# Patient Record
Sex: FEMALE | Race: BLACK OR AFRICAN AMERICAN | NOT HISPANIC OR LATINO | Employment: OTHER | URBAN - METROPOLITAN AREA
[De-identification: names, ages, dates, MRNs, and addresses within clinical notes are randomized per-mention and may not be internally consistent; named-entity substitution may affect disease eponyms.]

---

## 2017-02-23 PROBLEM — E03.9 ACQUIRED HYPOTHYROIDISM: Status: ACTIVE | Noted: 2017-02-23

## 2017-11-13 PROBLEM — E03.9 ACQUIRED HYPOTHYROIDISM: Status: RESOLVED | Noted: 2017-02-23 | Resolved: 2017-11-13

## 2018-01-30 ENCOUNTER — TELEPHONE (OUTPATIENT)
Dept: ADMINISTRATIVE | Facility: HOSPITAL | Age: 73
End: 2018-01-30

## 2018-05-15 ENCOUNTER — HOSPITAL ENCOUNTER (OUTPATIENT)
Dept: RADIOLOGY | Facility: HOSPITAL | Age: 73
Discharge: HOME | End: 2018-05-15
Attending: FAMILY MEDICINE
Payer: MEDICARE

## 2018-05-15 DIAGNOSIS — N95.9 MENOPAUSAL AND PERIMENOPAUSAL DISORDER: ICD-10-CM

## 2018-05-15 DIAGNOSIS — Z00.00 HEALTHCARE MAINTENANCE: ICD-10-CM

## 2018-05-15 PROCEDURE — 77080 DXA BONE DENSITY AXIAL: CPT | Mod: TC,PO

## 2018-09-24 DIAGNOSIS — R92.8 ABNORMAL MAMMOGRAM: Primary | ICD-10-CM

## 2019-02-12 ENCOUNTER — TELEPHONE (OUTPATIENT)
Dept: ADMINISTRATIVE | Facility: HOSPITAL | Age: 74
End: 2019-02-12

## 2019-08-01 ENCOUNTER — TELEPHONE (OUTPATIENT)
Dept: ADMINISTRATIVE | Facility: HOSPITAL | Age: 74
End: 2019-08-01

## 2019-08-01 ENCOUNTER — PATIENT OUTREACH (OUTPATIENT)
Dept: ADMINISTRATIVE | Facility: HOSPITAL | Age: 74
End: 2019-08-01

## 2019-12-05 PROBLEM — Z12.11 SCREENING FOR COLON CANCER: Status: ACTIVE | Noted: 2019-12-05

## 2020-03-02 ENCOUNTER — TELEPHONE (OUTPATIENT)
Dept: ADMINISTRATIVE | Facility: HOSPITAL | Age: 75
End: 2020-03-02

## 2020-05-27 ENCOUNTER — TELEPHONE (OUTPATIENT)
Dept: ADMINISTRATIVE | Facility: HOSPITAL | Age: 75
End: 2020-05-27

## 2021-01-11 PROBLEM — Z90.710 S/P VAGINAL HYSTERECTOMY: Status: ACTIVE | Noted: 2021-01-11

## 2021-01-11 PROBLEM — N81.4 UTERINE PROLAPSE: Status: ACTIVE | Noted: 2021-01-11

## 2021-02-23 PROBLEM — N39.46 MIXED URINARY INCONTINENCE DUE TO FEMALE GENITAL PROLAPSE: Status: ACTIVE | Noted: 2021-02-23

## 2021-02-23 PROBLEM — N81.9 MIXED URINARY INCONTINENCE DUE TO FEMALE GENITAL PROLAPSE: Status: ACTIVE | Noted: 2021-02-23

## 2021-04-13 PROBLEM — G25.81 RLS (RESTLESS LEGS SYNDROME): Status: ACTIVE | Noted: 2021-04-13

## 2021-12-17 DIAGNOSIS — R92.8 ABNORMAL FINDING ON BREAST IMAGING: Primary | ICD-10-CM

## 2022-02-16 ENCOUNTER — PATIENT MESSAGE (OUTPATIENT)
Dept: ADMINISTRATIVE | Facility: HOSPITAL | Age: 77
End: 2022-02-16
Payer: MEDICARE

## 2022-04-19 ENCOUNTER — HOSPITAL ENCOUNTER (OUTPATIENT)
Dept: RADIOLOGY | Facility: HOSPITAL | Age: 77
Discharge: HOME | End: 2022-04-19
Payer: MEDICARE

## 2022-04-19 DIAGNOSIS — C50.912 INVASIVE LOBULAR CARCINOMA OF LEFT BREAST IN FEMALE: ICD-10-CM

## 2022-05-03 ENCOUNTER — HOSPITAL ENCOUNTER (OUTPATIENT)
Dept: RADIOLOGY | Facility: HOSPITAL | Age: 77
Discharge: HOME | End: 2022-05-03
Payer: MEDICARE

## 2022-05-03 PROCEDURE — 77049 MRI BREAST W/WO CONTRAST, W/CAD, BILATERAL: ICD-10-PCS | Mod: 26,,, | Performed by: RADIOLOGY

## 2022-05-03 PROCEDURE — 77049 MRI BREAST C-+ W/CAD BI: CPT | Mod: TC

## 2022-05-03 PROCEDURE — 25500020 PHARM REV CODE 255

## 2022-05-03 PROCEDURE — A9577 INJ MULTIHANCE: HCPCS

## 2022-05-03 PROCEDURE — 77049 MRI BREAST C-+ W/CAD BI: CPT | Mod: 26,,, | Performed by: RADIOLOGY

## 2022-05-03 RX ADMIN — GADOBENATE DIMEGLUMINE 20 ML: 529 INJECTION, SOLUTION INTRAVENOUS at 07:05

## 2022-05-06 PROBLEM — C50.912 LOBULAR CARCINOMA OF LEFT BREAST: Status: ACTIVE | Noted: 2022-05-06

## 2022-05-06 PROBLEM — C50.112 MALIGNANT NEOPLASM OF CENTRAL PORTION OF LEFT FEMALE BREAST: Status: ACTIVE | Noted: 2022-05-06

## 2022-05-13 ENCOUNTER — HOSPITAL ENCOUNTER (OUTPATIENT)
Dept: RADIOLOGY | Facility: HOSPITAL | Age: 77
Discharge: HOME | End: 2022-05-13
Attending: SURGERY
Payer: MEDICARE

## 2022-05-13 DIAGNOSIS — C50.912 LOBULAR CARCINOMA OF LEFT BREAST: ICD-10-CM

## 2022-05-13 DIAGNOSIS — Z17.0 MALIGNANT NEOPLASM OF CENTRAL PORTION OF LEFT BREAST IN FEMALE, ESTROGEN RECEPTOR POSITIVE: ICD-10-CM

## 2022-05-13 DIAGNOSIS — C50.112 MALIGNANT NEOPLASM OF CENTRAL PORTION OF LEFT BREAST IN FEMALE, ESTROGEN RECEPTOR POSITIVE: ICD-10-CM

## 2022-05-13 DIAGNOSIS — D48.7 NEOPLASM OF UNCERTAIN BEHAVIOR OF OTHER SPECIFIED SITES: ICD-10-CM

## 2022-05-13 PROCEDURE — 25500020 PHARM REV CODE 255: Performed by: SURGERY

## 2022-05-13 PROCEDURE — A9698 NON-RAD CONTRAST MATERIALNOC: HCPCS | Performed by: SURGERY

## 2022-05-13 PROCEDURE — 78815 PET IMAGE W/CT SKULL-THIGH: CPT | Mod: 26,PS,, | Performed by: RADIOLOGY

## 2022-05-13 PROCEDURE — 78815 NM PET CT ROUTINE: ICD-10-PCS | Mod: 26,PS,, | Performed by: RADIOLOGY

## 2022-05-13 PROCEDURE — 78815 PET IMAGE W/CT SKULL-THIGH: CPT | Mod: TC

## 2022-05-13 RX ADMIN — IOHEXOL 500 ML: 9 SOLUTION ORAL at 03:05

## 2022-05-16 LAB
POCT GLUCOSE: 44 MG/DL (ref 70–110)
POCT GLUCOSE: 97 MG/DL (ref 70–110)

## 2022-05-23 ENCOUNTER — TELEPHONE (OUTPATIENT)
Dept: HEMATOLOGY/ONCOLOGY | Facility: CLINIC | Age: 77
End: 2022-05-23
Payer: MEDICARE

## 2022-05-23 NOTE — TELEPHONE ENCOUNTER
Received message to assist pt with medical oncology appt here at main campus as pt lives closer. Called & spoke with pt, she will see Dr. Lesia Zavala this Friday afternoon. Reviewed location & confirmed appt. Provided my direct number should she need anything in the interim.

## 2022-05-27 ENCOUNTER — OFFICE VISIT (OUTPATIENT)
Dept: HEMATOLOGY/ONCOLOGY | Facility: CLINIC | Age: 77
End: 2022-05-27
Payer: MEDICARE

## 2022-05-27 VITALS
HEART RATE: 62 BPM | RESPIRATION RATE: 18 BRPM | DIASTOLIC BLOOD PRESSURE: 63 MMHG | OXYGEN SATURATION: 95 % | HEIGHT: 67 IN | SYSTOLIC BLOOD PRESSURE: 139 MMHG | WEIGHT: 219.44 LBS | TEMPERATURE: 98 F | BODY MASS INDEX: 34.44 KG/M2

## 2022-05-27 DIAGNOSIS — Z17.0 MALIGNANT NEOPLASM OF CENTRAL PORTION OF LEFT BREAST IN FEMALE, ESTROGEN RECEPTOR POSITIVE: ICD-10-CM

## 2022-05-27 DIAGNOSIS — C50.912 LOBULAR CARCINOMA OF LEFT BREAST: Primary | ICD-10-CM

## 2022-05-27 DIAGNOSIS — C50.112 MALIGNANT NEOPLASM OF CENTRAL PORTION OF LEFT BREAST IN FEMALE, ESTROGEN RECEPTOR POSITIVE: ICD-10-CM

## 2022-05-27 PROCEDURE — 99999 PR PBB SHADOW E&M-EST. PATIENT-LVL IV: ICD-10-PCS | Mod: PBBFAC,,, | Performed by: INTERNAL MEDICINE

## 2022-05-27 PROCEDURE — 99999 PR PBB SHADOW E&M-EST. PATIENT-LVL IV: CPT | Mod: PBBFAC,,, | Performed by: INTERNAL MEDICINE

## 2022-05-27 PROCEDURE — 99205 OFFICE O/P NEW HI 60 MIN: CPT | Mod: S$PBB,,, | Performed by: INTERNAL MEDICINE

## 2022-05-27 PROCEDURE — 99214 OFFICE O/P EST MOD 30 MIN: CPT | Mod: PBBFAC | Performed by: INTERNAL MEDICINE

## 2022-05-27 PROCEDURE — 99205 PR OFFICE/OUTPT VISIT, NEW, LEVL V, 60-74 MIN: ICD-10-PCS | Mod: S$PBB,,, | Performed by: INTERNAL MEDICINE

## 2022-05-27 NOTE — PROGRESS NOTES
CONSULT NOTE    Subjective:       Patient ID: Kee Ramirez is a 76 y.o. female.  MRN: [id number]  : 1945    Chief Complaint: No chief complaint on file.      History of Present Illness:   Kee Ramirez is a 76 y.o. female who is referred for newly diagnosed ILC of the left breast.     Reports yearly mammograms, normal in . In  screening mammogram showed left breast architectural distortion. Diagnostic mammogram in March showed a 21 x 9  X 2 0 mm left breast mass. US guided biopsy showed ILC, ER strongly positive, NM 15% positive, Her 2 rhina negative.   A breast MRI showed a 7.6 x 4.7 cm mass with spiculated margins.No abnormal lymph nodes were found.     No history of breast mass or biopsies. She has been seeing Dr. Ortiz and is transferring care to use due to proximity. See full oncology history below.   She is s/p port placement and is here to discuss neoadjuvant chemotherapy.     Gyn history:  Menarche 14  , Age at first live birth 25  Menopause at early 60's   OCP use for 4 years.   HRT Premarin, brief, 3 months after hysterectomy in  for uterine prolapse.     Colonoscopy done 2019, repeat due .     FH:  Breast cancer younger sister in her 60's  Another sister had gall bladder cancer was older than 50      Oncology History:  As dcoumented by  and updated     2022 Left Breast, Core Needle Biopsies:   - Invasive lobular carcinoma, well-differentiated .   - Bloom-Damon score (5/9):        - Tubular Formation: 3/3        - Nuclear Pleomorphism: 1/3        - Mitotic Activity: 1/3.   - Background of lobular carinoma in-situ (LCIS), nuclear grade 1.   - No perineural or lymphovascular invasion is identified.   Estrogen receptor: Positive (strong intensity, >95% of tumor cell nuclei).   Progesterone receptor: Positive (strong intensity, 15% of tumor cell nuclei).   HER2 IHC: Negative.   Ki-67: 10%.  2022 [facility] --  Surgery FU w/Oncotype  04.06.2022  follow up  -Breast MRI for staging, discussed surgery options; pt would like BCT, will make definitive plan after MRI  05.03.2022 Breast MRI  -Irregular 7.6 cm enhancing spiculated mass in the upper LEFT breast, consistent with the patient's known biopsy-proven invasive lobular carcinoma. The enhancement does extend to the nipple-areolar complex.  -No suspicious abnormality in the RIGHT breast.   BI-RADS Category: Overall: 6 - known biopsy-proven malignancy  05.06.2022 Follow up with [facility]  -Plan for Axillary U/S to assess LAD, PetCT  -HemOnc referral for NA therapy. Due to size of mass/location and nature of lobular cancer, was not felt that BCT was optimal unless NA therapy was done 1st   -2 week follow up to discuss further   05.13.2022 PetCT  -Mildly hypermetabolic irregular soft tissue within the left breast in keeping with known lobular carcinoma. This mass is better appreciated on MRI of the breast.  -No definite evidence of metastatic disease.  Please note that FDG PET has has suboptimal sensitivity for certain histologies such as lobular and tubular carcinomas.  05.18.2022 Initial [facility] eval    History:  Past Medical History:   Diagnosis Date    Cancer     Diabetes mellitus, type 2     Hyperlipidemia     Hypertension     Midline low back pain without sciatica 07/31/2015    Thyroid disease     Venous stasis     bilateral legs      Past Surgical History:   Procedure Laterality Date    COLONOSCOPY      COLONOSCOPY N/A 12/5/2019    Procedure: COLONOSCOPY;  Surgeon: Luis Bogran-Reyes, MD;  Location: [facility];  Service: Endoscopy;  Laterality: N/A;    INSERTION OF TUNNELED CENTRAL VENOUS CATHETER (CVC) WITH SUBCUTANEOUS PORT Right 5/24/2022    Procedure: KONMLLAUK-TFIN-O-CATH;  Surgeon: Darien Bear MD;  Location: [facility];  Service: General;  Laterality: Right;    SHOULDER ARTHROSCOPY W/ ROTATOR CUFF REPAIR Right 2001    TUBAL LIGATION      VAGINAL HYSTERECTOMY  "N/A 1/11/2021    Procedure: HYSTERECTOMY, VAGINAL ;  Surgeon: Jessie Dacosta MD;  Location: [facility];  Service: OB/GYN;  Laterality: N/A;     Family History   Problem Relation Age of Onset    Diabetes Mother     Hypertension Mother     Arthritis Mother     COPD Father     Hypertension Sister     Diabetes Sister     Cancer Sister         PANCREATIC      Social History     Tobacco Use    Smoking status: Never Smoker    Smokeless tobacco: Never Used   Substance and Sexual Activity    Alcohol use: No     Alcohol/week: 0.0 standard drinks    Drug use: No    Sexual activity: Not Currently     Partners: Male     Birth control/protection: None, See Surgical Hx        ROS:   Review of Systems   Constitutional: Negative for fever, malaise/fatigue and weight loss.   HENT: Negative for congestion, ear pain, nosebleeds and sore throat.    Eyes: Negative for blurred vision, double vision and photophobia.   Respiratory: Negative for cough, hemoptysis, sputum production, shortness of breath, wheezing and stridor.    Cardiovascular: Negative for chest pain, palpitations, orthopnea and leg swelling.   Gastrointestinal: Negative for abdominal pain, blood in stool, constipation, diarrhea, heartburn, melena, nausea and vomiting.   Genitourinary: Negative for dysuria, frequency and hematuria.   Musculoskeletal: Negative for back pain, myalgias and neck pain.   Skin: Negative for itching and rash.   Neurological: Negative for dizziness, focal weakness, seizures, weakness and headaches.   Endo/Heme/Allergies: Negative for polydipsia. Does not bruise/bleed easily.   Psychiatric/Behavioral: Negative for depression and memory loss. The patient is nervous/anxious. The patient does not have insomnia.         Objective:     Vitals:    05/27/22 1533   BP: 139/63   Pulse: 62   Resp: 18   Temp: 98 °F (36.7 °C)   TempSrc: Oral   SpO2: 95%   Weight: 99.6 kg (219 lb 7.5 oz)   Height: 5' 7" (1.702 m)   PainSc: 0-No pain       Physical " Examination:   Physical Exam  Vitals and nursing note reviewed.   Constitutional:       General: She is not in acute distress.     Appearance: She is not diaphoretic.   HENT:      Head: Normocephalic.      Mouth/Throat:      Pharynx: No oropharyngeal exudate.   Eyes:      General: No scleral icterus.     Conjunctiva/sclera: Conjunctivae normal.   Neck:      Thyroid: No thyromegaly.   Cardiovascular:      Rate and Rhythm: Normal rate and regular rhythm.      Heart sounds: Normal heart sounds. No murmur heard.  Pulmonary:      Effort: Pulmonary effort is normal. No respiratory distress.      Breath sounds: No stridor. No wheezing or rales.   Chest:      Chest wall: No tenderness.   Abdominal:      General: Bowel sounds are normal. There is no distension.      Palpations: Abdomen is soft. There is no mass.      Tenderness: There is no abdominal tenderness. There is no rebound.   Musculoskeletal:         General: No tenderness or deformity. Normal range of motion.      Cervical back: Neck supple.   Lymphadenopathy:      Cervical: No cervical adenopathy.   Skin:     General: Skin is warm and dry.      Findings: No erythema or rash.      Comments: + left breat mass at 12 0 clock, 7-8 cm, firmness felt just under the Nipple as well with some flattening of the nipple. No obvious extension to the skin   Neurological:      Mental Status: She is alert and oriented to person, place, and time.      Cranial Nerves: No cranial nerve deficit.      Coordination: Coordination normal.      Gait: Gait is intact.   Psychiatric:         Mood and Affect: Affect normal.         Cognition and Memory: Memory normal.         Judgment: Judgment normal.          Diagnostic Tests:  Significant Imaging: I have reviewed and interpreted all pertinent imaging results/findings.        Laboratory Data:  All pertinent labs have been reviewed.    Labs:   Lab Results   Component Value Date    WBC 6.98 03/04/2022    HGB 10.6 (L) 03/04/2022    HCT 33.8 (L)  03/04/2022    MCV 94 03/04/2022     03/04/2022       Assessment/Plan:   Lobular carcinoma of left breast  Malignant neoplasm of central portion of left breast in female, estrogen receptor positive  cT3N0, ER 95%, PR15%, Her 2 rhina negative, Grade 1, ki 67 10%     She is interested in breast conservation and is not a candidate for upfront lumpectomy given tumor size and extension to the nipple areolar complex.  I will refer her to breast Oncology for an opinion at this time as she will be undergoing surgery with us.    Neoadjuvant chemotherapy has been planned, she is status post Port-A-Cath placement.  We did discuss that given favorable grade, on more than receptor-positive status and lobular histology of her breast cancer, we may not see a robust response to neoadjuvant chemotherapy.  She does want to undergo chemotherapy to try, otherwise she will agree to mastectomy if she has a suboptimal response to neoadjuvant chemotherapy.    She has a good performance status, as well-controlled hypertension and diabetes that will be monitor closely throughout chemo.  I will obtain a baseline 2D echo and offer her AC-T if normal.  Otherwise, docetaxel and Cytoxan would be preferred.    I will see her back in a week to finalize our plans.     ECOG SCORE    1 - Restricted in strenuous activity-ambulatory and able to carry out work of a light nature           Discussion:   No follow-ups on file.    Plan was discussed with the patient at length, and she verbalized understanding. Kee was given an opportunity to ask questions that were answered to her satisfaction, and she was advised to call in the interval if any problems or questions arise.    Electronically signed by Mitali Carlin MD          Answers for HPI/ROS submitted by the patient on 5/25/2022  appetite change : No  unexpected weight change: No  mouth sores: No  visual disturbance: No  adenopathy: No    Route Chart for Scheduling    Med Onc Chart  Routing      Follow up with physician . See me 6/7 with labs    Follow up with KAYLYN    Labs CMP and CBC   Lab interval:     Imaging    Pharmacy appointment    Other referrals          Treatment Plan Information   OP BREAST DOSE-DENSE AC-T (DOXORUBICIN CYCLOPHOSPHAMIDE Q2W FOLLOWED BY PACLITAXEL WEEKLY)   Mitali Carlin MD   Upcoming Treatment Dates - OP BREAST DOSE-DENSE AC-T (DOXORUBICIN CYCLOPHOSPHAMIDE Q2W FOLLOWED BY PACLITAXEL WEEKLY)    5/27/2022       Chemotherapy       DOXOrubicin chemo injection 130 mg       cyclophosphamide 600 mg/m2 = 1,300 mg in sodium chloride 0.9% 250 mL chemo infusion  6/10/2022       Chemotherapy       DOXOrubicin chemo injection 130 mg       cyclophosphamide 600 mg/m2 = 1,300 mg in sodium chloride 0.9% 250 mL chemo infusion  6/24/2022       Chemotherapy       DOXOrubicin chemo injection 130 mg       cyclophosphamide 600 mg/m2 = 1,300 mg in sodium chloride 0.9% 250 mL chemo infusion  7/8/2022       Chemotherapy       DOXOrubicin chemo injection 130 mg       cyclophosphamide 600 mg/m2 = 1,300 mg in sodium chloride 0.9% 250 mL chemo infusion

## 2022-05-27 NOTE — PLAN OF CARE
START ON PATHWAY REGIMEN - Breast    ICD673        Doxorubicin (Adriamycin)       Cyclophosphamide (Cytoxan)       Pegfilgrastim-xxxx       Paclitaxel (Taxol)           Additional Orders: Assess LVEF prior to initiation of doxorubicin and as   clinically indicated during and after treatment. Doxorubicin can cause   myocardial damage, including acute left ventricular failure. Risk of   cardiomyopathy is generally proportional to cumulative   anthracycline/anthracenedione exposure. Use of concomitant cardiotoxic agents,   previous radiotherapy to the mediastinum, or presence/history of cardiovascular   disease can additionally increase cardiomyopathy risk. See PI for details.    **Always confirm dose/schedule in your pharmacy ordering system**    Patient Characteristics:  Preoperative or Nonsurgical Candidate (Clinical Staging), Neoadjuvant Therapy   followed by Surgery, Invasive Disease, Chemotherapy, HER2   Negative/Unknown/Equivocal, ER Positive  Therapeutic Status: Preoperative or Nonsurgical Candidate (Clinical Staging)  AJCC M Category: cM0  AJCC Grade: G1  Breast Surgical Plan: Neoadjuvant Therapy followed by Surgery  ER Status: Positive (+)  AJCC 8 Stage Grouping: IIA  HER2 Status: Negative (-)  AJCC T Category: cT3  AJCC N Category: cN0  NH Status: Positive (+)  Intent of Therapy:  Curative Intent, Discussed with Patient

## 2022-05-30 ENCOUNTER — TELEPHONE (OUTPATIENT)
Dept: HEMATOLOGY/ONCOLOGY | Facility: CLINIC | Age: 77
End: 2022-05-30
Payer: MEDICARE

## 2022-05-30 NOTE — NURSING
Oncology Navigation   Intake  Date of Diagnosis: 3/21/2022  Cancer Type: Breast  Internal / External Referral: Internal  Date Worked: 5/30/2022  First Appointment Available: 5/27/2022  Appointment Date: 5/27/2022  First Available Date vs. Scheduled Date (days): 0     Treatment  Current Status: Active    Surgery: Planned  Surgical Oncologist: Dr.Amy Valente  Consult Date: 6/7/2022    Medical Oncologist: Dr.Rabia Carlin  Consult Date: 5/27/2022  Chemotherapy: Planned       Procedures: Biopsy; MRI; Echo; PET scan; Port / PICC  Biopsy Schedule Date: 3/21/2022  Echo Schedule Date: 6/6/2022  MRI Schedule Date: 5/3/2022  PET Scan Schedule Date: 5/13/2022  Port / PICC Schedule Date: 5/18/2022       ER: Positive  DE: Positive  Her2: Negative           Acuity      Follow Up  No follow-ups on file.

## 2022-05-30 NOTE — TELEPHONE ENCOUNTER
Per , pt needs appt with breast surgery prior to starting neoadjuvant chemo. Scheduled with  next Tuesday. Attempted to call & review, no answer, left voicemail.

## 2022-06-01 ENCOUNTER — TELEPHONE (OUTPATIENT)
Dept: HEMATOLOGY/ONCOLOGY | Facility: CLINIC | Age: 77
End: 2022-06-01
Payer: MEDICARE

## 2022-06-05 NOTE — PROGRESS NOTES
Breast Surgery  [facility]  Department of Surgery      REFERRING PROVIDER: Mitali Carlin MD  [address] [place] [address]    Chief Complaint: Breast Cancer (NP Breast Cancer)      Subjective:      Patient ID: Kee Ramirez is a 76 y.o. female with HTN, T2DM, hypothyroidism who presents with invasive lobular carcinoma of the left breast. She initially underwent screening mammogram on 12/15/21 which showed architectural distortion in the upper outer quadrant of the left breast and a focal asymmetry in the upper central portion of the right breast.    Follow-up mammogram (3/4/22) and ultrasound (3/4/22) showed 21 mm x 9 mm x 20 mm irregularly shaped, hypoechoic mass with indistinct margins in the upper central portion of the left breast and a 4 mm simple cyst seen in the inner central region of the right breast. A ultrasound guided biopsy was performed on 3/21/22 with pathology revealing infiltrating lobular carcinoma of the breast. She underwent MRI on 5/3/22 which showed 7.6 cm (anterior-posterior) x 4.7 cm (transverse) x 3.9 cm (superior-inferior) irregular heterogeneously enhancing mass with spiculated margins in the upper left breast at anterior-middle depth that extends to the NAC. She presented to [facility] and was evaluated by the surgery clinic there. At that time, she was desiring breast conserving surgery. She was not a candidate for that at the time and was referred to oncology for neoadjuvant therapy. She underwent PET scan which did not show evidence of metastatic disease. She has had her port placed on 5/24 with plans to start chemotherapy on 6/10. She sees Dr. Carlin today. She has not had genetic testing. She presents today to establish care at [facility] as it is closer to her home.     Patient does not routinely do self breast exams.  Patient has noted a change on breast exam.  Patient denies nipple discharge. Patient denies to previous breast biopsy. Patient denies a  personal history of breast cancer.    Findings at that time were the following:   Tumor size: 2.1 cm   Tumor ndgndrndanddndend:nd nd2nd Estrogen Receptor: positive  Progesterone Receptor: positive   Her-2 rhina: negative   Lymph node status: clinically negative   Lymphatic invasion: none identified     GYN History:  Age of menarche was 14. Age of menopause was 62.  Last menstrual period was around age 62. Patient denies hormonal therapy. Patient is . Age of first live birth was 25. Patient did not breast feed.      Family History:  Sister - breast cancer diagnosed in her 60s    Past Medical History:   Diagnosis Date    Cancer     Diabetes mellitus, type 2     Hyperlipidemia     Hypertension     Midline low back pain without sciatica 2015    Thyroid disease     Venous stasis     bilateral legs     Past Surgical History:   Procedure Laterality Date    COLONOSCOPY      COLONOSCOPY N/A 2019    Procedure: COLONOSCOPY;  Surgeon: Luis Bogran-Reyes, MD;  Location: Pending sale to [facility];  Service: Endoscopy;  Laterality: N/A;    INSERTION OF TUNNELED CENTRAL VENOUS CATHETER (CVC) WITH SUBCUTANEOUS PORT Right 2022    Procedure: [id number];  Surgeon: Darien Bear MD;  Location: [facility];  Service: General;  Laterality: Right;    SHOULDER ARTHROSCOPY W/ ROTATOR CUFF REPAIR Right     TUBAL LIGATION      VAGINAL HYSTERECTOMY N/A 2021    Procedure: HYSTERECTOMY, VAGINAL ;  Surgeon: Jessie Dacosta MD;  Location: [facility];  Service: OB/GYN;  Laterality: N/A;     Current Outpatient Medications on File Prior to Visit   Medication Sig Dispense Refill    amLODIPine (NORVASC) 10 MG tablet Take 1 tablet (10 mg total) by mouth once daily. 90 tablet 3    aspirin (ECOTRIN) 81 MG EC tablet Take 81 mg by mouth once daily.      cloNIDine (CATAPRES) 0.1 MG tablet TAKE 1 TABLET BY MOUTH THREE TIMES DAILY 90 tablet 11    gabapentin (NEURONTIN) 300 MG capsule Take 1 capsule (300 mg total) by mouth 3 (three) times daily.  90 capsule 5    ibuprofen (ADVIL,MOTRIN) 800 MG tablet Take 1 tablet (800 mg total) by mouth 3 (three) times daily. 90 tablet 3    levothyroxine (SYNTHROID) 125 MCG tablet Take 1 tablet (125 mcg total) by mouth before breakfast. 90 tablet 3    losartan (COZAAR) 100 MG tablet Take 1 tablet (100 mg total) by mouth once daily. 90 tablet 3    lovastatin (MEVACOR) 20 MG tablet Take 2 tablets (40 mg total) by mouth every evening. 180 tablet 3    metFORMIN (GLUCOPHAGE) 500 MG tablet Take 1 tablet (500 mg total) by mouth 2 (two) times daily with meals. 180 tablet 3    methylcellulose (ARTIFICIAL TEARS) 1 % ophthalmic solution Place 1 drop into both eyes as needed.      multivitamin (THERAGRAN) per tablet Take 1 tablet by mouth once daily.      oxybutynin (DITROPAN) 5 MG Tab Take 1 tablet (5 mg total) by mouth 3 (three) times daily. 90 tablet 11    rOPINIRole (REQUIP) 1 MG tablet Take 1 tablet (1 mg total) by mouth every evening. 90 tablet 3    traZODone (DESYREL) 50 MG tablet Take 1 tablet (50 mg total) by mouth every evening. 30 tablet 11    mupirocin (BACTROBAN) 2 % ointment       oxyCODONE (ROXICODONE) 5 MG immediate release tablet Take 1 tablet (5 mg total) by mouth every 6 (six) hours as needed for Pain. (Patient not taking: Reported on 5/27/2022) 5 tablet 0    triamcinolone acetonide 0.1% (KENALOG) 0.1 % cream Apply topically 2 (two) times daily. To affected skin location. 1 each 0    [DISCONTINUED] conjugated estrogens (PREMARIN) vaginal cream Place 1 g vaginally twice a week. (Patient not taking: No sig reported) 30 g 3     No current facility-administered medications on file prior to visit.     Social History     Socioeconomic History    Marital status:     Years of education: college   Tobacco Use    Smoking status: Never Smoker    Smokeless tobacco: Never Used   Substance and Sexual Activity    Alcohol use: No     Alcohol/week: 0.0 standard drinks    Drug use: No    Sexual activity:  "Not Currently     Partners: Male     Birth control/protection: None, See Surgical Hx     Social Determinants of Health     Financial Resource Strain: Low Risk     Difficulty of Paying Living Expenses: Not very hard   Food Insecurity: No Food Insecurity    Worried About Running Out of Food in the Last Year: Never true    Ran Out of Food in the Last Year: Never true   Transportation Needs: No Transportation Needs    Lack of Transportation (Medical): No    Lack of Transportation (Non-Medical): No   Physical Activity: Unknown    Days of Exercise per Week: 0 days   Stress: No Stress Concern Present    Feeling of Stress : Only a little   Social Connections: Unknown    Frequency of Communication with Friends and Family: More than three times a week    Frequency of Social Gatherings with Friends and Family: Three times a week    Active Member of Clubs or Organizations: Yes    Attends Club or Organization Meetings: Patient refused    Marital Status:    Housing Stability: Low Risk     Unable to Pay for Housing in the Last Year: No    Number of Places Lived in the Last Year: 1    Unstable Housing in the Last Year: No     Family History   Problem Relation Age of Onset    Diabetes Mother     Hypertension Mother     Arthritis Mother     COPD Father     Hypertension Sister     Diabetes Sister     Cancer Sister         PANCREATIC        Review of Systems   Respiratory: Negative.    Cardiovascular: Negative.    Gastrointestinal: Negative.    Hematological: Negative.    All other systems reviewed and are negative.    Objective:   BP (!) 172/73 (BP Location: Right arm, Patient Position: Sitting, BP Method: Large (Automatic))   Pulse (!) 53   Ht 5' 7" (1.702 m)   Wt 98.9 kg (218 lb)   BMI 34.14 kg/m²     Physical Exam   Constitutional: She is oriented to person, place, and time. She appears well-developed and well-nourished.   HENT:   Head: Normocephalic and atraumatic.   Eyes: Pupils are equal, round, " and reactive to light.   Cardiovascular: Normal rate and regular rhythm.    Pulmonary/Chest: Effort normal. No respiratory distress. Right breast exhibits no inverted nipple, no mass, no nipple discharge, no skin change and no tenderness. Left breast exhibits mass. Left breast exhibits no inverted nipple, no nipple discharge, no skin change and no tenderness.       Neurological: She is alert and oriented to person, place, and time.   Skin: Skin is warm and dry.         Radiology review: Images personally reviewed by me in the clinic.   Final Pathologic Diagnosis   Date/Time Value Ref Range Status   03/21/2022 09:32 AM (A)  Corrected    Left Breast, Core Needle Biopsies:  - Invasive lobular carcinoma, well-differentiated .  - Bloom-Damon score (5/9):       - Tubular Formation: 3/3       - Nuclear Pleomorphism: 1/3       - Mitotic Activity: 1/3.  - Background of lobular carinoma in-situ (LCIS), nuclear grade 1.  - No perineural or lymphovascular invasion is identified.  Note: Prognostic studies are pending and will be reported in an addendum to  follow.       Comment:     Interp By Avni Almodovar III, M.D., Signed on 03/28/2022 at 09:17         No matching staging information was found for the patient.  Cancer Staging  Malignant neoplasm of central portion of left female breast  Staging form: Breast, AJCC 8th Edition  - Clinical: Stage IIA (cT3, cN0, cM0, G1, ER+, ND+, HER2-) - Signed by Mitali Carlin MD on 6/7/2022    Pathology Results  (Last 10 years)               03/21/22 0932  Specimen to Pathology, Radiology Breast, needle biopsy (Abnormal) Edited Result - FINAL    Narrative:  Ultrasound guided left breast mass biopsy   5 - 14 gauge core biopsy specimen were obtained and submitted   to pathology in 1 jar   Release to patient->Immediate       01/11/21 0915  Specimen to Pathology, Surgery Gynecology and Obstetrics Final result    Narrative:  Pre-op Diagnosis: Uterine prolapse [N81.4]   Procedure(s):    HYSTERECTOMY, VAGINAL   Number of specimens: 1   Name of specimens: uterus and cervix   Specimen total (fresh, frozen, permanent):->1       12/05/19 0844  Specimen to Pathology, Surgery General Surgery Final result    Narrative:  screening   Procedure(s):   COLONOSCOPY   Number of specimens: 1   Name of specimens: 1. Rectal polyp @ 0844   Specimen total (fresh, frozen, permanent):->1                  X-Ray Chest 1 View    Result Date: 5/24/2022  EXAMINATION: XR CHEST 1 VIEW CLINICAL HISTORY: post port a cath placement; COMPARISON: AP portable chest 01/11/2021. FINDINGS: Right IJ Port-A-Cath in place.  Subtle patchy airspace opacities are questioned right mid and lower lung zones peripherally and are patchy airspace opacities left base raising possibility of multifocal airspace disease/possible multifocal pneumonia.  No effusion seen. Degenerative change glenohumeral joints.     Subtle patchy airspace opacities are questioned right mid and lower lung zones peripherally and also questioned left base raising possibility of subtle multifocal airspace disease.  PA and lateral views of chest recommended to better assess. Electronically signed by: Keila Wiggins MD Date:    05/24/2022 Time:    10:59    NM PET CT Routine FDG    Result Date: 5/13/2022  EXAMINATION: NM PET CT ROUTINE CLINICAL HISTORY: malignant neoplasm breast; Malignant neoplasm of unspecified site of left female breast TECHNIQUE: 11.98 mCi of F18-FDG was administered intravenously in the right forearm.  After an approximately 60 min distribution time, PET/CT images were acquired from the skull base to thigh.  Transmission images were acquired to correct for attenuation using a whole body low-dose CT scan with oral contrast and without IV contrast with the arms positioned above the head. Glycemia at the time of injection was 97 mg/dL. COMPARISON: MRI of the breast 05/03/2022. FINDINGS: Quality of the study: Adequate. In the head and neck, there are no  hypermetabolic lesions worrisome for malignancy. There are no hypermetabolic mucosal lesions, and there are no pathologically enlarged or hypermetabolic lymph nodes.  There is likely physiologic prominent uptake within Waldeyer's ring. In the chest, there is mildly hypermetabolic irregular soft tissue within the upper aspect of the left breast measuring proximally 6.4 cm in maximum axial dimension with a maximum SUV of 1.8 on image 63.  There are no concerning pulmonary nodules or masses, and there are no pathologically enlarged or hypermetabolic lymph nodes.  There are several non pathologically enlarged and non hypermetabolic axillary lymph nodes bilaterally. In the abdomen and pelvis, there is physiologic tracer distribution within the abdominal organs (including diffuse colonic uptake in keeping with metformin use) and excretion into the genitourinary system. In the bones, there are no definite hypermetabolic lesions worrisome for malignancy.  There mild-to-moderate S-shaped scoliosis of the thoracolumbar spine with associated degenerative changes. In the extremities, there are no hypermetabolic lesions worrisome for malignancy.  Mild periarticular uptake, right greater than left, in the shoulders is in keeping with soft tissue injury.     1.  Mildly hypermetabolic irregular soft tissue within the left breast in keeping with known lobular carcinoma.  This mass is better appreciated on MRI of the breast. 2.  No definite evidence of metastatic disease.  Please note that FDG PET has has suboptimal sensitivity for certain histologies such as lobular and tubular carcinomas. 3.  Other findings as above. Electronically signed by: Zak Patel Date:    05/13/2022 Time:    16:15    SURG FL Surgery Fluoro Usage    Result Date: 5/24/2022  See OP Notes for results. IMPRESSION: See OP Notes for results. This procedure was auto-finalized by: Virtual Radiologist    US Breast Left Limited    Result Date: 5/19/2022  Result: US  Breast Left Limited  History: Patient is 76 y.o. and is seen for diagnostic imaging. Films Compared: Compared to: 05/03/2022 MRI Breast w/wo Contrast, w/CAD, Bilateral, 03/21/2022 US Breast Biopsy with Imaging 1st site Left, 03/04/2022 Mammo Digital Diagnostic Bilat with George, 03/04/2022 US Breast Bilateral Limited, 12/15/2021 Mammo Digital Screening Bilat w/ George, 11/24/2020 Mammo Digital Diagnostic Bilat w/ George, 03/25/2019 Mammo Digital Diagnostic Bilat w/ George, 09/24/2018 US Breast Left Limited, 09/24/2018 Mammo Digital Diagnostic Left with CAD, 03/13/2018 US Breast Left Limited, 03/13/2018 Mammo Digital Diagnostic Left with CAD, 02/06/2018 Mammo Digital Screening Bilat with CAD, 12/16/2016 Mammo Digital Screening Bilat with CAD, 11/18/2015 Mammo Digital Screening Bilat with CAD, 10/27/2014 Mammo Digital Screening Bilat with CAD, and 10/22/2013 Mammo Digital Screening Bilat with Tomos  Findings:  There is a 20 mm x 19 mm x 16 mm irregularly shaped, hypoechoic mass with indistinct margins with shadowing seen in the upper central region of the left breast in the middle depth. Findings are similar in character as opposed to previous suggesting a poor response to chemotherapeutic regimen in a patient with documented history of lobular CIS.     Left Mass: Left breast 20 mm x 19 mm x 16 mm mass at the upper central middle position. Assessment: 3 - Probably benign. Short Interval Follow-Up in 3 Months is recommended. BI-RADS Category: Overall: 3 - Probably Benign  Recommendation: Short Interval Follow-Up is recommended in 3 Months.     Assessment:       1. Malignant neoplasm of central portion of left breast in female, estrogen receptor positive    2. Pre-op testing    3. Lobular carcinoma of left breast        Plan:     Options for management were discussed with the patient and her family. We reviewed the existing data noting the equivalency of breast conserving surgery with radiation therapy and mastectomy. We also  reviewed the guidelines of the National Comprehensive Cancer Network for Stage 2 breast carcinoma. We discussed the need for lumpectomy margins to be negative for carcinoma, the necessity for postoperative radiation therapy after breast conservation in most cases, the possibility of a failed or false negative sentinel lymph node biopsy and the potential need for complete lymphadenectomy for a failed or positive sentinel lymph node biopsy were fully discussed. In the setting of mastectomy, delayed or immediate reconstruction options are available and were discussed.     In the setting of lumpectomy, radiation therapy would be recommended majority of the time.  The duration and treatment side effects were discussed with the patient.  This will coordinated with the radiation oncologist pending final pathology.    We also discussed the role of systemic therapy in the treatment of early stage breast cancer.  We discussed that this is based on tumor biology and marisol status and will be determined based on final pathology.  We discussed that if the cancer is hormone positive, endocrine therapy would be recommended in most cases and its use can reduce the risk of recurrence as well as improve survival. Side effects of treatment were briefly discussed. We also discussed the potential role for chemotherapy based on a number of factors such as tumor phenotype (ER+ vs. triple negative vs. Czf4tlp+) and this would be determined in coordination with the medical oncologist.    The patient has elected to proceed with oncotype testing to see if chemotherapy is necessary.  If not, I offered her surgery upfront vs endocrine therapy neoadjuvantly.  I discussed with Dr. Carlin.  The patient would really prefer to have a lumpectomy rather than mastectomy.  I explained that I think there is a low chance that we are able to convert her to a lumpectomy candidate with neoadjuvant therapy, but would be possible.  She would prefer to try and  would definitely prefer not to have chemo if it is not indicated.  The operative risks of bleeding, infection, recurrence, scarring, and anesthetic complications and the possibility of requiring further surgery were all noted.      Patient was educated on breast cancer, receptors, wire localization lumpectomy, mastectomy, sentinel lymph node mapping and biopsy, axillary lymph node dissection, reconstruction, breast prosthesis with post-mastectomy bra and radiation therapy. Patient was given patient information binder including [facility] breast cancer treatment brochure.  All her questions were answered.    Total time spent with the patient: 65 minutes.  45 minutes of face to face consultation and 20 minutes of chart review and coordination of care.

## 2022-06-06 ENCOUNTER — HOSPITAL ENCOUNTER (OUTPATIENT)
Dept: CARDIOLOGY | Facility: HOSPITAL | Age: 77
Discharge: HOME | End: 2022-06-06
Attending: INTERNAL MEDICINE
Payer: MEDICARE

## 2022-06-06 VITALS — HEART RATE: 56 BPM | BODY MASS INDEX: 35.47 KG/M2 | HEIGHT: 67 IN | WEIGHT: 226 LBS

## 2022-06-06 DIAGNOSIS — C50.912 LOBULAR CARCINOMA OF LEFT BREAST: ICD-10-CM

## 2022-06-06 DIAGNOSIS — Z17.0 MALIGNANT NEOPLASM OF CENTRAL PORTION OF LEFT BREAST IN FEMALE, ESTROGEN RECEPTOR POSITIVE: ICD-10-CM

## 2022-06-06 DIAGNOSIS — Z71.89 GOALS OF CARE, COUNSELING/DISCUSSION: ICD-10-CM

## 2022-06-06 DIAGNOSIS — C50.112 MALIGNANT NEOPLASM OF CENTRAL PORTION OF LEFT BREAST IN FEMALE, ESTROGEN RECEPTOR POSITIVE: ICD-10-CM

## 2022-06-06 PROCEDURE — 93356 MYOCRD STRAIN IMG SPCKL TRCK: CPT

## 2022-06-06 PROCEDURE — 93306 TTE W/DOPPLER COMPLETE: CPT | Mod: 26,,, | Performed by: INTERNAL MEDICINE

## 2022-06-06 PROCEDURE — 93306 ECHO (CUPID ONLY): ICD-10-PCS | Mod: 26,,, | Performed by: INTERNAL MEDICINE

## 2022-06-06 PROCEDURE — 93356 ECHO (CUPID ONLY): ICD-10-PCS | Mod: ,,, | Performed by: INTERNAL MEDICINE

## 2022-06-06 PROCEDURE — 93356 MYOCRD STRAIN IMG SPCKL TRCK: CPT | Mod: ,,, | Performed by: INTERNAL MEDICINE

## 2022-06-07 ENCOUNTER — LAB VISIT (OUTPATIENT)
Dept: LAB | Facility: HOSPITAL | Age: 77
End: 2022-06-07
Payer: MEDICARE

## 2022-06-07 ENCOUNTER — OFFICE VISIT (OUTPATIENT)
Dept: SURGERY | Facility: CLINIC | Age: 77
End: 2022-06-07
Payer: MEDICARE

## 2022-06-07 ENCOUNTER — OFFICE VISIT (OUTPATIENT)
Dept: HEMATOLOGY/ONCOLOGY | Facility: CLINIC | Age: 77
End: 2022-06-07
Payer: MEDICARE

## 2022-06-07 ENCOUNTER — DOCUMENTATION ONLY (OUTPATIENT)
Dept: HEMATOLOGY/ONCOLOGY | Facility: CLINIC | Age: 77
End: 2022-06-07

## 2022-06-07 VITALS
WEIGHT: 218 LBS | DIASTOLIC BLOOD PRESSURE: 69 MMHG | BODY MASS INDEX: 34.21 KG/M2 | SYSTOLIC BLOOD PRESSURE: 167 MMHG | HEIGHT: 67 IN | OXYGEN SATURATION: 100 % | RESPIRATION RATE: 18 BRPM | TEMPERATURE: 98 F | HEART RATE: 53 BPM

## 2022-06-07 VITALS
BODY MASS INDEX: 34.21 KG/M2 | DIASTOLIC BLOOD PRESSURE: 73 MMHG | HEIGHT: 67 IN | SYSTOLIC BLOOD PRESSURE: 172 MMHG | HEART RATE: 53 BPM | WEIGHT: 218 LBS

## 2022-06-07 DIAGNOSIS — C50.912 LOBULAR CARCINOMA OF LEFT BREAST: ICD-10-CM

## 2022-06-07 DIAGNOSIS — Z01.818 PRE-OP TESTING: ICD-10-CM

## 2022-06-07 DIAGNOSIS — C50.112 MALIGNANT NEOPLASM OF CENTRAL PORTION OF LEFT BREAST IN FEMALE, ESTROGEN RECEPTOR POSITIVE: Primary | ICD-10-CM

## 2022-06-07 DIAGNOSIS — Z17.0 MALIGNANT NEOPLASM OF CENTRAL PORTION OF LEFT BREAST IN FEMALE, ESTROGEN RECEPTOR POSITIVE: Primary | ICD-10-CM

## 2022-06-07 DIAGNOSIS — Z78.0 ASYMPTOMATIC MENOPAUSAL STATE: ICD-10-CM

## 2022-06-07 DIAGNOSIS — C50.912 LOBULAR CARCINOMA OF LEFT BREAST: Primary | ICD-10-CM

## 2022-06-07 LAB
ALBUMIN SERPL BCP-MCNC: 3.7 G/DL (ref 3.5–5.2)
ALP SERPL-CCNC: 48 U/L (ref 55–135)
ALT SERPL W/O P-5'-P-CCNC: 11 U/L (ref 10–44)
ANION GAP SERPL CALC-SCNC: 9 MMOL/L (ref 8–16)
ASCENDING AORTA: 3.78 CM
AST SERPL-CCNC: 14 U/L (ref 10–40)
AV INDEX (PROSTH): 0.59
AV MEAN GRADIENT: 11 MMHG
AV PEAK GRADIENT: 20 MMHG
AV VALVE AREA: 2.09 CM2
AV VELOCITY RATIO: 0.58
BASOPHILS # BLD AUTO: 0.04 K/UL (ref 0–0.2)
BASOPHILS NFR BLD: 0.8 % (ref 0–1.9)
BILIRUB SERPL-MCNC: 0.5 MG/DL (ref 0.1–1)
BSA FOR ECHO PROCEDURE: 2.2 M2
BUN SERPL-MCNC: 18 MG/DL (ref 8–23)
CALCIUM SERPL-MCNC: 9.9 MG/DL (ref 8.7–10.5)
CHLORIDE SERPL-SCNC: 105 MMOL/L (ref 95–110)
CO2 SERPL-SCNC: 26 MMOL/L (ref 23–29)
CREAT SERPL-MCNC: 0.8 MG/DL (ref 0.5–1.4)
CV ECHO LV RWT: 0.3 CM
DIFFERENTIAL METHOD: ABNORMAL
DOP CALC AO PEAK VEL: 2.26 M/S
DOP CALC AO VTI: 52.77 CM
DOP CALC LVOT AREA: 3.5 CM2
DOP CALC LVOT DIAMETER: 2.12 CM
DOP CALC LVOT PEAK VEL: 1.32 M/S
DOP CALC LVOT STROKE VOLUME: 110.15 CM3
DOP CALCLVOT PEAK VEL VTI: 31.22 CM
E WAVE DECELERATION TIME: 182.48 MSEC
E/A RATIO: 1.14
E/E' RATIO: 10.8 M/S
ECHO LV POSTERIOR WALL: 0.8 CM (ref 0.6–1.1)
EJECTION FRACTION: 65 %
EOSINOPHIL # BLD AUTO: 0.3 K/UL (ref 0–0.5)
EOSINOPHIL NFR BLD: 6.4 % (ref 0–8)
ERYTHROCYTE [DISTWIDTH] IN BLOOD BY AUTOMATED COUNT: 12.4 % (ref 11.5–14.5)
EST. GFR  (AFRICAN AMERICAN): >60 ML/MIN/1.73 M^2
EST. GFR  (NON AFRICAN AMERICAN): >60 ML/MIN/1.73 M^2
FRACTIONAL SHORTENING: 38 % (ref 28–44)
GLUCOSE SERPL-MCNC: 110 MG/DL (ref 70–110)
HCT VFR BLD AUTO: 32.9 % (ref 37–48.5)
HGB BLD-MCNC: 10.6 G/DL (ref 12–16)
IMM GRANULOCYTES # BLD AUTO: 0.01 K/UL (ref 0–0.04)
IMM GRANULOCYTES NFR BLD AUTO: 0.2 % (ref 0–0.5)
INTERVENTRICULAR SEPTUM: 0.9 CM (ref 0.6–1.1)
LA MAJOR: 5.55 CM
LA MINOR: 5.57 CM
LA WIDTH: 4.62 CM
LEFT ATRIUM SIZE: 4.48 CM
LEFT ATRIUM VOLUME INDEX MOD: 35.8 ML/M2
LEFT ATRIUM VOLUME INDEX: 45.9 ML/M2
LEFT ATRIUM VOLUME MOD: 76.29 CM3
LEFT ATRIUM VOLUME: 97.82 CM3
LEFT INTERNAL DIMENSION IN SYSTOLE: 3.31 CM (ref 2.1–4)
LEFT VENTRICLE DIASTOLIC VOLUME INDEX: 53.88 ML/M2
LEFT VENTRICLE DIASTOLIC VOLUME: 114.77 ML
LEFT VENTRICLE MASS INDEX: 76 G/M2
LEFT VENTRICLE SYSTOLIC VOLUME INDEX: 20.9 ML/M2
LEFT VENTRICLE SYSTOLIC VOLUME: 44.57 ML
LEFT VENTRICULAR INTERNAL DIMENSION IN DIASTOLE: 5.3 CM (ref 3.5–6)
LEFT VENTRICULAR MASS: 162.11 G
LV LATERAL E/E' RATIO: 10.8 M/S
LV SEPTAL E/E' RATIO: 10.8 M/S
LYMPHOCYTES # BLD AUTO: 1.8 K/UL (ref 1–4.8)
LYMPHOCYTES NFR BLD: 34.9 % (ref 18–48)
MCH RBC QN AUTO: 28.8 PG (ref 27–31)
MCHC RBC AUTO-ENTMCNC: 32.2 G/DL (ref 32–36)
MCV RBC AUTO: 89 FL (ref 82–98)
MONOCYTES # BLD AUTO: 0.8 K/UL (ref 0.3–1)
MONOCYTES NFR BLD: 14.7 % (ref 4–15)
MV A" WAVE DURATION": 10.85 MSEC
MV PEAK A VEL: 0.95 M/S
MV PEAK E VEL: 1.08 M/S
MV STENOSIS PRESSURE HALF TIME: 52.92 MS
MV VALVE AREA P 1/2 METHOD: 4.16 CM2
NEUTROPHILS # BLD AUTO: 2.2 K/UL (ref 1.8–7.7)
NEUTROPHILS NFR BLD: 43 % (ref 38–73)
NRBC BLD-RTO: 0 /100 WBC
PISA TR MAX VEL: 3.12 M/S
PLATELET # BLD AUTO: 219 K/UL (ref 150–450)
PMV BLD AUTO: 9.8 FL (ref 9.2–12.9)
POTASSIUM SERPL-SCNC: 4.3 MMOL/L (ref 3.5–5.1)
PROT SERPL-MCNC: 7.6 G/DL (ref 6–8.4)
PULM VEIN S/D RATIO: 0.43
PV PEAK D VEL: 0.6 M/S
PV PEAK S VEL: 0.26 M/S
QEF: 69 %
RA MAJOR: 5.76 CM
RA PRESSURE: 3 MMHG
RA WIDTH: 3.99 CM
RBC # BLD AUTO: 3.68 M/UL (ref 4–5.4)
RIGHT VENTRICULAR END-DIASTOLIC DIMENSION: 4.07 CM
RV TISSUE DOPPLER FREE WALL SYSTOLIC VELOCITY 1 (APICAL 4 CHAMBER VIEW): 13.37 CM/S
SINUS: 2.75 CM
SODIUM SERPL-SCNC: 140 MMOL/L (ref 136–145)
STJ: 2.39 CM
TDI LATERAL: 0.1 M/S
TDI SEPTAL: 0.1 M/S
TDI: 0.1 M/S
TR MAX PG: 39 MMHG
TRICUSPID ANNULAR PLANE SYSTOLIC EXCURSION: 2.64 CM
TV REST PULMONARY ARTERY PRESSURE: 42 MMHG
WBC # BLD AUTO: 5.16 K/UL (ref 3.9–12.7)

## 2022-06-07 PROCEDURE — 99999 PR PBB SHADOW E&M-EST. PATIENT-LVL IV: ICD-10-PCS | Mod: PBBFAC,,, | Performed by: INTERNAL MEDICINE

## 2022-06-07 PROCEDURE — 99999 PR PBB SHADOW E&M-EST. PATIENT-LVL IV: CPT | Mod: PBBFAC,,, | Performed by: SURGERY

## 2022-06-07 PROCEDURE — 99999 PR PBB SHADOW E&M-EST. PATIENT-LVL IV: ICD-10-PCS | Mod: PBBFAC,,, | Performed by: SURGERY

## 2022-06-07 PROCEDURE — 99214 OFFICE O/P EST MOD 30 MIN: CPT | Mod: PBBFAC,27 | Performed by: SURGERY

## 2022-06-07 PROCEDURE — 85025 COMPLETE CBC W/AUTO DIFF WBC: CPT | Performed by: INTERNAL MEDICINE

## 2022-06-07 PROCEDURE — 99215 OFFICE O/P EST HI 40 MIN: CPT | Mod: S$PBB,,, | Performed by: INTERNAL MEDICINE

## 2022-06-07 PROCEDURE — 99214 OFFICE O/P EST MOD 30 MIN: CPT | Mod: PBBFAC | Performed by: INTERNAL MEDICINE

## 2022-06-07 PROCEDURE — 99215 OFFICE O/P EST HI 40 MIN: CPT | Mod: S$PBB,,, | Performed by: SURGERY

## 2022-06-07 PROCEDURE — 80053 COMPREHEN METABOLIC PANEL: CPT | Performed by: INTERNAL MEDICINE

## 2022-06-07 PROCEDURE — 99999 PR PBB SHADOW E&M-EST. PATIENT-LVL IV: CPT | Mod: PBBFAC,,, | Performed by: INTERNAL MEDICINE

## 2022-06-07 PROCEDURE — 99215 PR OFFICE/OUTPT VISIT, EST, LEVL V, 40-54 MIN: ICD-10-PCS | Mod: S$PBB,,, | Performed by: INTERNAL MEDICINE

## 2022-06-07 PROCEDURE — 99215 PR OFFICE/OUTPT VISIT, EST, LEVL V, 40-54 MIN: ICD-10-PCS | Mod: S$PBB,,, | Performed by: SURGERY

## 2022-06-07 NOTE — PROGRESS NOTES
"  Patient ID: Kee Ramirez is a 76 y.o. female.  Chief Complaint: Breast Cancer (NP Breast Cancer)      Subjective    HPI    Oncology History   Malignant neoplasm of central portion of left female breast   5/6/2022 Initial Diagnosis    Malignant neoplasm of central portion of left female breast     5/27/2022 -  Chemotherapy    Treatment Summary   Plan Name: OP BREAST DOSE-DENSE AC-T (DOXORUBICIN CYCLOPHOSPHAMIDE Q2W FOLLOWED BY PACLITAXEL WEEKLY)  Treatment Goal: Curative  Status: Active  Start Date: 5/27/2022 (Planned)  End Date: 10/7/2022 (Planned)  Provider: Mitali Carlin MD  Chemotherapy: DOXOrubicin chemo injection 130 mg, 60 mg/m2, Intravenous, Clinic/HOD 1 time, 0 of 4 cycles  cyclophosphamide 600 mg/m2 = 1,300 mg in sodium chloride 0.9% 250 mL chemo infusion, 600 mg/m2, Intravenous, Clinic/HOD 1 time, 0 of 4 cycles  PACLitaxeL (TAXOL) 80 mg/m2 = 174 mg in sodium chloride 0.9% 250 mL chemo infusion, 80 mg/m2, Intravenous, Clinic/HOD 1 time, 0 of 12 cycles         Cancer-related family history: Cancer-related family history includes Cancer in her sister.    Review of Systems - Oncology     Objective   Weight: 98.9 kg (218 lb), Height: 5' 7" (1.702 m), BSA (Calculated - sq m): 2.16 sq meters, BMI (Calculated): 34.1, BP: (!) 167/69, Pulse: (!) 53, Resp: 18, Temp: 98.1 °F (36.7 °C), SpO2: 100 %     Physical Exam  ECOG Score:      Pain Score: 0-No pain    Distress Score: 3    Lab Results   Component Value Date    WBC 5.16 06/07/2022    HGB 10.6 (L) 06/07/2022    HCT 32.9 (L) 06/07/2022     06/07/2022    CREATININE 0.8 06/07/2022    AST 14 06/07/2022       Assessment & Plan     Cancer Staging  No matching staging information was found for the patient.  Kee was seen today for breast cancer.  Diagnoses and all orders for this visit:  1. Pre-op testing (Primary)  -     EKG 12-lead; Future  -     X-Ray Chest PA And Lateral; Future; Expected date: 06/07/2022  2. Lobular carcinoma of left breast  -     " BRCAnalysis fred/ Sonny; Future; Expected date: 06/07/2022

## 2022-06-07 NOTE — NURSING
Oncology Navigation   Intake  Date of Diagnosis: 3/21/2022  Cancer Type: Breast  Internal / External Referral: Internal  Date Worked: 6/7/2022  First Appointment Available: 5/27/2022  Appointment Date: 5/27/2022  First Available Date vs. Scheduled Date (days): 0     Treatment  Current Status: Active    Surgery: Planned  Surgical Oncologist: Dr.Amy Valente  Consult Date: 6/7/2022    Medical Oncologist: Dr.Rabia Carlin  Consult Date: 5/27/2022  Chemotherapy: Planned       Procedures: Genomic testing; Genetic test  Biopsy Schedule Date: 3/21/2022  Echo Schedule Date: 6/6/2022  Genetic Testing Date Sent: 6/7/2022  MRI Schedule Date: 5/3/2022  Genomic Testing Date Sent: 6/7/2022  PET Scan Schedule Date: 5/13/2022  Port / PICC Schedule Date: 5/18/2022    General Referrals: Integrative medicine; Support group    ER: Positive  RI: Positive  Her2: Negative           Acuity  Stage: I-II  Treatment Tolerability: Has not started treatment yet/treatment fully completed and side effects resolved  Navigation Acuity: 1     Follow Up  Follow up in about 13 days (around 6/20/2022) for Oncotype results.

## 2022-06-07 NOTE — PROGRESS NOTES
Nurse Navigator Note:     Met with patient during her consult with Dr. Valente.  Patient and I reviewed the information she discussed with Dr. Valente, including treatment options, diagnosis, and future plans for workup. Patient and I went through the new patient binder, explained some of the information and why it is provided.     Also offered patient consults with our other specialty clinics: Dr. Riggins for gynecological health during treatment, our breast physical therapy department for pre-op and post-operative assessments, Dr. Alegria for psychological support, and Caron Hebert for nutritional counseling. Explained to patient that all of these support services are completely optional. Discussed that physical therapy may call patient to offer pre-op appt, and what that appt would entail.     Patient was given a copy of her appointments, Dr. Valente's card, and my card. Encouraged her to call me if she has any questions or concerns or would like to schedule any additional appointments. Verbalized understanding of all information.

## 2022-06-07 NOTE — PROGRESS NOTES
PROGRESS NOTE    Subjective:       Patient ID: Kee Ramirez is a 76 y.o. female.  MRN: [id number]  : 1945    Chief Complaint: ILC of the left breast     History of Present Illness:   Kee Ramirez is a 76 y.o. female who presents with newly diagnosed ILC of the left breast.      Reports yearly mammograms, normal in . In  screening mammogram showed left breast architectural distortion. Diagnostic mammogram in March showed a 21 x 9  X 2 0 mm left breast mass. US guided biopsy showed ILC, ER strongly positive, CO 15% positive, Her 2 rhina negative.   A breast MRI showed a 7.6 x 4.7 cm mass with spiculated margins.No abnormal lymph nodes were found.      No history of breast mass or biopsies. She has been seeing Dr. Ortiz and is transferring care to use due to proximity. See full oncology history below.   She has been referred for neoadjuvant chemotherapy. We met today to discuss recommendations following surgical oncology evaluation.           Oncology History:  As dcoumented by  and updated      2022 Left Breast, Core Needle Biopsies:   - Invasive lobular carcinoma, well-differentiated .   - Bloom-Damon score (5/9):        - Tubular Formation: 3/3        - Nuclear Pleomorphism: 1/3        - Mitotic Activity: 1/3.   - Background of lobular carinoma in-situ (LCIS), nuclear grade 1.   - No perineural or lymphovascular invasion is identified.   Estrogen receptor: Positive (strong intensity, >95% of tumor cell nuclei).   Progesterone receptor: Positive (strong intensity, 15% of tumor cell nuclei).   HER2 IHC: Negative.   Ki-67: 10%.  2022 [facility] TB -- Surgery FU w/Oncotype  2022  follow up  -Breast MRI for staging, discussed surgery options; pt would like BCT, will make definitive plan after MRI  2022 Breast MRI  -Irregular 7.6 cm enhancing spiculated mass in the upper LEFT breast, consistent with the patient's  known biopsy-proven invasive lobular carcinoma. The enhancement does extend to the nipple-areolar complex.  -No suspicious abnormality in the RIGHT breast.   BI-RADS Category: Overall: 6 - known biopsy-proven malignancy  05.06.2022 Follow up with [facility]  -Plan for Axillary U/S to assess LAD, [facility]  [facility]. Due to size of mass/location and nature of lobular cancer, was not felt that BCT was optimal unless NA therapy was done 1st   -2 week follow up to discuss further   05.13.2022 PetCT  -Mildly hypermetabolic irregular soft tissue within the left breast in keeping with known lobular carcinoma. This mass is better appreciated on MRI of the breast.  -No definite evidence of metastatic disease.  Please note that FDG PET has has suboptimal sensitivity for certain histologies such as lobular and tubular carcinomas.  05.18.2022 Initial [facility] eval    Oncology History:  Oncology History   Malignant neoplasm of central portion of left female breast   5/6/2022 Initial Diagnosis    Malignant neoplasm of central portion of left female breast     5/27/2022 -  Chemotherapy    Treatment Summary   Plan Name: OP BREAST DOSE-DENSE AC-T (DOXORUBICIN CYCLOPHOSPHAMIDE Q2W FOLLOWED BY PACLITAXEL WEEKLY)  Treatment Goal: Curative  Status: Active  Start Date: 5/27/2022 (Planned)  End Date: 10/7/2022 (Planned)  Provider: Mitali Carlin MD  Chemotherapy: DOXOrubicin chemo injection 130 mg, 60 mg/m2, Intravenous, Clinic/HOD 1 time, 0 of 4 cycles  cyclophosphamide 600 mg/m2 = 1,300 mg in sodium chloride 0.9% 250 mL chemo infusion, 600 mg/m2, Intravenous, Clinic/HOD 1 time, 0 of 4 cycles  PACLitaxeL (TAXOL) 80 mg/m2 = 174 mg in sodium chloride 0.9% 250 mL chemo infusion, 80 mg/m2, Intravenous, Clinic/HOD 1 time, 0 of 12 cycles     6/7/2022 Cancer Staged    Staging form: Breast, AJCC 8th Edition  - Clinical: Stage IIA (cT3, cN0, cM0, G1, ER+, AL+, HER2-)         History:  Past Medical History:   Diagnosis Date    Cancer      Diabetes mellitus, type 2     Hyperlipidemia     Hypertension     Midline low back pain without sciatica 07/31/2015    Thyroid disease     Venous stasis     bilateral legs      Past Surgical History:   Procedure Laterality Date    COLONOSCOPY      COLONOSCOPY N/A 12/5/2019    Procedure: COLONOSCOPY;  Surgeon: Luis Bogran-Reyes, MD;  Location: [facility];  Service: Endoscopy;  Laterality: N/A;    INSERTION OF TUNNELED CENTRAL VENOUS CATHETER (CVC) WITH SUBCUTANEOUS PORT Right 5/24/2022    Procedure: [id number];  Surgeon: Darien Bear MD;  Location: [facility];  Service: General;  Laterality: Right;    SHOULDER ARTHROSCOPY W/ ROTATOR CUFF REPAIR Right 2001    TUBAL LIGATION      VAGINAL HYSTERECTOMY N/A 1/11/2021    Procedure: HYSTERECTOMY, VAGINAL ;  Surgeon: Jessie Dacosta MD;  Location: [facility];  Service: OB/GYN;  Laterality: N/A;     Family History   Problem Relation Age of Onset    Diabetes Mother     Hypertension Mother     Arthritis Mother     COPD Father     Hypertension Sister     Diabetes Sister     Cancer Sister         PANCREATIC     Social History     Tobacco Use    Smoking status: Never Smoker    Smokeless tobacco: Never Used   Substance and Sexual Activity    Alcohol use: No     Alcohol/week: 0.0 standard drinks    Drug use: No    Sexual activity: Not Currently     Partners: Male     Birth control/protection: None, See Surgical Hx        ROS:   Review of Systems   Constitutional: Negative for fever, malaise/fatigue and weight loss.   HENT: Negative for congestion, hearing loss, nosebleeds and sore throat.    Eyes: Negative for double vision and photophobia.   Respiratory: Negative for cough, hemoptysis, sputum production, shortness of breath and wheezing.    Cardiovascular: Negative for chest pain, palpitations, orthopnea and leg swelling.   Gastrointestinal: Negative for abdominal pain, blood in stool, constipation, diarrhea, heartburn, nausea and vomiting.  "  Genitourinary: Negative for dysuria, hematuria and urgency.   Musculoskeletal: Negative for back pain, joint pain and myalgias.   Skin: Negative for itching and rash.   Neurological: Negative for dizziness, tingling, seizures, weakness and headaches.   Endo/Heme/Allergies: Negative for polydipsia. Does not bruise/bleed easily.   Psychiatric/Behavioral: Negative for depression and memory loss. The patient is not nervous/anxious and does not have insomnia.         Objective:     Vitals:    06/07/22 1140   BP: (!) 167/69   Pulse: (!) 53   Resp: 18   Temp: 98.1 °F (36.7 °C)   TempSrc: Oral   SpO2: 100%   Weight: 98.9 kg (218 lb)   Height: 5' 7" (1.702 m)   PainSc: 0-No pain     Wt Readings from Last 10 Encounters:   06/07/22 98.9 kg (218 lb)   06/07/22 98.9 kg (218 lb)   06/06/22 102.5 kg (226 lb)   05/27/22 99.6 kg (219 lb 7.5 oz)   05/24/22 102.5 kg (226 lb)   05/18/22 102.6 kg (226 lb 4.8 oz)   05/18/22 102.5 kg (225 lb 14.4 oz)   05/18/22 102.7 kg (226 lb 8.4 oz)   05/18/22 103.1 kg (227 lb 4.7 oz)   05/06/22 101.9 kg (224 lb 10.4 oz)       Physical Examination:   Physical Exam  Vitals and nursing note reviewed.   Constitutional:       General: She is not in acute distress.     Appearance: She is not diaphoretic.   HENT:      Head: Normocephalic.      Mouth/Throat:      Pharynx: No oropharyngeal exudate.   Eyes:      General: No scleral icterus.     Conjunctiva/sclera: Conjunctivae normal.   Neck:      Thyroid: No thyromegaly.   Cardiovascular:      Rate and Rhythm: Normal rate and regular rhythm.      Heart sounds: Normal heart sounds. No murmur heard.  Pulmonary:      Effort: Pulmonary effort is normal. No respiratory distress.      Breath sounds: No stridor. No wheezing or rales.   Chest:      Chest wall: No tenderness.   Abdominal:      General: Bowel sounds are normal. There is no distension.      Palpations: Abdomen is soft. There is no mass.      Tenderness: There is no abdominal tenderness. There is no " rebound.   Musculoskeletal:         General: No tenderness or deformity. Normal range of motion.      Cervical back: Neck supple.   Lymphadenopathy:      Cervical: No cervical adenopathy.   Skin:     General: Skin is warm and dry.      Findings: No erythema or rash.      Comments: + left breat mass at 12 0 clock, 7-8 cm, firmness felt just under the Nipple as well with some flattening of the nipple.    Neurological:      Mental Status: She is alert and oriented to person, place, and time.      Cranial Nerves: No cranial nerve deficit.      Coordination: Coordination normal.      Gait: Gait is intact.   Psychiatric:         Mood and Affect: Affect normal.         Cognition and Memory: Memory normal.         Judgment: Judgment normal.          Diagnostic Tests:  Significant Imaging: I have reviewed and interpreted all pertinent imaging results/findings.    Laboratory Data:  All pertinent labs have been reviewed.  Labs:   Lab Results   Component Value Date    WBC 5.16 06/07/2022    RBC 3.68 (L) 06/07/2022    HGB 10.6 (L) 06/07/2022    HCT 32.9 (L) 06/07/2022    MCV 89 06/07/2022     06/07/2022     06/07/2022     06/07/2022    K 4.3 06/07/2022    BUN 18 06/07/2022    CREATININE 0.8 06/07/2022    AST 14 06/07/2022    ALT 11 06/07/2022    BILITOT 0.5 06/07/2022       Assessment/Plan:   Malignant neoplasm of central portion of left breast in female, estrogen receptor positive     cT3N0, ER 95%, PR15%, Her 2 rhina negative, Grade 1, ki 67 10%      She is interested in breast conservation and is not a candidate for upfront lumpectomy given tumor size and extension to the nipple areolar complex.     She met with Dr. Valente today who agrees that she may not be a candidate for breast conservation even after neoadjuvant therapies. Patient wants to try.     We discussed neoadjuvant chemotherapy vs neoadjuvant endocrine therapy.       We discussed that given favorable grade, hormone receptor-positive status and  lobular histology of her breast cancer, we may not see a robust response to neoadjuvant chemotherapy. Given T3 tumor, it is reasonable to send oncotype and only proceed with chemotherapy if she is high risk. I will hold chemo until oncotype results are available.     If not, I will start her on neoadjuvant endocrine therapy, will obtain a baseline DEXA scan.     I have discussed the plan with Dr. Valente.   I will see her back in 2 weeks to finalize our plans.    ECOG SCORE    1 - Restricted in strenuous activity-ambulatory and able to carry out work of a light nature           Discussion:   No follow-ups on file.    Plan was discussed with the patient at length, and she verbalized understanding. Kee was given an opportunity to ask questions that were answered to her satisfaction, and she was advised to call in the interval if any problems or questions arise.    Electronically signed by Mitali Carlin MD        Route Chart for Scheduling    Med Onc Chart Routing      Follow up with physician . 6/24, see me with oncotype results and dexa scan. Cancel infusion appt 6/10 and 6/11   Follow up with KAYLYN    Labs    Imaging DXA scan      Pharmacy appointment    Other referrals          Treatment Plan Information   OP BREAST DOSE-DENSE AC-T (DOXORUBICIN CYCLOPHOSPHAMIDE Q2W FOLLOWED BY PACLITAXEL WEEKLY)   Mitali Carlin MD   Upcoming Treatment Dates - OP BREAST DOSE-DENSE AC-T (DOXORUBICIN CYCLOPHOSPHAMIDE Q2W FOLLOWED BY PACLITAXEL WEEKLY)    5/27/2022       Chemotherapy       DOXOrubicin chemo injection 130 mg       cyclophosphamide 600 mg/m2 = 1,300 mg in sodium chloride 0.9% 250 mL chemo infusion  6/10/2022       Chemotherapy       DOXOrubicin chemo injection 130 mg       cyclophosphamide 600 mg/m2 = 1,300 mg in sodium chloride 0.9% 250 mL chemo infusion  6/24/2022       Chemotherapy       DOXOrubicin chemo injection 130 mg       cyclophosphamide 600 mg/m2 = 1,300 mg in sodium chloride 0.9% 250 mL chemo  infusion  7/8/2022       Chemotherapy       DOXOrubicin chemo injection 130 mg       cyclophosphamide 600 mg/m2 = 1,300 mg in sodium chloride 0.9% 250 mL chemo infusion

## 2022-06-07 NOTE — Clinical Note
Edel tomas, I know many of you have been involved in this patient's care, so sending a blanket update. She would really prefer breast conservation, so we are going to send an oncotype first.  If it is low risk, will give her neoadjuvant endocrine therapy since the tumor is grade 1, Lobular, and unlikely to respond to chemo.   If it is high risk will give her chemo instead, but I doubt she will be high risk.  Will plan for surgery in 3-6 months depending on response to therapy.  She understands she will likely need mastectomy either way, but we may be able to spare her the chemo experience.  Thanks for sending her over!  She is definitely closer to home being treated here.  Thanks! Erika

## 2022-06-09 ENCOUNTER — PATIENT MESSAGE (OUTPATIENT)
Dept: HEMATOLOGY/ONCOLOGY | Facility: CLINIC | Age: 77
End: 2022-06-09
Payer: MEDICARE

## 2022-06-13 ENCOUNTER — PATIENT MESSAGE (OUTPATIENT)
Dept: HEMATOLOGY/ONCOLOGY | Facility: CLINIC | Age: 77
End: 2022-06-13
Payer: MEDICARE

## 2022-06-13 ENCOUNTER — TELEPHONE (OUTPATIENT)
Dept: HEMATOLOGY/ONCOLOGY | Facility: CLINIC | Age: 77
End: 2022-06-13
Payer: MEDICARE

## 2022-06-13 ENCOUNTER — HOSPITAL ENCOUNTER (OUTPATIENT)
Dept: RADIOLOGY | Facility: HOSPITAL | Age: 77
Discharge: HOME | End: 2022-06-13
Attending: SURGERY
Payer: MEDICARE

## 2022-06-13 ENCOUNTER — HOSPITAL ENCOUNTER (OUTPATIENT)
Dept: RADIOLOGY | Facility: CLINIC | Age: 77
Discharge: HOME | End: 2022-06-13
Attending: INTERNAL MEDICINE
Payer: MEDICARE

## 2022-06-13 DIAGNOSIS — Z01.818 PRE-OP TESTING: ICD-10-CM

## 2022-06-13 DIAGNOSIS — Z17.0 MALIGNANT NEOPLASM OF CENTRAL PORTION OF LEFT BREAST IN FEMALE, ESTROGEN RECEPTOR POSITIVE: ICD-10-CM

## 2022-06-13 DIAGNOSIS — Z78.0 ASYMPTOMATIC MENOPAUSAL STATE: ICD-10-CM

## 2022-06-13 DIAGNOSIS — C50.112 MALIGNANT NEOPLASM OF CENTRAL PORTION OF LEFT BREAST IN FEMALE, ESTROGEN RECEPTOR POSITIVE: ICD-10-CM

## 2022-06-13 PROCEDURE — 77080 DXA BONE DENSITY AXIAL: CPT | Mod: 26,,, | Performed by: INTERNAL MEDICINE

## 2022-06-13 PROCEDURE — 71046 X-RAY EXAM CHEST 2 VIEWS: CPT | Mod: 26,,, | Performed by: RADIOLOGY

## 2022-06-13 PROCEDURE — 71046 X-RAY EXAM CHEST 2 VIEWS: CPT | Mod: TC,FY

## 2022-06-13 PROCEDURE — 71046 XR CHEST PA AND LATERAL: ICD-10-PCS | Mod: 26,,, | Performed by: RADIOLOGY

## 2022-06-13 PROCEDURE — 77080 DXA BONE DENSITY AXIAL: CPT | Mod: TC

## 2022-06-13 PROCEDURE — 77080 DEXA BONE DENSITY SPINE HIP: ICD-10-PCS | Mod: 26,,, | Performed by: INTERNAL MEDICINE

## 2022-06-13 NOTE — TELEPHONE ENCOUNTER
Called pt and left message in regards to rescheduling appt due to provider being out of clinic.     Message also sent in portal

## 2022-06-17 NOTE — PROGRESS NOTES
PROGRESS NOTE    Subjective:       Patient ID: Kee Ramirez is a 76 y.o. female.  MRN: [id number]  : 1945    Chief Complaint: ILC of the left breast     History of Present Illness:   Kee Ramirez is a 76 y.o. female who presents with newly diagnosed ILC of the left breast.  Presents for a follow up visit     Reports yearly mammograms, normal in . In  screening mammogram showed left breast architectural distortion. Diagnostic mammogram in March showed a 21 x 9  X 2 0 mm left breast mass. US guided biopsy showed ILC, ER strongly positive, TX 15% positive, Her 2 rhina negative.   A breast MRI showed a 7.6 x 4.7 cm mass with spiculated margins.No abnormal lymph nodes were found.      No history of breast mass or biopsies. She has been seeing Dr. Ortiz and is transferring care to use due to proximity. See full oncology history below.   She has been referred for neoadjuvant chemotherapy. We met today as a follow up visit to further discuss neoadjuvant therapy.  At her visit two weeks ago, we discussed neoadjuvant chemotherapy vs neoadjuvant endocrine therapy.  She is interested in breast conservation and is not a candidate for upfront lumpectomy given tumor size and extension to the nipple areolar complex.  She met with Dr. Valente who agrees that she may not be a candidate for breast conservation even after neoadjuvant therapies.  Patient wants to try.     An oncotype was sent to determine if she was high risk and would benefit from chemotherapy.  We were notified there was not enough tissue specimen from the original biopsy for the oncotype.   We discussed this today and the follow up options: including starting neoadjuvent endocrine therapy now, or doing a re-biopsy of site to resend for oncotype, or proceeding with surgery with mastectomy.     She is generally feeling well today.  No new complaints.  Blood pressure is elevated in clinic.  She  brings in multiple home readings from home (bp 130-150/60-70) at home.  Sees Dr. Azar as her PCP    Oncology History:  As dcoumented by  and updated      03.21.2022 Left Breast, Core Needle Biopsies:   - Invasive lobular carcinoma, well-differentiated .   - Bloom-Damon score (5/9):        - Tubular Formation: 3/3        - Nuclear Pleomorphism: 1/3        - Mitotic Activity: 1/3.   - Background of lobular carinoma in-situ (LCIS), nuclear grade 1.   - No perineural or lymphovascular invasion is identified.   Estrogen receptor: Positive (strong intensity, >95% of tumor cell nuclei).   Progesterone receptor: Positive (strong intensity, 15% of tumor cell nuclei).   HER2 IHC: Negative.   Ki-67: 10%.  04.04.2022 [facility] -- Surgery FU w/Oncotype  04.06.2022 [facility] follow up  -Breast MRI for staging, discussed surgery options; pt would like BCT, will make definitive plan after MRI  05.03.2022 Breast MRI  -Irregular 7.6 cm enhancing spiculated mass in the upper LEFT breast, consistent with the patient's known biopsy-proven invasive lobular carcinoma. The enhancement does extend to the nipple-areolar complex.  -No suspicious abnormality in the RIGHT breast.   BI-RADS Category: Overall: 6 - known biopsy-proven malignancy  05.06.2022 Follow up with [facility]  -Plan for Axillary U/S to assess LAD, [facility]  [facility]. Due to size of mass/location and nature of lobular cancer, was not felt that BCT was optimal unless NA therapy was done 1st   -2 week follow up to discuss further   05.13.2022 PetCT  -Mildly hypermetabolic irregular soft tissue within the left breast in keeping with known lobular carcinoma. This mass is better appreciated on MRI of the breast.  -No definite evidence of metastatic disease.  Please note that FDG PET has has suboptimal sensitivity for certain histologies such as lobular and tubular carcinomas.  05.18.2022 Initial [facility] eval    Oncology History:  Oncology History   Malignant  neoplasm of central portion of left female breast   5/6/2022 Initial Diagnosis    Malignant neoplasm of central portion of left female breast     5/27/2022 - 5/27/2022 Chemotherapy    Treatment Summary   Plan Name: OP BREAST DOSE-DENSE AC-T (DOXORUBICIN CYCLOPHOSPHAMIDE Q2W FOLLOWED BY PACLITAXEL WEEKLY)  Treatment Goal: Curative  Status: Inactive  Start Date:   End Date:   Provider: Mitali Carlin MD  Chemotherapy: DOXOrubicin chemo injection 130 mg, 60 mg/m2, Intravenous, Clinic/HOD 1 time, 0 of 4 cycles  cyclophosphamide 600 mg/m2 = 1,300 mg in sodium chloride 0.9% 250 mL chemo infusion, 600 mg/m2, Intravenous, Clinic/HOD 1 time, 0 of 4 cycles  PACLitaxeL (TAXOL) 80 mg/m2 = 174 mg in sodium chloride 0.9% 250 mL chemo infusion, 80 mg/m2, Intravenous, Clinic/HOD 1 time, 0 of 12 cycles     6/7/2022 Cancer Staged    Staging form: Breast, AJCC 8th Edition  - Clinical: Stage IIA (cT3, cN0, cM0, G1, ER+, ID+, HER2-)     6/24/2022 -  Chemotherapy    Treatment Summary   Plan Name: OP ANASTROZOLE DAILY  Treatment Goal: Curative  Status: Active  Start Date: 6/24/2022 (Planned)  End Date: 6/24/2022 (Planned)  Provider: Mitali Carlin MD  Chemotherapy: anastrozole (ARIMIDEX) 1 mg Tab, 1 mg, Oral, Daily, 0 of 1 cycle, Start date: --, End date: --         History:  Past Medical History:   Diagnosis Date    Cancer     Diabetes mellitus, type 2     Hyperlipidemia     Hypertension     Midline low back pain without sciatica 07/31/2015    Thyroid disease     Venous stasis     bilateral legs      Past Surgical History:   Procedure Laterality Date    COLONOSCOPY      COLONOSCOPY N/A 12/5/2019    Procedure: COLONOSCOPY;  Surgeon: Luis Bogran-Reyes, MD;  Location: [facility];  Service: Endoscopy;  Laterality: N/A;    INSERTION OF TUNNELED CENTRAL VENOUS CATHETER (CVC) WITH SUBCUTANEOUS PORT Right 5/24/2022    Procedure: [id number];  Surgeon: Darien Bear MD;  Location: [facility];  Service: General;  Laterality:  "Right;    SHOULDER ARTHROSCOPY W/ ROTATOR CUFF REPAIR Right 2001    TUBAL LIGATION      VAGINAL HYSTERECTOMY N/A 1/11/2021    Procedure: HYSTERECTOMY, VAGINAL ;  Surgeon: Jessie Dacosta MD;  Location: [facility];  Service: OB/GYN;  Laterality: N/A;     Family History   Problem Relation Age of Onset    Diabetes Mother     Hypertension Mother     Arthritis Mother     COPD Father     Hypertension Sister     Diabetes Sister     Cancer Sister         PANCREATIC     Social History     Tobacco Use    Smoking status: Never Smoker    Smokeless tobacco: Never Used   Substance and Sexual Activity    Alcohol use: No     Alcohol/week: 0.0 standard drinks    Drug use: No    Sexual activity: Not Currently     Partners: Male     Birth control/protection: None, See Surgical Hx        ROS:   Review of Systems   Constitutional: Negative for fever, malaise/fatigue and weight loss.   HENT: Negative for congestion, hearing loss, nosebleeds and sore throat.    Eyes: Negative for double vision and photophobia.   Respiratory: Negative for cough, hemoptysis, sputum production, shortness of breath and wheezing.    Cardiovascular: Negative for chest pain, palpitations, orthopnea and leg swelling.   Gastrointestinal: Negative for abdominal pain, blood in stool, constipation, diarrhea, heartburn, nausea and vomiting.   Genitourinary: Negative for dysuria, hematuria and urgency.   Musculoskeletal: Negative for back pain, joint pain and myalgias.   Skin: Negative for itching and rash.   Neurological: Negative for dizziness, tingling, seizures, weakness and headaches.   Endo/Heme/Allergies: Negative for polydipsia. Does not bruise/bleed easily.   Psychiatric/Behavioral: Negative for depression and memory loss. The patient is not nervous/anxious and does not have insomnia.       Objective:     Vitals:    06/24/22 1450   BP: (!) 181/75   Pulse: 67   Resp: 18   SpO2: 96%   Weight: 97.1 kg (214 lb 1.1 oz)   Height: 5' 7" (1.702 m) "   PainSc: 0-No pain     Wt Readings from Last 10 Encounters:   06/24/22 97.1 kg (214 lb 1.1 oz)   06/07/22 98.9 kg (218 lb)   06/07/22 98.9 kg (218 lb)   06/06/22 102.5 kg (226 lb)   05/27/22 99.6 kg (219 lb 7.5 oz)   05/24/22 102.5 kg (226 lb)   05/18/22 102.6 kg (226 lb 4.8 oz)   05/18/22 102.5 kg (225 lb 14.4 oz)   05/18/22 102.7 kg (226 lb 8.4 oz)   05/18/22 103.1 kg (227 lb 4.7 oz)       Physical Examination:   Physical Exam  Vitals and nursing note reviewed.   Constitutional:       General: She is not in acute distress.     Appearance: She is not diaphoretic.   HENT:      Head: Normocephalic.   Eyes:      General: No scleral icterus.     Conjunctiva/sclera: Conjunctivae normal.   Neck:      Thyroid: No thyromegaly.   Cardiovascular:      Rate and Rhythm: Normal rate and regular rhythm.      Heart sounds: Normal heart sounds. No murmur heard.  Pulmonary:      Effort: Pulmonary effort is normal. No respiratory distress.      Breath sounds: No stridor. No wheezing or rales.   Chest:      Chest wall: No tenderness.     Musculoskeletal:         General: No tenderness or deformity. Normal range of motion.      Cervical back: Neck supple.   Lymphadenopathy:      Cervical: No cervical adenopathy.   Skin:     General: Skin is warm and dry.      Findings: No erythema or rash.      Comments: + left breat mass at 12 0 clock, 7-8 cm, firmness felt just under the Nipple as well with some flattening of the nipple.    Neurological:      Mental Status: She is alert and oriented to person, place, and time.      Cranial Nerves: No cranial nerve deficit.      Coordination: Coordination normal.      Gait: Gait is intact.   Psychiatric:         Mood and Affect: Affect normal.         Cognition and Memory: Memory normal.         Judgment: Judgment normal.     Diagnostic Tests:  Significant Imaging: I have reviewed and interpreted all pertinent imaging results/findings.    Laboratory Data:  All pertinent labs have been  reviewed.  Labs:   Lab Results   Component Value Date    WBC 6.33 06/24/2022    RBC 3.97 (L) 06/24/2022    HGB 11.8 (L) 06/24/2022    HCT 36.4 (L) 06/24/2022    MCV 92 06/24/2022     06/24/2022     06/24/2022     06/24/2022    K 4.5 06/24/2022    BUN 23 06/24/2022    CREATININE 0.8 06/24/2022    AST 17 06/24/2022    ALT 17 06/24/2022    BILITOT 0.8 06/24/2022       Assessment/Plan:   Malignant neoplasm of central portion of left breast in female, estrogen receptor positive  cT3N0, ER 95%, PR15%, Her 2 rhina negative, Grade 1, ki 67 10%      She is interested in breast conservation and is not a candidate for upfront lumpectomy given tumor size and extension to the nipple areolar complex.     We discussed neoadjuvant chemotherapy vs neoadjuvant endocrine therapy.      We discussed that given favorable grade, hormone receptor-positive status and lobular histology of her breast cancer, we may not see a robust response to neoadjuvant chemotherapy.She is more likely to respond well to endocrine therapy that can be used in the neoadjuvant setting.      Given not enough tissue sample for oncotype, patient would like to proceed with neoadjuvant endocrine therapy with arimidex.  Side effects of arimidex reviewed including bone pain, fatigue, hot flashes and osteopenia/osteoporosis.       Pt instructed to start calcium and vitamin D supplements.  Recent dexa from 06/20/22 reviewed and showed normal bone density.      Will see patient every 4 weeks to monitor tumor response to therapy with imaging in 3 months.  Pt verbalized understanding and agrees with plan.  Questions were answered. Have discussed plan with Dr. Valente.     Primary Hypertension  Compliant with current medications  Home blood pressures better than in clinic today, but still elevated  Low salt diet discussed  Recommended follow up with PCP    ECOG SCORE    0 - Fully active-able to carry on all pre-disease performance without restriction        Discussion:   No follow-ups on file.    Plan was discussed with the patient at length, and she verbalized understanding. Kee was given an opportunity to ask questions that were answered to her satisfaction, and she was advised to call in the interval if any problems or questions arise.    Electronically signed by Keila Duarte NP    Route Chart for Scheduling      Med Onc Chart Routing      Follow up with physician 4 weeks. 07/22/22, 08/19/22, 09/16/22   Follow up with KAYLYN    Infusion scheduling note    Injection scheduling note    Labs    Imaging Mammogram   In 3 months   Pharmacy appointment    Other referrals          Treatment Plan Information   OP ANASTROZOLE DAILY   Mitali Carlin MD   Upcoming Treatment Dates - OP ANASTROZOLE DAILY    6/24/2022       Antiemetics       Physician communication order       Take Home Chemotherapy       anastrozole (ARIMIDEX) 1 mg Tab

## 2022-06-20 DIAGNOSIS — Z17.0 MALIGNANT NEOPLASM OF CENTRAL PORTION OF LEFT BREAST IN FEMALE, ESTROGEN RECEPTOR POSITIVE: Primary | ICD-10-CM

## 2022-06-20 DIAGNOSIS — C50.112 MALIGNANT NEOPLASM OF CENTRAL PORTION OF LEFT BREAST IN FEMALE, ESTROGEN RECEPTOR POSITIVE: Primary | ICD-10-CM

## 2022-06-24 ENCOUNTER — LAB VISIT (OUTPATIENT)
Dept: LAB | Facility: HOSPITAL | Age: 77
End: 2022-06-24
Attending: INTERNAL MEDICINE
Payer: MEDICARE

## 2022-06-24 ENCOUNTER — OFFICE VISIT (OUTPATIENT)
Dept: HEMATOLOGY/ONCOLOGY | Facility: CLINIC | Age: 77
End: 2022-06-24
Payer: MEDICARE

## 2022-06-24 VITALS
WEIGHT: 214.06 LBS | BODY MASS INDEX: 33.6 KG/M2 | DIASTOLIC BLOOD PRESSURE: 75 MMHG | HEART RATE: 67 BPM | SYSTOLIC BLOOD PRESSURE: 181 MMHG | HEIGHT: 67 IN | OXYGEN SATURATION: 96 % | RESPIRATION RATE: 18 BRPM

## 2022-06-24 DIAGNOSIS — I10 PRIMARY HYPERTENSION: ICD-10-CM

## 2022-06-24 DIAGNOSIS — C50.112 MALIGNANT NEOPLASM OF CENTRAL PORTION OF LEFT BREAST IN FEMALE, ESTROGEN RECEPTOR POSITIVE: ICD-10-CM

## 2022-06-24 DIAGNOSIS — Z17.0 MALIGNANT NEOPLASM OF CENTRAL PORTION OF LEFT BREAST IN FEMALE, ESTROGEN RECEPTOR POSITIVE: Primary | ICD-10-CM

## 2022-06-24 DIAGNOSIS — Z17.0 MALIGNANT NEOPLASM OF CENTRAL PORTION OF LEFT BREAST IN FEMALE, ESTROGEN RECEPTOR POSITIVE: ICD-10-CM

## 2022-06-24 DIAGNOSIS — C50.112 MALIGNANT NEOPLASM OF CENTRAL PORTION OF LEFT BREAST IN FEMALE, ESTROGEN RECEPTOR POSITIVE: Primary | ICD-10-CM

## 2022-06-24 LAB
ALBUMIN SERPL BCP-MCNC: 4 G/DL (ref 3.5–5.2)
ALP SERPL-CCNC: 52 U/L (ref 55–135)
ALT SERPL W/O P-5'-P-CCNC: 17 U/L (ref 10–44)
ANION GAP SERPL CALC-SCNC: 9 MMOL/L (ref 8–16)
AST SERPL-CCNC: 17 U/L (ref 10–40)
BASOPHILS # BLD AUTO: 0.03 K/UL (ref 0–0.2)
BASOPHILS NFR BLD: 0.5 % (ref 0–1.9)
BILIRUB SERPL-MCNC: 0.8 MG/DL (ref 0.1–1)
BUN SERPL-MCNC: 23 MG/DL (ref 8–23)
CALCIUM SERPL-MCNC: 10.4 MG/DL (ref 8.7–10.5)
CHLORIDE SERPL-SCNC: 105 MMOL/L (ref 95–110)
CO2 SERPL-SCNC: 26 MMOL/L (ref 23–29)
CREAT SERPL-MCNC: 0.8 MG/DL (ref 0.5–1.4)
DIFFERENTIAL METHOD: ABNORMAL
EOSINOPHIL # BLD AUTO: 0.3 K/UL (ref 0–0.5)
EOSINOPHIL NFR BLD: 5.2 % (ref 0–8)
ERYTHROCYTE [DISTWIDTH] IN BLOOD BY AUTOMATED COUNT: 12.4 % (ref 11.5–14.5)
EST. GFR  (AFRICAN AMERICAN): >60 ML/MIN/1.73 M^2
EST. GFR  (NON AFRICAN AMERICAN): >60 ML/MIN/1.73 M^2
GLUCOSE SERPL-MCNC: 110 MG/DL (ref 70–110)
HCT VFR BLD AUTO: 36.4 % (ref 37–48.5)
HGB BLD-MCNC: 11.8 G/DL (ref 12–16)
IMM GRANULOCYTES # BLD AUTO: 0.01 K/UL (ref 0–0.04)
IMM GRANULOCYTES NFR BLD AUTO: 0.2 % (ref 0–0.5)
LYMPHOCYTES # BLD AUTO: 2.1 K/UL (ref 1–4.8)
LYMPHOCYTES NFR BLD: 32.9 % (ref 18–48)
MCH RBC QN AUTO: 29.7 PG (ref 27–31)
MCHC RBC AUTO-ENTMCNC: 32.4 G/DL (ref 32–36)
MCV RBC AUTO: 92 FL (ref 82–98)
MONOCYTES # BLD AUTO: 0.8 K/UL (ref 0.3–1)
MONOCYTES NFR BLD: 11.8 % (ref 4–15)
NEUTROPHILS # BLD AUTO: 3.1 K/UL (ref 1.8–7.7)
NEUTROPHILS NFR BLD: 49.4 % (ref 38–73)
NRBC BLD-RTO: 0 /100 WBC
PLATELET # BLD AUTO: 277 K/UL (ref 150–450)
PMV BLD AUTO: 9.9 FL (ref 9.2–12.9)
POTASSIUM SERPL-SCNC: 4.5 MMOL/L (ref 3.5–5.1)
PROT SERPL-MCNC: 8.1 G/DL (ref 6–8.4)
RBC # BLD AUTO: 3.97 M/UL (ref 4–5.4)
SODIUM SERPL-SCNC: 140 MMOL/L (ref 136–145)
WBC # BLD AUTO: 6.33 K/UL (ref 3.9–12.7)

## 2022-06-24 PROCEDURE — 99999 PR PBB SHADOW E&M-EST. PATIENT-LVL III: CPT | Mod: PBBFAC,,, | Performed by: INTERNAL MEDICINE

## 2022-06-24 PROCEDURE — 99213 OFFICE O/P EST LOW 20 MIN: CPT | Mod: PBBFAC | Performed by: INTERNAL MEDICINE

## 2022-06-24 PROCEDURE — 36415 COLL VENOUS BLD VENIPUNCTURE: CPT | Performed by: INTERNAL MEDICINE

## 2022-06-24 PROCEDURE — 80053 COMPREHEN METABOLIC PANEL: CPT | Performed by: INTERNAL MEDICINE

## 2022-06-24 PROCEDURE — 99215 OFFICE O/P EST HI 40 MIN: CPT | Mod: S$PBB,,, | Performed by: INTERNAL MEDICINE

## 2022-06-24 PROCEDURE — 99215 PR OFFICE/OUTPT VISIT, EST, LEVL V, 40-54 MIN: ICD-10-PCS | Mod: S$PBB,,, | Performed by: INTERNAL MEDICINE

## 2022-06-24 PROCEDURE — 99999 PR PBB SHADOW E&M-EST. PATIENT-LVL III: ICD-10-PCS | Mod: PBBFAC,,, | Performed by: INTERNAL MEDICINE

## 2022-06-24 PROCEDURE — 85025 COMPLETE CBC W/AUTO DIFF WBC: CPT | Performed by: INTERNAL MEDICINE

## 2022-06-24 RX ORDER — ANASTROZOLE 1 MG/1
1 TABLET ORAL DAILY
Qty: 30 TABLET | Refills: 3 | Status: SHIPPED | OUTPATIENT
Start: 2022-06-24 | End: 2022-07-26 | Stop reason: SDUPTHER

## 2022-06-24 NOTE — PLAN OF CARE
DISCONTINUE ON PATHWAY REGIMEN - Breast    [id number]        Doxorubicin (Adriamycin)       Cyclophosphamide (Cytoxan)       [id number]       Paclitaxel (Taxol)           Additional Orders: Assess LVEF prior to initiation of doxorubicin and as   clinically indicated during and after treatment. Doxorubicin can cause   myocardial damage, including acute left ventricular failure. Risk of   cardiomyopathy is generally proportional to cumulative   anthracycline/anthracenedione exposure. Use of concomitant cardiotoxic agents,   previous radiotherapy to the mediastinum, or presence/history of cardiovascular   disease can additionally increase cardiomyopathy risk. See PI for details.    **Always confirm dose/schedule in your pharmacy ordering system**    REASON: Change In Patient Status  PRIOR TREATMENT: [id number]    START ON PATHWAY REGIMEN - Breast    [id number]        Anastrozole (Arimidex)     **Always confirm dose/schedule in your pharmacy ordering system**    Patient Characteristics:  Preoperative or Nonsurgical Candidate (Clinical Staging), Neoadjuvant Therapy   followed by Surgery, Invasive Disease, Hormonal Therapy, Postmenopausal  Therapeutic Status: Preoperative or Nonsurgical Candidate (Clinical Staging)  AJCC M Category: cM0  AJCC Grade: G1  Breast Surgical Plan: Neoadjuvant Therapy followed by Surgery  ER Status: Positive (+)  AJCC 8 Stage Grouping: IIA  HER2 Status: Negative (-)  AJCC T Category: cT3  AJCC N Category: cN0  AK Status: Positive (+)  Menopausal Status: Postmenopausal  Intent of Therapy:  Curative Intent, Discussed with Patient

## 2022-07-07 ENCOUNTER — OFFICE VISIT (OUTPATIENT)
Dept: HEMATOLOGY/ONCOLOGY | Facility: CLINIC | Age: 77
End: 2022-07-07
Payer: MEDICARE

## 2022-07-07 ENCOUNTER — TELEPHONE (OUTPATIENT)
Dept: SURGERY | Facility: CLINIC | Age: 77
End: 2022-07-07
Payer: MEDICARE

## 2022-07-07 VITALS
DIASTOLIC BLOOD PRESSURE: 72 MMHG | HEIGHT: 67 IN | HEART RATE: 65 BPM | WEIGHT: 216.69 LBS | BODY MASS INDEX: 34.01 KG/M2 | SYSTOLIC BLOOD PRESSURE: 154 MMHG

## 2022-07-07 DIAGNOSIS — Z17.0 MALIGNANT NEOPLASM OF CENTRAL PORTION OF LEFT BREAST IN FEMALE, ESTROGEN RECEPTOR POSITIVE: Primary | ICD-10-CM

## 2022-07-07 DIAGNOSIS — C50.112 MALIGNANT NEOPLASM OF CENTRAL PORTION OF LEFT BREAST IN FEMALE, ESTROGEN RECEPTOR POSITIVE: Primary | ICD-10-CM

## 2022-07-07 DIAGNOSIS — R53.81 PHYSICAL DECONDITIONING: ICD-10-CM

## 2022-07-07 DIAGNOSIS — R53.83 FATIGUE, UNSPECIFIED TYPE: ICD-10-CM

## 2022-07-07 PROCEDURE — 99999 PR PBB SHADOW E&M-EST. PATIENT-LVL V: ICD-10-PCS | Mod: PBBFAC,,, | Performed by: PHYSICIAN ASSISTANT

## 2022-07-07 PROCEDURE — 99999 PR PBB SHADOW E&M-EST. PATIENT-LVL V: CPT | Mod: PBBFAC,,, | Performed by: PHYSICIAN ASSISTANT

## 2022-07-07 PROCEDURE — 99215 OFFICE O/P EST HI 40 MIN: CPT | Mod: PBBFAC | Performed by: PHYSICIAN ASSISTANT

## 2022-07-07 PROCEDURE — 99204 OFFICE O/P NEW MOD 45 MIN: CPT | Mod: S$PBB,,, | Performed by: PHYSICIAN ASSISTANT

## 2022-07-07 PROCEDURE — 99204 PR OFFICE/OUTPT VISIT, NEW, LEVL IV, 45-59 MIN: ICD-10-PCS | Mod: S$PBB,,, | Performed by: PHYSICIAN ASSISTANT

## 2022-07-07 NOTE — TELEPHONE ENCOUNTER
Attempted to contact patient regarding genetic testing results.  Patient did not answer, message left with my name and direct number for patient to contact back.

## 2022-07-07 NOTE — PROGRESS NOTES
Integrative Health and Medicine Initial Visit    This 76 y.o. female seeks an integrative approach to discuss what she can do to improve her long-term survival from cancer and to address side effects related to cancer treatment. Presents today with her son, [place].    HPI   She has a history of ER+/NJ+/HER2- left breast cancer. Started on neoadjuvant endocrine therapy with Arimidex. She is tolerating this well. Denies joint pain, hot flashes, or mood changes. She has been having some fatigue on and off. Will relax for a few minutes and feels better. Initially anxious and worried about diagnosis but now has come to terms with it and excepted it. She is feeling well and confident in her treatment. She is not exercising. Son has been working on diet. Avoiding fried foods and increasing fruits.    Supplements: Multivitamin, Vitamin D3, Calcium    7 pillars Assessment    Sleep  Sleeping well. Getting 6-7 hours a night.   Will wake to urinate but able to go right back to sleep.    Resilience  How do you manage stress? 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 Paint[employer], does puzzles and flowers.    Nutrition   Eliminated fried foods. Often skips breakfast and not eating regular meals. Increasing fruits.    Exercise  None. She does feel deconditioned and interested in PT.    Past Medical History  Past Medical History:   Diagnosis Date    Cancer     Diabetes mellitus, type 2     Hyperlipidemia     Hypertension     Midline low back pain without sciatica 07/31/2015    Thyroid disease     Venous stasis     bilateral legs        Past Surgical History   Past Surgical History:   Procedure Laterality Date    COLONOSCOPY      COLONOSCOPY N/A 12/5/2019    Procedure: COLONOSCOPY;  Surgeon: Luis Bogran-Reyes, MD;  Location: [facility];  Service: Endoscopy;  Laterality: N/A;    INSERTION OF TUNNELED CENTRAL VENOUS CATHETER (CVC) WITH SUBCUTANEOUS PORT Right 5/24/2022    Procedure: [id number];  Surgeon: Darien Bear MD;   [facility];  Service: General;  Laterality: Right;    SHOULDER ARTHROSCOPY W/ ROTATOR CUFF REPAIR Right 2001    TUBAL LIGATION      VAGINAL HYSTERECTOMY N/A 1/11/2021    Procedure: HYSTERECTOMY, VAGINAL ;  Surgeon: Jessie Dacosta MD;  [facility];  Service: OB/GYN;  Laterality: N/A;        Family History   Family History   Problem Relation Age of Onset    Diabetes Mother     Hypertension Mother     Arthritis Mother     COPD Father     Hypertension Sister     Diabetes Sister     Cancer Sister         PANCREATIC        Social History  Social History     Socioeconomic History    Marital status:     Years of education: college   Tobacco Use    Smoking status: Never Smoker    Smokeless tobacco: Never Used   Substance and Sexual Activity    Alcohol use: No     Alcohol/week: 0.0 standard drinks    Drug use: No    Sexual activity: Not Currently     Partners: Male     Birth control/protection: None, See Surgical Hx     Social Determinants of Health     Financial Resource Strain: Low Risk     Difficulty of Paying Living Expenses: Not very hard   Food Insecurity: No Food Insecurity    Worried About Running Out of Food in the Last Year: Never true    Ran Out of Food in the Last Year: Never true   Transportation Needs: No Transportation Needs    Lack of Transportation (Medical): No    Lack of Transportation (Non-Medical): No   Physical Activity: Unknown    Days of Exercise per Week: 0 days   Stress: No Stress Concern Present    Feeling of Stress : Only a little   Social Connections: Unknown    Frequency of Communication with Friends and Family: More than three times a week    Frequency of Social Gatherings with Friends and Family: Three times a week    Active Member of Clubs or Organizations: Yes    Attends Club or Organization Meetings: Patient refused    Marital Status:    Housing Stability: Low Risk     Unable to Pay for Housing in the Last Year: No    Number of  Places Lived in the Last Year: 1    Unstable Housing in the Last Year: No        Allergies  Review of patient's allergies indicates:   Allergen Reactions    Naproxen sodium Itching        Current Medications:    Current Outpatient Medications:     amLODIPine (NORVASC) 10 MG tablet, Take 1 tablet (10 mg total) by mouth once daily., Disp: 90 tablet, Rfl: 3    anastrozole (ARIMIDEX) 1 mg Tab, Take 1 tablet (1 mg total) by mouth once daily., Disp: 30 tablet, Rfl: 3    aspirin (ECOTRIN) 81 MG EC tablet, Take 81 mg by mouth once daily., Disp: , Rfl:     cloNIDine (CATAPRES) 0.1 MG tablet, TAKE 1 TABLET BY MOUTH THREE TIMES DAILY, Disp: 90 tablet, Rfl: 11    gabapentin (NEURONTIN) 300 MG capsule, Take 1 capsule (300 mg total) by mouth 3 (three) times daily., Disp: 90 capsule, Rfl: 5    ibuprofen (ADVIL,MOTRIN) 800 MG tablet, Take 1 tablet (800 mg total) by mouth 3 (three) times daily., Disp: 90 tablet, Rfl: 3    levothyroxine (SYNTHROID) 125 MCG tablet, Take 1 tablet (125 mcg total) by mouth before breakfast., Disp: 90 tablet, Rfl: 3    losartan (COZAAR) 100 MG tablet, Take 1 tablet (100 mg total) by mouth once daily., Disp: 90 tablet, Rfl: 3    lovastatin (MEVACOR) 20 MG tablet, Take 2 tablets (40 mg total) by mouth every evening., Disp: 180 tablet, Rfl: 3    metFORMIN (GLUCOPHAGE) 500 MG tablet, Take 1 tablet (500 mg total) by mouth 2 (two) times daily with meals., Disp: 180 tablet, Rfl: 3    methylcellulose (ARTIFICIAL TEARS) 1 % ophthalmic solution, Place 1 drop into both eyes as needed., Disp: , Rfl:     multivitamin (THERAGRAN) per tablet, Take 1 tablet by mouth once daily., Disp: , Rfl:     rOPINIRole (REQUIP) 1 MG tablet, Take 1 tablet (1 mg total) by mouth every evening., Disp: 90 tablet, Rfl: 3    traZODone (DESYREL) 50 MG tablet, Take 1 tablet (50 mg total) by mouth every evening., Disp: 30 tablet, Rfl: 11    oxybutynin (DITROPAN) 5 MG Tab, Take 1 tablet (5 mg total) by mouth 3 (three) times  "daily., Disp: 90 tablet, Rfl: 11     Review of Systems  Constitutional: no weight loss, weight gain, fever. +fatigue  Eyes:  No vision changes, glasses/contacts  ENT/Mouth: No ulcers, sinus problems, ears ringing, headache  Cardiovascular: No inability to lie flat, chest pain, exercise intolerance, swelling, heart palpitations  Respiratory: No wheezing, coughing blood, shortness of breath, or cough  Gastrointestinal: No diarrhea, bloody stool, nausea/vomiting, constipation, gas, hemorrhoids  Genitourinary: No blood in urine, painful urination, urgency of urination, frequency of urination, incomplete emptying, incontinence, abnormal bleeding, painful periods, heavy periods, vaginal discharge, vaginal odor, painful intercourse, sexual problems, bleeding after intercourse.  Musculoskeletal: No muscle weakness  Skin/Breast: No painful breasts, nipple discharge, masses, rash, ulcers  Neurological: No passing out, seizures, numbness, headache  Endocrine: No diabetes, hypothyroid, hyperthyroid, hot flashes, hair loss, abnormal hair growth, acne  Psychiatric: No depression, crying  Hematologic: No bruises, bleeding, swollen lymph nodes, anemia.    Physical Exam   Vitals:    07/07/22 1133   BP: (!) 154/72   Pulse: 65   Weight: 98.3 kg (216 lb 11.4 oz)   Height: 5' 7" (1.702 m)   PainSc: 0-No pain     Body mass index is 33.94 kg/m².  Physical Exam - deferred    ASSESSMENT:     Malignant neoplasm of central portion of left breast in female, estrogen receptor positive  -     Ambulatory referral/consult to Integrative Oncology  -     Ambulatory referral/consult to Physical/Occupational Therapy; Future; Expected date: 07/14/2022    Physical deconditioning  -     Ambulatory referral/consult to Physical/Occupational Therapy; Future; Expected date: 07/14/2022    Fatigue, unspecified type        PLAN:  Referral to PT for deconditioning.  Discussed that regular exercise helps with fatigue.  Start with Walking with geri at home. Also " has classes at [facility] [employer].  Reviewed diet and discussed that this can effect her energy levels.  Encouraged small regular meals. Focus on lean proteins with every meal, especially breakfast.  Declines nutrition referral at this time but will consider.  Discussed benefits of acupuncture if needed in the future.  Follow up PRN.

## 2022-07-07 NOTE — TELEPHONE ENCOUNTER
2nd attempt to contact patient regarding genetic testing results.  Patient did not answer, message left with my name and direct number for patient to contact back.  Will try patient again.

## 2022-07-08 ENCOUNTER — TELEPHONE (OUTPATIENT)
Dept: SURGERY | Facility: CLINIC | Age: 77
End: 2022-07-08
Payer: MEDICARE

## 2022-07-08 ENCOUNTER — PATIENT MESSAGE (OUTPATIENT)
Dept: SURGERY | Facility: CLINIC | Age: 77
End: 2022-07-08
Payer: MEDICARE

## 2022-07-08 NOTE — TELEPHONE ENCOUNTER
Genetic Lay Navigator Note:    Called patient with the results of genetic testing. Explained that genetic testing resulted with a variant of unknown significance. Discussed that the results do not indicate any further action is needed at this time, and that the company will notify us if any additional recommendations become available. Explained that we will provide a formal copy of report via [email or web address] or mail to patient.    Offered patient a phone session with a genetic counselor through [employer], or a in person session with our [facility] genetic counselors. Also discussed that this will remain an available option for patient at any time in the future.     Patient was instructed to call with any additional questions or concerns. Verbalized understanding of all information.    Provider notified of results and that patient was given them.     Full results reports scanned in media.    Report # 1        Report # 2

## 2022-07-11 ENCOUNTER — PATIENT MESSAGE (OUTPATIENT)
Dept: ADMINISTRATIVE | Facility: HOSPITAL | Age: 77
End: 2022-07-11
Payer: MEDICARE

## 2022-07-12 ENCOUNTER — TELEPHONE (OUTPATIENT)
Dept: HEMATOLOGY/ONCOLOGY | Facility: CLINIC | Age: 77
End: 2022-07-12
Payer: MEDICARE

## 2022-07-12 NOTE — TELEPHONE ENCOUNTER
Pt returned call, reports intense abdominal cramping that is intermittent. Last bowel movement yesterday. Denies fever.

## 2022-07-12 NOTE — TELEPHONE ENCOUNTER
Spoke to patient who reports an achy pain in her stomach, does not have reflux, no blood from mouth or rectally, eating has nothing to do with symptoms, no fever, has had bowel movement. Started this am, subsided for a short time and it came back. Wants to know what she can take, she has no pain meds. Is there something you can prescribe her or is there anything else you need to know?

## 2022-07-12 NOTE — TELEPHONE ENCOUNTER
Left message for patient to start with otc simethicone d/t arimedex side effects per provider. Asked her to call back and let us know how she is doing  Pt verbalized understanding and thanked nurse

## 2022-07-12 NOTE — TELEPHONE ENCOUNTER
"Received message from pt asking for something for "stomach pain." Attempted to call pt back to get more info, no answer, had to leave voicemail.  "

## 2022-07-18 ENCOUNTER — TELEPHONE (OUTPATIENT)
Dept: HEMATOLOGY/ONCOLOGY | Facility: CLINIC | Age: 77
End: 2022-07-18
Payer: MEDICARE

## 2022-07-18 NOTE — TELEPHONE ENCOUNTER
Pt called to report nausea, vomiting & diarrhea x 1 week, having trouble even keeping water down. Tmax 99.7, reports that it is constant. Denies starting any new meds recently except the Arimidex & also denies being around anyone that's been sick. Informed pt that I would message her provider for next steps, she voiced understanding.

## 2022-07-18 NOTE — TELEPHONE ENCOUNTER
Per , instructed pt to hold Arimidex to see if symptoms resolve. She has not taken today's dose so she will hold until Friday & call me with an update then. Reminded to stay hydrated & sip fluids like Gatorade & Pedialyte, she voiced understanding.

## 2022-07-19 ENCOUNTER — HOSPITAL ENCOUNTER (INPATIENT)
Facility: HOSPITAL | Age: 77
LOS: 2 days | Discharge: HOME | DRG: 388 | End: 2022-07-22
Attending: EMERGENCY MEDICINE | Admitting: SURGERY
Payer: MEDICARE

## 2022-07-19 ENCOUNTER — TELEPHONE (OUTPATIENT)
Dept: HEMATOLOGY/ONCOLOGY | Facility: CLINIC | Age: 77
End: 2022-07-19
Payer: MEDICARE

## 2022-07-19 DIAGNOSIS — K56.609 SMALL BOWEL OBSTRUCTION: ICD-10-CM

## 2022-07-19 DIAGNOSIS — I10 PRIMARY HYPERTENSION: ICD-10-CM

## 2022-07-19 DIAGNOSIS — R10.84 GENERALIZED ABDOMINAL PAIN: Primary | ICD-10-CM

## 2022-07-19 DIAGNOSIS — E11.9 TYPE 2 DIABETES MELLITUS WITHOUT COMPLICATION, WITHOUT LONG-TERM CURRENT USE OF INSULIN: ICD-10-CM

## 2022-07-19 DIAGNOSIS — U07.1 COVID-19: ICD-10-CM

## 2022-07-19 DIAGNOSIS — E07.9 THYROID DISEASE: ICD-10-CM

## 2022-07-19 DIAGNOSIS — R11.2 NAUSEA AND VOMITING, INTRACTABILITY OF VOMITING NOT SPECIFIED, UNSPECIFIED VOMITING TYPE: ICD-10-CM

## 2022-07-19 DIAGNOSIS — K56.609 SBO (SMALL BOWEL OBSTRUCTION): ICD-10-CM

## 2022-07-19 LAB
ALBUMIN SERPL BCP-MCNC: 4.2 G/DL (ref 3.5–5.2)
ALP SERPL-CCNC: 54 U/L (ref 55–135)
ALT SERPL W/O P-5'-P-CCNC: 16 U/L (ref 10–44)
ANION GAP SERPL CALC-SCNC: 14 MMOL/L (ref 8–16)
AST SERPL-CCNC: 15 U/L (ref 10–40)
BASOPHILS # BLD AUTO: 0.03 K/UL (ref 0–0.2)
BASOPHILS NFR BLD: 0.4 % (ref 0–1.9)
BILIRUB SERPL-MCNC: 1 MG/DL (ref 0.1–1)
BUN SERPL-MCNC: 27 MG/DL (ref 8–23)
CALCIUM SERPL-MCNC: 10.3 MG/DL (ref 8.7–10.5)
CHLORIDE SERPL-SCNC: 102 MMOL/L (ref 95–110)
CO2 SERPL-SCNC: 21 MMOL/L (ref 23–29)
CREAT SERPL-MCNC: 1.4 MG/DL (ref 0.5–1.4)
DIFFERENTIAL METHOD: ABNORMAL
EOSINOPHIL # BLD AUTO: 0.1 K/UL (ref 0–0.5)
EOSINOPHIL NFR BLD: 0.6 % (ref 0–8)
ERYTHROCYTE [DISTWIDTH] IN BLOOD BY AUTOMATED COUNT: 12.5 % (ref 11.5–14.5)
EST. GFR  (AFRICAN AMERICAN): 42.7 ML/MIN/1.73 M^2
EST. GFR  (NON AFRICAN AMERICAN): 37 ML/MIN/1.73 M^2
GLUCOSE SERPL-MCNC: 110 MG/DL (ref 70–110)
HCT VFR BLD AUTO: 40.3 % (ref 37–48.5)
HGB BLD-MCNC: 13.3 G/DL (ref 12–16)
IMM GRANULOCYTES # BLD AUTO: 0.02 K/UL (ref 0–0.04)
IMM GRANULOCYTES NFR BLD AUTO: 0.2 % (ref 0–0.5)
LACTATE SERPL-SCNC: 1.5 MMOL/L (ref 0.5–2.2)
LIPASE SERPL-CCNC: 7 U/L (ref 4–60)
LYMPHOCYTES # BLD AUTO: 2.1 K/UL (ref 1–4.8)
LYMPHOCYTES NFR BLD: 25.2 % (ref 18–48)
MCH RBC QN AUTO: 29.6 PG (ref 27–31)
MCHC RBC AUTO-ENTMCNC: 33 G/DL (ref 32–36)
MCV RBC AUTO: 90 FL (ref 82–98)
MONOCYTES # BLD AUTO: 1.4 K/UL (ref 0.3–1)
MONOCYTES NFR BLD: 16.9 % (ref 4–15)
NEUTROPHILS # BLD AUTO: 4.7 K/UL (ref 1.8–7.7)
NEUTROPHILS NFR BLD: 56.7 % (ref 38–73)
NRBC BLD-RTO: 0 /100 WBC
PLATELET # BLD AUTO: 329 K/UL (ref 150–450)
PMV BLD AUTO: 10.3 FL (ref 9.2–12.9)
POTASSIUM SERPL-SCNC: 4.9 MMOL/L (ref 3.5–5.1)
PROT SERPL-MCNC: 8.5 G/DL (ref 6–8.4)
RBC # BLD AUTO: 4.49 M/UL (ref 4–5.4)
SODIUM SERPL-SCNC: 137 MMOL/L (ref 136–145)
WBC # BLD AUTO: 8.22 K/UL (ref 3.9–12.7)

## 2022-07-19 PROCEDURE — 80053 COMPREHEN METABOLIC PANEL: CPT | Performed by: EMERGENCY MEDICINE

## 2022-07-19 PROCEDURE — 99285 PR EMERGENCY DEPT VISIT,LEVEL V: ICD-10-PCS | Mod: ,,, | Performed by: EMERGENCY MEDICINE

## 2022-07-19 PROCEDURE — 96374 THER/PROPH/DIAG INJ IV PUSH: CPT

## 2022-07-19 PROCEDURE — 83605 ASSAY OF LACTIC ACID: CPT | Performed by: EMERGENCY MEDICINE

## 2022-07-19 PROCEDURE — 99285 EMERGENCY DEPT VISIT HI MDM: CPT | Mod: 25

## 2022-07-19 PROCEDURE — 83690 ASSAY OF LIPASE: CPT | Performed by: EMERGENCY MEDICINE

## 2022-07-19 PROCEDURE — 25000003 PHARM REV CODE 250: Performed by: EMERGENCY MEDICINE

## 2022-07-19 PROCEDURE — 99285 EMERGENCY DEPT VISIT HI MDM: CPT | Mod: ,,, | Performed by: EMERGENCY MEDICINE

## 2022-07-19 PROCEDURE — 86803 HEPATITIS C AB TEST: CPT | Performed by: PHYSICIAN ASSISTANT

## 2022-07-19 PROCEDURE — 83036 HEMOGLOBIN GLYCOSYLATED A1C: CPT | Performed by: EMERGENCY MEDICINE

## 2022-07-19 PROCEDURE — 96361 HYDRATE IV INFUSION ADD-ON: CPT

## 2022-07-19 PROCEDURE — 85025 COMPLETE CBC W/AUTO DIFF WBC: CPT | Performed by: EMERGENCY MEDICINE

## 2022-07-19 PROCEDURE — 25500020 PHARM REV CODE 255: Performed by: EMERGENCY MEDICINE

## 2022-07-19 PROCEDURE — 63600175 PHARM REV CODE 636 W HCPCS: Performed by: EMERGENCY MEDICINE

## 2022-07-19 RX ORDER — ONDANSETRON 2 MG/ML
8 INJECTION INTRAMUSCULAR; INTRAVENOUS
Status: COMPLETED | OUTPATIENT
Start: 2022-07-19 | End: 2022-07-19

## 2022-07-19 RX ADMIN — IOHEXOL 100 ML: 350 INJECTION, SOLUTION INTRAVENOUS at 10:07

## 2022-07-19 RX ADMIN — SODIUM CHLORIDE 1000 ML: 0.9 INJECTION, SOLUTION INTRAVENOUS at 07:07

## 2022-07-19 RX ADMIN — ONDANSETRON 8 MG: 2 INJECTION INTRAMUSCULAR; INTRAVENOUS at 07:07

## 2022-07-19 NOTE — FIRST PROVIDER EVALUATION
Medical screening exam completed.  I have conducted a focused provider triage encounter, findings are as follows:    Brief history of present illness:  abd pain, n/v, no BM x 3 days, breast cancer    There were no vitals filed for this visit.    Pertinent physical exam:  Well-appearing    Brief workup plan:  Labs, zofran    Preliminary workup initiated; this workup will be continued and followed by the physician or advanced practice provider that is assigned to the patient when roomed.

## 2022-07-19 NOTE — TELEPHONE ENCOUNTER
Pt called to report continued nausea & vomiting despite not taking the Arimidex x 2 days. States it's getting worse with intermittent abdominal pain, emesis is yellow. Can't keep anything down. Discussed with , offered to send in a rx for Zofran but pt reports that she is going to have her son bring her to the ER this evening. Will follow.

## 2022-07-20 LAB
ANION GAP SERPL CALC-SCNC: 16 MMOL/L (ref 8–16)
ANISOCYTOSIS BLD QL SMEAR: SLIGHT
BASOPHILS # BLD AUTO: 0.03 K/UL (ref 0–0.2)
BASOPHILS NFR BLD: 0.5 % (ref 0–1.9)
BUN SERPL-MCNC: 27 MG/DL (ref 8–23)
CALCIUM SERPL-MCNC: 9.8 MG/DL (ref 8.7–10.5)
CHLORIDE SERPL-SCNC: 102 MMOL/L (ref 95–110)
CO2 SERPL-SCNC: 19 MMOL/L (ref 23–29)
CREAT SERPL-MCNC: 1.1 MG/DL (ref 0.5–1.4)
DIFFERENTIAL METHOD: ABNORMAL
EOSINOPHIL # BLD AUTO: 0.1 K/UL (ref 0–0.5)
EOSINOPHIL NFR BLD: 1.2 % (ref 0–8)
ERYTHROCYTE [DISTWIDTH] IN BLOOD BY AUTOMATED COUNT: 13 % (ref 11.5–14.5)
EST. GFR  (AFRICAN AMERICAN): 57.2 ML/MIN/1.73 M^2
EST. GFR  (NON AFRICAN AMERICAN): 49.6 ML/MIN/1.73 M^2
ESTIMATED AVG GLUCOSE: 123 MG/DL (ref 68–131)
GLUCOSE SERPL-MCNC: 94 MG/DL (ref 70–110)
HBA1C MFR BLD: 5.9 % (ref 4–5.6)
HCT VFR BLD AUTO: 38.6 % (ref 37–48.5)
HCV AB SERPL QL IA: NEGATIVE
HGB BLD-MCNC: 12.4 G/DL (ref 12–16)
HYPOCHROMIA BLD QL SMEAR: ABNORMAL
IMM GRANULOCYTES # BLD AUTO: 0.02 K/UL (ref 0–0.04)
IMM GRANULOCYTES NFR BLD AUTO: 0.3 % (ref 0–0.5)
LYMPHOCYTES # BLD AUTO: 1.4 K/UL (ref 1–4.8)
LYMPHOCYTES NFR BLD: 21.6 % (ref 18–48)
MAGNESIUM SERPL-MCNC: 1.5 MG/DL (ref 1.6–2.6)
MCH RBC QN AUTO: 29.3 PG (ref 27–31)
MCHC RBC AUTO-ENTMCNC: 32.1 G/DL (ref 32–36)
MCV RBC AUTO: 91 FL (ref 82–98)
MONOCYTES # BLD AUTO: 1.2 K/UL (ref 0.3–1)
MONOCYTES NFR BLD: 17.5 % (ref 4–15)
NEUTROPHILS # BLD AUTO: 3.9 K/UL (ref 1.8–7.7)
NEUTROPHILS NFR BLD: 58.9 % (ref 38–73)
NRBC BLD-RTO: 0 /100 WBC
OVALOCYTES BLD QL SMEAR: ABNORMAL
PHOSPHATE SERPL-MCNC: 3.9 MG/DL (ref 2.7–4.5)
PLATELET # BLD AUTO: 265 K/UL (ref 150–450)
PMV BLD AUTO: 10.7 FL (ref 9.2–12.9)
POCT GLUCOSE: 100 MG/DL (ref 70–110)
POCT GLUCOSE: 113 MG/DL (ref 70–110)
POIKILOCYTOSIS BLD QL SMEAR: SLIGHT
POLYCHROMASIA BLD QL SMEAR: ABNORMAL
POTASSIUM SERPL-SCNC: 4.2 MMOL/L (ref 3.5–5.1)
RBC # BLD AUTO: 4.23 M/UL (ref 4–5.4)
SARS-COV-2 RDRP RESP QL NAA+PROBE: POSITIVE
SODIUM SERPL-SCNC: 137 MMOL/L (ref 136–145)
WBC # BLD AUTO: 6.56 K/UL (ref 3.9–12.7)

## 2022-07-20 PROCEDURE — 80048 BASIC METABOLIC PNL TOTAL CA: CPT | Performed by: STUDENT IN AN ORGANIZED HEALTH CARE EDUCATION/TRAINING PROGRAM

## 2022-07-20 PROCEDURE — U0002 COVID-19 LAB TEST NON-CDC: HCPCS | Performed by: STUDENT IN AN ORGANIZED HEALTH CARE EDUCATION/TRAINING PROGRAM

## 2022-07-20 PROCEDURE — 25000003 PHARM REV CODE 250

## 2022-07-20 PROCEDURE — 99223 PR INITIAL HOSPITAL CARE,LEVL III: ICD-10-PCS | Mod: AI,CR,GC, | Performed by: SURGERY

## 2022-07-20 PROCEDURE — 99223 1ST HOSP IP/OBS HIGH 75: CPT | Mod: AI,CR,GC, | Performed by: SURGERY

## 2022-07-20 PROCEDURE — 27000207 HC ISOLATION

## 2022-07-20 PROCEDURE — 36415 COLL VENOUS BLD VENIPUNCTURE: CPT | Performed by: STUDENT IN AN ORGANIZED HEALTH CARE EDUCATION/TRAINING PROGRAM

## 2022-07-20 PROCEDURE — 84100 ASSAY OF PHOSPHORUS: CPT | Performed by: STUDENT IN AN ORGANIZED HEALTH CARE EDUCATION/TRAINING PROGRAM

## 2022-07-20 PROCEDURE — 85025 COMPLETE CBC W/AUTO DIFF WBC: CPT | Performed by: STUDENT IN AN ORGANIZED HEALTH CARE EDUCATION/TRAINING PROGRAM

## 2022-07-20 PROCEDURE — 20600001 HC STEP DOWN PRIVATE ROOM

## 2022-07-20 PROCEDURE — 63600175 PHARM REV CODE 636 W HCPCS: Performed by: STUDENT IN AN ORGANIZED HEALTH CARE EDUCATION/TRAINING PROGRAM

## 2022-07-20 PROCEDURE — 83735 ASSAY OF MAGNESIUM: CPT | Performed by: STUDENT IN AN ORGANIZED HEALTH CARE EDUCATION/TRAINING PROGRAM

## 2022-07-20 RX ORDER — MAGNESIUM SULFATE HEPTAHYDRATE 40 MG/ML
2 INJECTION, SOLUTION INTRAVENOUS ONCE
Status: COMPLETED | OUTPATIENT
Start: 2022-07-20 | End: 2022-07-20

## 2022-07-20 RX ORDER — INSULIN ASPART 100 [IU]/ML
0-5 INJECTION, SOLUTION INTRAVENOUS; SUBCUTANEOUS EVERY 6 HOURS PRN
Status: DISCONTINUED | OUTPATIENT
Start: 2022-07-20 | End: 2022-07-23 | Stop reason: HOSPADM

## 2022-07-20 RX ORDER — LABETALOL HCL 20 MG/4 ML
10 SYRINGE (ML) INTRAVENOUS EVERY 6 HOURS PRN
Status: DISCONTINUED | OUTPATIENT
Start: 2022-07-20 | End: 2022-07-23 | Stop reason: HOSPADM

## 2022-07-20 RX ORDER — LEVOTHYROXINE SODIUM ANHYDROUS 100 UG/5ML
100 INJECTION, POWDER, LYOPHILIZED, FOR SOLUTION INTRAVENOUS
Status: DISCONTINUED | OUTPATIENT
Start: 2022-07-20 | End: 2022-07-23 | Stop reason: HOSPADM

## 2022-07-20 RX ORDER — ONDANSETRON 8 MG/1
8 TABLET, ORALLY DISINTEGRATING ORAL EVERY 8 HOURS PRN
Status: DISCONTINUED | OUTPATIENT
Start: 2022-07-20 | End: 2022-07-23 | Stop reason: HOSPADM

## 2022-07-20 RX ORDER — HYDRALAZINE HYDROCHLORIDE 20 MG/ML
10 INJECTION INTRAMUSCULAR; INTRAVENOUS EVERY 8 HOURS PRN
Status: DISCONTINUED | OUTPATIENT
Start: 2022-07-20 | End: 2022-07-23 | Stop reason: HOSPADM

## 2022-07-20 RX ORDER — GLUCAGON 1 MG
1 KIT INJECTION
Status: DISCONTINUED | OUTPATIENT
Start: 2022-07-20 | End: 2022-07-23 | Stop reason: HOSPADM

## 2022-07-20 RX ORDER — SODIUM CHLORIDE, SODIUM LACTATE, POTASSIUM CHLORIDE, CALCIUM CHLORIDE 600; 310; 30; 20 MG/100ML; MG/100ML; MG/100ML; MG/100ML
INJECTION, SOLUTION INTRAVENOUS CONTINUOUS
Status: DISCONTINUED | OUTPATIENT
Start: 2022-07-20 | End: 2022-07-23 | Stop reason: HOSPADM

## 2022-07-20 RX ORDER — ENOXAPARIN SODIUM 100 MG/ML
40 INJECTION SUBCUTANEOUS EVERY 24 HOURS
Status: DISCONTINUED | OUTPATIENT
Start: 2022-07-20 | End: 2022-07-23 | Stop reason: HOSPADM

## 2022-07-20 RX ORDER — LIDOCAINE HYDROCHLORIDE 20 MG/ML
JELLY TOPICAL
Status: DISPENSED | OUTPATIENT
Start: 2022-07-20 | End: 2022-07-20

## 2022-07-20 RX ORDER — LIDOCAINE HYDROCHLORIDE 20 MG/ML
JELLY TOPICAL ONCE
Status: DISCONTINUED | OUTPATIENT
Start: 2022-07-20 | End: 2022-07-23 | Stop reason: HOSPADM

## 2022-07-20 RX ADMIN — ENOXAPARIN SODIUM 40 MG: 100 INJECTION SUBCUTANEOUS at 04:07

## 2022-07-20 RX ADMIN — SODIUM CHLORIDE, SODIUM LACTATE, POTASSIUM CHLORIDE, AND CALCIUM CHLORIDE: .6; .31; .03; .02 INJECTION, SOLUTION INTRAVENOUS at 04:07

## 2022-07-20 RX ADMIN — HYDRALAZINE HYDROCHLORIDE 10 MG: 20 INJECTION, SOLUTION INTRAMUSCULAR; INTRAVENOUS at 07:07

## 2022-07-20 RX ADMIN — HYDRALAZINE HYDROCHLORIDE 10 MG: 20 INJECTION, SOLUTION INTRAMUSCULAR; INTRAVENOUS at 05:07

## 2022-07-20 RX ADMIN — MAGNESIUM SULFATE HEPTAHYDRATE 2 G: 40 INJECTION, SOLUTION INTRAVENOUS at 10:07

## 2022-07-20 RX ADMIN — Medication 10 MG: at 12:07

## 2022-07-20 RX ADMIN — SODIUM CHLORIDE, SODIUM LACTATE, POTASSIUM CHLORIDE, AND CALCIUM CHLORIDE: .6; .31; .03; .02 INJECTION, SOLUTION INTRAVENOUS at 03:07

## 2022-07-20 NOTE — CARE UPDATE
Ng placed at bedside, waiting for KUB.   Patient tested positive for COVID, respiratory precautions ordered

## 2022-07-20 NOTE — ED PROVIDER NOTES
Encounter Date: 7/19/2022    SCRIBE #1 NOTE: I, Paola Vidal, am scribing for, and in the presence of,  Michael Carpenter DO. I have scribed the following portions of the note - Other sections scribed: HPI ROS MDM.       History     Chief Complaint   Patient presents with    Abdominal Pain     Nausea, vomiting abd below navel     Time patient was seen by the provider: 8:35 PM      The patient is a 76 y.o. female with past medical history of HTN, thyroid disease, DM2, HLD, breast cancer, who presents to the ED with a complaint of abdominal pain onset 1 week. The patient reports of lower abdominal pain that is intermittent but is gradually worsening associated with nausea, vomiting and inability to tolerate p.o..  The pain is initially midline, but radiates diffusely.. Describes the pain as throbbing sensation. States she has been constipated and has not had a bowel movement for 3 days. She notes of associated nausea and vomiting. She is unable to tolerate PO intake. Denies diarrhea, blood in vomit, fever, chills, dysuria, hematuria. Patient is fully vaccinated for COVID-19.  She denies any chest pain, lightheadedness, dizziness, falls or trauma.  She denies any skin changes, rashes or ulcerations.  No other aggravating or alleviating factors.    The history is provided by the patient and medical records. No  was used.     Review of patient's allergies indicates:   Allergen Reactions    Naproxen sodium Itching     Past Medical History:   Diagnosis Date    Cancer     Diabetes mellitus, type 2     Hyperlipidemia     Hypertension     Midline low back pain without sciatica 07/31/2015    Thyroid disease     Venous stasis     bilateral legs     Past Surgical History:   Procedure Laterality Date    COLONOSCOPY      COLONOSCOPY N/A 12/5/2019    Procedure: COLONOSCOPY;  Surgeon: Luis Bogran-Reyes, MD;  Location: On license of [facility];  Service: Endoscopy;  Laterality: N/A;    INSERTION OF TUNNELED  CENTRAL VENOUS CATHETER (CVC) WITH SUBCUTANEOUS PORT Right 5/24/2022    Procedure: [id number];  Surgeon: Darien Bear MD;  Location: [facility];  Service: General;  Laterality: Right;    SHOULDER ARTHROSCOPY W/ ROTATOR CUFF REPAIR Right 2001    TUBAL LIGATION      VAGINAL HYSTERECTOMY N/A 1/11/2021    Procedure: HYSTERECTOMY, VAGINAL ;  Surgeon: Jessie Dacosta MD;  Location: [facility];  Service: OB/GYN;  Laterality: N/A;     Family History   Problem Relation Age of Onset    Diabetes Mother     Hypertension Mother     Arthritis Mother     COPD Father     Hypertension Sister     Diabetes Sister     Cancer Sister         PANCREATIC     Social History     Tobacco Use    Smoking status: Never Smoker    Smokeless tobacco: Never Used   Substance Use Topics    Alcohol use: No     Alcohol/week: 0.0 standard drinks    Drug use: No     Review of Systems   Constitutional: Positive for appetite change. Negative for chills and fever.   HENT: Negative for sore throat.    Respiratory: Negative for shortness of breath.    Cardiovascular: Negative for chest pain.   Gastrointestinal: Positive for abdominal pain, constipation, nausea and vomiting. Negative for diarrhea.   Genitourinary: Negative for dysuria and hematuria.   Musculoskeletal: Negative for back pain.   Skin: Negative for rash.   Neurological: Negative for weakness.   Hematological: Does not bruise/bleed easily.       Physical Exam     Initial Vitals [07/19/22 1804]   BP Pulse Resp Temp SpO2   131/80 101 16 99.2 °F (37.3 °C) 99 %      MAP       --         Physical Exam    Nursing note and vitals reviewed.      Constitutional: Well-developed. Well-nourished.  Moderate emotional distress.  HENT: OP clear and moist.  NECK: Supple. No cervical LAD.  CARDIAC: RRR. Normal S1/ S2. No murmurs, gallops or rubs. 2+ distal pulses.  PULM: Normal effort. Breath sounds clear - no wheezes, rales, rhonchi.  ABD: Soft, diffusely tender, slightly distended,  hypoactive BS. Negative Lafleur sign, no McBurney's point tenderness. No guarding, no rebound.  : No CVA tenderness.   RECTAL: deferred  MS: Full ROM. No edema.   NEURO: Alert and oriented x3., no sensory or motor deficits.  Moving all extremities purposefully.  SKIN: Warm and dry. No rash or lesions.  No cyanosis or jaundice.  PSYCH: Normal judgment. Normal affect.      ED Course   Procedures  Labs Reviewed   CBC W/ AUTO DIFFERENTIAL - Abnormal; Notable for the following components:       Result Value    Mono # 1.4 (*)     Mono % 16.9 (*)     All other components within normal limits    Narrative:     Release to patient->Immediate   COMPREHENSIVE METABOLIC PANEL - Abnormal; Notable for the following components:    CO2 21 (*)     BUN 27 (*)     Total Protein 8.5 (*)     Alkaline Phosphatase 54 (*)     eGFR if  42.7 (*)     eGFR if non  37.0 (*)     All other components within normal limits    Narrative:     Release to patient->Immediate   HEMOGLOBIN A1C - Abnormal; Notable for the following components:    Hemoglobin A1C 5.9 (*)     All other components within normal limits    Narrative:     add on per dr mary fernandez/ order #[id number] @02:36 07/20/2022    Release to patient->Immediate   LIPASE    Narrative:     Release to patient->Immediate   LACTIC ACID, PLASMA    Narrative:     Release to patient->Immediate   HEMOGLOBIN A1C   POCT GLUCOSE MONITORING CONTINUOUS          Imaging Results           CT Abdomen Pelvis With Contrast (Final result)  Result time 07/19/22 23:39:51    Final result by Geronimo Issa MD (07/19/22 23:39:51)                 Impression:      Moderate distension of fluid-filled small bowel with 2 apparent transition points within the right lower quadrant as above.  Swirled appearance to the mesentery and vessels in this area.  Findings consistent with small-bowel obstruction with closed loop obstruction difficult to exclude.  There is trace adjacent free  fluid and edema but no convincing findings of bowel ischemia.  Adhesions favored over internal hernia.    Cholelithiasis with abnormal heterogeneous appearance to the gallbladder likely related to presence of sizable stones.  Suggest follow-up gallbladder ultrasound when clinically appropriate.    Nonobstructive left nephrolith.    Partially calcified right adnexal mass measuring 2.4 cm, unchanged from PET-CT 05/13/2022.    Additional incidental findings as above.    This report was flagged in Epic as abnormal.    Electronically signed by resident: Kristopher Benedict  Date:    07/19/2022  Time:    22:41    Electronically signed by: Geronimo Issa MD  Date:    07/19/2022  Time:    23:39             Narrative:    EXAMINATION:  CT ABDOMEN PELVIS WITH CONTRAST    CLINICAL HISTORY:  Abdominal abscess/infection suspected;LLQ abdominal pain;    TECHNIQUE:  The patient was surveyed from the lung bases through the pelvis after the administration of 100 cc Omni 350 IV contrast.  Oral contrast was administered. The data was reconstructed for coronal, sagittal, and axial images.    COMPARISON:  PET-CT, 05/13/2022.    FINDINGS:  CHEST:    Lungs/Pleura: Lung bases are unremarkable.  No focal consolidation, pneumothorax, or pleural effusion is present.    Heart: The visualized portions of the heart are normal. No pericardial effusion.    Thoracic soft tissues: Unremarkable    ABDOMEN:    Liver: The liver is normal in size and attenuation.  No focal hepatic abnormality.    Gallbladder/Bile ducts: Layering hyperdensity within the gallbladder likely representing cholelithiasis.  There is otherwise abnormal heterogeneous appearance to the gallbladder without evidence of wall thickening or pericholecystic fluid.  Mild intrahepatic biliary ductal dilation.  Extrahepatic common bile duct is within normal limits.    Spleen:Unremarkable.    Stomach: Unremarkable.    Pancreas: Unremarkable.    Adrenals: Unremarkable.    Renal/Ureters: The  kidneys are normal in size and location. No hydronephrosis. Punctate nonobstructive left lower pole stone.  Punctate hypodensity within the right midpole too small to characterize.  The ureters are normal in course and caliber. The urinary bladder is unremarkable.    Reproductive: Partially calcified right adnexal mass measuring 2.4 x 2.4 cm, not significantly changed from prior PET-CT 05/13/2022.    Bowel: Multiple loops of dilated fluid-filled small bowel with apparent transition point within the right lower quadrant with slight swirled appearance to the mesentery in vessels.  Likely transition points in the right lower quadrant on series 2, image 96 and 111, making it difficult to exclude close loop obstruction.  Trace adjacent free fluid most pronounced in the midline lower abdomen/pelvis.  Trace inflammatory fat stranding adjacent to distended bowel.  No evidence of pneumatosis or portal venous gas.  Colon is unremarkable.    Peritoneum: Trace pelvic free fluid.  No pneumoperitoneum.    Lymph Nodes: No pathologic marisol enlargement in the abdomen or pelvis.    Aorta: The abdominal aorta is normal in course and caliber.  Moderate atherosclerotic calcifications.    Bones: Grade 1 anterolisthesis of L4 on L5.  Advanced degenerative changes of the spine.  No acute fractures or osseous destructive lesions.    Soft Tissues: Extraperitoneal soft tissues are unremarkable.                              X-Rays:   Independently Interpreted Readings:   Abdomen:   Abdomen and Pelvis CT with Contrast - Small Bowel: Obstruction present. Two transition points, mesenteric swirl, small-bowel obstruction     Medications   ondansetron disintegrating tablet 8 mg (has no administration in time range)   enoxaparin injection 40 mg (has no administration in time range)   levothyroxine injection 100 mcg (has no administration in time range)   dextrose 10% bolus 125 mL (has no administration in time range)   glucagon (human recombinant)  injection 1 mg (has no administration in time range)   insulin aspart U-100 pen 0-5 Units (has no administration in time range)   lactated ringers infusion ( Intravenous Stopped 7/20/22 0442)   hydrALAZINE injection 10 mg (10 mg Intravenous Given 7/20/22 0742)   LIDOcaine HCL 2% jelly (has no administration in time range)   LIDOcaine HCl 2% urojet (has no administration in time range)   labetalol 20 mg/4 mL (5 mg/mL) IV syring (10 mg Intravenous Given 7/20/22 1242)   sodium chloride 0.9% bolus 1,000 mL (0 mLs Intravenous Stopped 7/19/22 2053)   ondansetron injection 8 mg (8 mg Intravenous Given 7/19/22 1954)   iohexoL (OMNIPAQUE 350) injection 100 mL (100 mLs Intravenous Given 7/19/22 2219)   magnesium sulfate 2g in water 50mL IVPB (premix) (2 g Intravenous New Bag 7/20/22 1046)     Medical Decision Making:   History:   Old Medical Records: I decided to obtain old medical records.  Initial Assessment:   The patient is a 76 y.o. female with past medical history of HTN, thyroid disease, DM2, HLD, breast cancer, who presents to the ED with a complaint of abdominal pain onset 1 week.  Differential Diagnosis:   Obstruction, perforation, appendicitis, pancreatitis, diverticulitis  Independently Interpreted Test(s):   I have ordered and independently interpreted X-rays - see prior notes.  Clinical Tests:   Lab Tests: Ordered and Reviewed  Radiological Study: Ordered and Reviewed  Other:   I have discussed this case with another health care provider.       <> Summary of the Discussion: General Surgery    Emergent evaluation of a patient presenting with abdominal pain, nausea, vomiting and obstipation for 3 days.  She is currently afebrile vital signs are stable.  Physical exam findings remarkable for slight distension, diffuse tenderness but without peritoneal findings on my exam.  IV line placed, IV fluids administered, labs obtained which does not show lactic acidosis, severe metabolic acidosis, leukocytosis or elevated  lipase.  Doubt pancreatitis.  She does have a slight JAE likely due to dehydration and poor p.o. intake secondary to nausea and vomiting.  Given age, risk factors and concern for acute process CT scan of the abdomen pelvis was obtained.  Two transition points were seen on CT on my read concerning for closed loop obstruction.  Discussed findings with general surgery who evaluated the patient at bedside.  Plan will be for NG tube, admit to General surgery and continued monitoring.  Continue IV fluids for now.  Patient agreeable to admission plan.  Patient remained hemodynamically stable at the time of admission.  Patient was signed out to oncoming ED attending, Dr. Jeffrey with admission pending full general surgery evaluation.    Complexity: High - level 5          Scribe Attestation:   Scribe #1: I performed the above scribed service and the documentation accurately describes the services I performed. I attest to the accuracy of the note.               I, Dr. Michael Carpenter, personally performed the services described in this documentation. All medical record entries made by the scribe were at my direction and in my presence.  I have reviewed the chart and agree that the record reflects my personal performance and is accurate and complete.     Clinical Impression:   Final diagnoses:  [R10.84] Generalized abdominal pain (Primary)  [R11.2] Nausea and vomiting, intractability of vomiting not specified, unspecified vomiting type  [K56.609] Small bowel obstruction          ED Disposition Condition    Observation               Michael Carpenter DO, [facility]  Emergency Staff Physician   Dept of Emergency Medicine   [facility]  Spectralink: [phone]        Disclaimer: This note has been generated using voice-recognition software. There may be typographical errors that have been missed during proof-reading.       Michael Carpenter DO  07/20/22 0221

## 2022-07-20 NOTE — ED TRIAGE NOTES
Pt states that she has HX of Breast Ca and currently taking oral treatment. Pt states that she has had a problem with medication and was taken off medication. Pt has been having nausea and vomiting for the past several days. Pt denies blood and only vomiting bile. Pt states that she is unable to tolerate liquids or food. Pt also concerned about lower abdominal pain that she describes as throbbing that comes and goes.

## 2022-07-20 NOTE — CARE UPDATE
GENERAL SURGERY    Pt seen and examined.  Abdominal exam still very reassuring.  No output from NG yet, but appears quite distal on KUB.   Will retract the NG and monitor.  May be okay for gastrograffin later today.  Will make determination on PM rounds.    Roger Ng MD  General Surgery, PGY-5  [phone]

## 2022-07-20 NOTE — CONSULTS
[facility] - Emergency Dept  General Surgery  Consult Note    Consults  Subjective:     Chief Complaint/Reason for Admission:   Abdominal pain    History of Present Illness:   76 y.o. female with past medical history of HTN, thyroid disease, DM2, HLD, breast cancer (on Arimidex), who presents to the ED with a complaint of abdominal pain onset 1 week. The patient reports of lower abdominal pain that is intermittent. Describes the pain as throbbing sensation. States she hasnt had a bowel movement or passed gas in 1 week. She notes of associated nausea and vomiting. She is unable to tolerate PO intake.     This is the first time she has this symptoms. She was seen in the ED, a set of labs were unremarkable aside from a minor JAE (baseline 0.8, right now 1.4). A CT was was done, it showed dilated loops of bowel and two transition points within the RLQ with swirled appearance of the mesentery and vessels in this area. General surgery was consulted for evaluation.     No current facility-administered medications on file prior to encounter.     Current Outpatient Medications on File Prior to Encounter   Medication Sig    amLODIPine (NORVASC) 10 MG tablet Take 1 tablet (10 mg total) by mouth once daily.    anastrozole (ARIMIDEX) 1 mg Tab Take 1 tablet (1 mg total) by mouth once daily.    aspirin (ECOTRIN) 81 MG EC tablet Take 81 mg by mouth once daily.    cloNIDine (CATAPRES) 0.1 MG tablet TAKE 1 TABLET BY MOUTH THREE TIMES DAILY    gabapentin (NEURONTIN) 300 MG capsule Take 1 capsule (300 mg total) by mouth 3 (three) times daily.    ibuprofen (ADVIL,MOTRIN) 800 MG tablet Take 1 tablet (800 mg total) by mouth 3 (three) times daily.    levothyroxine (SYNTHROID) 125 MCG tablet Take 1 tablet (125 mcg total) by mouth before breakfast.    losartan (COZAAR) 100 MG tablet Take 1 tablet (100 mg total) by mouth once daily.    lovastatin (MEVACOR) 20 MG tablet Take 2 tablets (40 mg total) by mouth every evening.    metFORMIN  (GLUCOPHAGE) 500 MG tablet Take 1 tablet (500 mg total) by mouth 2 (two) times daily with meals.    methylcellulose (ARTIFICIAL TEARS) 1 % ophthalmic solution Place 1 drop into both eyes as needed.    multivitamin (THERAGRAN) per tablet Take 1 tablet by mouth once daily.    oxybutynin (DITROPAN) 5 MG Tab Take 1 tablet (5 mg total) by mouth 3 (three) times daily.    rOPINIRole (REQUIP) 1 MG tablet Take 1 tablet (1 mg total) by mouth every evening.    traZODone (DESYREL) 50 MG tablet Take 1 tablet (50 mg total) by mouth every evening.    [DISCONTINUED] conjugated estrogens (PREMARIN) vaginal cream Place 1 g vaginally twice a week. (Patient not taking: No sig reported)       Review of patient's allergies indicates:   Allergen Reactions    Naproxen sodium Itching       Past Medical History:   Diagnosis Date    Cancer     Diabetes mellitus, type 2     Hyperlipidemia     Hypertension     Midline low back pain without sciatica 07/31/2015    Thyroid disease     Venous stasis     bilateral legs     Past Surgical History:   Procedure Laterality Date    COLONOSCOPY      COLONOSCOPY N/A 12/5/2019    Procedure: COLONOSCOPY;  Surgeon: Luis Bogran-Reyes, MD;  Location: [facility];  Service: Endoscopy;  Laterality: N/A;    INSERTION OF TUNNELED CENTRAL VENOUS CATHETER (CVC) WITH SUBCUTANEOUS PORT Right 5/24/2022    Procedure: [id number];  Surgeon: Darien Bear MD;  Location[facility];  Service: General;  Laterality: Right;    SHOULDER ARTHROSCOPY W/ ROTATOR CUFF REPAIR Right 2001    TUBAL LIGATION      VAGINAL HYSTERECTOMY N/A 1/11/2021    Procedure: HYSTERECTOMY, VAGINAL ;  Surgeon: Jessie Dacosta MD;  Location: [facility];  Service: OB/GYN;  Laterality: N/A;     Family History     Problem Relation (Age of Onset)    Arthritis Mother    COPD Father    Cancer Sister    Diabetes Mother, Sister    Hypertension Mother, Sister        Tobacco Use    Smoking status: Never Smoker    Smokeless tobacco: Never  Used   Substance and Sexual Activity    Alcohol use: No     Alcohol/week: 0.0 standard drinks    Drug use: No    Sexual activity: Not Currently     Partners: Male     Birth control/protection: None, See Surgical Hx     Review of Systems   Constitutional: Positive for appetite change. Negative for activity change.   Respiratory: Negative for apnea and chest tightness.    Cardiovascular: Negative for chest pain and leg swelling.   Gastrointestinal: Positive for abdominal distention and abdominal pain.   All other systems reviewed and are negative.    Objective:     Vital Signs (Most Recent):  Temp: 98.5 °F (36.9 °C) (07/20/22 0021)  Pulse: 67 (07/20/22 0021)  Resp: 16 (07/20/22 0021)  BP: (!) 173/81 (07/20/22 0021)  SpO2: 99 % (07/20/22 0021) Vital Signs (24h Range):  Temp:  [98.5 °F (36.9 °C)-99.2 °F (37.3 °C)] 98.5 °F (36.9 °C)  Pulse:  [] 67  Resp:  [16] 16  SpO2:  [99 %] 99 %  BP: (131-173)/(80-81) 173/81     Weight: 97.1 kg (214 lb)  Body mass index is 33.52 kg/m².      Intake/Output Summary (Last 24 hours) at 7/20/2022 0117  Last data filed at 7/19/2022 2053  Gross per 24 hour   Intake 1000 ml   Output --   Net 1000 ml       Physical Exam  Constitutional:       Appearance: Normal appearance.   Cardiovascular:      Rate and Rhythm: Normal rate.      Pulses: Normal pulses.   Pulmonary:      Effort: Pulmonary effort is normal.   Abdominal:      General: Abdomen is flat.      Comments: Mild distention  Not tender  Not peritoneal  Soft    Musculoskeletal:         General: Normal range of motion.   Skin:     General: Skin is warm.      Capillary Refill: Capillary refill takes less than 2 seconds.   Neurological:      General: No focal deficit present.      Mental Status: She is alert and oriented to person, place, and time.         Significant Labs:  CBC:   Recent Labs   Lab 07/19/22 1948   WBC 8.22   RBC 4.49   HGB 13.3   HCT 40.3      MCV 90   MCH 29.6   MCHC 33.0     CMP:   Recent Labs   Lab  07/19/22 1948      CALCIUM 10.3   ALBUMIN 4.2   PROT 8.5*      K 4.9   CO2 21*      BUN 27*   CREATININE 1.4   ALKPHOS 54*   ALT 16   AST 15   BILITOT 1.0     Lactic Acid:   Recent Labs   Lab 07/19/22 1948   LACTATE 1.5       Significant Diagnostics:  I have reviewed all pertinent imaging results/findings within the past 24 hours.    Assessment/Plan:     76 y.o. female with past medical history of HTN, thyroid disease, DM2, HLD, breast cancer (on Arimidex), who presents to the ED with a complaint of abdominal pain onset 1 week. CT scan findings consistent with SBO.    - Two transition points seen in the CT scan concerning for a closed loop obstruction, however, the bowel within these transition points is not dilated. Swirl sign is not a full 360.   - Will admit to general surgery   - Will place ng and will do an early GG challenge   - KUB at 5am in the morning   - GG Today   - Serial abdominal exams  - Low threshold for exploratory laparotomy based on CT scan findings   - Continue IVF hydration for JAE     Thank you for your consult. I will follow-up with patient. Please contact us if you have any additional questions.    Michael Acevedo MD  General Surgery  [facility] - Emergency Dept

## 2022-07-21 PROBLEM — U07.1 COVID-19: Status: ACTIVE | Noted: 2022-07-21

## 2022-07-21 PROBLEM — K56.609 SBO (SMALL BOWEL OBSTRUCTION): Status: ACTIVE | Noted: 2022-07-21

## 2022-07-21 LAB
ALBUMIN SERPL BCP-MCNC: 3.7 G/DL (ref 3.5–5.2)
ALP SERPL-CCNC: 52 U/L (ref 55–135)
ALT SERPL W/O P-5'-P-CCNC: 17 U/L (ref 10–44)
ANION GAP SERPL CALC-SCNC: 17 MMOL/L (ref 8–16)
ANION GAP SERPL CALC-SCNC: 20 MMOL/L (ref 8–16)
AST SERPL-CCNC: 16 U/L (ref 10–40)
BASOPHILS # BLD AUTO: 0.02 K/UL (ref 0–0.2)
BASOPHILS # BLD AUTO: 0.05 K/UL (ref 0–0.2)
BASOPHILS NFR BLD: 0.4 % (ref 0–1.9)
BASOPHILS NFR BLD: 0.5 % (ref 0–1.9)
BILIRUB SERPL-MCNC: 0.7 MG/DL (ref 0.1–1)
BUN SERPL-MCNC: 14 MG/DL (ref 8–23)
BUN SERPL-MCNC: 16 MG/DL (ref 8–23)
CALCIUM SERPL-MCNC: 10 MG/DL (ref 8.7–10.5)
CALCIUM SERPL-MCNC: 9.4 MG/DL (ref 8.7–10.5)
CHLORIDE SERPL-SCNC: 102 MMOL/L (ref 95–110)
CHLORIDE SERPL-SCNC: 104 MMOL/L (ref 95–110)
CO2 SERPL-SCNC: 20 MMOL/L (ref 23–29)
CO2 SERPL-SCNC: 21 MMOL/L (ref 23–29)
CREAT SERPL-MCNC: 0.8 MG/DL (ref 0.5–1.4)
CREAT SERPL-MCNC: 0.8 MG/DL (ref 0.5–1.4)
DIFFERENTIAL METHOD: ABNORMAL
DIFFERENTIAL METHOD: ABNORMAL
EOSINOPHIL # BLD AUTO: 0.1 K/UL (ref 0–0.5)
EOSINOPHIL # BLD AUTO: 0.1 K/UL (ref 0–0.5)
EOSINOPHIL NFR BLD: 0.8 % (ref 0–8)
EOSINOPHIL NFR BLD: 1.8 % (ref 0–8)
ERYTHROCYTE [DISTWIDTH] IN BLOOD BY AUTOMATED COUNT: 12.8 % (ref 11.5–14.5)
ERYTHROCYTE [DISTWIDTH] IN BLOOD BY AUTOMATED COUNT: 13 % (ref 11.5–14.5)
EST. GFR  (AFRICAN AMERICAN): >60 ML/MIN/1.73 M^2
EST. GFR  (AFRICAN AMERICAN): >60 ML/MIN/1.73 M^2
EST. GFR  (NON AFRICAN AMERICAN): >60 ML/MIN/1.73 M^2
EST. GFR  (NON AFRICAN AMERICAN): >60 ML/MIN/1.73 M^2
GLUCOSE SERPL-MCNC: 151 MG/DL (ref 70–110)
GLUCOSE SERPL-MCNC: 92 MG/DL (ref 70–110)
HCT VFR BLD AUTO: 34.3 % (ref 37–48.5)
HCT VFR BLD AUTO: 40 % (ref 37–48.5)
HGB BLD-MCNC: 11.2 G/DL (ref 12–16)
HGB BLD-MCNC: 12.7 G/DL (ref 12–16)
IMM GRANULOCYTES # BLD AUTO: 0.02 K/UL (ref 0–0.04)
IMM GRANULOCYTES # BLD AUTO: 0.06 K/UL (ref 0–0.04)
IMM GRANULOCYTES NFR BLD AUTO: 0.4 % (ref 0–0.5)
IMM GRANULOCYTES NFR BLD AUTO: 0.6 % (ref 0–0.5)
LACTATE SERPL-SCNC: 2 MMOL/L (ref 0.5–2.2)
LYMPHOCYTES # BLD AUTO: 1.5 K/UL (ref 1–4.8)
LYMPHOCYTES # BLD AUTO: 1.5 K/UL (ref 1–4.8)
LYMPHOCYTES NFR BLD: 15.8 % (ref 18–48)
LYMPHOCYTES NFR BLD: 26.1 % (ref 18–48)
MAGNESIUM SERPL-MCNC: 1.5 MG/DL (ref 1.6–2.6)
MCH RBC QN AUTO: 28.8 PG (ref 27–31)
MCH RBC QN AUTO: 29.4 PG (ref 27–31)
MCHC RBC AUTO-ENTMCNC: 31.8 G/DL (ref 32–36)
MCHC RBC AUTO-ENTMCNC: 32.7 G/DL (ref 32–36)
MCV RBC AUTO: 88 FL (ref 82–98)
MCV RBC AUTO: 93 FL (ref 82–98)
MONOCYTES # BLD AUTO: 0.9 K/UL (ref 0.3–1)
MONOCYTES # BLD AUTO: 0.9 K/UL (ref 0.3–1)
MONOCYTES NFR BLD: 10 % (ref 4–15)
MONOCYTES NFR BLD: 16.1 % (ref 4–15)
NEUTROPHILS # BLD AUTO: 3.1 K/UL (ref 1.8–7.7)
NEUTROPHILS # BLD AUTO: 6.7 K/UL (ref 1.8–7.7)
NEUTROPHILS NFR BLD: 55.2 % (ref 38–73)
NEUTROPHILS NFR BLD: 72.3 % (ref 38–73)
NRBC BLD-RTO: 0 /100 WBC
NRBC BLD-RTO: 0 /100 WBC
PHOSPHATE SERPL-MCNC: 3.6 MG/DL (ref 2.7–4.5)
PLATELET # BLD AUTO: 278 K/UL (ref 150–450)
PLATELET # BLD AUTO: 287 K/UL (ref 150–450)
PMV BLD AUTO: 10.4 FL (ref 9.2–12.9)
PMV BLD AUTO: 11.1 FL (ref 9.2–12.9)
POCT GLUCOSE: 113 MG/DL (ref 70–110)
POCT GLUCOSE: 93 MG/DL (ref 70–110)
POCT GLUCOSE: 97 MG/DL (ref 70–110)
POTASSIUM SERPL-SCNC: 3.5 MMOL/L (ref 3.5–5.1)
POTASSIUM SERPL-SCNC: 3.6 MMOL/L (ref 3.5–5.1)
PROT SERPL-MCNC: 7.6 G/DL (ref 6–8.4)
RBC # BLD AUTO: 3.89 M/UL (ref 4–5.4)
RBC # BLD AUTO: 4.32 M/UL (ref 4–5.4)
SODIUM SERPL-SCNC: 141 MMOL/L (ref 136–145)
SODIUM SERPL-SCNC: 143 MMOL/L (ref 136–145)
WBC # BLD AUTO: 5.66 K/UL (ref 3.9–12.7)
WBC # BLD AUTO: 9.28 K/UL (ref 3.9–12.7)

## 2022-07-21 PROCEDURE — 85025 COMPLETE CBC W/AUTO DIFF WBC: CPT | Mod: 91

## 2022-07-21 PROCEDURE — 80048 BASIC METABOLIC PNL TOTAL CA: CPT | Performed by: STUDENT IN AN ORGANIZED HEALTH CARE EDUCATION/TRAINING PROGRAM

## 2022-07-21 PROCEDURE — 99233 SBSQ HOSP IP/OBS HIGH 50: CPT | Mod: GC,,, | Performed by: STUDENT IN AN ORGANIZED HEALTH CARE EDUCATION/TRAINING PROGRAM

## 2022-07-21 PROCEDURE — 63600175 PHARM REV CODE 636 W HCPCS

## 2022-07-21 PROCEDURE — 80053 COMPREHEN METABOLIC PANEL: CPT

## 2022-07-21 PROCEDURE — 83735 ASSAY OF MAGNESIUM: CPT | Performed by: STUDENT IN AN ORGANIZED HEALTH CARE EDUCATION/TRAINING PROGRAM

## 2022-07-21 PROCEDURE — 20600001 HC STEP DOWN PRIVATE ROOM

## 2022-07-21 PROCEDURE — 84100 ASSAY OF PHOSPHORUS: CPT | Performed by: STUDENT IN AN ORGANIZED HEALTH CARE EDUCATION/TRAINING PROGRAM

## 2022-07-21 PROCEDURE — 25000003 PHARM REV CODE 250

## 2022-07-21 PROCEDURE — 25000003 PHARM REV CODE 250: Performed by: STUDENT IN AN ORGANIZED HEALTH CARE EDUCATION/TRAINING PROGRAM

## 2022-07-21 PROCEDURE — 36415 COLL VENOUS BLD VENIPUNCTURE: CPT | Performed by: STUDENT IN AN ORGANIZED HEALTH CARE EDUCATION/TRAINING PROGRAM

## 2022-07-21 PROCEDURE — 85025 COMPLETE CBC W/AUTO DIFF WBC: CPT | Performed by: STUDENT IN AN ORGANIZED HEALTH CARE EDUCATION/TRAINING PROGRAM

## 2022-07-21 PROCEDURE — 36415 COLL VENOUS BLD VENIPUNCTURE: CPT

## 2022-07-21 PROCEDURE — 27000207 HC ISOLATION

## 2022-07-21 PROCEDURE — 25500020 PHARM REV CODE 255: Performed by: STUDENT IN AN ORGANIZED HEALTH CARE EDUCATION/TRAINING PROGRAM

## 2022-07-21 PROCEDURE — 83605 ASSAY OF LACTIC ACID: CPT

## 2022-07-21 PROCEDURE — 63600175 PHARM REV CODE 636 W HCPCS: Performed by: STUDENT IN AN ORGANIZED HEALTH CARE EDUCATION/TRAINING PROGRAM

## 2022-07-21 PROCEDURE — 99233 PR SUBSEQUENT HOSPITAL CARE,LEVL III: ICD-10-PCS | Mod: GC,,, | Performed by: STUDENT IN AN ORGANIZED HEALTH CARE EDUCATION/TRAINING PROGRAM

## 2022-07-21 RX ORDER — ACETAMINOPHEN 10 MG/ML
1000 INJECTION, SOLUTION INTRAVENOUS ONCE
Status: COMPLETED | OUTPATIENT
Start: 2022-07-21 | End: 2022-07-21

## 2022-07-21 RX ORDER — ACETAMINOPHEN 325 MG/1
650 TABLET ORAL EVERY 6 HOURS PRN
Status: DISCONTINUED | OUTPATIENT
Start: 2022-07-21 | End: 2022-07-23 | Stop reason: HOSPADM

## 2022-07-21 RX ORDER — MAGNESIUM SULFATE HEPTAHYDRATE 40 MG/ML
2 INJECTION, SOLUTION INTRAVENOUS ONCE
Status: COMPLETED | OUTPATIENT
Start: 2022-07-21 | End: 2022-07-21

## 2022-07-21 RX ORDER — LABETALOL HCL 20 MG/4 ML
10 SYRINGE (ML) INTRAVENOUS ONCE
Status: COMPLETED | OUTPATIENT
Start: 2022-07-21 | End: 2022-07-21

## 2022-07-21 RX ORDER — AMLODIPINE BESYLATE 10 MG/1
10 TABLET ORAL DAILY
Status: DISCONTINUED | OUTPATIENT
Start: 2022-07-21 | End: 2022-07-22

## 2022-07-21 RX ORDER — HYDRALAZINE HYDROCHLORIDE 20 MG/ML
10 INJECTION INTRAMUSCULAR; INTRAVENOUS ONCE AS NEEDED
Status: DISCONTINUED | OUTPATIENT
Start: 2022-07-21 | End: 2022-07-23 | Stop reason: HOSPADM

## 2022-07-21 RX ORDER — MORPHINE SULFATE 2 MG/ML
2 INJECTION, SOLUTION INTRAMUSCULAR; INTRAVENOUS ONCE
Status: COMPLETED | OUTPATIENT
Start: 2022-07-21 | End: 2022-07-21

## 2022-07-21 RX ORDER — LABETALOL HCL 20 MG/4 ML
20 SYRINGE (ML) INTRAVENOUS ONCE
Status: DISCONTINUED | OUTPATIENT
Start: 2022-07-21 | End: 2022-07-21

## 2022-07-21 RX ADMIN — Medication 10 MG: at 07:07

## 2022-07-21 RX ADMIN — MORPHINE SULFATE 2 MG: 2 INJECTION, SOLUTION INTRAMUSCULAR; INTRAVENOUS at 07:07

## 2022-07-21 RX ADMIN — HYDRALAZINE HYDROCHLORIDE 10 MG: 20 INJECTION, SOLUTION INTRAMUSCULAR; INTRAVENOUS at 08:07

## 2022-07-21 RX ADMIN — MAGNESIUM SULFATE HEPTAHYDRATE 2 G: 40 INJECTION, SOLUTION INTRAVENOUS at 08:07

## 2022-07-21 RX ADMIN — DIATRIZOATE MEGLUMINE AND DIATRIZOATE SODIUM 50 ML: 660; 100 LIQUID ORAL; RECTAL at 08:07

## 2022-07-21 RX ADMIN — ONDANSETRON 8 MG: 8 TABLET, ORALLY DISINTEGRATING ORAL at 05:07

## 2022-07-21 RX ADMIN — ACETAMINOPHEN 1000 MG: 10 INJECTION INTRAVENOUS at 12:07

## 2022-07-21 RX ADMIN — ENOXAPARIN SODIUM 40 MG: 100 INJECTION SUBCUTANEOUS at 04:07

## 2022-07-21 RX ADMIN — SODIUM CHLORIDE, SODIUM LACTATE, POTASSIUM CHLORIDE, AND CALCIUM CHLORIDE: .6; .31; .03; .02 INJECTION, SOLUTION INTRAVENOUS at 09:07

## 2022-07-21 RX ADMIN — Medication 10 MG: at 06:07

## 2022-07-21 RX ADMIN — ACETAMINOPHEN 650 MG: 325 TABLET ORAL at 06:07

## 2022-07-21 RX ADMIN — HYDRALAZINE HYDROCHLORIDE 10 MG: 20 INJECTION, SOLUTION INTRAMUSCULAR; INTRAVENOUS at 05:07

## 2022-07-21 RX ADMIN — Medication 10 MG: at 12:07

## 2022-07-21 RX ADMIN — LEVOTHYROXINE SODIUM 100 MCG: 20 INJECTION, SOLUTION INTRAVENOUS at 09:07

## 2022-07-21 RX ADMIN — SODIUM CHLORIDE, SODIUM LACTATE, POTASSIUM CHLORIDE, AND CALCIUM CHLORIDE: .6; .31; .03; .02 INJECTION, SOLUTION INTRAVENOUS at 12:07

## 2022-07-21 RX ADMIN — Medication 10 MG: at 04:07

## 2022-07-21 RX ADMIN — HYDRALAZINE HYDROCHLORIDE 10 MG: 20 INJECTION, SOLUTION INTRAMUSCULAR; INTRAVENOUS at 02:07

## 2022-07-21 NOTE — NURSING
Called and spoke to Dr. Aranda re: pt temp of 100.4. Pt is NPO and has nothing ordered. Dr. Aranda came to see pt and put in order to give one time dose of Acetaminophen 1000 mg IVPB. Orders will be implemented. Will continue to monitor pt.

## 2022-07-21 NOTE — PROGRESS NOTES
[facility] - [facility]  General Surgery  Progress Note    Subjective:     History of Present Illness:  No notes on file    Post-Op Info:  * No surgery found *         Interval History: NAEON. Temp to 100.4, resolved with tylenol. HDS. 500cc out of NGT overnight. Denies n/v. Passing flatus, no BM. Pain is controlled with current regimen. Adequate UOP. No new symptoms or concerns this morning. All questions answered.       Medications:  Continuous Infusions:   lactated ringers 125 mL/hr at 07/21/22 0005     Scheduled Meds:   diatrizoate meglumineand-diatrizoate sodium  50 mL Per NG tube Once    enoxaparin  40 mg Subcutaneous Daily    levothyroxine  100 mcg Intravenous Before breakfast    LIDOcaine HCl 2%   Topical (Top) Once     PRN Meds:dextrose 10%, glucagon (human recombinant), hydrALAZINE, insulin aspart U-100, labetalol, ondansetron     Review of patient's allergies indicates:   Allergen Reactions    Naproxen sodium Itching     Objective:     Vital Signs (Most Recent):  Temp: 98.4 °F (36.9 °C) (07/21/22 0755)  Pulse: 77 (07/21/22 0755)  Resp: 16 (07/21/22 0755)  BP: (!) 169/77 (07/21/22 0755)  SpO2: 98 % (07/21/22 0755)   Vital Signs (24h Range):  Temp:  [97.8 °F (36.6 °C)-100.4 °F (38 °C)] 98.4 °F (36.9 °C)  Pulse:  [75-98] 77  Resp:  [16-20] 16  SpO2:  [96 %-98 %] 98 %  BP: (139-193)/(54-84) 169/77     Weight: 97.1 kg (214 lb)  Body mass index is 33.52 kg/m².    Intake/Output - Last 3 Shifts         07/19 0700 07/20 0659 07/20 0700 07/21 0659 07/21 0700 07/22 0659    P.O.  0     I.V. (mL/kg) 82 (0.8) 1299.3 (13.4)     IV Piggyback 1000      Total Intake(mL/kg) 1082 (11.1) 1299.3 (13.4)     Urine (mL/kg/hr)  1000 (0.4)     Drains  850     Stool  0     Total Output  1850     Net +1082 -550.7            Stool Occurrence  0 x             Physical Exam  Vitals and nursing note reviewed.   Constitutional:       General: She is not in acute distress.     Appearance: She is not diaphoretic.      Comments: Room  air  NGT to LIWS   HENT:      Head: Normocephalic and atraumatic.      Mouth/Throat:      Mouth: Mucous membranes are moist.      Pharynx: Oropharynx is clear.   Eyes:      Extraocular Movements: Extraocular movements intact.      Conjunctiva/sclera: Conjunctivae normal.   Cardiovascular:      Rate and Rhythm: Normal rate.   Pulmonary:      Effort: Pulmonary effort is normal. No respiratory distress.   Abdominal:      Palpations: Abdomen is soft.      Tenderness: There is no guarding or rebound.      Comments: Mildly distended. No TTP throughout. No peritonitic signs    Musculoskeletal:         General: No deformity.      Cervical back: Normal range of motion.   Skin:     General: Skin is warm and dry.   Neurological:      Mental Status: She is alert and oriented to person, place, and time.       Significant Labs:  I have reviewed all pertinent lab results within the past 24 hours.  CBC:   Recent Labs   Lab 07/21/22  0308   WBC 5.66   RBC 3.89*   HGB 11.2*   HCT 34.3*      MCV 88   MCH 28.8   MCHC 32.7     CMP:   Recent Labs   Lab 07/19/22  1948 07/20/22  0342 07/21/22  0308      < > 92   CALCIUM 10.3   < > 9.4   ALBUMIN 4.2  --   --    PROT 8.5*  --   --       < > 141   K 4.9   < > 3.6   CO2 21*   < > 20*      < > 104   BUN 27*   < > 16   CREATININE 1.4   < > 0.8   ALKPHOS 54*  --   --    ALT 16  --   --    AST 15  --   --    BILITOT 1.0  --   --     < > = values in this interval not displayed.       Significant Diagnostics:  I have reviewed all pertinent imaging results/findings within the past 24 hours.    Assessment/Plan:     * SBO (small bowel obstruction)  Patient is a 76 y ear old female with past medical history of HTN, thyroid disease, DM2, HLD, breast cancer (on Arimidex), who presents to the ED with a complaint of abdominal pain onset 1 week. CT scan findings consistent with SBO. Clinically stable     - Two transition points seen in the CT scan concerning for a closed loop  obstruction, however, the bowel within these transition points is not dilated. Swirl sign is not a full 360 and physical exam remains reassuring  - NPO  - NGT to LIWS  - Gastrograffin challenge today with KUBs at 4 and 8 hours  - Serial abdominal exams  - Low threshold for exploratory laparotomy based on CT scan findings   - Continue IVF hydration while NPO. JAE resolved  - PRN pain and nausea medications  - Daily labs  - Replete electrolytes PRN  - DVT prophylaxis (SCDs and lovenox)  - OOB, ambulate    Dispo: not yet medically stable for dc         COVID-19  - Positive on admit  - PRN tylenol  - Currently satting well on room air  - Continue to monitor with q4 vitals and adjust regimen PRN    HTN (hypertension)  - Currently not controlled  - Holding home meds in setting of acute disease process and NPO  - PRN hydralazine and labetalol on board  - Continue to monitor with q4 vitals and adjust regimen PRN    hypothyroidism  - Home synthroid converted to IV levothyroxine while NPO    DM type 2 (diabetes mellitus, type 2)  - Mostly controlled  - SSI   - POCT glucose checks  - Hypoglycemia protocol  - Holding home meds in setting of acute disease process        Case discussed with Dr. Oliveros.    Win Haley PACheloC  General Surgery  [facility]

## 2022-07-21 NOTE — PLAN OF CARE
Pt aox4 on RA, patient verbalizes understanding of POC.    Problem: Adult Inpatient Plan of Care  Goal: Plan of Care Review  Outcome: Ongoing, Progressing  Goal: Patient-Specific Goal (Individualized)  Outcome: Ongoing, Progressing  Goal: Absence of Hospital-Acquired Illness or Injury  Outcome: Ongoing, Progressing  Goal: Optimal Comfort and Wellbeing  Outcome: Ongoing, Progressing  Goal: Readiness for Transition of Care  Outcome: Ongoing, Progressing     Problem: Diabetes Comorbidity  Goal: Blood Glucose Level Within Targeted Range  Outcome: Ongoing, Progressing     Problem: Infection  Goal: Absence of Infection Signs and Symptoms  Outcome: Ongoing, Progressing

## 2022-07-21 NOTE — PLAN OF CARE
POC reviewed with patient who verbalized understanding. AAOX4. Remains free of falls and injury.     - VSS on RA, except elevated BP, controlled with PRN medications, MD aware  - R NG tube to LIWS  - mIVF  - Blood glucose being monitored every 6 hours    NPO, denies nausea. Telemetry being monitored running NSR. Patient denies chest pain & SOB. No acute events. No distress noted. Bed in lowest position, call light within reach, frequent rounds made for safety.     WCTM     Problem: Diabetes Comorbidity  Goal: Blood Glucose Level Within Targeted Range  Outcome: Ongoing, Progressing     Problem: Infection  Goal: Absence of Infection Signs and Symptoms  Outcome: Ongoing, Progressing

## 2022-07-21 NOTE — PLAN OF CARE
[facility]  Initial Discharge Assessment       Primary Care Provider: Ravin Viera MD    Admission Diagnosis: Small bowel obstruction [K56.609]  Generalized abdominal pain [R10.84]  Nausea and vomiting, intractability of vomiting not specified, unspecified vomiting type [R11.2]    Admission Date: 7/19/2022  Expected Discharge Date: 7/21/2022    Discharge Barriers Identified: None    Payor: MEDICARE / Plan: MEDICARE PART A & B / Product Type: Government /     Extended Emergency Contact Information  Primary Emergency Contact: Sejal Al  Address: [address] [place] [address]  Home Phone: [phone]  Mobile Phone: [phone]  Relation: Sister    Discharge Plan A: Home  Discharge Plan B: Home Health      [facility] [address] [place], [place] [address] [facility]  [address] [facility] [address]  Phone: [phone] Fax: [phone]    [facility] [address] [place], [place] - [address]  [address]  [place] [address]  Phone: [phone] Fax: [phone]      Initial Assessment (most recent)     Adult Discharge Assessment - 07/21/22 1256        Discharge Assessment    Assessment Type Discharge Planning Assessment     Confirmed/corrected address, phone number and insurance Yes     Confirmed Demographics Correct on Facesheet     Source of Information family     Communicated CHRISTINA with patient/caregiver Yes     Do you expect to return to your current living situation? Yes     Do you have help at home or someone to help you manage your care at home? Yes     Prior to hospitilization cognitive status: Alert/Oriented     Current cognitive status: Alert/Oriented     Walking or Climbing Stairs Difficulty none     Dressing/Bathing Difficulty none     Equipment Currently Used at Home none     Readmission within 30 days? No     Do you currently have service(s) that help you manage your care at home? No     Do you take prescription  medications? Yes     Do you have prescription coverage? Yes     Do you have any problems affording any of your prescribed medications? No     Is the patient taking medications as prescribed? yes     Who is going to help you get home at discharge? Family     Are you on dialysis? No     Do you take coumadin? No     Discharge Plan A Home     Discharge Plan B Home Health     DME Needed Upon Discharge  none     Discharge Plan discussed with: Patient     Discharge Barriers Identified None                  Marcela Barroso RN, CM   Ext: [phone]

## 2022-07-21 NOTE — ASSESSMENT & PLAN NOTE
- Positive on admit  - PRN tylenol  - Currently satting well on room air  - Continue to monitor with q4 vitals and adjust regimen PRN

## 2022-07-21 NOTE — PLAN OF CARE
Pt Aox4. BP ran high this shift, needing both PRN medications. Pt temp went up to 100.4, and pt rec'd one time dose of acetaminophen 1000 mg IVPB. Upon recheck, pt temp had returned to normal. Pt to bathroom ambulatory with assist. Educated on POC, resting comfortably, bed low and locked, call light within reach, will continue to monitor.       Problem: Adult Inpatient Plan of Care  Goal: Plan of Care Review  Outcome: Ongoing, Progressing     Problem: Adult Inpatient Plan of Care  Goal: Patient-Specific Goal (Individualized)  Outcome: Ongoing, Progressing     Problem: Adult Inpatient Plan of Care  Goal: Absence of Hospital-Acquired Illness or Injury  Outcome: Ongoing, Progressing     Problem: Adult Inpatient Plan of Care  Goal: Optimal Comfort and Wellbeing  Outcome: Ongoing, Progressing

## 2022-07-21 NOTE — SUBJECTIVE & OBJECTIVE
Interval History: NAEON. Temp to 100.4, resolved with tylenol. HDS. 500cc out of NGT overnight. Denies n/v. Passing flatus, no BM. Pain is controlled with current regimen. Adequate UOP. No new symptoms or concerns this morning. All questions answered.       Medications:  Continuous Infusions:   lactated ringers 125 mL/hr at 07/21/22 0005     Scheduled Meds:   diatrizoate meglumineand-diatrizoate sodium  50 mL Per NG tube Once    enoxaparin  40 mg Subcutaneous Daily    levothyroxine  100 mcg Intravenous Before breakfast    LIDOcaine HCl 2%   Topical (Top) Once     PRN Meds:dextrose 10%, glucagon (human recombinant), hydrALAZINE, insulin aspart U-100, labetalol, ondansetron     Review of patient's allergies indicates:   Allergen Reactions    Naproxen sodium Itching     Objective:     Vital Signs (Most Recent):  Temp: 98.4 °F (36.9 °C) (07/21/22 0755)  Pulse: 77 (07/21/22 0755)  Resp: 16 (07/21/22 0755)  BP: (!) 169/77 (07/21/22 0755)  SpO2: 98 % (07/21/22 0755)   Vital Signs (24h Range):  Temp:  [97.8 °F (36.6 °C)-100.4 °F (38 °C)] 98.4 °F (36.9 °C)  Pulse:  [75-98] 77  Resp:  [16-20] 16  SpO2:  [96 %-98 %] 98 %  BP: (139-193)/(54-84) 169/77     Weight: 97.1 kg (214 lb)  Body mass index is 33.52 kg/m².    Intake/Output - Last 3 Shifts         07/19 0700 07/20 0659 07/20 0700 07/21 0659 07/21 0700 07/22 0659    P.O.  0     I.V. (mL/kg) 82 (0.8) 1299.3 (13.4)     IV Piggyback 1000      Total Intake(mL/kg) 1082 (11.1) 1299.3 (13.4)     Urine (mL/kg/hr)  1000 (0.4)     Drains  850     Stool  0     Total Output  1850     Net +1082 -550.7            Stool Occurrence  0 x             Physical Exam  Vitals and nursing note reviewed.   Constitutional:       General: She is not in acute distress.     Appearance: She is not diaphoretic.      Comments: Room air  NGT to VARSHA   HENT:      Head: Normocephalic and atraumatic.      Mouth/Throat:      Mouth: Mucous membranes are moist.      Pharynx: Oropharynx is clear.   Eyes:       Extraocular Movements: Extraocular movements intact.      Conjunctiva/sclera: Conjunctivae normal.   Cardiovascular:      Rate and Rhythm: Normal rate.   Pulmonary:      Effort: Pulmonary effort is normal. No respiratory distress.   Abdominal:      Palpations: Abdomen is soft.      Tenderness: There is no guarding or rebound.      Comments: Mildly distended. No TTP throughout. No peritonitic signs    Musculoskeletal:         General: No deformity.      Cervical back: Normal range of motion.   Skin:     General: Skin is warm and dry.   Neurological:      Mental Status: She is alert and oriented to person, place, and time.       Significant Labs:  I have reviewed all pertinent lab results within the past 24 hours.  CBC:   Recent Labs   Lab 07/21/22  0308   WBC 5.66   RBC 3.89*   HGB 11.2*   HCT 34.3*      MCV 88   MCH 28.8   MCHC 32.7     CMP:   Recent Labs   Lab 07/19/22  1948 07/20/22  0342 07/21/22  0308      < > 92   CALCIUM 10.3   < > 9.4   ALBUMIN 4.2  --   --    PROT 8.5*  --   --       < > 141   K 4.9   < > 3.6   CO2 21*   < > 20*      < > 104   BUN 27*   < > 16   CREATININE 1.4   < > 0.8   ALKPHOS 54*  --   --    ALT 16  --   --    AST 15  --   --    BILITOT 1.0  --   --     < > = values in this interval not displayed.       Significant Diagnostics:  I have reviewed all pertinent imaging results/findings within the past 24 hours.

## 2022-07-21 NOTE — ASSESSMENT & PLAN NOTE
Patient is a 76 y ear old female with past medical history of HTN, thyroid disease, DM2, HLD, breast cancer (on Arimidex), who presents to the ED with a complaint of abdominal pain onset 1 week. CT scan findings consistent with SBO. Clinically stable     - Two transition points seen in the CT scan concerning for a closed loop obstruction, however, the bowel within these transition points is not dilated. Swirl sign is not a full 360 and physical exam remains reassuring  - NPO  - NGT to LIWS  - Gastrograffin challenge today with KUBs at 4 and 8 hours  - Serial abdominal exams  - Low threshold for exploratory laparotomy based on CT scan findings   - Continue IVF hydration while NPO. JAE resolved  - PRN pain and nausea medications  - Daily labs  - Replete electrolytes PRN  - DVT prophylaxis (SCDs and lovenox)  - OOB, ambulate    Dispo: not yet medically stable for dc

## 2022-07-21 NOTE — ASSESSMENT & PLAN NOTE
- Mostly controlled  - SSI   - POCT glucose checks  - Hypoglycemia protocol  - Holding home meds in setting of acute disease process

## 2022-07-21 NOTE — ASSESSMENT & PLAN NOTE
- Currently not controlled  - Holding home meds in setting of acute disease process and NPO  - PRN hydralazine and labetalol on board  - Continue to monitor with q4 vitals and adjust regimen PRN

## 2022-07-22 VITALS
HEIGHT: 67 IN | RESPIRATION RATE: 18 BRPM | SYSTOLIC BLOOD PRESSURE: 168 MMHG | DIASTOLIC BLOOD PRESSURE: 76 MMHG | TEMPERATURE: 98 F | BODY MASS INDEX: 33.59 KG/M2 | WEIGHT: 214 LBS | OXYGEN SATURATION: 98 % | HEART RATE: 76 BPM

## 2022-07-22 LAB
ANION GAP SERPL CALC-SCNC: 15 MMOL/L (ref 8–16)
BASOPHILS # BLD AUTO: 0.02 K/UL (ref 0–0.2)
BASOPHILS NFR BLD: 0.3 % (ref 0–1.9)
BUN SERPL-MCNC: 12 MG/DL (ref 8–23)
CALCIUM SERPL-MCNC: 9.4 MG/DL (ref 8.7–10.5)
CHLORIDE SERPL-SCNC: 105 MMOL/L (ref 95–110)
CO2 SERPL-SCNC: 24 MMOL/L (ref 23–29)
CREAT SERPL-MCNC: 0.7 MG/DL (ref 0.5–1.4)
DIFFERENTIAL METHOD: ABNORMAL
EOSINOPHIL # BLD AUTO: 0 K/UL (ref 0–0.5)
EOSINOPHIL NFR BLD: 0.5 % (ref 0–8)
ERYTHROCYTE [DISTWIDTH] IN BLOOD BY AUTOMATED COUNT: 12.9 % (ref 11.5–14.5)
EST. GFR  (AFRICAN AMERICAN): >60 ML/MIN/1.73 M^2
EST. GFR  (NON AFRICAN AMERICAN): >60 ML/MIN/1.73 M^2
GLUCOSE SERPL-MCNC: 84 MG/DL (ref 70–110)
HCT VFR BLD AUTO: 34.9 % (ref 37–48.5)
HGB BLD-MCNC: 11.2 G/DL (ref 12–16)
IMM GRANULOCYTES # BLD AUTO: 0.03 K/UL (ref 0–0.04)
IMM GRANULOCYTES NFR BLD AUTO: 0.4 % (ref 0–0.5)
LYMPHOCYTES # BLD AUTO: 1.6 K/UL (ref 1–4.8)
LYMPHOCYTES NFR BLD: 20.7 % (ref 18–48)
MAGNESIUM SERPL-MCNC: 1.6 MG/DL (ref 1.6–2.6)
MCH RBC QN AUTO: 29.5 PG (ref 27–31)
MCHC RBC AUTO-ENTMCNC: 32.1 G/DL (ref 32–36)
MCV RBC AUTO: 92 FL (ref 82–98)
MONOCYTES # BLD AUTO: 1 K/UL (ref 0.3–1)
MONOCYTES NFR BLD: 12.4 % (ref 4–15)
NEUTROPHILS # BLD AUTO: 5.2 K/UL (ref 1.8–7.7)
NEUTROPHILS NFR BLD: 65.7 % (ref 38–73)
NRBC BLD-RTO: 0 /100 WBC
PHOSPHATE SERPL-MCNC: 3.4 MG/DL (ref 2.7–4.5)
PLATELET # BLD AUTO: 318 K/UL (ref 150–450)
PMV BLD AUTO: 10.8 FL (ref 9.2–12.9)
POCT GLUCOSE: 96 MG/DL (ref 70–110)
POTASSIUM SERPL-SCNC: 3.2 MMOL/L (ref 3.5–5.1)
RBC # BLD AUTO: 3.8 M/UL (ref 4–5.4)
SODIUM SERPL-SCNC: 144 MMOL/L (ref 136–145)
WBC # BLD AUTO: 7.92 K/UL (ref 3.9–12.7)

## 2022-07-22 PROCEDURE — 80048 BASIC METABOLIC PNL TOTAL CA: CPT | Performed by: STUDENT IN AN ORGANIZED HEALTH CARE EDUCATION/TRAINING PROGRAM

## 2022-07-22 PROCEDURE — 97535 SELF CARE MNGMENT TRAINING: CPT

## 2022-07-22 PROCEDURE — 83735 ASSAY OF MAGNESIUM: CPT | Performed by: STUDENT IN AN ORGANIZED HEALTH CARE EDUCATION/TRAINING PROGRAM

## 2022-07-22 PROCEDURE — 97165 OT EVAL LOW COMPLEX 30 MIN: CPT

## 2022-07-22 PROCEDURE — 36415 COLL VENOUS BLD VENIPUNCTURE: CPT | Performed by: STUDENT IN AN ORGANIZED HEALTH CARE EDUCATION/TRAINING PROGRAM

## 2022-07-22 PROCEDURE — 27000207 HC ISOLATION

## 2022-07-22 PROCEDURE — 63600175 PHARM REV CODE 636 W HCPCS: Performed by: STUDENT IN AN ORGANIZED HEALTH CARE EDUCATION/TRAINING PROGRAM

## 2022-07-22 PROCEDURE — 97161 PT EVAL LOW COMPLEX 20 MIN: CPT

## 2022-07-22 PROCEDURE — 20600001 HC STEP DOWN PRIVATE ROOM

## 2022-07-22 PROCEDURE — 84100 ASSAY OF PHOSPHORUS: CPT | Performed by: STUDENT IN AN ORGANIZED HEALTH CARE EDUCATION/TRAINING PROGRAM

## 2022-07-22 PROCEDURE — 25000003 PHARM REV CODE 250: Performed by: SURGERY

## 2022-07-22 PROCEDURE — 97530 THERAPEUTIC ACTIVITIES: CPT

## 2022-07-22 PROCEDURE — 85025 COMPLETE CBC W/AUTO DIFF WBC: CPT | Performed by: STUDENT IN AN ORGANIZED HEALTH CARE EDUCATION/TRAINING PROGRAM

## 2022-07-22 RX ORDER — POTASSIUM CHLORIDE 7.45 MG/ML
10 INJECTION INTRAVENOUS
Status: DISPENSED | OUTPATIENT
Start: 2022-07-22 | End: 2022-07-22

## 2022-07-22 RX ORDER — AMLODIPINE BESYLATE 10 MG/1
10 TABLET ORAL DAILY
Status: DISCONTINUED | OUTPATIENT
Start: 2022-07-22 | End: 2022-07-23 | Stop reason: HOSPADM

## 2022-07-22 RX ADMIN — POTASSIUM CHLORIDE 10 MEQ: 7.46 INJECTION, SOLUTION INTRAVENOUS at 11:07

## 2022-07-22 RX ADMIN — HYDRALAZINE HYDROCHLORIDE 10 MG: 20 INJECTION, SOLUTION INTRAMUSCULAR; INTRAVENOUS at 12:07

## 2022-07-22 RX ADMIN — POTASSIUM CHLORIDE 10 MEQ: 7.46 INJECTION, SOLUTION INTRAVENOUS at 01:07

## 2022-07-22 RX ADMIN — POTASSIUM CHLORIDE 10 MEQ: 7.46 INJECTION, SOLUTION INTRAVENOUS at 02:07

## 2022-07-22 RX ADMIN — AMLODIPINE BESYLATE 10 MG: 10 TABLET ORAL at 05:07

## 2022-07-22 RX ADMIN — SODIUM CHLORIDE, SODIUM LACTATE, POTASSIUM CHLORIDE, AND CALCIUM CHLORIDE: .6; .31; .03; .02 INJECTION, SOLUTION INTRAVENOUS at 10:07

## 2022-07-22 RX ADMIN — POTASSIUM CHLORIDE 10 MEQ: 7.46 INJECTION, SOLUTION INTRAVENOUS at 12:07

## 2022-07-22 RX ADMIN — POTASSIUM CHLORIDE 10 MEQ: 7.46 INJECTION, SOLUTION INTRAVENOUS at 03:07

## 2022-07-22 NOTE — PT/OT/SLP EVAL
"Physical Therapy Co-Evaluation and Discharge Note    Patient Name:  Kee Ramirez   MRN:  [id number]  Admitting Diagnosis:  SBO (small bowel obstruction)   Recent Surgery: * No surgery found *    Admit Date: 7/19/2022  Length of Stay: 2 days    Recommendations:     Discharge Recommendations:  home   Discharge Equipment Recommendations: none   Barriers to discharge: None    Assessment:     Kee Ramirez is a 76 y.o. female admitted with a medical diagnosis of SBO (small bowel obstruction). At this time, patient is functioning at their prior level of function and does not require further acute PT services. Pt educated to continue to ambulate 3x/day throughout stay in the hospital.       GOALS: No goals identified at [facility].    Plan:     During this hospitalization, patient does not require further acute PT services.  Please re-consult if situation changes.      Subjective     RN notified prior to session. Patient agreeable to PT evaluation.    Chief Complaint: "I feel better today than yesterday"  Patient/Family Comments/goals: To return to PLOF  Pain/Comfort:  Pain Rating 1: 0/10  Pain Rating Post-Intervention 1: 0/10    Social History:  Residence: lives with their family 1-story house with 0 VIVI. Pt's bathroom has a tub-shower.  Support available: family  Equipment Used: none  Equipment Owned (not using): none  Prior level of function: independent  Hand Dominance: right.   Work: Retired.   Drive: yes.     Patient reports they will have assistance from family upon discharge.    Objective:     Additional staff present:  OT and Supervising PT; OT for co-evaluation due to patient's medical complexities requiring two skilled therapists in order to appropriately assess patient's functional deficits as well as ensure patient safety, accommodate for limited activity tolerance, and provide appropriate, skilled assistance to maximize functional potential during evaluation.    Patient found HOB elevated with NG tube, peripheral IV, " "telemetry upon PT entry to room.    General Precautions: Standard, fall, airborne, contact, droplet   Orthopedic Precautions:N/A   Braces: N/A   Body mass index is 33.52 kg/m².  Oxygen Device: Room Air  Vitals: BP (!) 170/77 (BP Location: Left arm, Patient Position: Sitting)   Pulse 84   Temp 98.7 °F (37.1 °C) (Oral)   Resp 17   Ht 5' 7" (1.702 m)   Wt 97.1 kg (214 lb)   SpO2 99%   Breastfeeding No   BMI 33.52 kg/m²     Exam:  · Cognition:   · Alert and Cooperative  · AxOx4  · Command following: Follows multistep verbal commands  · Fluency: clear/fluent  · Skin Integrity: Visible skin intact  · Edema: None noted   · Sensation: Intact  · Hearing: Intact  · Vision:  Intact  · Postural Assessment: slouched posture  · Range of Motion:  · RUE: WNL  · LUE: WNL  · RLE: WNL  · LLE: WNL  · Strength Exam:  · Bilateral Upper Extremity Strength: grossly 5/5  · Bilateral Lower Extremity Strength: grossly 5/5    Outcome Measures:  AM-PAC 6 CLICK MOBILITY  Turning over in bed (including adjusting bedclothes, sheets and blankets)?: 4  Sitting down on and standing up from a chair with arms (e.g., wheelchair, bedside commode, etc.): 4  Moving from lying on back to sitting on the side of the bed?: 4  Moving to and from a bed to a chair (including a wheelchair)?: 4  Need to walk in hospital room?: 4  Climbing 3-5 steps with a railing?: 4  Basic Mobility Total Score: 24     Functional Mobility:  Bed Mobility:   · Supine to Sit: supervision; from R side of bed  · Scooting anteriorly to EOB to have both feet planted on floor: supervision    Sitting Balance at Edge of Bed:   Static Sitting Balance: Good : able to maintain balance against moderate resistance   Dynamic Sitting Balance: Good : able to sit unsupported and weight shift across midline moderately   Assistance Level Required: Supervision   Time: 3 minutes   Postural deviations noted: slouched posture   Comments: Pt demonstrated good activity tolerance; no LOB and " VSS    Transfers:   · Sit <> Stand Transfer: supervision with no assistive device   · Stand <> Sit Transfer: supervision with no assistive device   · [id number] from EOB    Standing Balance:   Static Standing Balance: Good : able to maintain standing balance against moderate resistance   Dynamic Standing Balance: Good : able to stand independently unsupported and cross midline moderately   Assistance Level Required: Supervision   Patient used: no assistive device    Time: 2 minutes   Postural deviations noted: slouched posture   Comments: Pt required one UE resting on sink for support while standing to perform self-care; no LOB and VSS    Gait:   · Patient ambulated: 50 feet   · Patient required: supervision  · Patient used:  no assistive device   · Gait Pattern observed: reciprocal gait  · Gait Deviation(s): decreased juvenal  · Impairments due to: decreased endurance  · all lines remained intact throughout ambulation trial  · Comments: Pt ambulated 3 laps in room with 180 degree turns and vertical/horizontal head turns and no LOB; VSS    Education:   Time provided for education, counseling and discussion of health disposition in regards to patient's current status   All questions answered within PT scope of practice and to patient's satisfaction   PT role in POC to address current functional deficits   Pt educated on proper body mechanics, safety techniques, and energy conservation with PT facilitation and cueing throughout session   Call nursing/pct to transfer to chair/use bathroom. Pt stated understanding.    Patient left up in chair with all lines intact, call button in reach and RN notified.    History:     Past Medical History:   Diagnosis Date    Cancer     Diabetes mellitus, type 2     Hyperlipidemia     Hypertension     Midline low back pain without sciatica 07/31/2015    Thyroid disease     Venous stasis     bilateral legs       Past Surgical History:   Procedure Laterality Date     COLONOSCOPY      COLONOSCOPY N/A 12/5/2019    Procedure: COLONOSCOPY;  Surgeon: Luis Bogran-Reyes, MD;  [facility]: [facility];  Service: Endoscopy;  Laterality: N/A;    INSERTION OF TUNNELED CENTRAL VENOUS CATHETER (CVC) WITH SUBCUTANEOUS PORT Right 5/24/2022    Procedure: [id number];  Surgeon: Darien Bear MD;  Location: [facility];  Service: General;  Laterality: Right;    SHOULDER ARTHROSCOPY W/ ROTATOR CUFF REPAIR Right 2001    TUBAL LIGATION      VAGINAL HYSTERECTOMY N/A 1/11/2021    Procedure: HYSTERECTOMY, VAGINAL ;  Surgeon: Jessie Dacosta MD;  Location: [facility];  Service: OB/GYN;  Laterality: N/A;       Family History   Problem Relation Age of Onset    Diabetes Mother     Hypertension Mother     Arthritis Mother     COPD Father     Hypertension Sister     Diabetes Sister     Cancer Sister         PANCREATIC       Social History     Socioeconomic History    Marital status:     Years of education: college   Tobacco Use    Smoking status: Never Smoker    Smokeless tobacco: Never Used   Substance and Sexual Activity    Alcohol use: No     Alcohol/week: 0.0 standard drinks    Drug use: No    Sexual activity: Not Currently     Partners: Male     Birth control/protection: None, See Surgical Hx     Social Determinants of Health     Financial Resource Strain: Low Risk     Difficulty of Paying Living Expenses: Not hard at all   Food Insecurity: No Food Insecurity    Worried About Running Out of Food in the Last Year: Never true    Ran Out of Food in the Last Year: Never true   Transportation Needs: No Transportation Needs    Lack of Transportation (Medical): No    Lack of Transportation (Non-Medical): No   Physical Activity: Inactive    Days of Exercise per Week: 0 days    Minutes of Exercise per Session: 0 min   Stress: No Stress Concern Present    Feeling of Stress : Only a little   Social Connections: Unknown    Frequency of Communication with Friends and Family: More  than three times a week    Frequency of Social Gatherings with Friends and Family: Patient refused    Active Member of Clubs or Organizations: Yes    Attends Club or Organization Meetings: Patient refused    Marital Status:    Housing Stability: Low Risk     Unable to Pay for Housing in the Last Year: No    Number of Places Lived in the Last Year: 1    Unstable Housing in the Last Year: No       Time Tracking:     PT Received On: 07/22/22  PT Start Time: 0920     PT Stop Time: 0936  PT Total Time (min): 16 min     Billable Minutes: Evaluation 8 minutes and Therapeutic Activity 8 minutes    Diann Warren, SPT  7/22/2022

## 2022-07-22 NOTE — PLAN OF CARE
OT evaluation completed; pt is functioning at prior level and does not require skilled acute OT services at this time.  Problem: Occupational Therapy  Goal: Occupational Therapy Goal  Outcome: Met

## 2022-07-22 NOTE — SUBJECTIVE & OBJECTIVE
Interval History:   No acute events overnight.   GGC without contrast seen in the colon but patient having bowel movements.   KUB this AM with contrast in rectum.  Denies nausea/vomiting. Had single episode of emesis last night but none since.     Medications:  Continuous Infusions:   lactated ringers 125 mL/hr at 07/21/22 0911     Scheduled Meds:   amLODIPine  10 mg Oral Daily    enoxaparin  40 mg Subcutaneous Daily    levothyroxine  100 mcg Intravenous Before breakfast    LIDOcaine HCl 2%   Topical (Top) Once     PRN Meds:acetaminophen, dextrose 10%, glucagon (human recombinant), hydrALAZINE, hydrALAZINE, insulin aspart U-100, labetalol, ondansetron     Review of patient's allergies indicates:   Allergen Reactions    Naproxen sodium Itching     Objective:     Vital Signs (Most Recent):  Temp: 98.5 °F (36.9 °C) (07/22/22 0454)  Pulse: 99 (07/22/22 0454)  Resp: 16 (07/22/22 0454)  BP: (!) 143/73 (07/22/22 0454)  SpO2: 95 % (07/22/22 0454)   Vital Signs (24h Range):  Temp:  [98.2 °F (36.8 °C)-98.5 °F (36.9 °C)] 98.5 °F (36.9 °C)  Pulse:  [] 99  Resp:  [16-30] 16  SpO2:  [95 %-100 %] 95 %  BP: (143-204)/(55-91) 143/73     Weight: 97.1 kg (214 lb)  Body mass index is 33.52 kg/m².    Intake/Output - Last 3 Shifts         07/20 0700 07/21 0659 07/21 0700 07/22 0659 07/22 0700 07/23 0659    P.O. 0      I.V. (mL/kg) 1299.3 (13.4)      IV Piggyback       Total Intake(mL/kg) 1299.3 (13.4)      Urine (mL/kg/hr) 1000 (0.4) 100 (0)     Emesis/NG output  0     Drains 850 100     Stool 0 0     Total Output 1850 200     Net -550.7 -200            Urine Occurrence  3 x     Stool Occurrence 0 x 4 x     Emesis Occurrence  2 x             Physical Exam  Vitals and nursing note reviewed.   Constitutional:       General: She is not in acute distress.     Appearance: She is not diaphoretic.      Comments: Room air  NGT to VARSHA   HENT:      Head: Normocephalic and atraumatic.      Mouth/Throat:      Mouth: Mucous membranes are  moist.      Pharynx: Oropharynx is clear.   Eyes:      Extraocular Movements: Extraocular movements intact.      Conjunctiva/sclera: Conjunctivae normal.   Cardiovascular:      Rate and Rhythm: Normal rate.   Pulmonary:      Effort: Pulmonary effort is normal. No respiratory distress.   Abdominal:      Palpations: Abdomen is soft.      Tenderness: There is no guarding or rebound.      Comments: Mildly distended. No TTP throughout. No peritonitic signs    Musculoskeletal:         General: No deformity.      Cervical back: Normal range of motion.   Skin:     General: Skin is warm and dry.   Neurological:      Mental Status: She is alert and oriented to person, place, and time.       Significant Labs:  I have reviewed all pertinent lab results within the past 24 hours.  CBC:   Recent Labs   Lab 07/22/22  0403   WBC 7.92   RBC 3.80*   HGB 11.2*   HCT 34.9*      MCV 92   MCH 29.5   MCHC 32.1       CMP:   Recent Labs   Lab 07/21/22  1921 07/22/22  0403   * 84   CALCIUM 10.0 9.4   ALBUMIN 3.7  --    PROT 7.6  --     144   K 3.5 3.2*   CO2 21* 24    105   BUN 14 12   CREATININE 0.8 0.7   ALKPHOS 52*  --    ALT 17  --    AST 16  --    BILITOT 0.7  --          Significant Diagnostics:  I have reviewed all pertinent imaging results/findings within the past 24 hours.

## 2022-07-22 NOTE — PT/OT/SLP EVAL
"Occupational Therapy   Co-Evaluation and Discharge Note    Name: Kee Ramirez  MRN: [id number]  Admitting Diagnosis:  SBO (small bowel obstruction)   Recent Surgery: * No surgery found *      Recommendations:     Discharge Recommendations: home  Discharge Equipment Recommendations:  none  Barriers to discharge:  None    Assessment:     Kee Ramirez is a 76 y.o. female with a medical diagnosis of SBO (small bowel obstruction). Pt agreeable to participate in evaluation. Pt demonstrating decreased endurance; encouraged patient to continue walking within room to increase activity tolerance. Pt reported no concerns with her ability to perform ADLs and functional mobility at home. At this time, patient is functioning at their prior level of function and does not require further acute OT services. Patient is appropriate to return home with assist from family when medically stable for d/c.     Plan:     During this hospitalization, patient does not require further acute OT services.  Please re-consult if situation changes.    · Plan of Care Reviewed with: patient    Subjective     Chief Complaint: None stated   Patient/Family Comments/goals: "I had a rough night last night but I am feeling so much better"    Occupational Profile:  Living Environment: Pt lives with her 2 adult sons in a [facility] with no VIVI. Pt has a tub/shower combo.   Previous level of function: Independent with ADLs and functional mobility   Roles and Routines: Pt was driving bu not working PTA  Equipment Used at home:  none  Assistance upon Discharge: Sons; pt's sons work but are able to provide assist if needed     Pain/Comfort:  · Pain Rating 1: 0/10    Patients cultural, spiritual, Christianity conflicts given the current situation: no    Objective:     Communicated with: RN prior to session.  Patient found HOB elevated with NG tube, peripheral IV, telemetry upon OT entry to room.    Additional staff present: PT present for co-tx as appropriate for patient 2* " medical complexities, decreased activity tolerance for 2 sessions, and level of skilled assist needed for task completion.    General Precautions: Standard, fall, droplet, contact, airborne   Orthopedic Precautions:N/A   Braces: N/A  Respiratory Status: Room air     Occupational Performance:    Bed Mobility:    · Patient completed Supine to Sit with supervision    Functional Mobility/Transfers:  · Patient completed Sit > Stand Transfer from EOB with supervision with no assistive device   · Patient completed Toilet Transfer using Step Transfer Technique with supervision with no assistive device   · Functional Mobility: Pt ambulated ~50 feet within room with supervision using no AD    Activities of Daily Living:  · Grooming: supervision to perform oral care standing at sink  · Lower Body Dressing: supervision to don/doff B sock sitting EOB    Cognitive/Visual Perceptual:  Cognitive/Psychosocial Skills:     -       Oriented to: Person, Place, Time and Situation   -       Follows Commands/attention:Follows multistep  commands  -       Communication: clear/fluent  -       Memory: No Deficits noted  -       Safety awareness/insight to disability: intact   -       Mood/Affect/Coping skills/emotional control: Appropriate to situation, Cooperative and Pleasant  Visual/Perceptual:      -Intact     Physical Exam:  Balance:    -       Supervision sitting and standing balance   Sensation:    -       Intact  Dominant hand:    -       Right  Upper Extremity Range of Motion:     -       Right Upper Extremity: WFL  -       Left Upper Extremity: WFL  Upper Extremity Strength:    -       Right Upper Extremity: WFL  -       Left Upper Extremity: WFL   Strength:    -       Right Upper Extremity: WFL  -       Left Upper Extremity: WFL  Fine Motor Coordination:    -       Intact    AMPAC 6 Click ADL:  AMPAC Total Score: 24    Treatment & Education:  - Discussed OT POC and answered all questions within OT scope of practice   -  Instructed patient to use call light to have nursing staff assist with needs/transfers  - Therapist provided facilitation and instruction of proper body mechanics and fall prevention strategies during tasks listed above  - Education on energy conservation and task modification to maximize safety and independence  - Instructed patient to sit in bedside chair and walk within room daily to increase OOB activity tolerance  - Encouraged patient to alert nursing staff is any concerns with ADLs arise for re-consultation   - Positioned patient comfortably in bedside chair with bedside table set up for safety and convenience   Education:    Patient left up in chair with all lines intact and call button in reach    GOALS:   Multidisciplinary Problems     Occupational Therapy Goals     Not on file          Multidisciplinary Problems (Resolved)        Problem: Occupational Therapy    Goal Priority Disciplines Outcome Interventions   Occupational Therapy Goal   (Resolved)     OT, PT/OT Met                    History:     Past Medical History:   Diagnosis Date    Cancer     Diabetes mellitus, type 2     Hyperlipidemia     Hypertension     Midline low back pain without sciatica 07/31/2015    Thyroid disease     Venous stasis     bilateral legs       Past Surgical History:   Procedure Laterality Date    COLONOSCOPY      COLONOSCOPY N/A 12/5/2019    Procedure: COLONOSCOPY;  Surgeon: Luis Bogran-Reyes, MD;  Location: [facility];  Service: Endoscopy;  Laterality: N/A;    INSERTION OF TUNNELED CENTRAL VENOUS CATHETER (CVC) WITH SUBCUTANEOUS PORT Right 5/24/2022    Procedure: [id number];  Surgeon: Darien Bear MD;  Location: [facility];  Service: General;  Laterality: Right;    SHOULDER ARTHROSCOPY W/ ROTATOR CUFF REPAIR Right 2001    TUBAL LIGATION      VAGINAL HYSTERECTOMY N/A 1/11/2021    Procedure: HYSTERECTOMY, VAGINAL ;  Surgeon: Jessie Dacosta MD;  Location: [facility];  Service: OB/GYN;  Laterality: N/A;        Time Tracking:     OT Date of Treatment: 07/22/22  OT Start Time: 0920  OT Stop Time: 0936  OT Total Time (min): 16 min    Billable Minutes:Evaluation 8  Self Care/Home Management 8    7/22/2022

## 2022-07-22 NOTE — ASSESSMENT & PLAN NOTE
Patient is a 76 y ear old female with past medical history of HTN, thyroid disease, DM2, HLD, breast cancer (on Arimidex), who presents to the ED with a complaint of abdominal pain onset 1 week. CT scan findings consistent with SBO. Clinically stable     - Two transition points seen in the CT scan concerning for a closed loop obstruction, however, the bowel within these transition points is not dilated. Swirl sign is not a full 360 and physical exam remains reassuring  - d/c NGT   - CLD  - Serial abdominal exams  - Low threshold for exploratory laparotomy based on CT scan findings   - Continue IVF hydration until taking enough PO. JAE resolved  - PRN pain and nausea medications  - Daily labs  - Replete electrolytes PRN  - DVT prophylaxis (SCDs and lovenox)  - OOB, ambulate    Dispo: not yet medically stable for dc

## 2022-07-22 NOTE — NURSING
Patient had nausea and vomited. Patient also endorsed acute onset of abd pain 10/10. Notified MD of patient elevated BP, n/v and abd pain. See new orders. MD en route to bedside.

## 2022-07-22 NOTE — PROGRESS NOTES
Abdiel Gillis - [facility]  General Surgery  Progress Note    Subjective:     History of Present Illness:  No notes on file    Post-Op Info:  * No surgery found *         Interval History:   No acute events overnight.   GGC without contrast seen in the colon but patient having bowel movements.   KUB this AM with contrast in rectum.  Denies nausea/vomiting. Had single episode of emesis last night but none since.     Medications:  Continuous Infusions:   lactated ringers 125 mL/hr at 07/21/22 0911     Scheduled Meds:   amLODIPine  10 mg Oral Daily    enoxaparin  40 mg Subcutaneous Daily    levothyroxine  100 mcg Intravenous Before breakfast    LIDOcaine HCl 2%   Topical (Top) Once     PRN Meds:acetaminophen, dextrose 10%, glucagon (human recombinant), hydrALAZINE, hydrALAZINE, insulin aspart U-100, labetalol, ondansetron     Review of patient's allergies indicates:   Allergen Reactions    Naproxen sodium Itching     Objective:     Vital Signs (Most Recent):  Temp: 98.5 °F (36.9 °C) (07/22/22 0454)  Pulse: 99 (07/22/22 0454)  Resp: 16 (07/22/22 0454)  BP: (!) 143/73 (07/22/22 0454)  SpO2: 95 % (07/22/22 0454)   Vital Signs (24h Range):  Temp:  [98.2 °F (36.8 °C)-98.5 °F (36.9 °C)] 98.5 °F (36.9 °C)  Pulse:  [] 99  Resp:  [16-30] 16  SpO2:  [95 %-100 %] 95 %  BP: (143-204)/(55-91) 143/73     Weight: 97.1 kg (214 lb)  Body mass index is 33.52 kg/m².    Intake/Output - Last 3 Shifts         07/20 0700 07/21 0659 07/21 0700 07/22 0659 07/22 0700 07/23 0659    P.O. 0      I.V. (mL/kg) 1299.3 (13.4)      IV Piggyback       Total Intake(mL/kg) 1299.3 (13.4)      Urine (mL/kg/hr) 1000 (0.4) 100 (0)     Emesis/NG output  0     Drains 850 100     Stool 0 0     Total Output 1850 200     Net -550.7 -200            Urine Occurrence  3 x     Stool Occurrence 0 x 4 x     Emesis Occurrence  2 x             Physical Exam  Vitals and nursing note reviewed.   Constitutional:       General: She is not in acute distress.      Appearance: She is not diaphoretic.      Comments: Room air  NGT to VARSHA   HENT:      Head: Normocephalic and atraumatic.      Mouth/Throat:      Mouth: Mucous membranes are moist.      Pharynx: Oropharynx is clear.   Eyes:      Extraocular Movements: Extraocular movements intact.      Conjunctiva/sclera: Conjunctivae normal.   Cardiovascular:      Rate and Rhythm: Normal rate.   Pulmonary:      Effort: Pulmonary effort is normal. No respiratory distress.   Abdominal:      Palpations: Abdomen is soft.      Tenderness: There is no guarding or rebound.      Comments: Mildly distended. No TTP throughout. No peritonitic signs    Musculoskeletal:         General: No deformity.      Cervical back: Normal range of motion.   Skin:     General: Skin is warm and dry.   Neurological:      Mental Status: She is alert and oriented to person, place, and time.       Significant Labs:  I have reviewed all pertinent lab results within the past 24 hours.  CBC:   Recent Labs   Lab 07/22/22  0403   WBC 7.92   RBC 3.80*   HGB 11.2*   HCT 34.9*      MCV 92   MCH 29.5   MCHC 32.1       CMP:   Recent Labs   Lab 07/21/22  1921 07/22/22  0403   * 84   CALCIUM 10.0 9.4   ALBUMIN 3.7  --    PROT 7.6  --     144   K 3.5 3.2*   CO2 21* 24    105   BUN 14 12   CREATININE 0.8 0.7   ALKPHOS 52*  --    ALT 17  --    AST 16  --    BILITOT 0.7  --          Significant Diagnostics:  I have reviewed all pertinent imaging results/findings within the past 24 hours.    Assessment/Plan:     * SBO (small bowel obstruction)  Patient is a 76 y ear old female with past medical history of HTN, thyroid disease, DM2, HLD, breast cancer (on Arimidex), who presents to the ED with a complaint of abdominal pain onset 1 week. CT scan findings consistent with SBO. Clinically stable     - Two transition points seen in the CT scan concerning for a closed loop obstruction, however, the bowel within these transition points is not dilated. Swirl  sign is not a full 360 and physical exam remains reassuring  - d/c NGT   - CLD  - Serial abdominal exams  - Low threshold for exploratory laparotomy based on CT scan findings   - Continue IVF hydration until taking enough PO. JAE resolved  - PRN pain and nausea medications  - Daily labs  - Replete electrolytes PRN  - DVT prophylaxis (SCDs and lovenox)  - OOB, ambulate    Dispo: not yet medically stable for dc         COVID-19  - Positive on admit  - PRN tylenol  - Currently satting well on room air  - Continue to monitor with q4 vitals and adjust regimen PRN    HTN (hypertension)  - Currently not controlled  - Holding home meds in setting of acute disease process and NPO  - PRN hydralazine and labetalol on board  - Continue to monitor with q4 vitals and adjust regimen PRN    hypothyroidism  - Home synthroid converted to IV levothyroxine while NPO    DM type 2 (diabetes mellitus, type 2)  - Mostly controlled  - SSI   - POCT glucose checks  - Hypoglycemia protocol  - Holding home meds in setting of acute disease process          Precious Lei MD  General Surgery  [facility]

## 2022-07-22 NOTE — PLAN OF CARE
Problem: Physical Therapy  Goal: Physical Therapy Goal  Description: Discharge Recommendation: Home, no PT needs.    Evaluation completed today. Pt demonstrated a high level of functional mobility and does not require acute PT services.     Diann Warren, RASHAWN  7/22/2022      Outcome: Met

## 2022-07-22 NOTE — DISCHARGE SUMMARY
Abdiel madeline [facility]  DISCHARGE SUMMARY  General Surgery      Admit Date:  7/19/2022    Discharge Date and Time:  7/22/2022  5:13 PM    Attending Physician:  Guero Oliveros MD     Discharge Provider:  Precious Lei MD     Reason for Admission:  SBO (small bowel obstruction)     Procedures Performed:  * No surgery found *    Hospital Course:  Please see the preoperative H&P and other available documentation for full details related to history prior to this admission.  Briefly, Kee Ramirez is a 76 y.o. female who was admitted following scheduled elective surgery for SBO (small bowel obstruction)    Patient had an NGT placed for decompression on admit. They underwent GGC on 7/21/22 and had BM later that evening. Contrast was seen in the colon on morning KUB the next day. The patient's labs and vital signs remained stable and appropriate throughout their hospital stay. They tolerated advancement of their diet, pain was well controlled, bowel function had returned, patient was ambulatory and voiding appropriately. By   7/22/22 the patient was deemed okay for discharge and instructed to follow up in clinic in 2 weeks.        Consults:  None.    Significant Diagnostic Studies:   Recent Labs   Lab 07/21/22  0308 07/21/22 1921 07/22/22  0403   WBC 5.66 9.28 7.92   HGB 11.2* 12.7 11.2*   HCT 34.3* 40.0 34.9*    278 318     Recent Labs   Lab 07/19/22  1948 07/20/22  0342 07/21/22  0308 07/21/22 1921 07/22/22  0403    137 141 143 144   K 4.9 4.2 3.6 3.5 3.2*    102 104 102 105   CO2 21* 19* 20* 21* 24   BUN 27* 27* 16 14 12   CREATININE 1.4 1.1 0.8 0.8 0.7    94 92 151* 84   CALCIUM 10.3 9.8 9.4 10.0 9.4   MG  --  1.5* 1.5*  --  1.6   PHOS  --  3.9 3.6  --  3.4   AST 15  --   --  16  --    ALT 16  --   --  17  --    ALKPHOS 54*  --   --  52*  --    BILITOT 1.0  --   --  0.7  --    PROT 8.5*  --   --  7.6  --    ALBUMIN 4.2  --   --  3.7  --    LIPASE 7  --   --   --   --      Recent Labs   Lab  07/19/22 1948 07/21/22 1921   LACTATE 1.5 2.0   No results for input(s): PH, PCO2, PO2, HCO3 in the last 72 hours.      Final Diagnoses:   Principal Problem:  SBO (small bowel obstruction)   Secondary Diagnoses:    Active Hospital Problems    Diagnosis  POA    *SBO (small bowel obstruction) [K56.609]  Yes    COVID-19 [U07.1]  Yes    DM type 2 (diabetes mellitus, type 2) [E11.9]  Yes    hypothyroidism [E07.9]  Yes    HTN (hypertension) [I10]  Yes      Resolved Hospital Problems   No resolved problems to display.       Discharged Condition:  Good    Disposition:  Home or Self Care    Follow Up/Patient Instructions:     Medications:  Reconciled Home Medications:    Current Discharge Medication List      CONTINUE these medications which have NOT CHANGED    Details   amLODIPine (NORVASC) 10 MG tablet Take 1 tablet (10 mg total) by mouth once daily.  Qty: 90 tablet, Refills: 3    Comments: .  Associated Diagnoses: Primary hypertension      anastrozole (ARIMIDEX) 1 mg Tab Take 1 tablet (1 mg total) by mouth once daily.  Qty: 30 tablet, Refills: 3    Associated Diagnoses: Malignant neoplasm of central portion of left breast in female, estrogen receptor positive      aspirin (ECOTRIN) 81 MG EC tablet Take 81 mg by mouth once daily.      cloNIDine (CATAPRES) 0.1 MG tablet TAKE 1 TABLET BY MOUTH THREE TIMES DAILY  Qty: 90 tablet, Refills: 11    Associated Diagnoses: Primary hypertension      gabapentin (NEURONTIN) 300 MG capsule Take 1 capsule (300 mg total) by mouth 3 (three) times daily.  Qty: 90 capsule, Refills: 5    Associated Diagnoses: Chronic bilateral low back pain with left-sided sciatica      ibuprofen (ADVIL,MOTRIN) 800 MG tablet Take 1 tablet (800 mg total) by mouth 3 (three) times daily.  Qty: 90 tablet, Refills: 3    Associated Diagnoses: Chronic bilateral low back pain with left-sided sciatica      levothyroxine (SYNTHROID) 125 MCG tablet Take 1 tablet (125 mcg total) by mouth before breakfast.  Qty: 90  tablet, Refills: 3    Associated Diagnoses: Thyroid disease      losartan (COZAAR) 100 MG tablet Take 1 tablet (100 mg total) by mouth once daily.  Qty: 90 tablet, Refills: 3    Comments: .  Associated Diagnoses: Primary hypertension      lovastatin (MEVACOR) 20 MG tablet Take 2 tablets (40 mg total) by mouth every evening.  Qty: 180 tablet, Refills: 3    Associated Diagnoses: Mixed hyperlipidemia      metFORMIN (GLUCOPHAGE) 500 MG tablet Take 1 tablet (500 mg total) by mouth 2 (two) times daily with meals.  Qty: 180 tablet, Refills: 3    Associated Diagnoses: Type 2 diabetes mellitus without complication, without long-term current use of insulin      methylcellulose (ARTIFICIAL TEARS) 1 % ophthalmic solution Place 1 drop into both eyes as needed.      multivitamin (THERAGRAN) per tablet Take 1 tablet by mouth once daily.      oxybutynin (DITROPAN) 5 MG Tab Take 1 tablet (5 mg total) by mouth 3 (three) times daily.  Qty: 90 tablet, Refills: 11    Associated Diagnoses: Urinary frequency      rOPINIRole (REQUIP) 1 MG tablet Take 1 tablet (1 mg total) by mouth every evening.  Qty: 90 tablet, Refills: 3    Associated Diagnoses: RLS (restless legs syndrome)      traZODone (DESYREL) 50 MG tablet Take 1 tablet (50 mg total) by mouth every evening.  Qty: 30 tablet, Refills: 11    Associated Diagnoses: Insomnia, unspecified type         STOP taking these medications       conjugated estrogens (PREMARIN) vaginal cream Comments:   Reason for Stopping:             Discharge Procedure Orders   Diet Adult Regular     COVID-19 Surveillance Program     Order Specific Question Answer Comments   Does patient have a smartphone? Yes    Does patient have the [facility] vidya on their smartphone? No    While in surveillance program, will patient be using home oxygen? No      Notify your health care provider if you experience any of the following:  increased confusion or weakness     Notify your health care provider if you experience any of  the following:  persistent dizziness, light-headedness, or visual disturbances     Notify your health care provider if you experience any of the following:  severe persistent headache     Notify your health care provider if you experience any of the following:  difficulty breathing or increased cough     Notify your health care provider if you experience any of the following:  severe uncontrolled pain     Notify your health care provider if you experience any of the following:  persistent nausea and vomiting or diarrhea     Notify your health care provider if you experience any of the following:  temperature >100.4     No dressing needed     Activity as tolerated      Follow-up Information     Guero Oliveros MD Follow up.    Specialty: General Surgery  Why: As needed  Contact information:  [address] [facility] [place] [address]  [phone]                         Precious Lei MD

## 2022-07-22 NOTE — PLAN OF CARE
Problem: Adult Inpatient Plan of Care  Goal: Plan of Care Review  Outcome: Ongoing, Progressing     Problem: Adult Inpatient Plan of Care  Goal: Patient-Specific Goal (Individualized)  Outcome: Ongoing, Progressing     Problem: Diabetes Comorbidity  Goal: Blood Glucose Level Within Targeted Range  Outcome: Ongoing, Progressing     Problem: Infection  Goal: Absence of Infection Signs and Symptoms  Outcome: Ongoing, Progressing       Patient AAO x 3 , calm and cooperative with staff.  Patient denies pain throughout shift.  Patient NG tube discontinued and patient tolerated clear liquid diet and diet advanced to Regular diet. K+ level 3.2 and administered K riders.  Ambulates well with BRP.  PT dc;d after patient participated well with ambulating and endurance. Family at the bedside, discharge date pending.

## 2022-07-23 ENCOUNTER — PATIENT MESSAGE (OUTPATIENT)
Dept: ADMINISTRATIVE | Facility: CLINIC | Age: 77
End: 2022-07-23
Payer: MEDICARE

## 2022-07-23 ENCOUNTER — NURSE TRIAGE (OUTPATIENT)
Dept: ADMINISTRATIVE | Facility: CLINIC | Age: 77
End: 2022-07-23
Payer: MEDICARE

## 2022-07-23 LAB — POCT GLUCOSE: 109 MG/DL (ref 70–110)

## 2022-07-23 NOTE — NURSING
Patient educated on discharge instructions and follow up care. Patient informed of upcoming appointments and verbalized an understanding.  AAO x4, denies pain. Skin WDI.  Patient educated to call nurse hotline with any questions or concerns Pharmacy delivered meds at bedside. PIV removed  and catheter intact.   Patient provided instructions not to take clonidine due to low BP.  Personal belongings packed and taking by patient.  Patient called son and awaiting ride to  Transport home.

## 2022-07-23 NOTE — TELEPHONE ENCOUNTER
2nd enrollment call attempted with no contact made. VM left, and previous [employer] message sent. Tasks will remain place. Will enroll into [facility] program

## 2022-07-25 ENCOUNTER — TELEPHONE (OUTPATIENT)
Dept: HEMATOLOGY/ONCOLOGY | Facility: CLINIC | Age: 77
End: 2022-07-25
Payer: MEDICARE

## 2022-07-25 NOTE — PHYSICIAN QUERY
PT Name: Kee Ramirez  MR #: [id number]     DOCUMENTATION CLARIFICATION     CDS: Brandy Capley, RN  Email: [email or web address]    This form is a permanent document in the medical record.     Query Date: July 25, 2022    By submitting this query, we are merely seeking further clarification of documentation to reflect the severity of illness of your patient. Please utilize your independent clinical judgment when addressing the question(s) below.    The medical record reflects the following:     Indicators   Supporting Clinical Findings Location in Medical Record   X Bowel obstruction, intestinal obstruction, LBO or SBO documented 76 y.o. female with past medical history of HTN, thyroid disease, DM2, HLD, breast cancer (on Arimidex), who presents to the ED with a complaint of abdominal pain onset 1 week. CT scan findings consistent with SBO   General surgery consult 7/20   X Radiology findings Bowel:   Multiple loops of dilated fluid-filled small bowel with apparent transition point within the right lower quadrant with slight swirled appearance to the mesentery in vessels.  Likely transition points in the right lower quadrant on series 2, image 96 and 111, making it difficult to exclude close loop obstruction.  Trace adjacent free fluid most pronounced in the midline lower abdomen/pelvis.  Trace inflammatory fat stranding adjacent to distended bowel.  No evidence of pneumatosis or portal venous gas.  Colon is unremarkable.    Impression:  Moderate distension of fluid-filled small bowel with 2 apparent transition points within the right lower quadrant as above.  Swirled appearance to the mesentery and vessels in this area.  Findings consistent with small-bowel obstruction with closed loop obstruction difficult to exclude.  There is trace adjacent free fluid and edema but no convincing findings of bowel ischemia.  Adhesions favored over internal hernia.   CT 7/19   X Treatment/Medication - Will place ng and will do  an early GG challenge   - KUB at 5am in the morning   - GG Today   - Serial abdominal exams   General surgery consult 7/20    Procedure/Surgery     X Other Past Surgical History:   TUBAL LIGATION       VAGINAL HYSTERECTOMY N/A 1/11/2021    They underwent GGC on 7/21/22 and had BM later that evening. Contrast was seen in the colon on morning KUB the next day.   General surgery consult 7/20        Discharge summary 7/22     Provider, please further specify the bowel obstruction diagnosis:    [ x  ] Partial or incomplete intestinal obstruction, due to adhesions   [   ] Partial or incomplete intestinal obstruction, due to other (please specify): ____________   [   ] Partial or incomplete intestinal obstruction, unknown or unspecified etiology   [   ] Other intestinal condition (please specify): _____________________   [   ] Clinically Undetermined       Please document in your progress notes daily for the duration of treatment until resolved, and include in your discharge summary.

## 2022-07-26 ENCOUNTER — OFFICE VISIT (OUTPATIENT)
Dept: HEMATOLOGY/ONCOLOGY | Facility: CLINIC | Age: 77
End: 2022-07-26
Payer: MEDICARE

## 2022-07-26 VITALS
WEIGHT: 210.31 LBS | BODY MASS INDEX: 33.01 KG/M2 | TEMPERATURE: 98 F | HEART RATE: 69 BPM | HEIGHT: 67 IN | DIASTOLIC BLOOD PRESSURE: 76 MMHG | RESPIRATION RATE: 18 BRPM | OXYGEN SATURATION: 95 % | SYSTOLIC BLOOD PRESSURE: 168 MMHG

## 2022-07-26 DIAGNOSIS — K56.609 SBO (SMALL BOWEL OBSTRUCTION): ICD-10-CM

## 2022-07-26 DIAGNOSIS — C50.112 MALIGNANT NEOPLASM OF CENTRAL PORTION OF LEFT BREAST IN FEMALE, ESTROGEN RECEPTOR POSITIVE: Primary | ICD-10-CM

## 2022-07-26 DIAGNOSIS — I10 PRIMARY HYPERTENSION: ICD-10-CM

## 2022-07-26 DIAGNOSIS — Z17.0 MALIGNANT NEOPLASM OF CENTRAL PORTION OF LEFT BREAST IN FEMALE, ESTROGEN RECEPTOR POSITIVE: Primary | ICD-10-CM

## 2022-07-26 DIAGNOSIS — Z79.811 USE OF ANASTROZOLE: ICD-10-CM

## 2022-07-26 PROCEDURE — 99214 PR OFFICE/OUTPT VISIT, EST, LEVL IV, 30-39 MIN: ICD-10-PCS | Mod: S$PBB,,, | Performed by: INTERNAL MEDICINE

## 2022-07-26 PROCEDURE — 99999 PR PBB SHADOW E&M-EST. PATIENT-LVL IV: ICD-10-PCS | Mod: PBBFAC,,, | Performed by: INTERNAL MEDICINE

## 2022-07-26 PROCEDURE — 99214 OFFICE O/P EST MOD 30 MIN: CPT | Mod: PBBFAC | Performed by: INTERNAL MEDICINE

## 2022-07-26 PROCEDURE — 99214 OFFICE O/P EST MOD 30 MIN: CPT | Mod: S$PBB,,, | Performed by: INTERNAL MEDICINE

## 2022-07-26 PROCEDURE — 99999 PR PBB SHADOW E&M-EST. PATIENT-LVL IV: CPT | Mod: PBBFAC,,, | Performed by: INTERNAL MEDICINE

## 2022-07-26 RX ORDER — ANASTROZOLE 1 MG/1
1 TABLET ORAL DAILY
Qty: 90 TABLET | Refills: 3 | Status: SHIPPED | OUTPATIENT
Start: 2022-07-26 | End: 2023-03-22 | Stop reason: SDUPTHER

## 2022-07-26 NOTE — PROGRESS NOTES
PROGRESS NOTE    Subjective:       Patient ID: Kee Ramirez is a 76 y.o. female.  MRN: [id number]  : 1945    Chief Complaint: ILC of the left breast     History of Present Illness:   Kee Ramirez is a 76 y.o. female who presents with  ILC of the left breast.  Presents for a follow up visit     Reports yearly mammograms, normal in . In  screening mammogram showed left breast architectural distortion. Diagnostic mammogram in March showed a 21 x 9  X 2 0 mm left breast mass. US guided biopsy showed ILC, ER strongly positive, MS 15% positive, Her 2 rhina negative.   A breast MRI showed a 7.6 x 4.7 cm mass with spiculated margins.No abnormal lymph nodes were found.      No history of breast mass or biopsies. She has been seeing Dr. Ortiz and is transferring care to use due to proximity. See full oncology history below.   She has been referred for neoadjuvant chemotherapy. We met today as a follow up visit to further discuss neoadjuvant therapy.  At her visit two weeks ago, we discussed neoadjuvant chemotherapy vs neoadjuvant endocrine therapy.  She is interested in breast conservation and is not a candidate for upfront lumpectomy given tumor size and extension to the nipple areolar complex.  She met with Dr. Valente who agrees that she may not be a candidate for breast conservation even after neoadjuvant therapies.  Patient wants to try.     An oncotype was sent to determine if she was high risk and would benefit from chemotherapy.  We were notified there was not enough tissue specimen from the original biopsy for the oncotype.  After extensive discussion, we elected to start neoadjuvant anastrozole.     Interim history:    She started anastrozole about 4 weeks ago and tolerated it well. A couple of weeks after she started the medication, she developed nausea, vomiting, abdominal pain and was admitted to the hospital from -, found to have  SBO. Has NGT for decompression and improved with conservative measures.   She is feeling much better now, has some gas but denies nausea or vomiting. She had diarrhea after discharge but it is now improving.     Oncology History:  As dcoumented by  and updated      03.21.2022 Left Breast, Core Needle Biopsies:   - Invasive lobular carcinoma, well-differentiated .   - Bloom-Damon score (5/9):        - Tubular Formation: 3/3        - Nuclear Pleomorphism: 1/3        - Mitotic Activity: 1/3.   - Background of lobular carinoma in-situ (LCIS), nuclear grade 1.   - No perineural or lymphovascular invasion is identified.   Estrogen receptor: Positive (strong intensity, >95% of tumor cell nuclei).   Progesterone receptor: Positive (strong intensity, 15% of tumor cell nuclei).   HER2 IHC: Negative.   Ki-67: 10%.  04.04.2022 [facility] [place] [facility] -- Surgery FU w/Oncotype  04.06.2022 [facility] follow up  -Breast MRI for staging, discussed surgery options; pt would like BCT, will make definitive plan after MRI  05.03.2022 Breast MRI  -Irregular 7.6 cm enhancing spiculated mass in the upper LEFT breast, consistent with the patient's known biopsy-proven invasive lobular carcinoma. The enhancement does extend to the nipple-areolar complex.  -No suspicious abnormality in the RIGHT breast.   BI-RADS Category: Overall: 6 - known biopsy-proven malignancy  05.06.2022 Follow up with [facility]  -Plan for Axillary U/S to assess LAD, PetCT  -[facility] therapy. Due to size of mass/location and nature of lobular cancer, was not felt that BCT was optimal unless NA therapy was done 1st   -2 week follow up to discuss further   05.13.2022 PetCT  -Mildly hypermetabolic irregular soft tissue within the left breast in keeping with known lobular carcinoma. This mass is better appreciated on MRI of the breast.  -No definite evidence of metastatic disease.  Please note that FDG PET has has suboptimal sensitivity for certain histologies such as lobular  and tubular carcinomas.  05.18.2022 Initial [facility] eval    Oncology History:  Oncology History   Malignant neoplasm of central portion of left female breast   5/6/2022 Initial Diagnosis    Malignant neoplasm of central portion of left female breast     5/27/2022 - 5/27/2022 Chemotherapy    Treatment Summary   Plan Name: OP BREAST DOSE-DENSE AC-T (DOXORUBICIN CYCLOPHOSPHAMIDE Q2W FOLLOWED BY PACLITAXEL WEEKLY)  Treatment Goal: Curative  Status: Inactive  Start Date:   End Date:   Provider: Mitali Carlin MD  Chemotherapy: DOXOrubicin chemo injection 130 mg, 60 mg/m2, Intravenous, Clinic/HOD 1 time, 0 of 4 cycles  cyclophosphamide 600 mg/m2 = 1,300 mg in sodium chloride 0.9% 250 mL chemo infusion, 600 mg/m2, Intravenous, Clinic/HOD 1 time, 0 of 4 cycles  PACLitaxeL (TAXOL) 80 mg/m2 = 174 mg in sodium chloride 0.9% 250 mL chemo infusion, 80 mg/m2, Intravenous, Clinic/HOD 1 time, 0 of 12 cycles     6/7/2022 Cancer Staged    Staging form: Breast, AJCC 8th Edition  - Clinical: Stage IIA (cT3, cN0, cM0, G1, ER+, AK+, HER2-)     6/24/2022 -  Chemotherapy    Treatment Summary   Plan Name: OP ANASTROZOLE DAILY  Treatment Goal: Curative  Status: Active  Start Date: 6/24/2022 (Planned)  End Date: 6/24/2022 (Planned)  Provider: Mitali Carlin MD  Chemotherapy: anastrozole (ARIMIDEX) 1 mg Tab, 1 mg, Oral, Daily, 0 of 1 cycle, Start date: --, End date: --         History:  Past Medical History:   Diagnosis Date    Cancer     Diabetes mellitus, type 2     Hyperlipidemia     Hypertension     Midline low back pain without sciatica 07/31/2015    Thyroid disease     Venous stasis     bilateral legs      Past Surgical History:   Procedure Laterality Date    COLONOSCOPY      COLONOSCOPY N/A 12/5/2019    Procedure: COLONOSCOPY;  Surgeon: Luis Bogran-Reyes, MD;  Location: [facility];  Service: Endoscopy;  Laterality: N/A;    INSERTION OF TUNNELED CENTRAL VENOUS CATHETER (CVC) WITH SUBCUTANEOUS PORT Right 5/24/2022     Procedure: [id number];  Surgeon: Darien Bear MD;  Location: [facility];  Service: General;  Laterality: Right;    SHOULDER ARTHROSCOPY W/ ROTATOR CUFF REPAIR Right 2001    TUBAL LIGATION      VAGINAL HYSTERECTOMY N/A 1/11/2021    Procedure: HYSTERECTOMY, VAGINAL ;  Surgeon: Jesise Dacosta MD;  Location: [facility];  Service: OB/GYN;  Laterality: N/A;     Family History   Problem Relation Age of Onset    Diabetes Mother     Hypertension Mother     Arthritis Mother     COPD Father     Hypertension Sister     Diabetes Sister     Cancer Sister         PANCREATIC     Social History     Tobacco Use    Smoking status: Never Smoker    Smokeless tobacco: Never Used   Substance and Sexual Activity    Alcohol use: No     Alcohol/week: 0.0 standard drinks    Drug use: No    Sexual activity: Not Currently     Partners: Male     Birth control/protection: None, See Surgical Hx        ROS:   Review of Systems   Constitutional: Negative for fever, malaise/fatigue and weight loss.   HENT: Negative for congestion, hearing loss, nosebleeds and sore throat.    Eyes: Negative for double vision and photophobia.   Respiratory: Negative for cough, hemoptysis, sputum production, shortness of breath and wheezing.    Cardiovascular: Negative for chest pain, palpitations, orthopnea and leg swelling.   Gastrointestinal: Negative for abdominal pain, blood in stool, constipation, + diarrhea heartburn, nausea and vomiting.   Genitourinary: Negative for dysuria, hematuria and urgency.   Musculoskeletal: Negative for back pain, joint pain and myalgias.   Skin: Negative for itching and rash.   Neurological: Negative for dizziness, tingling, seizures, weakness and headaches.   Endo/Heme/Allergies: Negative for polydipsia. Does not bruise/bleed easily.   Psychiatric/Behavioral: Negative for depression and memory loss. The patient is not nervous/anxious and does not have insomnia.       Objective:     Vitals:    07/26/22 0956  "  BP: (!) 168/76   Pulse: 69   Resp: 18   Temp: 98.4 °F (36.9 °C)   TempSrc: Oral   SpO2: 95%   Weight: 95.4 kg (210 lb 5.1 oz)   Height: 5' 7" (1.702 m)   PainSc: 0-No pain     Wt Readings from Last 10 Encounters:   07/26/22 95.4 kg (210 lb 5.1 oz)   07/20/22 97.1 kg (214 lb)   07/07/22 98.3 kg (216 lb 11.4 oz)   06/24/22 97.1 kg (214 lb 1.1 oz)   06/07/22 98.9 kg (218 lb)   06/07/22 98.9 kg (218 lb)   06/06/22 102.5 kg (226 lb)   05/27/22 99.6 kg (219 lb 7.5 oz)   05/24/22 102.5 kg (226 lb)   05/18/22 102.6 kg (226 lb 4.8 oz)       Physical Examination:   Physical Exam  Vitals and nursing note reviewed.   Constitutional:       General: She is not in acute distress.     Appearance: She is not diaphoretic.   HENT:      Head: Normocephalic.   Eyes:      General: No scleral icterus.     Conjunctiva/sclera: Conjunctivae normal.   Neck:      Thyroid: No thyromegaly.   Cardiovascular:      Rate and Rhythm: Normal rate and regular rhythm.      Heart sounds: Normal heart sounds. No murmur heard.  Pulmonary:      Effort: Pulmonary effort is normal. No respiratory distress.      Breath sounds: No stridor. No wheezing or rales.   Chest:      Chest wall: No tenderness.     Musculoskeletal:         General: No tenderness or deformity. Normal range of motion.      Cervical back: Neck supple.   Lymphadenopathy:      Cervical: No cervical adenopathy.   Skin:     General: Skin is warm and dry.      Findings: No erythema or rash.      Comments: + left breat mass at 12 0 clock, 7-8 cm, firmness felt just under the Nipple as well with some flattening of the nipple.  Feels softer and less distinct.   Neurological:      Mental Status: She is alert and oriented to person, place, and time.      Cranial Nerves: No cranial nerve deficit.      Coordination: Coordination normal.      Gait: Gait is intact.   Psychiatric:         Mood and Affect: Affect normal.         Cognition and Memory: Memory normal.         Judgment: Judgment normal. "     Diagnostic Tests:  Significant Imaging: I have reviewed and interpreted all pertinent imaging results/findings.    Laboratory Data:  All pertinent labs have been reviewed.  Labs:   Lab Results   Component Value Date    WBC 7.92 07/22/2022    RBC 3.80 (L) 07/22/2022    HGB 11.2 (L) 07/22/2022    HCT 34.9 (L) 07/22/2022    MCV 92 07/22/2022     07/22/2022    GLU 84 07/22/2022     07/22/2022    K 3.2 (L) 07/22/2022    BUN 12 07/22/2022    CREATININE 0.7 07/22/2022    AST 16 07/21/2022    ALT 17 07/21/2022    BILITOT 0.7 07/21/2022       Assessment/Plan:   Malignant neoplasm of central portion of left breast in female, estrogen receptor positive  cT3N0, ER 95%, PR15%, Her 2 rhina negative, Grade 1, ki 67 10%      She is interested in breast conservation and is not a candidate for upfront lumpectomy given tumor size and extension to the nipple areolar complex.     We discussed neoadjuvant chemotherapy vs neoadjuvant endocrine therapy.      We discussed that given favorable grade, hormone receptor-positive status and lobular histology of her breast cancer, we may not see a robust response to neoadjuvant chemotherapy.She is more likely to respond well to endocrine therapy that can be used in the neoadjuvant setting.      Given not enough tissue sample for oncotype, patient would like to proceed with neoadjuvant endocrine therapy with arimidex. She is tolerating it well. Continue, see every 4 weeks in clinic and repeat imaging after 3 months of treatment.     DEXA normal in June, continue calcium and vitamin D.      Primary Hypertension  Compliant with current medications  Recommended follow up with PCP    Small Bowel Obstruction   Recent hospital admission noted. Continue to monitor closely.     ECOG SCORE    1 - Restricted in strenuous activity-ambulatory and able to carry out work of a light nature       Discussion:   No follow-ups on file.    Plan was discussed with the patient at length, and she  verbalized understanding. Kee was given an opportunity to ask questions that were answered to her satisfaction, and she was advised to call in the interval if any problems or questions arise.    Electronically signed by Mitali Carlin MD    Route Chart for Scheduling    Med Onc Chart Routing      Follow up with physician . 4 weeks, appt are made    Follow up with KAYLYN    Infusion scheduling note    Injection scheduling note    Labs    Imaging    Pharmacy appointment    Other referrals          Treatment Plan Information   OP ANASTROZOLE DAILY   Mitali Carlin MD   Upcoming Treatment Dates - OP ANASTROZOLE DAILY    6/24/2022       Antiemetics       Physician communication order       Take Home Chemotherapy       anastrozole (ARIMIDEX) 1 mg Tab

## 2022-08-17 ENCOUNTER — OFFICE VISIT (OUTPATIENT)
Dept: HEMATOLOGY/ONCOLOGY | Facility: CLINIC | Age: 77
End: 2022-08-17
Payer: MEDICARE

## 2022-08-17 VITALS
SYSTOLIC BLOOD PRESSURE: 154 MMHG | HEIGHT: 67 IN | HEART RATE: 65 BPM | DIASTOLIC BLOOD PRESSURE: 70 MMHG | WEIGHT: 215.81 LBS | OXYGEN SATURATION: 97 % | TEMPERATURE: 98 F | RESPIRATION RATE: 18 BRPM | BODY MASS INDEX: 33.87 KG/M2

## 2022-08-17 DIAGNOSIS — C50.112 MALIGNANT NEOPLASM OF CENTRAL PORTION OF LEFT BREAST IN FEMALE, ESTROGEN RECEPTOR POSITIVE: ICD-10-CM

## 2022-08-17 DIAGNOSIS — C50.912 LOBULAR CARCINOMA OF LEFT BREAST: Primary | ICD-10-CM

## 2022-08-17 DIAGNOSIS — Z79.811 USE OF ANASTROZOLE: ICD-10-CM

## 2022-08-17 DIAGNOSIS — Z17.0 MALIGNANT NEOPLASM OF CENTRAL PORTION OF LEFT BREAST IN FEMALE, ESTROGEN RECEPTOR POSITIVE: ICD-10-CM

## 2022-08-17 PROCEDURE — 99214 OFFICE O/P EST MOD 30 MIN: CPT | Mod: PBBFAC | Performed by: INTERNAL MEDICINE

## 2022-08-17 PROCEDURE — 99999 PR PBB SHADOW E&M-EST. PATIENT-LVL IV: CPT | Mod: PBBFAC,,, | Performed by: INTERNAL MEDICINE

## 2022-08-17 PROCEDURE — 99214 PR OFFICE/OUTPT VISIT, EST, LEVL IV, 30-39 MIN: ICD-10-PCS | Mod: S$PBB,,, | Performed by: INTERNAL MEDICINE

## 2022-08-17 PROCEDURE — 99999 PR PBB SHADOW E&M-EST. PATIENT-LVL IV: ICD-10-PCS | Mod: PBBFAC,,, | Performed by: INTERNAL MEDICINE

## 2022-08-17 PROCEDURE — 99214 OFFICE O/P EST MOD 30 MIN: CPT | Mod: S$PBB,,, | Performed by: INTERNAL MEDICINE

## 2022-08-17 NOTE — PROGRESS NOTES
PROGRESS NOTE    Subjective:       Patient ID: Kee Ramirez is a 77 y.o. female.  MRN: [id number]  : 1945    Chief Complaint: ILC of the left breast     History of Present Illness:   Kee Ramirez is a 77 y.o. female who presents with  ILC of the left breast.  Presents for a follow up visit     Reports yearly mammograms , normal in . In  screening mammogram showed left breast architectural distortion. Diagnostic mammogram in March showed a 21 x 9  X 2 0 mm left breast mass. US guided biopsy showed ILC, ER strongly positive, UT 15% positive, Her 2 rhina negative.   A breast MRI showed a 7.6 x 4.7 cm mass with spiculated margins.No abnormal lymph nodes were found.      No history of breast mass or biopsies. She has been seeing Dr. Ortiz and is transferring care to use due to proximity. See full oncology history below.   She has been referred for neoadjuvant chemotherapy. We met today as a follow up visit to further discuss neoadjuvant therapy.  At her visit two weeks ago, we discussed neoadjuvant chemotherapy vs neoadjuvant endocrine therapy.  She is interested in breast conservation and is not a candidate for upfront lumpectomy given tumor size and extension to the nipple areolar complex.  She met with Dr. Valente who agrees that she may not be a candidate for breast conservation even after neoadjuvant therapies.  Patient wants to try.     An oncotype was sent to determine if she was high risk and would benefit from chemotherapy.  We were notified there was not enough tissue specimen from the original biopsy for the oncotype.  After extensive discussion, we elected to start neoadjuvant anastrozole.     2022: Started anastrozole      She was admitted to the hospital from -, found to have SBO. Has NGT for decompression and improved with conservative measures.     Interim history:    She has remained on anastrozole since her last visit.  She denies any hot flashes or joint pains.   Noted to be hypertensive today, did take her blood pressure mediatation.     Oncology History:  As dcoumented by  and updated      03.21.2022 Left Breast, Core Needle Biopsies:   - Invasive lobular carcinoma, well-differentiated .   - Bloom-Damon score (5/9):        - Tubular Formation: 3/3        - Nuclear Pleomorphism: 1/3        - Mitotic Activity: 1/3.   - Background of lobular carinoma in-situ (LCIS), nuclear grade 1.   - No perineural or lymphovascular invasion is identified.   Estrogen receptor: Positive (strong intensity, >95% of tumor cell nuclei).   Progesterone receptor: Positive (strong intensity, 15% of tumor cell nuclei).   HER2 IHC: Negative.   Ki-67: 10%.  04.04.2022 [facility] -- Surgery FU w/Oncotype  04.06.2022 [facility] follow up  -Breast MRI for staging, discussed surgery options; pt would like BCT, will make definitive plan after MRI  05.03.2022 Breast MRI  -Irregular 7.6 cm enhancing spiculated mass in the upper LEFT breast, consistent with the patient's known biopsy-proven invasive lobular carcinoma. The enhancement does extend to the nipple-areolar complex.  -No suspicious abnormality in the RIGHT breast.   BI-RADS Category: Overall: 6 - known biopsy-proven malignancy  05.06.2022 Follow up with [facility]  -Plan for Axillary U/S to assess LAD, PetCT  -[facility] referral for NA therapy. Due to size of mass/location and nature of lobular cancer, was not felt that BCT was optimal unless NA therapy was done 1st   -2 week follow up to discuss further   05.13.2022 PetCT  -Mildly hypermetabolic irregular soft tissue within the left breast in keeping with known lobular carcinoma. This mass is better appreciated on MRI of the breast.  -No definite evidence of metastatic disease.  Please note that FDG PET has has suboptimal sensitivity for certain histologies such as lobular and tubular carcinomas.  05.18.2022 Initial [facility] eval    Oncology History:  Oncology  History   Malignant neoplasm of central portion of left female breast   5/6/2022 Initial Diagnosis    Malignant neoplasm of central portion of left female breast     5/27/2022 - 5/27/2022 Chemotherapy    Treatment Summary   Plan Name: OP BREAST DOSE-DENSE AC-T (DOXORUBICIN CYCLOPHOSPHAMIDE Q2W FOLLOWED BY PACLITAXEL WEEKLY)  Treatment Goal: Curative  Status: Inactive  Start Date:   End Date:   Provider: Mitali Carlin MD  Chemotherapy: DOXOrubicin chemo injection 130 mg, 60 mg/m2, Intravenous, Clinic/HOD 1 time, 0 of 4 cycles  cyclophosphamide 600 mg/m2 = 1,300 mg in sodium chloride 0.9% 250 mL chemo infusion, 600 mg/m2, Intravenous, Clinic/HOD 1 time, 0 of 4 cycles  PACLitaxeL (TAXOL) 80 mg/m2 = 174 mg in sodium chloride 0.9% 250 mL chemo infusion, 80 mg/m2, Intravenous, Clinic/HOD 1 time, 0 of 12 cycles     6/7/2022 Cancer Staged    Staging form: Breast, AJCC 8th Edition  - Clinical: Stage IIA (cT3, cN0, cM0, G1, ER+, PA+, HER2-)     6/24/2022 -  Chemotherapy    Treatment Summary   Plan Name: OP ANASTROZOLE DAILY  Treatment Goal: Curative  Status: Active  Start Date: 6/24/2022 (Planned)  End Date: 6/24/2022 (Planned)  Provider: Mitali Carlin MD  Chemotherapy: anastrozole (ARIMIDEX) 1 mg Tab, 1 mg, Oral, Daily, 0 of 1 cycle, Start date: --, End date: --         History:  Past Medical History:   Diagnosis Date    Cancer     Diabetes mellitus, type 2     Hyperlipidemia     Hypertension     Midline low back pain without sciatica 07/31/2015    Thyroid disease     Venous stasis     bilateral legs      Past Surgical History:   Procedure Laterality Date    COLONOSCOPY      COLONOSCOPY N/A 12/5/2019    Procedure: COLONOSCOPY;  Surgeon: Luis Bogran-Reyes, MD;  Location: [facility];  Service: Endoscopy;  Laterality: N/A;    INSERTION OF TUNNELED CENTRAL VENOUS CATHETER (CVC) WITH SUBCUTANEOUS PORT Right 5/24/2022    Procedure: [id number]-K-CATH;  Surgeon: Darien Bear MD;  Location: [facility];  Service:  General;  Laterality: Right;    SHOULDER ARTHROSCOPY W/ ROTATOR CUFF REPAIR Right 2001    TUBAL LIGATION      VAGINAL HYSTERECTOMY N/A 1/11/2021    Procedure: HYSTERECTOMY, VAGINAL ;  Surgeon: Jessie Dacosta MD;  Location: [facility];  Service: OB/GYN;  Laterality: N/A;     Family History   Problem Relation Age of Onset    Diabetes Mother     Hypertension Mother     Arthritis Mother     COPD Father     Hypertension Sister     Diabetes Sister     Cancer Sister         PANCREATIC     Social History     Tobacco Use    Smoking status: Never Smoker    Smokeless tobacco: Never Used   Substance and Sexual Activity    Alcohol use: No     Alcohol/week: 0.0 standard drinks    Drug use: No    Sexual activity: Not Currently     Partners: Male     Birth control/protection: None, See Surgical Hx        ROS:   Review of Systems   Constitutional: Negative for fever, malaise/fatigue and weight loss.   HENT: Negative for congestion, hearing loss, nosebleeds and sore throat.    Eyes: Negative for double vision and photophobia.   Respiratory: Negative for cough, hemoptysis, sputum production, shortness of breath and wheezing.    Cardiovascular: Negative for chest pain, palpitations, orthopnea and leg swelling.   Gastrointestinal: Negative for abdominal pain, blood in stool, constipation, + diarrhea heartburn, nausea and vomiting.   Genitourinary: Negative for dysuria, hematuria and urgency.   Musculoskeletal: Negative for back pain, joint pain and myalgias.   Skin: Negative for itching and rash.   Neurological: Negative for dizziness, tingling, seizures, weakness and headaches.   Endo/Heme/Allergies: Negative for polydipsia. Does not bruise/bleed easily.   Psychiatric/Behavioral: Negative for depression and memory loss. The patient is not nervous/anxious and does not have insomnia.       Objective:     Vitals:    08/17/22 1015   BP: (!) 154/70   Pulse: 65   Resp: 18   Temp: 97.6 °F (36.4 °C)   TempSrc: Oral   SpO2: 97%  "  Weight: 97.9 kg (215 lb 13.3 oz)   Height: 5' 7" (1.702 m)   PainSc: 0-No pain     Wt Readings from Last 10 Encounters:   08/17/22 97.9 kg (215 lb 13.3 oz)   07/26/22 95.4 kg (210 lb 5.1 oz)   07/20/22 97.1 kg (214 lb)   07/07/22 98.3 kg (216 lb 11.4 oz)   06/24/22 97.1 kg (214 lb 1.1 oz)   06/07/22 98.9 kg (218 lb)   06/07/22 98.9 kg (218 lb)   06/06/22 102.5 kg (226 lb)   05/27/22 99.6 kg (219 lb 7.5 oz)   05/24/22 102.5 kg (226 lb)       Physical Examination:   Physical Exam  Vitals and nursing note reviewed.   Constitutional:       General: She is not in acute distress.     Appearance: She is not diaphoretic.   HENT:      Head: Normocephalic.   Eyes:      General: No scleral icterus.     Conjunctiva/sclera: Conjunctivae normal.   Neck:      Thyroid: No thyromegaly.   Cardiovascular:      Rate and Rhythm: Normal rate and regular rhythm.      Heart sounds: Normal heart sounds. No murmur heard.  Pulmonary:      Effort: Pulmonary effort is normal. No respiratory distress.      Breath sounds: No stridor. No wheezing or rales.   Chest:      Chest wall: No tenderness.     Musculoskeletal:         General: No tenderness or deformity. Normal range of motion.      Cervical back: Neck supple.   Lymphadenopathy:      Cervical: No cervical adenopathy.   Skin:     General: Skin is warm and dry.      Findings: No erythema or rash.      Comments: + left breat mass at 12 0 clock,smaller, nipple everted now,  Feels softer, smaller and less distinct.   Neurological:      Mental Status: She is alert and oriented to person, place, and time.      Cranial Nerves: No cranial nerve deficit.      Coordination: Coordination normal.      Gait: Gait is intact.   Psychiatric:         Mood and Affect: Affect normal.         Cognition and Memory: Memory normal.         Judgment: Judgment normal.     Diagnostic Tests:  Significant Imaging: I have reviewed and interpreted all pertinent imaging results/findings.    Laboratory Data:  All " pertinent labs have been reviewed.  Labs:   Lab Results   Component Value Date    WBC 7.92 07/22/2022    RBC 3.80 (L) 07/22/2022    HGB 11.2 (L) 07/22/2022    HCT 34.9 (L) 07/22/2022    MCV 92 07/22/2022     07/22/2022    GLU 84 07/22/2022     07/22/2022    K 3.2 (L) 07/22/2022    BUN 12 07/22/2022    CREATININE 0.7 07/22/2022    AST 16 07/21/2022    ALT 17 07/21/2022    BILITOT 0.7 07/21/2022       Assessment/Plan:   Malignant neoplasm of central portion of left breast in female, estrogen receptor positive  cT3N0, ER 95%, PR15%, Her 2 rhina negative, Grade 1, ki 67 10%      She is interested in breast conservation and is not a candidate for upfront lumpectomy given tumor size and extension to the nipple areolar complex.     We have previously discussed neoadjuvant chemotherapy vs neoadjuvant endocrine therapy.      We discussed that given favorable grade, hormone receptor-positive status and lobular histology of her breast cancer, we may not see a robust response to neoadjuvant chemotherapy.She is more likely to respond well to endocrine therapy that can be used in the neoadjuvant setting.      Given not enough tissue sample for oncotype, patient would like to proceed with neoadjuvant endocrine therapy with arimidex. She is tolerating it well. Continue, see every 4 weeks in clinic and repeat imaging after 3 months of treatment.     DEXA normal in June, continue calcium and vitamin D.      Primary Hypertension  Compliant with current medications  Recommended follow up with PCP    Small Bowel Obstruction   Recent hospital admission noted. Continue to monitor closely.     ECOG SCORE         Discussion:   No follow-ups on file.    Plan was discussed with the patient at length, and she verbalized understanding. Kee was given an opportunity to ask questions that were answered to her satisfaction, and she was advised to call in the interval if any problems or questions arise.Electronically signed by Mitali ROBERTS  MD Tesfaye    Route Chart for Scheduling    Med Onc Chart Routing      Follow up with physician 1 month. Appts made    Follow up with KAYLYN    Infusion scheduling note    Injection scheduling note    Labs    Imaging    Pharmacy appointment    Other referrals          Treatment Plan Information   OP ANASTROZOLE DAILY   Mitali Carlin MD   Upcoming Treatment Dates - OP ANASTROZOLE DAILY    6/24/2022       Antiemetics       Physician communication order       Take Home Chemotherapy       anastrozole (ARIMIDEX) 1 mg Tab

## 2022-09-12 ENCOUNTER — LAB VISIT (OUTPATIENT)
Dept: LAB | Facility: HOSPITAL | Age: 77
End: 2022-09-12
Payer: MEDICARE

## 2022-09-12 DIAGNOSIS — E11.9 TYPE 2 DIABETES MELLITUS WITHOUT COMPLICATION, WITHOUT LONG-TERM CURRENT USE OF INSULIN: ICD-10-CM

## 2022-09-12 DIAGNOSIS — E07.9 THYROID DISEASE: ICD-10-CM

## 2022-09-12 LAB
ALBUMIN SERPL BCP-MCNC: 3.8 G/DL (ref 3.5–5.2)
ALP SERPL-CCNC: 47 U/L (ref 55–135)
ALT SERPL W/O P-5'-P-CCNC: 9 U/L (ref 10–44)
ANION GAP SERPL CALC-SCNC: 8 MMOL/L (ref 8–16)
AST SERPL-CCNC: 14 U/L (ref 10–40)
BILIRUB SERPL-MCNC: 0.5 MG/DL (ref 0.1–1)
BUN SERPL-MCNC: 24 MG/DL (ref 8–23)
CALCIUM SERPL-MCNC: 9.8 MG/DL (ref 8.7–10.5)
CHLORIDE SERPL-SCNC: 107 MMOL/L (ref 95–110)
CO2 SERPL-SCNC: 26 MMOL/L (ref 23–29)
CREAT SERPL-MCNC: 0.7 MG/DL (ref 0.5–1.4)
EST. GFR  (NO RACE VARIABLE): >60 ML/MIN/1.73 M^2
ESTIMATED AVG GLUCOSE: 117 MG/DL (ref 68–131)
GLUCOSE SERPL-MCNC: 108 MG/DL (ref 70–110)
HBA1C MFR BLD: 5.7 % (ref 4–5.6)
POTASSIUM SERPL-SCNC: 4.5 MMOL/L (ref 3.5–5.1)
PROT SERPL-MCNC: 7.6 G/DL (ref 6–8.4)
SODIUM SERPL-SCNC: 141 MMOL/L (ref 136–145)
T4 FREE SERPL-MCNC: 1.73 NG/DL (ref 0.71–1.51)
TSH SERPL DL<=0.005 MIU/L-ACNC: 0.1 UIU/ML (ref 0.4–4)

## 2022-09-12 PROCEDURE — 83036 HEMOGLOBIN GLYCOSYLATED A1C: CPT | Performed by: FAMILY MEDICINE

## 2022-09-12 PROCEDURE — 80053 COMPREHEN METABOLIC PANEL: CPT | Performed by: FAMILY MEDICINE

## 2022-09-12 PROCEDURE — 36415 COLL VENOUS BLD VENIPUNCTURE: CPT | Performed by: FAMILY MEDICINE

## 2022-09-12 PROCEDURE — 84439 ASSAY OF FREE THYROXINE: CPT | Performed by: FAMILY MEDICINE

## 2022-09-12 PROCEDURE — 84443 ASSAY THYROID STIM HORMONE: CPT | Performed by: FAMILY MEDICINE

## 2022-09-16 ENCOUNTER — OFFICE VISIT (OUTPATIENT)
Dept: HEMATOLOGY/ONCOLOGY | Facility: CLINIC | Age: 77
End: 2022-09-16
Payer: MEDICARE

## 2022-09-16 ENCOUNTER — HOSPITAL ENCOUNTER (OUTPATIENT)
Dept: RADIOLOGY | Facility: HOSPITAL | Age: 77
Discharge: HOME | End: 2022-09-16
Attending: NURSE PRACTITIONER
Payer: MEDICARE

## 2022-09-16 ENCOUNTER — OFFICE VISIT (OUTPATIENT)
Dept: SURGERY | Facility: CLINIC | Age: 77
End: 2022-09-16
Payer: MEDICARE

## 2022-09-16 VITALS
TEMPERATURE: 98 F | BODY MASS INDEX: 33.34 KG/M2 | DIASTOLIC BLOOD PRESSURE: 59 MMHG | HEIGHT: 67 IN | WEIGHT: 212.44 LBS | HEART RATE: 89 BPM | RESPIRATION RATE: 18 BRPM | OXYGEN SATURATION: 98 % | SYSTOLIC BLOOD PRESSURE: 177 MMHG

## 2022-09-16 VITALS
WEIGHT: 212 LBS | SYSTOLIC BLOOD PRESSURE: 177 MMHG | HEART RATE: 89 BPM | DIASTOLIC BLOOD PRESSURE: 59 MMHG | BODY MASS INDEX: 33.27 KG/M2 | HEIGHT: 67 IN

## 2022-09-16 DIAGNOSIS — Z17.0 MALIGNANT NEOPLASM OF CENTRAL PORTION OF LEFT BREAST IN FEMALE, ESTROGEN RECEPTOR POSITIVE: Primary | ICD-10-CM

## 2022-09-16 DIAGNOSIS — C50.912 LOBULAR CARCINOMA OF LEFT BREAST: ICD-10-CM

## 2022-09-16 DIAGNOSIS — Z17.0 MALIGNANT NEOPLASM OF CENTRAL PORTION OF LEFT BREAST IN FEMALE, ESTROGEN RECEPTOR POSITIVE: ICD-10-CM

## 2022-09-16 DIAGNOSIS — C50.112 MALIGNANT NEOPLASM OF CENTRAL PORTION OF LEFT BREAST IN FEMALE, ESTROGEN RECEPTOR POSITIVE: Primary | ICD-10-CM

## 2022-09-16 DIAGNOSIS — C50.112 MALIGNANT NEOPLASM OF CENTRAL PORTION OF LEFT BREAST IN FEMALE, ESTROGEN RECEPTOR POSITIVE: ICD-10-CM

## 2022-09-16 DIAGNOSIS — Z09 FOLLOW UP: ICD-10-CM

## 2022-09-16 DIAGNOSIS — Z79.811 USE OF ANASTROZOLE: ICD-10-CM

## 2022-09-16 PROCEDURE — 99214 PR OFFICE/OUTPT VISIT, EST, LEVL IV, 30-39 MIN: ICD-10-PCS | Mod: S$PBB,,, | Performed by: INTERNAL MEDICINE

## 2022-09-16 PROCEDURE — 77065 DX MAMMO INCL CAD UNI: CPT | Mod: TC,LT

## 2022-09-16 PROCEDURE — 99214 OFFICE O/P EST MOD 30 MIN: CPT | Mod: PBBFAC | Performed by: INTERNAL MEDICINE

## 2022-09-16 PROCEDURE — 99999 PR PBB SHADOW E&M-EST. PATIENT-LVL III: CPT | Mod: PBBFAC,,, | Performed by: NURSE PRACTITIONER

## 2022-09-16 PROCEDURE — 77065 MAMMO DIGITAL DIAGNOSTIC LEFT WITH TOMO: ICD-10-PCS | Mod: 26,LT,, | Performed by: RADIOLOGY

## 2022-09-16 PROCEDURE — 76642 ULTRASOUND BREAST LIMITED: CPT | Mod: 26,LT,, | Performed by: RADIOLOGY

## 2022-09-16 PROCEDURE — 77061 MAMMO DIGITAL DIAGNOSTIC LEFT WITH TOMO: ICD-10-PCS | Mod: 26,LT,, | Performed by: RADIOLOGY

## 2022-09-16 PROCEDURE — 99214 OFFICE O/P EST MOD 30 MIN: CPT | Mod: S$PBB,,, | Performed by: NURSE PRACTITIONER

## 2022-09-16 PROCEDURE — 77061 BREAST TOMOSYNTHESIS UNI: CPT | Mod: 26,LT,, | Performed by: RADIOLOGY

## 2022-09-16 PROCEDURE — 77065 DX MAMMO INCL CAD UNI: CPT | Mod: 26,LT,, | Performed by: RADIOLOGY

## 2022-09-16 PROCEDURE — 99214 PR OFFICE/OUTPT VISIT, EST, LEVL IV, 30-39 MIN: ICD-10-PCS | Mod: S$PBB,,, | Performed by: NURSE PRACTITIONER

## 2022-09-16 PROCEDURE — 99999 PR PBB SHADOW E&M-EST. PATIENT-LVL III: ICD-10-PCS | Mod: PBBFAC,,, | Performed by: NURSE PRACTITIONER

## 2022-09-16 PROCEDURE — 99999 PR PBB SHADOW E&M-EST. PATIENT-LVL IV: ICD-10-PCS | Mod: PBBFAC,,, | Performed by: INTERNAL MEDICINE

## 2022-09-16 PROCEDURE — 99213 OFFICE O/P EST LOW 20 MIN: CPT | Mod: PBBFAC,27 | Performed by: NURSE PRACTITIONER

## 2022-09-16 PROCEDURE — 76642 ULTRASOUND BREAST LIMITED: CPT | Mod: TC,LT

## 2022-09-16 PROCEDURE — 99999 PR PBB SHADOW E&M-EST. PATIENT-LVL IV: CPT | Mod: PBBFAC,,, | Performed by: INTERNAL MEDICINE

## 2022-09-16 PROCEDURE — 76642 US BREAST LEFT LIMITED: ICD-10-PCS | Mod: 26,LT,, | Performed by: RADIOLOGY

## 2022-09-16 PROCEDURE — 99214 OFFICE O/P EST MOD 30 MIN: CPT | Mod: S$PBB,,, | Performed by: INTERNAL MEDICINE

## 2022-09-16 NOTE — Clinical Note
Need to set up Ms. Ramirez with [facility] near the end of December. She was started on Neoadjuvant anastrozole on 6/24/2022 so plan for MRI and see Dr. Valente for 6 month follow up for surgery plan  Thanks Jairo

## 2022-09-16 NOTE — PROGRESS NOTES
PROGRESS NOTE    Subjective:       Patient ID: Kee Ramirez is a 77 y.o. female.  MRN: [id number]  : 1945    Chief Complaint: ILC of the left breast     History of Present Illness:   Kee Ramirez is a 77 y.o. female who presents with  ILC of the left breast.  Presents for a follow up visit     Reports yearly mammograms , normal in . In  screening mammogram showed left breast architectural distortion. Diagnostic mammogram in March showed a 21 x 9  X 2 0 mm left breast mass. US guided biopsy showed ILC, ER strongly positive, MD 15% positive, Her 2 rhina negative.   A breast MRI showed a 7.6 x 4.7 cm mass with spiculated margins.No abnormal lymph nodes were found.      No history of breast mass or biopsies. She has been seeing Dr. Ortiz and is transferring care to use due to proximity. See full oncology history below.   She has been referred for neoadjuvant chemotherapy. We met today as a follow up visit to further discuss neoadjuvant therapy.  At her visit two weeks ago, we discussed neoadjuvant chemotherapy vs neoadjuvant endocrine therapy.  She is interested in breast conservation and is not a candidate for upfront lumpectomy given tumor size and extension to the nipple areolar complex.  She met with Dr. Valente who agrees that she may not be a candidate for breast conservation even after neoadjuvant therapies.  Patient wants to try.     An oncotype was sent to determine if she was high risk and would benefit from chemotherapy.  We were notified there was not enough tissue specimen from the original biopsy for the oncotype.  After extensive discussion, we elected to start neoadjuvant anastrozole.     2022: Started anastrozole      She was admitted to the hospital from -, found to have SBO. Has NGT for decompression and improved with conservative measures.     Interim history:    She has remained on anastrozole since her last visit  and has had follow up imaging earlier today.    She denies any hot flashes or joint pains.   Has persistent hypertension.     Oncology History:  As dcoumented by  and updated      03.21.2022 Left Breast, Core Needle Biopsies:   - Invasive lobular carcinoma, well-differentiated .   - Bloom-Damon score (5/9):        - Tubular Formation: 3/3        - Nuclear Pleomorphism: 1/3        - Mitotic Activity: 1/3.   - Background of lobular carinoma in-situ (LCIS), nuclear grade 1.   - No perineural or lymphovascular invasion is identified.   Estrogen receptor: Positive (strong intensity, >95% of tumor cell nuclei).   Progesterone receptor: Positive (strong intensity, 15% of tumor cell nuclei).   HER2 IHC: Negative.   Ki-67: 10%.  04.04.2022 [facility] -- Surgery FU w/Oncotype  04.06.2022  follow up  -Breast MRI for staging, discussed surgery options; pt would like BCT, will make definitive plan after MRI  05.03.2022 Breast MRI  -Irregular 7.6 cm enhancing spiculated mass in the upper LEFT breast, consistent with the patient's known biopsy-proven invasive lobular carcinoma. The enhancement does extend to the nipple-areolar complex.  -No suspicious abnormality in the RIGHT breast.   BI-RADS Category: Overall: 6 - known biopsy-proven malignancy  05.06.2022 Follow up with [facility]  -Plan for Axillary U/S to assess LAD, PetCT  -[facility] referral for NA therapy. Due to size of mass/location and nature of lobular cancer, was not felt that BCT was optimal unless NA therapy was done 1st   -2 week follow up to discuss further   05.13.2022 PetCT  -Mildly hypermetabolic irregular soft tissue within the left breast in keeping with known lobular carcinoma. This mass is better appreciated on MRI of the breast.  -No definite evidence of metastatic disease.  Please note that FDG PET has has suboptimal sensitivity for certain histologies such as lobular and tubular carcinomas.  05.18.2022 Initial [facility] eval    Oncology History:  Oncology  History   Malignant neoplasm of central portion of left female breast   5/6/2022 Initial Diagnosis    Malignant neoplasm of central portion of left female breast     5/27/2022 - 5/27/2022 Chemotherapy    Treatment Summary   Plan Name: OP BREAST DOSE-DENSE AC-T (DOXORUBICIN CYCLOPHOSPHAMIDE Q2W FOLLOWED BY PACLITAXEL WEEKLY)  Treatment Goal: Curative  Status: Inactive  Start Date:   End Date:   Provider: Mitali Carlin MD  Chemotherapy: DOXOrubicin chemo injection 130 mg, 60 mg/m2, Intravenous, Clinic/HOD 1 time, 0 of 4 cycles  cyclophosphamide 600 mg/m2 = 1,300 mg in sodium chloride 0.9% 250 mL chemo infusion, 600 mg/m2, Intravenous, Clinic/HOD 1 time, 0 of 4 cycles  PACLitaxeL (TAXOL) 80 mg/m2 = 174 mg in sodium chloride 0.9% 250 mL chemo infusion, 80 mg/m2, Intravenous, Clinic/HOD 1 time, 0 of 12 cycles       6/7/2022 Cancer Staged    Staging form: Breast, AJCC 8th Edition  - Clinical: Stage IIA (cT3, cN0, cM0, G1, ER+, DE+, HER2-)       6/24/2022 -  Chemotherapy    Treatment Summary   Plan Name: OP ANASTROZOLE DAILY  Treatment Goal: Curative  Status: Active  Start Date: 6/24/2022 (Planned)  End Date: 6/24/2022 (Planned)  Provider: Mitali Carlin MD  Chemotherapy: anastrozole (ARIMIDEX) 1 mg Tab, 1 mg, Oral, Daily, 0 of 1 cycle, Start date: --, End date: --           History:  Past Medical History:   Diagnosis Date    Breast cancer 05/01/2022    left    Cancer     Diabetes mellitus, type 2     Hyperlipidemia     Hypertension     Lobular carcinoma in situ 05/01/2022    left    Midline low back pain without sciatica 07/31/2015    Thyroid disease     Venous stasis     bilateral legs      Past Surgical History:   Procedure Laterality Date    BREAST BIOPSY Left 03/21/2022    Core bx, + ILC    COLONOSCOPY      COLONOSCOPY N/A 12/05/2019    Procedure: COLONOSCOPY;  Surgeon: Luis Bogran-Reyes, MD;  Location: [facility];  Service: Endoscopy;  Laterality: N/A;    HYSTERECTOMY  12/01/2021    INSERTION OF TUNNELED CENTRAL  VENOUS CATHETER (CVC) WITH SUBCUTANEOUS PORT Right 05/24/2022    Procedure: [id number];  Surgeon: Darien Bear MD;  Location: [facility];  Service: General;  Laterality: Right;    SHOULDER ARTHROSCOPY W/ ROTATOR CUFF REPAIR Right 2001    TUBAL LIGATION      VAGINAL HYSTERECTOMY N/A 01/11/2021    Procedure: HYSTERECTOMY, VAGINAL ;  Surgeon: Jessie Dacosta MD;  Location: [facility];  Service: OB/GYN;  Laterality: N/A;     Family History   Problem Relation Age of Onset    Diabetes Mother     Hypertension Mother     Arthritis Mother     COPD Father     Hypertension Sister     Diabetes Sister     Cancer Sister         PANCREATIC    Breast cancer Sister      Social History     Tobacco Use    Smoking status: Never    Smokeless tobacco: Never   Substance and Sexual Activity    Alcohol use: No     Alcohol/week: 0.0 standard drinks    Drug use: No    Sexual activity: Not Currently     Partners: Male     Birth control/protection: None, See Surgical Hx        ROS:   Review of Systems   Constitutional: Negative for fever, malaise/fatigue and weight loss.   HENT: Negative for congestion, hearing loss, nosebleeds and sore throat.    Eyes: Negative for double vision and photophobia.   Respiratory: Negative for cough, hemoptysis, sputum production, shortness of breath and wheezing.    Cardiovascular: Negative for chest pain, palpitations, orthopnea and leg swelling.   Gastrointestinal: Negative for abdominal pain, blood in stool, constipation, + diarrhea heartburn, nausea and vomiting.   Genitourinary: Negative for dysuria, hematuria and urgency.   Musculoskeletal: Negative for back pain, joint pain and myalgias.   Skin: Negative for itching and rash.   Neurological: Negative for dizziness, tingling, seizures, weakness and headaches.   Endo/Heme/Allergies: Negative for polydipsia. Does not bruise/bleed easily.   Psychiatric/Behavioral: Negative for depression and memory loss. The patient is not nervous/anxious and does not  "have insomnia.       Objective:     Vitals:    09/16/22 0929   BP: (!) 177/59   Pulse: 89   Resp: 18   Temp: 97.8 °F (36.6 °C)   TempSrc: Oral   SpO2: 98%   Weight: 96.3 kg (212 lb 6.6 oz)   Height: 5' 7" (1.702 m)   PainSc: 0-No pain     Wt Readings from Last 10 Encounters:   09/16/22 96.2 kg (212 lb)   09/16/22 96.3 kg (212 lb 6.6 oz)   08/17/22 97.9 kg (215 lb 13.3 oz)   07/26/22 95.4 kg (210 lb 5.1 oz)   07/20/22 97.1 kg (214 lb)   07/07/22 98.3 kg (216 lb 11.4 oz)   06/24/22 97.1 kg (214 lb 1.1 oz)   06/07/22 98.9 kg (218 lb)   06/07/22 98.9 kg (218 lb)   06/06/22 102.5 kg (226 lb)       Physical Examination:   Physical Exam  Vitals and nursing note reviewed.   Constitutional:       General: She is not in acute distress.     Appearance: She is not diaphoretic.   HENT:      Head: Normocephalic.   Eyes:      General: No scleral icterus.     Conjunctiva/sclera: Conjunctivae normal.   Neck:      Thyroid: No thyromegaly.   Cardiovascular:      Rate and Rhythm: Normal rate and regular rhythm.      Heart sounds: Normal heart sounds. No murmur heard.  Pulmonary:      Effort: Pulmonary effort is normal. No respiratory distress.      Breath sounds: No stridor. No wheezing or rales.   Chest:      Chest wall: No tenderness.     Musculoskeletal:         General: No tenderness or deformity. Normal range of motion.      Cervical back: Neck supple.   Lymphadenopathy:      Cervical: No cervical adenopathy.   Skin:     General: Skin is warm and dry.      Findings: No erythema or rash.      Comments: + left breat mass at 12 0 clock,smaller, nipple everted now,  Feels softer, smaller and less distinct.   Neurological:      Mental Status: She is alert and oriented to person, place, and time.      Cranial Nerves: No cranial nerve deficit.      Coordination: Coordination normal.      Gait: Gait is intact.   Psychiatric:         Mood and Affect: Affect normal.         Cognition and Memory: Memory normal.         Judgment: Judgment " normal.     Diagnostic Tests:  Significant Imaging: I have reviewed and interpreted all pertinent imaging results/findings.    Laboratory Data:  All pertinent labs have been reviewed.  Labs:   Lab Results   Component Value Date    WBC 7.92 07/22/2022    RBC 3.80 (L) 07/22/2022    HGB 11.2 (L) 07/22/2022    HCT 34.9 (L) 07/22/2022    MCV 92 07/22/2022     07/22/2022     09/12/2022     09/12/2022    K 4.5 09/12/2022    BUN 24 (H) 09/12/2022    CREATININE 0.7 09/12/2022    AST 14 09/12/2022    ALT 9 (L) 09/12/2022    BILITOT 0.5 09/12/2022       Assessment/Plan:   Malignant neoplasm of central portion of left breast in female, estrogen receptor positive  cT3N0, ER 95%, PR15%, Her 2 rhina negative, Grade 1, ki 67 10%      She is interested in breast conservation and is not a candidate for upfront lumpectomy given tumor size and extension to the nipple areolar complex.     We have previously discussed neoadjuvant chemotherapy vs neoadjuvant endocrine therapy. See previous notes for discussion. She is currently on neoadjuvant anastrozole and tolerating well.     Clinically responding, will follow official mammogram results today, discussed with surgical oncology team.     I will see her back in 6 weeks for CBE. She will see surgical oncology again in 3 months with plans for surgery at the time.     DEXA normal in June, continue calcium and vitamin D.      Primary Hypertension  Compliant with current medications  Recommended follow up with PCP    Small Bowel Obstruction   Recent hospital admission noted. Continue to monitor closely.     ECOG SCORE           Discussion:   No follow-ups on file.    Plan was discussed with the patient at length, and she verbalized understanding. Kee was given an opportunity to ask questions that were answered to her satisfaction, and she was advised to call in the interval if any problems or questions arise.Electronically signed by Mitali Carlin MD      Answers submitted  by the patient for this visit:  Review of Systems Questionnaire (Submitted on 9/9/2022)  appetite change : No  unexpected weight change: No  mouth sores: No  visual disturbance: No  cough: No  shortness of breath: No  chest pain: No  abdominal pain: No  diarrhea: No  frequency: No  back pain: No  rash: No  headaches: No  adenopathy: No  nervous/ anxious: No    Route Chart for Scheduling    Med Onc Chart Routing      Follow up with physician 6 weeks.   Follow up with KAYLYN    Infusion scheduling note    Injection scheduling note    Labs    Imaging    Pharmacy appointment    Other referrals          Treatment Plan Information   OP ANASTROZOLE DAILY   Mitali Carlin MD   Upcoming Treatment Dates - OP ANASTROZOLE DAILY    6/24/2022       Antiemetics       Physician communication order       Take Home Chemotherapy       anastrozole (ARIMIDEX) 1 mg Tab

## 2022-09-16 NOTE — PROGRESS NOTES
Breast Surgery  [facility]  Department of Surgery      REFERRING PROVIDER: No referring provider defined for this encounter.    Chief Complaint: Follow-up (3 mo f/u)      Subjective:      Patient ID: Kee Ramirez is a 77 y.o. female with HTN, T2DM, hypothyroidism who presents with invasive lobular carcinoma of the left breast. She initially underwent screening mammogram on 12/15/21 which showed architectural distortion in the upper outer quadrant of the left breast and a focal asymmetry in the upper central portion of the right breast.    Follow-up mammogram (3/4/22) and ultrasound (3/4/22) showed 21 mm x 9 mm x 20 mm irregularly shaped, hypoechoic mass with indistinct margins in the upper central portion of the left breast and a 4 mm simple cyst seen in the inner central region of the right breast. A ultrasound guided biopsy was performed on 3/21/22 with pathology revealing infiltrating lobular carcinoma of the breast. She underwent MRI on 5/3/22 which showed 7.6 cm (anterior-posterior) x 4.7 cm (transverse) x 3.9 cm (superior-inferior) irregular heterogeneously enhancing mass with spiculated margins in the upper left breast at anterior-middle depth that extends to the NAC. She presented to [facility] and was evaluated by the surgery clinic there. At that time, she was desiring breast conserving surgery. She was not a candidate for that at the time and was referred to oncology for neoadjuvant therapy. She underwent PET scan which did not show evidence of metastatic disease. She has had her port placed on 5/24/2022 with plans to start chemotherapy on 6/10/2022. An Oncotype was sent determine if she was high risk and would benefit from chemotherapy. There was not enough tissue specimen from the original biopsy for the oncotype.  After extensive discussion, Dr. Carlin started neoadjuvant anastrozole on 6/24/2022.     Patient does not routinely do self breast exams.  Patient has noted a change on breast exam.   Patient denies nipple discharge. Patient denies to previous breast biopsy. Patient denies a personal history of breast cancer.    Findings at that time were the following:   Tumor size: 2.1 cm   Tumor ndgndrndanddndend:nd nd2nd Estrogen Receptor: positive  Progesterone Receptor: positive   Her-2 rhina: negative   Lymph node status: clinically negative   Lymphatic invasion: none identified     GYN History:  Age of menarche was 14. Age of menopause was 62.  Last menstrual period was around age 62. Patient denies hormonal therapy. Patient is . Age of first live birth was 25. Patient did not breast feed.      Family History:  Sister - breast cancer diagnosed in her 60s    Past Medical History:   Diagnosis Date    Breast cancer 2022    left    Cancer     Diabetes mellitus, type 2     Hyperlipidemia     Hypertension     Lobular carcinoma in situ 2022    left    Midline low back pain without sciatica 2015    Thyroid disease     Venous stasis     bilateral legs     Past Surgical History:   Procedure Laterality Date    BREAST BIOPSY Left 2022    Core bx, + ILC    COLONOSCOPY      COLONOSCOPY N/A 2019    Procedure: COLONOSCOPY;  Surgeon: Luis Bogran-Reyes, MD;  Location: [facility];  Service: Endoscopy;  Laterality: N/A;    HYSTERECTOMY  2021    INSERTION OF TUNNELED CENTRAL VENOUS CATHETER (CVC) WITH SUBCUTANEOUS PORT Right 2022    Procedure: [id number];  Surgeon: Darien Bear MD;  Location: [facility];  Service: General;  Laterality: Right;    SHOULDER ARTHROSCOPY W/ ROTATOR CUFF REPAIR Right     TUBAL LIGATION      VAGINAL HYSTERECTOMY N/A 2021    Procedure: HYSTERECTOMY, VAGINAL ;  Surgeon: Jessie Dacosta MD;  Location: [facility];  Service: OB/GYN;  Laterality: N/A;     Current Outpatient Medications on File Prior to Visit   Medication Sig Dispense Refill    amLODIPine (NORVASC) 10 MG tablet Take 1 tablet (10 mg total) by mouth once daily. 90 tablet 3    anastrozole (ARIMIDEX)  1 mg Tab Take 1 tablet (1 mg total) by mouth once daily. 90 tablet 3    aspirin (ECOTRIN) 81 MG EC tablet Take 81 mg by mouth once daily.      cloNIDine (CATAPRES) 0.1 MG tablet TAKE 1 TABLET BY MOUTH THREE TIMES DAILY 90 tablet 11    gabapentin (NEURONTIN) 300 MG capsule Take 1 capsule (300 mg total) by mouth 3 (three) times daily. 90 capsule 5    ibuprofen (ADVIL,MOTRIN) 800 MG tablet Take 1 tablet (800 mg total) by mouth 3 (three) times daily. 90 tablet 3    levothyroxine (SYNTHROID) 125 MCG tablet Take 1 tablet (125 mcg total) by mouth before breakfast. 90 tablet 3    losartan (COZAAR) 100 MG tablet Take 1 tablet (100 mg total) by mouth once daily. 90 tablet 3    lovastatin (MEVACOR) 20 MG tablet Take 2 tablets (40 mg total) by mouth every evening. 180 tablet 3    metFORMIN (GLUCOPHAGE) 500 MG tablet Take 1 tablet (500 mg total) by mouth 2 (two) times daily with meals. 180 tablet 3    methylcellulose (ARTIFICIAL TEARS) 1 % ophthalmic solution Place 1 drop into both eyes as needed.      multivitamin (THERAGRAN) per tablet Take 1 tablet by mouth once daily.      rOPINIRole (REQUIP) 1 MG tablet Take 1 tablet (1 mg total) by mouth every evening. 90 tablet 3    traZODone (DESYREL) 50 MG tablet Take 1 tablet (50 mg total) by mouth every evening. 30 tablet 11    oxybutynin (DITROPAN) 5 MG Tab Take 1 tablet (5 mg total) by mouth 3 (three) times daily. 90 tablet 11    [DISCONTINUED] conjugated estrogens (PREMARIN) vaginal cream Place 1 g vaginally twice a week. (Patient not taking: No sig reported) 30 g 3     No current facility-administered medications on file prior to visit.     Social History     Socioeconomic History    Marital status:     Years of education: college   Tobacco Use    Smoking status: Never    Smokeless tobacco: Never   Substance and Sexual Activity    Alcohol use: No     Alcohol/week: 0.0 standard drinks    Drug use: No    Sexual activity: Not Currently     Partners: Male     Birth  "control/protection: None, See Surgical Hx     Social Determinants of Health     Financial Resource Strain: Low Risk     Difficulty of Paying Living Expenses: Not very hard   Food Insecurity: No Food Insecurity    Worried About Running Out of Food in the Last Year: Never true    Ran Out of Food in the Last Year: Never true   Transportation Needs: No Transportation Needs    Lack of Transportation (Medical): No    Lack of Transportation (Non-Medical): No   Physical Activity: Inactive    Days of Exercise per Week: 0 days    Minutes of Exercise per Session: 0 min   Stress: No Stress Concern Present    Feeling of Stress : Not at all   Social Connections: Unknown    Frequency of Communication with Friends and Family: More than three times a week    Frequency of Social Gatherings with Friends and Family: Once a week    Active Member of Clubs or Organizations: Yes    Attends Club or Organization Meetings: 1 to 4 times per year    Marital Status:    Housing Stability: Low Risk     Unable to Pay for Housing in the Last Year: No    Number of Places Lived in the Last Year: 1    Unstable Housing in the Last Year: No     Family History   Problem Relation Age of Onset    Diabetes Mother     Hypertension Mother     Arthritis Mother     COPD Father     Hypertension Sister     Diabetes Sister     Cancer Sister         PANCREATIC    Breast cancer Sister         Review of Systems   Respiratory: Negative.     Cardiovascular: Negative.    Gastrointestinal: Negative.    Hematological: Negative.    All other systems reviewed and are negative.  Objective:   BP (!) 177/59 (BP Location: Right arm, Patient Position: Sitting, BP Method: Medium (Automatic))   Pulse 89   Ht 5' 7" (1.702 m)   Wt 96.2 kg (212 lb)   BMI 33.20 kg/m²     Physical Exam   Constitutional: She is oriented to person, place, and time. She appears well-developed.   HENT:   Head: Normocephalic and atraumatic.   Eyes: Pupils are equal, round, and reactive to light. "   Cardiovascular:  Normal rate and regular rhythm.            Pulmonary/Chest: Effort normal. No respiratory distress. Right breast exhibits no inverted nipple, no mass, no nipple discharge, no skin change and no tenderness. Left breast exhibits mass. Left breast exhibits no inverted nipple, no nipple discharge, no skin change and no tenderness.       Neurological: She is alert and oriented to person, place, and time.   Skin: Skin is warm and dry.     Slight tethering to the areola skin no nipple inversion  Lymphadenopathy.         Radiology review: Images personally reviewed by me in the clinic.   Final Pathologic Diagnosis   Date/Time Value Ref Range Status   03/21/2022 09:32 AM (A)  Corrected    Left Breast, Core Needle Biopsies:  - Invasive lobular carcinoma, well-differentiated .  - Bloom-Damon score (5/9):       - Tubular Formation: 3/3       - Nuclear Pleomorphism: 1/3       - Mitotic Activity: 1/3.  - Background of lobular carinoma in-situ (LCIS), nuclear grade 1.  - No perineural or lymphovascular invasion is identified.  Note: Prognostic studies are pending and will be reported in an addendum to  follow.       Comment:     Interp By Avni Almodovar III, M.D., Signed on 06/20/2022 at 08:38         No matching staging information was found for the patient.   Cancer Staging   Malignant neoplasm of central portion of left female breast  Staging form: Breast, AJCC 8th Edition  - Clinical: Stage IIA (cT3, cN0, cM0, G1, ER+, MN+, HER2-) - Signed by Mitali Carlin MD on 6/7/2022    Pathology Results  (Last 10 years)                 03/21/22 0932  Specimen to Pathology, Radiology Breast, needle biopsy (Abnormal) Edited Result - FINAL    Narrative:  Ultrasound guided left breast mass biopsy   5 - 14 gauge core biopsy specimen were obtained and submitted   to pathology in 1 jar   Release to patient->Immediate       01/11/21 0915  Specimen to Pathology, Surgery Gynecology and Obstetrics Final result    Narrative:   Pre-op Diagnosis: Uterine prolapse [N81.4]   Procedure(s):   HYSTERECTOMY, VAGINAL   Number of specimens: 1   Name of specimens: uterus and cervix   Specimen total (fresh, frozen, permanent):->1       12/05/19 0844  Specimen to Pathology, Surgery General Surgery Final result    Narrative:  screening   Procedure(s):   COLONOSCOPY   Number of specimens: 1   Name of specimens: 1. Rectal polyp @ 0844   Specimen total (fresh, frozen, permanent):->1                    X-Ray Chest 1 View    Result Date: 5/24/2022  EXAMINATION: XR CHEST 1 VIEW CLINICAL HISTORY: post port a cath placement; COMPARISON: AP portable chest 01/11/2021. FINDINGS: Right IJ Port-A-Cath in place.  Subtle patchy airspace opacities are questioned right mid and lower lung zones peripherally and are patchy airspace opacities left base raising possibility of multifocal airspace disease/possible multifocal pneumonia.  No effusion seen. Degenerative change glenohumeral joints.     Subtle patchy airspace opacities are questioned right mid and lower lung zones peripherally and also questioned left base raising possibility of subtle multifocal airspace disease.  PA and lateral views of chest recommended to better assess. Electronically signed by: Keila Wiggins MD Date:    05/24/2022 Time:    10:59    NM PET CT Routine FDG    Result Date: 5/13/2022  EXAMINATION: NM PET CT ROUTINE CLINICAL HISTORY: malignant neoplasm breast; Malignant neoplasm of unspecified site of left female breast TECHNIQUE: 11.98 mCi of F18-FDG was administered intravenously in the right forearm.  After an approximately 60 min distribution time, PET/CT images were acquired from the skull base to thigh.  Transmission images were acquired to correct for attenuation using a whole body low-dose CT scan with oral contrast and without IV contrast with the arms positioned above the head. Glycemia at the time of injection was 97 mg/dL. COMPARISON: MRI of the breast 05/03/2022. FINDINGS: Quality  of the study: Adequate. In the head and neck, there are no hypermetabolic lesions worrisome for malignancy. There are no hypermetabolic mucosal lesions, and there are no pathologically enlarged or hypermetabolic lymph nodes.  There is likely physiologic prominent uptake within Waldeyer's ring. In the chest, there is mildly hypermetabolic irregular soft tissue within the upper aspect of the left breast measuring proximally 6.4 cm in maximum axial dimension with a maximum SUV of 1.8 on image 63.  There are no concerning pulmonary nodules or masses, and there are no pathologically enlarged or hypermetabolic lymph nodes.  There are several non pathologically enlarged and non hypermetabolic axillary lymph nodes bilaterally. In the abdomen and pelvis, there is physiologic tracer distribution within the abdominal organs (including diffuse colonic uptake in keeping with metformin use) and excretion into the genitourinary system. In the bones, there are no definite hypermetabolic lesions worrisome for malignancy.  There mild-to-moderate S-shaped scoliosis of the thoracolumbar spine with associated degenerative changes. In the extremities, there are no hypermetabolic lesions worrisome for malignancy.  Mild periarticular uptake, right greater than left, in the shoulders is in keeping with soft tissue injury.     1.  Mildly hypermetabolic irregular soft tissue within the left breast in keeping with known lobular carcinoma.  This mass is better appreciated on MRI of the breast. 2.  No definite evidence of metastatic disease.  Please note that FDG PET has has suboptimal sensitivity for certain histologies such as lobular and tubular carcinomas. 3.  Other findings as above. Electronically signed by: Zak Patel Date:    05/13/2022 Time:    16:15    SURG FL Surgery Fluoro Usage    Result Date: 5/24/2022  See OP Notes for results. IMPRESSION: See OP Notes for results. This procedure was auto-finalized by: Virtual Radiologist    US  Breast Left Limited    Result Date: 5/19/2022  Result: US Breast Left Limited  History: Patient is 76 y.o. and is seen for diagnostic imaging. Films Compared: Compared to: 05/03/2022 MRI Breast w/wo Contrast, w/CAD, Bilateral, 03/21/2022 US Breast Biopsy with Imaging 1st site Left, 03/04/2022 Mammo Digital Diagnostic Bilat with George, 03/04/2022 US Breast Bilateral Limited, 12/15/2021 Mammo Digital Screening Bilat w/ George, 11/24/2020 Mammo Digital Diagnostic Bilat w/ George, 03/25/2019 Mammo Digital Diagnostic Bilat w/ George, 09/24/2018 US Breast Left Limited, 09/24/2018 Mammo Digital Diagnostic Left with CAD, 03/13/2018 US Breast Left Limited, 03/13/2018 Mammo Digital Diagnostic Left with CAD, 02/06/2018 Mammo Digital Screening Bilat with CAD, 12/16/2016 Mammo Digital Screening Bilat with CAD, 11/18/2015 Mammo Digital Screening Bilat with CAD, 10/27/2014 Mammo Digital Screening Bilat with CAD, and 10/22/2013 Mammo Digital Screening Bilat with Tomos  Findings:  There is a 20 mm x 19 mm x 16 mm irregularly shaped, hypoechoic mass with indistinct margins with shadowing seen in the upper central region of the left breast in the middle depth. Findings are similar in character as opposed to previous suggesting a poor response to chemotherapeutic regimen in a patient with documented history of lobular CIS.     Left Mass: Left breast 20 mm x 19 mm x 16 mm mass at the upper central middle position. Assessment: 3 - Probably benign. Short Interval Follow-Up in 3 Months is recommended. BI-RADS Category: Overall: 3 - Probably Benign  Recommendation: Short Interval Follow-Up is recommended in 3 Months.     Assessment:       1. Malignant neoplasm of central portion of left breast in female, estrogen receptor positive    2. Follow up          Plan:     Options for management were discussed with the patient and her family. We reviewed the existing data noting the equivalency of breast conserving surgery with radiation therapy and  mastectomy. We also reviewed the guidelines of the National Comprehensive Cancer Network for Stage 2 breast carcinoma. We discussed the need for lumpectomy margins to be negative for carcinoma, the necessity for postoperative radiation therapy after breast conservation in most cases, the possibility of a failed or false negative sentinel lymph node biopsy and the potential need for complete lymphadenectomy for a failed or positive sentinel lymph node biopsy were fully discussed. In the setting of mastectomy, delayed or immediate reconstruction options are available and were discussed.     In the setting of lumpectomy, radiation therapy would be recommended majority of the time.  The duration and treatment side effects were discussed with the patient.  This will coordinated with the radiation oncologist pending final pathology.    We also discussed the role of systemic therapy in the treatment of early stage breast cancer.  We discussed that this is based on tumor biology and marislo status and will be determined based on final pathology.  We discussed that if the cancer is hormone positive, endocrine therapy would be recommended in most cases and its use can reduce the risk of recurrence as well as improve survival. Side effects of treatment were briefly discussed. We also discussed the potential role for chemotherapy based on a number of factors such as tumor phenotype (ER+ vs. triple negative vs. [id number]+) and this would be determined in coordination with the medical oncologist.    The patient doing well on neoadjuvant endocrine therapy. Diagnostic mammogram today shows partial imaging response. The patient would really prefer to have a lumpectomy rather than mastectomy. Will have MRI done in 6 months to assess treatment response and discuss surgery. She is very motivated to undergo BCT.     Will meet with Dr. Valente near the end of December after MRI to assess treatment response and follow up with surgical  plan    The patient is in agreement with the plan. Questions were encouraged and answered to patient's satisfaction. Kee will call our office with any questions or concerns.

## 2022-10-12 DIAGNOSIS — E11.9 TYPE 2 DIABETES MELLITUS WITHOUT COMPLICATION: ICD-10-CM

## 2022-10-17 ENCOUNTER — PATIENT MESSAGE (OUTPATIENT)
Dept: HEMATOLOGY/ONCOLOGY | Facility: CLINIC | Age: 77
End: 2022-10-17
Payer: MEDICARE

## 2022-10-17 DIAGNOSIS — Z17.0 MALIGNANT NEOPLASM OF CENTRAL PORTION OF LEFT BREAST IN FEMALE, ESTROGEN RECEPTOR POSITIVE: Primary | ICD-10-CM

## 2022-10-17 DIAGNOSIS — C50.112 MALIGNANT NEOPLASM OF CENTRAL PORTION OF LEFT BREAST IN FEMALE, ESTROGEN RECEPTOR POSITIVE: Primary | ICD-10-CM

## 2022-10-19 DIAGNOSIS — F41.9 ANXIETY: Primary | ICD-10-CM

## 2022-10-19 RX ORDER — DIAZEPAM 2 MG/1
2 TABLET ORAL
Qty: 2 TABLET | Refills: 0 | Status: SHIPPED | OUTPATIENT
Start: 2022-10-19 | End: 2022-12-22 | Stop reason: CLARIF

## 2022-10-20 ENCOUNTER — TELEPHONE (OUTPATIENT)
Dept: HEMATOLOGY/ONCOLOGY | Facility: CLINIC | Age: 77
End: 2022-10-20
Payer: MEDICARE

## 2022-10-20 NOTE — TELEPHONE ENCOUNTER
Left voicemail for patient and sister to call for appt change per Dr Carlin, can patient come at 130 instead of 4

## 2022-10-20 NOTE — TELEPHONE ENCOUNTER
"----- Message from Lashay Estrada sent at 10/20/2022  2:38 PM CDT -----  Regarding: Consult/Advisory:      Name Of Caller:     Ms. Sweet      Contact Preference?:  [phone]      What is the nature of the call?:   Returning call to [facility]. Pt wants to let her know that they will not be available tomorrow morning. Pt wants to reschedule the appt for a Wednesday or Thursday after 10 am.      "Thank you for all that you do for our patients"       "

## 2022-11-09 ENCOUNTER — INFUSION (OUTPATIENT)
Dept: INFUSION THERAPY | Facility: HOSPITAL | Age: 77
End: 2022-11-09
Payer: MEDICARE

## 2022-11-09 ENCOUNTER — OFFICE VISIT (OUTPATIENT)
Dept: HEMATOLOGY/ONCOLOGY | Facility: CLINIC | Age: 77
End: 2022-11-09
Payer: MEDICARE

## 2022-11-09 VITALS
OXYGEN SATURATION: 96 % | SYSTOLIC BLOOD PRESSURE: 167 MMHG | TEMPERATURE: 98 F | HEART RATE: 71 BPM | RESPIRATION RATE: 18 BRPM | DIASTOLIC BLOOD PRESSURE: 76 MMHG | HEIGHT: 67 IN | BODY MASS INDEX: 33.32 KG/M2 | WEIGHT: 212.31 LBS

## 2022-11-09 DIAGNOSIS — C50.912 LOBULAR CARCINOMA OF LEFT BREAST: ICD-10-CM

## 2022-11-09 DIAGNOSIS — Z17.0 MALIGNANT NEOPLASM OF CENTRAL PORTION OF LEFT BREAST IN FEMALE, ESTROGEN RECEPTOR POSITIVE: Primary | ICD-10-CM

## 2022-11-09 DIAGNOSIS — C50.112 MALIGNANT NEOPLASM OF CENTRAL PORTION OF LEFT BREAST IN FEMALE, ESTROGEN RECEPTOR POSITIVE: Primary | ICD-10-CM

## 2022-11-09 DIAGNOSIS — C50.112 MALIGNANT NEOPLASM OF CENTRAL PORTION OF LEFT FEMALE BREAST: Primary | ICD-10-CM

## 2022-11-09 DIAGNOSIS — Z79.811 USE OF ANASTROZOLE: ICD-10-CM

## 2022-11-09 PROCEDURE — 96523 IRRIG DRUG DELIVERY DEVICE: CPT

## 2022-11-09 PROCEDURE — 99214 PR OFFICE/OUTPT VISIT, EST, LEVL IV, 30-39 MIN: ICD-10-PCS | Mod: S$PBB,,, | Performed by: INTERNAL MEDICINE

## 2022-11-09 PROCEDURE — 99999 PR PBB SHADOW E&M-EST. PATIENT-LVL IV: CPT | Mod: PBBFAC,,, | Performed by: INTERNAL MEDICINE

## 2022-11-09 PROCEDURE — 99214 OFFICE O/P EST MOD 30 MIN: CPT | Mod: S$PBB,,, | Performed by: INTERNAL MEDICINE

## 2022-11-09 PROCEDURE — 99214 OFFICE O/P EST MOD 30 MIN: CPT | Mod: PBBFAC | Performed by: INTERNAL MEDICINE

## 2022-11-09 PROCEDURE — 63600175 PHARM REV CODE 636 W HCPCS: Performed by: INTERNAL MEDICINE

## 2022-11-09 PROCEDURE — 99999 PR PBB SHADOW E&M-EST. PATIENT-LVL IV: ICD-10-PCS | Mod: PBBFAC,,, | Performed by: INTERNAL MEDICINE

## 2022-11-09 PROCEDURE — 25000003 PHARM REV CODE 250: Performed by: INTERNAL MEDICINE

## 2022-11-09 PROCEDURE — A4216 STERILE WATER/SALINE, 10 ML: HCPCS | Performed by: INTERNAL MEDICINE

## 2022-11-09 RX ORDER — LIDOCAINE AND PRILOCAINE 25; 25 MG/G; MG/G
CREAM TOPICAL
Qty: 30 G | Refills: 0 | Status: SHIPPED | OUTPATIENT
Start: 2022-11-09 | End: 2022-12-28

## 2022-11-09 RX ORDER — HEPARIN 100 UNIT/ML
500 SYRINGE INTRAVENOUS
OUTPATIENT
Start: 2022-11-09

## 2022-11-09 RX ORDER — SODIUM CHLORIDE 0.9 % (FLUSH) 0.9 %
10 SYRINGE (ML) INJECTION
Status: DISCONTINUED | OUTPATIENT
Start: 2022-11-09 | End: 2022-11-09 | Stop reason: HOSPADM

## 2022-11-09 RX ORDER — HEPARIN 100 UNIT/ML
500 SYRINGE INTRAVENOUS
Status: DISCONTINUED | OUTPATIENT
Start: 2022-11-09 | End: 2022-11-09 | Stop reason: HOSPADM

## 2022-11-09 RX ORDER — SODIUM CHLORIDE 0.9 % (FLUSH) 0.9 %
10 SYRINGE (ML) INJECTION
OUTPATIENT
Start: 2022-11-09

## 2022-11-09 RX ADMIN — SODIUM CHLORIDE, PRESERVATIVE FREE 10 ML: 5 INJECTION INTRAVENOUS at 09:11

## 2022-11-09 RX ADMIN — HEPARIN 500 UNITS: 100 SYRINGE at 09:11

## 2022-11-09 NOTE — PROGRESS NOTES
PROGRESS NOTE    Subjective:       Patient ID: Kee Ramirez is a 77 y.o. female.  MRN: [id number]  : 1945    Chief Complaint: ILC of the left breast     History of Present Illness:   Kee Ramirez is a 77 y.o. female who presents with  ILC of the left breast.  Presents for a follow up visit     Reports yearly mammograms , normal in . In  screening mammogram showed left breast architectural distortion. Diagnostic mammogram in March showed a 21 x 9  X 2 0 mm left breast mass. US guided biopsy showed ILC, ER strongly positive, GA 15% positive, Her 2 rhina negative.   A breast MRI showed a 7.6 x 4.7 cm mass with spiculated margins.No abnormal lymph nodes were found.      No history of breast mass or biopsies. She has been seeing Dr. Ortiz and is transferring care to use due to proximity. See full oncology history below.   She has been referred for neoadjuvant chemotherapy. We met today as a follow up visit to further discuss neoadjuvant therapy.  At her visit two weeks ago, we discussed neoadjuvant chemotherapy vs neoadjuvant endocrine therapy.  She is interested in breast conservation and is not a candidate for upfront lumpectomy given tumor size and extension to the nipple areolar complex.  She met with Dr. Valente who agrees that she may not be a candidate for breast conservation even after neoadjuvant therapies.  Patient wants to try.     An oncotype was sent to determine if she was high risk and would benefit from chemotherapy.  We were notified there was not enough tissue specimen from the original biopsy for the oncotype.  After extensive discussion, we elected to start neoadjuvant anastrozole.     2022: Started anastrozole      She was admitted to the hospital from -, found to have SBO. Has NGT for decompression and improved with conservative measures.     Interim history:    She has remained on anastrozole since her last visit  with clinically responsive disease.    She denies any hot flashes or joint pains.   Reports sinus congestion and cough for 2 days, is afebrile. Denies sick contacts.   Has persistent hypertension.     Oncology History:  As dcoumented by  and updated      03.21.2022 Left Breast, Core Needle Biopsies:   - Invasive lobular carcinoma, well-differentiated .   - Bloom-Damon score (5/9):        - Tubular Formation: 3/3        - Nuclear Pleomorphism: 1/3        - Mitotic Activity: 1/3.   - Background of lobular carinoma in-situ (LCIS), nuclear grade 1.   - No perineural or lymphovascular invasion is identified.   Estrogen receptor: Positive (strong intensity, >95% of tumor cell nuclei).   Progesterone receptor: Positive (strong intensity, 15% of tumor cell nuclei).   HER2 IHC: Negative.   Ki-67: 10%.  04.04.2022 [facility] [facility] -- Surgery FU w/Oncotype  04.06.2022 [facility] follow up  -Breast MRI for staging, discussed surgery options; pt would like BCT, will make definitive plan after MRI  05.03.2022 Breast MRI  -Irregular 7.6 cm enhancing spiculated mass in the upper LEFT breast, consistent with the patient's known biopsy-proven invasive lobular carcinoma. The enhancement does extend to the nipple-areolar complex.  -No suspicious abnormality in the RIGHT breast.   BI-RADS Category: Overall: 6 - known biopsy-proven malignancy  05.06.2022 Follow up with [facility]  -Plan for Axillary U/S to assess LAD, PetCT  -HemOnc referral for NA therapy. Due to size of mass/location and nature of lobular cancer, was not felt that BCT was optimal unless NA therapy was done 1st   -2 week follow up to discuss further   05.13.2022 PetCT  -Mildly hypermetabolic irregular soft tissue within the left breast in keeping with known lobular carcinoma. This mass is better appreciated on MRI of the breast.  -No definite evidence of metastatic disease.  Please note that FDG PET has has suboptimal sensitivity for certain histologies such as lobular and tubular  carcinomas.  05.18.2022 Initial [facility] eval    Oncology History:  Oncology History   Malignant neoplasm of central portion of left female breast   5/6/2022 Initial Diagnosis    Malignant neoplasm of central portion of left female breast     5/27/2022 - 5/27/2022 Chemotherapy    Treatment Summary   Plan Name: OP BREAST DOSE-DENSE AC-T (DOXORUBICIN CYCLOPHOSPHAMIDE Q2W FOLLOWED BY PACLITAXEL WEEKLY)  Treatment Goal: Curative  Status: Inactive  Start Date:   End Date:   Provider: Mitali Carlin MD  Chemotherapy: DOXOrubicin chemo injection 130 mg, 60 mg/m2, Intravenous, Clinic/HOD 1 time, 0 of 4 cycles  cyclophosphamide 600 mg/m2 = 1,300 mg in sodium chloride 0.9% 250 mL chemo infusion, 600 mg/m2, Intravenous, Clinic/HOD 1 time, 0 of 4 cycles  PACLitaxeL (TAXOL) 80 mg/m2 = 174 mg in sodium chloride 0.9% 250 mL chemo infusion, 80 mg/m2, Intravenous, Clinic/HOD 1 time, 0 of 12 cycles       6/7/2022 Cancer Staged    Staging form: Breast, AJCC 8th Edition  - Clinical: Stage IIA (cT3, cN0, cM0, G1, ER+, IN+, HER2-)       6/24/2022 -  Chemotherapy    Treatment Summary   Plan Name: OP ANASTROZOLE DAILY  Treatment Goal: Curative  Status: Active  Start Date: 6/24/2022 (Planned)  End Date: 6/24/2022 (Planned)  Provider: Mitali Carlin MD  Chemotherapy: anastrozole (ARIMIDEX) 1 mg Tab, 1 mg, Oral, Daily, 0 of 1 cycle, Start date: --, End date: --           History:  Past Medical History:   Diagnosis Date    Breast cancer 05/01/2022    left    Cancer     Diabetes mellitus, type 2     Hyperlipidemia     Hypertension     Lobular carcinoma in situ 05/01/2022    left    Midline low back pain without sciatica 07/31/2015    Thyroid disease     Venous stasis     bilateral legs      Past Surgical History:   Procedure Laterality Date    BREAST BIOPSY Left 03/21/2022    Core bx, + ILC    COLONOSCOPY      COLONOSCOPY N/A 12/05/2019    Procedure: COLONOSCOPY;  Surgeon: Luis Bogran-Reyes, MD;  Location: [facility];  Service: Endoscopy;   Laterality: N/A;    HYSTERECTOMY  12/01/2021    INSERTION OF TUNNELED CENTRAL VENOUS CATHETER (CVC) WITH SUBCUTANEOUS PORT Right 05/24/2022    Procedure: [id number];  Surgeon: Darien Bear MD;  Location: [facility];  Service: General;  Laterality: Right;    SHOULDER ARTHROSCOPY W/ ROTATOR CUFF REPAIR Right 2001    TUBAL LIGATION      VAGINAL HYSTERECTOMY N/A 01/11/2021    Procedure: HYSTERECTOMY, VAGINAL ;  Surgeon: Jessie Dacosta MD;  Location: [facility];  Service: OB/GYN;  Laterality: N/A;     Family History   Problem Relation Age of Onset    Diabetes Mother     Hypertension Mother     Arthritis Mother     COPD Father     Hypertension Sister     Diabetes Sister     Cancer Sister         PANCREATIC    Breast cancer Sister      Social History     Tobacco Use    Smoking status: Never    Smokeless tobacco: Never   Substance and Sexual Activity    Alcohol use: No     Alcohol/week: 0.0 standard drinks    Drug use: No    Sexual activity: Not Currently     Partners: Male     Birth control/protection: None, See Surgical Hx        ROS:   Review of Systems   Constitutional: Negative for fever, malaise/fatigue and weight loss.   HENT: Negative for congestion, hearing loss, nosebleeds and sore throat.    Eyes: Negative for double vision and photophobia.   Respiratory: + cough x 2 days, no hemoptysis, sputum production, shortness of breath and wheezing.    Cardiovascular: Negative for chest pain, palpitations, orthopnea and leg swelling.   Gastrointestinal: Negative for abdominal pain, blood in stool, constipation, + diarrhea heartburn, nausea and vomiting.   Genitourinary: Negative for dysuria, hematuria and urgency.   Musculoskeletal: Negative for back pain, joint pain and myalgias.   Skin: Negative for itching and rash.   Neurological: Negative for dizziness, tingling, seizures, weakness and headaches.   Endo/Heme/Allergies: Negative for polydipsia. Does not bruise/bleed easily.   Psychiatric/Behavioral: Negative  "for depression and memory loss. The patient is not nervous/anxious and does not have insomnia.       Objective:     Vitals:    11/09/22 0857   BP: (!) 167/76   Pulse: 71   Resp: 18   Temp: 98.1 °F (36.7 °C)   TempSrc: Oral   SpO2: 96%   Weight: 96.3 kg (212 lb 4.9 oz)   Height: 5' 7" (1.702 m)   PainSc: 0-No pain     Wt Readings from Last 10 Encounters:   11/09/22 96.3 kg (212 lb 4.9 oz)   09/16/22 96.2 kg (212 lb)   09/16/22 96.3 kg (212 lb 6.6 oz)   08/17/22 97.9 kg (215 lb 13.3 oz)   07/26/22 95.4 kg (210 lb 5.1 oz)   07/20/22 97.1 kg (214 lb)   07/07/22 98.3 kg (216 lb 11.4 oz)   06/24/22 97.1 kg (214 lb 1.1 oz)   06/07/22 98.9 kg (218 lb)   06/07/22 98.9 kg (218 lb)       Physical Examination:   Physical Exam  Vitals and nursing note reviewed.   Constitutional:       General: She is not in acute distress.     Appearance: She is not diaphoretic.   HENT:      Head: Normocephalic.   Eyes:      General: No scleral icterus.     Conjunctiva/sclera: Conjunctivae normal.   Neck:      Thyroid: No thyromegaly.   Cardiovascular:      Rate and Rhythm: Normal rate and regular rhythm.      Heart sounds: Normal heart sounds. No murmur heard.  Pulmonary:      Effort: Pulmonary effort is normal. No respiratory distress.      Breath sounds: No stridor. No wheezing or rales.   Chest:      Chest wall: No tenderness.     Musculoskeletal:         General: No tenderness or deformity. Normal range of motion.      Cervical back: Neck supple.   Lymphadenopathy:      Cervical: No cervical adenopathy.   Skin:     General: Skin is warm and dry.      Findings: No erythema or rash.      Comments: + left breat mass at 12 0 clock,smaller, nipple everted now,  Feels softer, smaller and less distinct.   Neurological:      Mental Status: She is alert and oriented to person, place, and time.      Cranial Nerves: No cranial nerve deficit.      Coordination: Coordination normal.      Gait: Gait is intact.   Psychiatric:         Mood and Affect: " Affect normal.         Cognition and Memory: Memory normal.         Judgment: Judgment normal.     Diagnostic Tests:  Significant Imaging: I have reviewed and interpreted all pertinent imaging results/findings.    Laboratory Data:  All pertinent labs have been reviewed.  Labs:   Lab Results   Component Value Date    WBC 7.92 07/22/2022    RBC 3.80 (L) 07/22/2022    HGB 11.2 (L) 07/22/2022    HCT 34.9 (L) 07/22/2022    MCV 92 07/22/2022     07/22/2022     09/12/2022     09/12/2022    K 4.5 09/12/2022    BUN 24 (H) 09/12/2022    CREATININE 0.7 09/12/2022    AST 14 09/12/2022    ALT 9 (L) 09/12/2022    BILITOT 0.5 09/12/2022       Assessment/Plan:   Malignant neoplasm of central portion of left breast in female, estrogen receptor positive  cT3N0, ER 95%, PR15%, Her 2 rhina negative, Grade 1, ki 67 10%      She is interested in breast conservation and is not a candidate for upfront lumpectomy given tumor size and extension to the nipple areolar complex.     We have previously discussed neoadjuvant chemotherapy vs neoadjuvant endocrine therapy. See previous notes for discussion. She is currently on neoadjuvant anastrozole and tolerating well.     Clinically responding, plan for a follow up MRI and surgical planning next month. I will see her after her surgery follow up to finalize plan. Meanwhile continue anastrozole.     DEXA normal in June, continue calcium and vitamin D.      Primary Hypertension  Noted to be hypertensive today, recommend checking blood pressure at home.   Compliant with current medications  Has PCP follow up this month    ECOG SCORE    1 - Restricted in strenuous activity-ambulatory and able to carry out work of a light nature       Discussion:   No follow-ups on file.    Plan was discussed with the patient at length, and she verbalized understanding. Kee was given an opportunity to ask questions that were answered to her satisfaction, and she was advised to call in the interval  if any problems or questions arise.Electronically signed by Mitali Carlin MD      Answers submitted by the patient for this visit:  Review of Systems Questionnaire (Submitted on 9/9/2022)  appetite change : No  unexpected weight change: No  mouth sores: No  visual disturbance: No  cough: No  shortness of breath: No  chest pain: No  abdominal pain: No  diarrhea: No  frequency: No  back pain: No  rash: No  headaches: No  adenopathy: No  nervous/ anxious: No    Route Chart for Scheduling    Med Onc Chart Routing  Urgent    Follow up with physician . 12/14   Follow up with KAYLYN    Infusion scheduling note needs to have port flush asap   Injection scheduling note    Labs    Imaging    Pharmacy appointment    Other referrals        Treatment Plan Information   OP ANASTROZOLE DAILY   Mitali Carlin MD   Upcoming Treatment Dates - OP ANASTROZOLE DAILY    6/24/2022       Antiemetics       Physician communication order       Take Home Chemotherapy       anastrozole (ARIMIDEX) 1 mg Tab

## 2022-12-01 ENCOUNTER — HOSPITAL ENCOUNTER (OUTPATIENT)
Dept: RADIOLOGY | Facility: HOSPITAL | Age: 77
Discharge: HOME | End: 2022-12-01
Attending: NURSE PRACTITIONER
Payer: MEDICARE

## 2022-12-01 DIAGNOSIS — Z17.0 MALIGNANT NEOPLASM OF CENTRAL PORTION OF LEFT BREAST IN FEMALE, ESTROGEN RECEPTOR POSITIVE: ICD-10-CM

## 2022-12-01 DIAGNOSIS — C50.112 MALIGNANT NEOPLASM OF CENTRAL PORTION OF LEFT BREAST IN FEMALE, ESTROGEN RECEPTOR POSITIVE: ICD-10-CM

## 2022-12-01 PROCEDURE — 77049 MRI BREAST W/WO CONTRAST, W/CAD, BILATERAL: ICD-10-PCS | Mod: 26,,, | Performed by: RADIOLOGY

## 2022-12-01 PROCEDURE — A9577 INJ MULTIHANCE: HCPCS | Performed by: NURSE PRACTITIONER

## 2022-12-01 PROCEDURE — 77049 MRI BREAST C-+ W/CAD BI: CPT | Mod: 26,,, | Performed by: RADIOLOGY

## 2022-12-01 PROCEDURE — 77049 MRI BREAST C-+ W/CAD BI: CPT | Mod: TC

## 2022-12-01 PROCEDURE — 25500020 PHARM REV CODE 255: Performed by: NURSE PRACTITIONER

## 2022-12-01 RX ADMIN — GADOBENATE DIMEGLUMINE 20 ML: 529 INJECTION, SOLUTION INTRAVENOUS at 11:12

## 2022-12-06 ENCOUNTER — PATIENT MESSAGE (OUTPATIENT)
Dept: ADMINISTRATIVE | Facility: HOSPITAL | Age: 77
End: 2022-12-06
Payer: MEDICARE

## 2022-12-06 ENCOUNTER — PATIENT OUTREACH (OUTPATIENT)
Dept: ADMINISTRATIVE | Facility: HOSPITAL | Age: 77
End: 2022-12-06
Payer: MEDICARE

## 2022-12-06 DIAGNOSIS — E78.2 MIXED HYPERLIPIDEMIA: Primary | ICD-10-CM

## 2022-12-08 ENCOUNTER — OFFICE VISIT (OUTPATIENT)
Dept: SURGERY | Facility: CLINIC | Age: 77
End: 2022-12-08
Payer: MEDICARE

## 2022-12-08 VITALS
BODY MASS INDEX: 32.8 KG/M2 | SYSTOLIC BLOOD PRESSURE: 161 MMHG | HEART RATE: 60 BPM | DIASTOLIC BLOOD PRESSURE: 76 MMHG | HEIGHT: 67 IN | WEIGHT: 209 LBS

## 2022-12-08 DIAGNOSIS — Z01.818 PREOP TESTING: ICD-10-CM

## 2022-12-08 DIAGNOSIS — Z17.0 MALIGNANT NEOPLASM OF CENTRAL PORTION OF LEFT BREAST IN FEMALE, ESTROGEN RECEPTOR POSITIVE: Primary | ICD-10-CM

## 2022-12-08 DIAGNOSIS — C50.112 MALIGNANT NEOPLASM OF CENTRAL PORTION OF LEFT BREAST IN FEMALE, ESTROGEN RECEPTOR POSITIVE: Primary | ICD-10-CM

## 2022-12-08 PROCEDURE — 99999 PR PBB SHADOW E&M-EST. PATIENT-LVL V: ICD-10-PCS | Mod: PBBFAC,,, | Performed by: SURGERY

## 2022-12-08 PROCEDURE — 99214 OFFICE O/P EST MOD 30 MIN: CPT | Mod: S$PBB,,, | Performed by: SURGERY

## 2022-12-08 PROCEDURE — 99215 OFFICE O/P EST HI 40 MIN: CPT | Mod: PBBFAC | Performed by: SURGERY

## 2022-12-08 PROCEDURE — 99214 PR OFFICE/OUTPT VISIT, EST, LEVL IV, 30-39 MIN: ICD-10-PCS | Mod: S$PBB,,, | Performed by: SURGERY

## 2022-12-08 PROCEDURE — 99999 PR PBB SHADOW E&M-EST. PATIENT-LVL V: CPT | Mod: PBBFAC,,, | Performed by: SURGERY

## 2022-12-08 NOTE — PROGRESS NOTES
Breast Surgery  [facility]  Department of Surgery      REFERRING PROVIDER: No referring provider defined for this encounter.    Chief Complaint: Follow-up (AI 6 mon, discuss sx)        Subjective:      Patient ID: Kee Ramirez is a 77 y.o. female with HTN, T2DM, hypothyroidism who presents with ER+ IL+ Her2- invasive lobular carcinoma of the left breast.  She underwent MRI on 5/3/22 which showed 7.6 cm (anterior-posterior) x 4.7 cm (transverse) x 3.9 cm (superior-inferior) irregular heterogeneously enhancing mass with spiculated margins in the upper left breast at anterior-middle depth that extends to the NAC. She has been adamant she would prefer breast conserving surgery if possible.  An Oncotype was sent determine if she was high risk and would benefit from chemotherapy. There was not enough tissue specimen from the original biopsy for the oncotype.  After extensive discussion, Dr. Carlin started neoadjuvant anastrozole on 2022.     Interval History:   Patient underwent MRI Breast 22 which demonstrated:     Previously identified left breast mass (biopsy proven invasive lobular carcinoma) demonstrates decreased degree of enhancement compared to previous MRI.  There is persistent abnormal enhancement in the upper central left breast at the anterior-middle depth. This abnormal enhancement measures 8.1 cm (AP) x 2.5 cm (TV) x 2.7 cm (CC) (previously 7.6 x 4.7 x 3.9 cm). There is continued extension to the nipple areolar complex. This abnormal enhancement is 2.3 cm from the skin and 5.2 cm from the chest wall. No axillary adenopathy. There is no evidence of suspicious masses, abnormal enhancement, or other abnormal findings in the right breast.    GYN History:  Age of menarche was 14. Age of menopause was 62.  Last menstrual period was around age 62. Patient denies hormonal therapy. Patient is . Age of first live birth was 25. Patient did not breast feed.      Family History:  Sister - breast  cancer diagnosed in her 60s    Past Medical History:   Diagnosis Date    Bowel obstruction     Breast cancer 05/01/2022    left    Cancer     COVID-19 07/2022    Diabetes mellitus, type 2     Hyperlipidemia     Hypertension     Lobular carcinoma in situ 05/01/2022    left    Midline low back pain without sciatica 07/31/2015    Thyroid disease     Venous stasis     bilateral legs     Past Surgical History:   Procedure Laterality Date    BREAST BIOPSY Left 03/21/2022    Core bx, + ILC    COLONOSCOPY      COLONOSCOPY N/A 12/05/2019    Procedure: COLONOSCOPY;  Surgeon: Luis Bogran-Reyes, MD;  Location: [facility];  Service: Endoscopy;  Laterality: N/A;    HYSTERECTOMY  12/01/2021    INSERTION OF TUNNELED CENTRAL VENOUS CATHETER (CVC) WITH SUBCUTANEOUS PORT Right 05/24/2022    Procedure: INYWYNTHS-NCVF-A-CATH;  Surgeon: Darien Bear MD;  Location: [facility];  Service: General;  Laterality: Right;    SHOULDER ARTHROSCOPY W/ ROTATOR CUFF REPAIR Right 2001    TUBAL LIGATION      VAGINAL HYSTERECTOMY N/A 01/11/2021    Procedure: HYSTERECTOMY, VAGINAL ;  Surgeon: Jessie Dacosta MD;  Location: [facility];  Service: OB/GYN;  Laterality: N/A;     Current Outpatient Medications on File Prior to Visit   Medication Sig Dispense Refill    amLODIPine (NORVASC) 10 MG tablet Take 1 tablet (10 mg total) by mouth once daily. 90 tablet 3    anastrozole (ARIMIDEX) 1 mg Tab Take 1 tablet (1 mg total) by mouth once daily. 90 tablet 3    aspirin (ECOTRIN) 81 MG EC tablet Take 81 mg by mouth once daily.      cloNIDine (CATAPRES) 0.1 MG tablet TAKE 1 TABLET BY MOUTH THREE TIMES DAILY 90 tablet 11    gabapentin (NEURONTIN) 300 MG capsule Take 1 capsule (300 mg total) by mouth 3 (three) times daily. (Patient taking differently: Take 300 mg by mouth as needed.) 90 capsule 5    ibuprofen (ADVIL,MOTRIN) 800 MG tablet Take 1 tablet (800 mg total) by mouth 3 (three) times daily. 90 tablet 3    levothyroxine (SYNTHROID) 112 MCG tablet Take 1 tablet (112  mcg total) by mouth before breakfast. 90 tablet 3    losartan (COZAAR) 100 MG tablet Take 1 tablet (100 mg total) by mouth once daily. 90 tablet 3    lovastatin (MEVACOR) 20 MG tablet Take 2 tablets (40 mg total) by mouth every evening. 180 tablet 3    metFORMIN (GLUCOPHAGE) 500 MG tablet Take 1 tablet (500 mg total) by mouth daily with breakfast. 90 tablet 3    methylcellulose (ARTIFICIAL TEARS) 1 % ophthalmic solution Place 1 drop into both eyes as needed.      multivitamin (THERAGRAN) per tablet Take 1 tablet by mouth once daily.      rOPINIRole (REQUIP) 1 MG tablet Take 1 tablet (1 mg total) by mouth every evening. 90 tablet 3    triamcinolone acetonide 0.5% (KENALOG) 0.5 % Crea Apply topically 2 (two) times daily. 15 g 5    LIDOcaine-prilocaine (EMLA) cream Apply topically as needed. (Patient not taking: Reported on 11/28/2022) 30 g 0    [DISCONTINUED] conjugated estrogens (PREMARIN) vaginal cream Place 1 g vaginally twice a week. (Patient not taking: No sig reported) 30 g 3     No current facility-administered medications on file prior to visit.     Social History     Socioeconomic History    Marital status:     Years of education: college   Tobacco Use    Smoking status: Never    Smokeless tobacco: Never   Substance and Sexual Activity    Alcohol use: No     Alcohol/week: 0.0 standard drinks    Drug use: No    Sexual activity: Not Currently     Partners: Male     Birth control/protection: None, See Surgical Hx     Social Determinants of Health     Financial Resource Strain: Low Risk     Difficulty of Paying Living Expenses: Not hard at all   Food Insecurity: No Food Insecurity    Worried About Running Out of Food in the Last Year: Never true    Ran Out of Food in the Last Year: Never true   Transportation Needs: No Transportation Needs    Lack of Transportation (Medical): No    Lack of Transportation (Non-Medical): No   Physical Activity: Unknown    Days of Exercise per Week: 0 days    Minutes of  "Exercise per Session: Patient refused   Stress: No Stress Concern Present    Feeling of Stress : Not at all   Social Connections: Unknown    Frequency of Communication with Friends and Family: More than three times a week    Frequency of Social Gatherings with Friends and Family: Three times a week    Active Member of Clubs or Organizations: Yes    Attends Club or Organization Meetings: More than 4 times per year    Marital Status:    Housing Stability: Low Risk     Unable to Pay for Housing in the Last Year: No    Number of Places Lived in the Last Year: 1    Unstable Housing in the Last Year: No     Family History   Problem Relation Age of Onset    Diabetes Mother     Hypertension Mother     Arthritis Mother     COPD Father     Hypertension Sister     Diabetes Sister     Cancer Sister         PANCREATIC    Breast cancer Sister         Review of Systems   Respiratory: Negative.     Cardiovascular: Negative.    Gastrointestinal: Negative.    Hematological: Negative.    All other systems reviewed and are negative.  Objective:   BP (!) 161/76 (BP Location: Left arm, Patient Position: Sitting, BP Method: Large (Automatic))   Pulse 60   Ht 5' 7" (1.702 m)   Wt 94.8 kg (209 lb)   BMI 32.73 kg/m²     Physical Exam   Constitutional: She is oriented to person, place, and time. She appears well-developed.   HENT:   Head: Normocephalic and atraumatic.   Eyes: Pupils are equal, round, and reactive to light.   Cardiovascular:  Normal rate and regular rhythm.            Pulmonary/Chest: Effort normal. No respiratory distress. Right breast exhibits no inverted nipple, no mass, no nipple discharge, no skin change and no tenderness. Left breast exhibits mass. Left breast exhibits no inverted nipple, no nipple discharge, no skin change and no tenderness.       Neurological: She is alert and oriented to person, place, and time.   Skin: Skin is warm and dry.     Slight tethering to the areola skin no nipple " inversion  Lymphadenopathy.         Radiology review: Images personally reviewed by me in the clinic.   Final Pathologic Diagnosis   Date/Time Value Ref Range Status   03/21/2022 09:32 AM (A)  Corrected    Left Breast, Core Needle Biopsies:  - Invasive lobular carcinoma, well-differentiated .  - Bloom-Damon score (5/9):       - Tubular Formation: 3/3       - Nuclear Pleomorphism: 1/3       - Mitotic Activity: 1/3.  - Background of lobular carinoma in-situ (LCIS), nuclear grade 1.  - No perineural or lymphovascular invasion is identified.  Note: Prognostic studies are pending and will be reported in an addendum to  follow.       Comment:     Interp By Avni Almodovar III, M.D., Signed on 06/20/2022 at 08:38         No matching staging information was found for the patient.   Cancer Staging   Malignant neoplasm of central portion of left female breast  Staging form: Breast, AJCC 8th Edition  - Clinical: Stage IIA (cT3, cN0, cM0, G1, ER+, NJ+, HER2-) - Signed by Mitali Carlin MD on 6/7/2022    Pathology Results  (Last 10 years)                 03/21/22 0932  Specimen to Pathology, Radiology Breast, needle biopsy (Abnormal) Edited Result - FINAL    Narrative:  Ultrasound guided left breast mass biopsy   5 - 14 gauge core biopsy specimen were obtained and submitted   to pathology in 1 jar   Release to patient->Immediate       01/11/21 0915  Specimen to Pathology, Surgery Gynecology and Obstetrics Final result    Narrative:  Pre-op Diagnosis: Uterine prolapse [N81.4]   Procedure(s):   HYSTERECTOMY, VAGINAL   Number of specimens: 1   Name of specimens: uterus and cervix   Specimen total (fresh, frozen, permanent):->1       12/05/19 0844  Specimen to Pathology, Surgery General Surgery Final result    Narrative:  screening   Procedure(s):   COLONOSCOPY   Number of specimens: 1   Name of specimens: 1. Rectal polyp @ 0844   Specimen total (fresh, frozen, permanent):->1                  MRI Breast 12/2/22  Result:   MRI  Breast w/wo Contrast, w/CAD, Bilateral     History:  Patient is 77 y.o. and is seen for malignant neoplasm of central portion of left breast in female, estrogen receptor positive.       Films Compared:  Prior images (if available) were compared.     Technique:  A routine breast MRI was performed with a dedicated breast coil. Pre-contrast STIR were acquired. Then, pre and post contrast T1 weighted fat saturated images were acquired and subtracted with MIP reconstruction. 20 ml of intravenous gadolinium contrast was administered. The study was reviewed with Matchbox software.     Findings:  The breasts have scattered fibroglandular tissue. The background parenchymal enhancement is minimal.      Left  Previously identified left breast mass (biopsy proven invasive lobular carcinoma) demonstrates decreased degree of enhancement compared to previous MRI.  There is persistent abnormal enhancement in the upper central left breast at the anterior-middle depth. This abnormal enhancement measures 8.1 cm (AP) x 2.5 cm (TV) x 2.7 cm (CC) (previously 7.6 x 4.7 x 3.9 cm). There is continued extension to the nipple areolar complex. This abnormal enhancement is 2.3 cm from the skin and 5.2 cm from the chest wall.     No axillary adenopathy.      Right  There is no evidence of suspicious masses, abnormal enhancement, or other abnormal findings in the right breast.     Impression:  Left  Interval decreased enhancement of left breast malignancy (biopsy proven ILC).  Residual abnormal enhancement still spans 8.1 cm AP x 2.5 cm transverse and extends to the left nipple areolar complex.     Right  There is no MR evidence of malignancy.     BI-RADS Category:   Overall: 6 - Known Biopsy-Proven Malignancy     Recommendation:  The patient is established with breast surgery and oncology. Further management per clinical team.    X-Ray Chest 1 View    Result Date: 5/24/2022  EXAMINATION: XR CHEST 1 VIEW CLINICAL HISTORY: post port a cath  placement; COMPARISON: AP portable chest 01/11/2021. FINDINGS: Right IJ Port-A-Cath in place.  Subtle patchy airspace opacities are questioned right mid and lower lung zones peripherally and are patchy airspace opacities left base raising possibility of multifocal airspace disease/possible multifocal pneumonia.  No effusion seen. Degenerative change glenohumeral joints.     Subtle patchy airspace opacities are questioned right mid and lower lung zones peripherally and also questioned left base raising possibility of subtle multifocal airspace disease.  PA and lateral views of chest recommended to better assess. Electronically signed by: Keila Wiggins MD Date:    05/24/2022 Time:    10:59    NM PET CT Routine FDG    Result Date: 5/13/2022  EXAMINATION: NM PET CT ROUTINE CLINICAL HISTORY: malignant neoplasm breast; Malignant neoplasm of unspecified site of left female breast TECHNIQUE: 11.98 mCi of F18-FDG was administered intravenously in the right forearm.  After an approximately 60 min distribution time, PET/CT images were acquired from the skull base to thigh.  Transmission images were acquired to correct for attenuation using a whole body low-dose CT scan with oral contrast and without IV contrast with the arms positioned above the head. Glycemia at the time of injection was 97 mg/dL. COMPARISON: MRI of the breast 05/03/2022. FINDINGS: Quality of the study: Adequate. In the head and neck, there are no hypermetabolic lesions worrisome for malignancy. There are no hypermetabolic mucosal lesions, and there are no pathologically enlarged or hypermetabolic lymph nodes.  There is likely physiologic prominent uptake within Waldeyer's ring. In the chest, there is mildly hypermetabolic irregular soft tissue within the upper aspect of the left breast measuring proximally 6.4 cm in maximum axial dimension with a maximum SUV of 1.8 on image 63.  There are no concerning pulmonary nodules or masses, and there are no  pathologically enlarged or hypermetabolic lymph nodes.  There are several non pathologically enlarged and non hypermetabolic axillary lymph nodes bilaterally. In the abdomen and pelvis, there is physiologic tracer distribution within the abdominal organs (including diffuse colonic uptake in keeping with metformin use) and excretion into the genitourinary system. In the bones, there are no definite hypermetabolic lesions worrisome for malignancy.  There mild-to-moderate S-shaped scoliosis of the thoracolumbar spine with associated degenerative changes. In the extremities, there are no hypermetabolic lesions worrisome for malignancy.  Mild periarticular uptake, right greater than left, in the shoulders is in keeping with soft tissue injury.     1.  Mildly hypermetabolic irregular soft tissue within the left breast in keeping with known lobular carcinoma.  This mass is better appreciated on MRI of the breast. 2.  No definite evidence of metastatic disease.  Please note that FDG PET has has suboptimal sensitivity for certain histologies such as lobular and tubular carcinomas. 3.  Other findings as above. Electronically signed by: Zak Patel Date:    05/13/2022 Time:    16:15    SURG [place] Surgery Fluoro Usage    Result Date: 5/24/2022  See OP Notes for results. IMPRESSION: See OP Notes for results. This procedure was auto-finalized by: Virtual Radiologist    US Breast Left Limited    Result Date: 5/19/2022  Result: US Breast Left Limited  History: Patient is 76 y.o. and is seen for diagnostic imaging. Films Compared: Compared to: 05/03/2022 MRI Breast w/wo Contrast, w/CAD, Bilateral, 03/21/2022 US Breast Biopsy with Imaging 1st site Left, 03/04/2022 Mammo Digital Diagnostic Bilat with George, 03/04/2022 US Breast Bilateral Limited, 12/15/2021 Mammo Digital Screening Bilat w/ George, 11/24/2020 Mammo Digital Diagnostic Bilat w/ George, 03/25/2019 Mammo Digital Diagnostic Bilat w/ George, 09/24/2018 US Breast Left Limited,  09/24/2018 Mammo Digital Diagnostic Left with CAD, 03/13/2018 US Breast Left Limited, 03/13/2018 Mammo Digital Diagnostic Left with CAD, 02/06/2018 Mammo Digital Screening Bilat with CAD, 12/16/2016 Mammo Digital Screening Bilat with CAD, 11/18/2015 Mammo Digital Screening Bilat with CAD, 10/27/2014 Mammo Digital Screening Bilat with CAD, and 10/22/2013 Mammo Digital Screening Bilat with Tomos  Findings:  There is a 20 mm x 19 mm x 16 mm irregularly shaped, hypoechoic mass with indistinct margins with shadowing seen in the upper central region of the left breast in the middle depth. Findings are similar in character as opposed to previous suggesting a poor response to chemotherapeutic regimen in a patient with documented history of lobular CIS.     Left Mass: Left breast 20 mm x 19 mm x 16 mm mass at the upper central middle position. Assessment: 3 - Probably benign. Short Interval Follow-Up in 3 Months is recommended. BI-RADS Category: Overall: 3 - Probably Benign  Recommendation: Short Interval Follow-Up is recommended in 3 Months.     Assessment:       1. Malignant neoplasm of central portion of left breast in female, estrogen receptor positive    2. Preop testing            Plan:     Options for management were discussed with the patient and her family. We reviewed the existing data noting the equivalency of breast conserving surgery with radiation therapy and mastectomy. We also reviewed the guidelines of the National Comprehensive Cancer Network for Stage 2 breast carcinoma. We discussed the need for lumpectomy margins to be negative for carcinoma, the necessity for postoperative radiation therapy after breast conservation in most cases, the possibility of a failed or false negative sentinel lymph node biopsy and the potential need for complete lymphadenectomy for a failed or positive sentinel lymph node biopsy were fully discussed. In the setting of mastectomy, delayed or immediate reconstruction options are  available and were discussed.     In the setting of lumpectomy, radiation therapy would be recommended majority of the time.  The duration and treatment side effects were discussed with the patient.  This will coordinated with the radiation oncologist pending final pathology.    We also discussed the role of systemic therapy in the treatment of early stage breast cancer.  We discussed that this is based on tumor biology and marisol status and will be determined based on final pathology.  We discussed that if the cancer is hormone positive, endocrine therapy would be recommended in most cases and its use can reduce the risk of recurrence as well as improve survival. Side effects of treatment were briefly discussed. We also discussed the potential role for chemotherapy based on a number of factors such as tumor phenotype (ER+ vs. triple negative vs. Ztx6oxy+) and this would be determined in coordination with the medical oncologist.    The patient doing well on neoadjuvant endocrine therapy. Diagnostic mammogram today shows partial imaging response. The patient would really prefer to have a lumpectomy but there has not been enough response to assure her that I would be able to clear her margins reasonably without concern for leaving residual disease behind.  Ultimately after discussion, she is opting for L mastectomy, LSNB    The patient is in agreement with the plan. Questions were encouraged and answered to patient's satisfaction. Kee will call our office with any questions or concerns.         45 minutes were spent on this encounter, 30 of which was face to face counseling and 15 minutes were spent on chart review and coordination of care.

## 2022-12-08 NOTE — H&P (VIEW-ONLY)
Breast Surgery  [facility]  Department of Surgery      REFERRING PROVIDER: No referring provider defined for this encounter.    Chief Complaint: Follow-up (AI 6 mon, discuss sx)        Subjective:      Patient ID: Kee Ramirez is a 77 y.o. female with HTN, T2DM, hypothyroidism who presents with ER+ NE+ Her2- invasive lobular carcinoma of the left breast.  She underwent MRI on 5/3/22 which showed 7.6 cm (anterior-posterior) x 4.7 cm (transverse) x 3.9 cm (superior-inferior) irregular heterogeneously enhancing mass with spiculated margins in the upper left breast at anterior-middle depth that extends to the NAC. She has been adamant she would prefer breast conserving surgery if possible.  An Oncotype was sent determine if she was high risk and would benefit from chemotherapy. There was not enough tissue specimen from the original biopsy for the oncotype.  After extensive discussion, Dr. Carlin started neoadjuvant anastrozole on 2022.     Interval History:   Patient underwent MRI Breast 22 which demonstrated:     Previously identified left breast mass (biopsy proven invasive lobular carcinoma) demonstrates decreased degree of enhancement compared to previous MRI.  There is persistent abnormal enhancement in the upper central left breast at the anterior-middle depth. This abnormal enhancement measures 8.1 cm (AP) x 2.5 cm (TV) x 2.7 cm (CC) (previously 7.6 x 4.7 x 3.9 cm). There is continued extension to the nipple areolar complex. This abnormal enhancement is 2.3 cm from the skin and 5.2 cm from the chest wall. No axillary adenopathy. There is no evidence of suspicious masses, abnormal enhancement, or other abnormal findings in the right breast.    GYN History:  Age of menarche was 14. Age of menopause was 62.  Last menstrual period was around age 62. Patient denies hormonal therapy. Patient is . Age of first live birth was 25. Patient did not breast feed.      Family History:  Sister - breast  cancer diagnosed in her 60s    Past Medical History:   Diagnosis Date    Bowel obstruction     Breast cancer 05/01/2022    left    Cancer     COVID-19 07/2022    Diabetes mellitus, type 2     Hyperlipidemia     Hypertension     Lobular carcinoma in situ 05/01/2022    left    Midline low back pain without sciatica 07/31/2015    Thyroid disease     Venous stasis     bilateral legs     Past Surgical History:   Procedure Laterality Date    BREAST BIOPSY Left 03/21/2022    Core bx, + ILC    COLONOSCOPY      COLONOSCOPY N/A 12/05/2019    Procedure: COLONOSCOPY;  Surgeon: Luis Bogran-Reyes, MD;  Location: [facility];  Service: Endoscopy;  Laterality: N/A;    HYSTERECTOMY  12/01/2021    INSERTION OF TUNNELED CENTRAL VENOUS CATHETER (CVC) WITH SUBCUTANEOUS PORT Right 05/24/2022    Procedure: [id number]-CATH;  Surgeon: Darien Bear MD;  Location: [facility];  Service: General;  Laterality: Right;    SHOULDER ARTHROSCOPY W/ ROTATOR CUFF REPAIR Right 2001    TUBAL LIGATION      VAGINAL HYSTERECTOMY N/A 01/11/2021    Procedure: HYSTERECTOMY, VAGINAL ;  Surgeon: Jessie Dacosta MD;  Location: [facility];  Service: OB/GYN;  Laterality: N/A;     Current Outpatient Medications on File Prior to Visit   Medication Sig Dispense Refill    amLODIPine (NORVASC) 10 MG tablet Take 1 tablet (10 mg total) by mouth once daily. 90 tablet 3    anastrozole (ARIMIDEX) 1 mg Tab Take 1 tablet (1 mg total) by mouth once daily. 90 tablet 3    aspirin (ECOTRIN) 81 MG EC tablet Take 81 mg by mouth once daily.      cloNIDine (CATAPRES) 0.1 MG tablet TAKE 1 TABLET BY MOUTH THREE TIMES DAILY 90 tablet 11    gabapentin (NEURONTIN) 300 MG capsule Take 1 capsule (300 mg total) by mouth 3 (three) times daily. (Patient taking differently: Take 300 mg by mouth as needed.) 90 capsule 5    ibuprofen (ADVIL,MOTRIN) 800 MG tablet Take 1 tablet (800 mg total) by mouth 3 (three) times daily. 90 tablet 3    levothyroxine (SYNTHROID) 112 MCG tablet Take 1 tablet (112  mcg total) by mouth before breakfast. 90 tablet 3    losartan (COZAAR) 100 MG tablet Take 1 tablet (100 mg total) by mouth once daily. 90 tablet 3    lovastatin (MEVACOR) 20 MG tablet Take 2 tablets (40 mg total) by mouth every evening. 180 tablet 3    metFORMIN (GLUCOPHAGE) 500 MG tablet Take 1 tablet (500 mg total) by mouth daily with breakfast. 90 tablet 3    methylcellulose (ARTIFICIAL TEARS) 1 % ophthalmic solution Place 1 drop into both eyes as needed.      multivitamin (THERAGRAN) per tablet Take 1 tablet by mouth once daily.      rOPINIRole (REQUIP) 1 MG tablet Take 1 tablet (1 mg total) by mouth every evening. 90 tablet 3    triamcinolone acetonide 0.5% (KENALOG) 0.5 % Crea Apply topically 2 (two) times daily. 15 g 5    LIDOcaine-prilocaine (EMLA) cream Apply topically as needed. (Patient not taking: Reported on 11/28/2022) 30 g 0    [DISCONTINUED] conjugated estrogens (PREMARIN) vaginal cream Place 1 g vaginally twice a week. (Patient not taking: No sig reported) 30 g 3     No current facility-administered medications on file prior to visit.     Social History     Socioeconomic History    Marital status:     Years of education: college   Tobacco Use    Smoking status: Never    Smokeless tobacco: Never   Substance and Sexual Activity    Alcohol use: No     Alcohol/week: 0.0 standard drinks    Drug use: No    Sexual activity: Not Currently     Partners: Male     Birth control/protection: None, See Surgical Hx     Social Determinants of Health     Financial Resource Strain: Low Risk     Difficulty of Paying Living Expenses: Not hard at all   Food Insecurity: No Food Insecurity    Worried About Running Out of Food in the Last Year: Never true    Ran Out of Food in the Last Year: Never true   Transportation Needs: No Transportation Needs    Lack of Transportation (Medical): No    Lack of Transportation (Non-Medical): No   Physical Activity: Unknown    Days of Exercise per Week: 0 days    Minutes of  "Exercise per Session: Patient refused   Stress: No Stress Concern Present    Feeling of Stress : Not at all   Social Connections: Unknown    Frequency of Communication with Friends and Family: More than three times a week    Frequency of Social Gatherings with Friends and Family: Three times a week    Active Member of Clubs or Organizations: Yes    Attends Club or Organization Meetings: More than 4 times per year    Marital Status:    Housing Stability: Low Risk     Unable to Pay for Housing in the Last Year: No    Number of Places Lived in the Last Year: 1    Unstable Housing in the Last Year: No     Family History   Problem Relation Age of Onset    Diabetes Mother     Hypertension Mother     Arthritis Mother     COPD Father     Hypertension Sister     Diabetes Sister     Cancer Sister         PANCREATIC    Breast cancer Sister         Review of Systems   Respiratory: Negative.     Cardiovascular: Negative.    Gastrointestinal: Negative.    Hematological: Negative.    All other systems reviewed and are negative.  Objective:   BP (!) 161/76 (BP Location: Left arm, Patient Position: Sitting, BP Method: Large (Automatic))   Pulse 60   Ht 5' 7" (1.702 m)   Wt 94.8 kg (209 lb)   BMI 32.73 kg/m²     Physical Exam   Constitutional: She is oriented to person, place, and time. She appears well-developed.   HENT:   Head: Normocephalic and atraumatic.   Eyes: Pupils are equal, round, and reactive to light.   Cardiovascular:  Normal rate and regular rhythm.            Pulmonary/Chest: Effort normal. No respiratory distress. Right breast exhibits no inverted nipple, no mass, no nipple discharge, no skin change and no tenderness. Left breast exhibits mass. Left breast exhibits no inverted nipple, no nipple discharge, no skin change and no tenderness.       Neurological: She is alert and oriented to person, place, and time.   Skin: Skin is warm and dry.     Slight tethering to the areola skin no nipple " inversion  Lymphadenopathy.         Radiology review: Images personally reviewed by me in the clinic.   Final Pathologic Diagnosis   Date/Time Value Ref Range Status   03/21/2022 09:32 AM (A)  Corrected    Left Breast, Core Needle Biopsies:  - Invasive lobular carcinoma, well-differentiated .  - Bloom-Damon score (5/9):       - Tubular Formation: 3/3       - Nuclear Pleomorphism: 1/3       - Mitotic Activity: 1/3.  - Background of lobular carinoma in-situ (LCIS), nuclear grade 1.  - No perineural or lymphovascular invasion is identified.  Note: Prognostic studies are pending and will be reported in an addendum to  follow.       Comment:     Interp By Avni Almodovar III, M.D., Signed on 06/20/2022 at 08:38         No matching staging information was found for the patient.   Cancer Staging   Malignant neoplasm of central portion of left female breast  Staging form: Breast, AJCC 8th Edition  - Clinical: Stage IIA (cT3, cN0, cM0, G1, ER+, KY+, HER2-) - Signed by Mitali Carlin MD on 6/7/2022    Pathology Results  (Last 10 years)                 03/21/22 0932  Specimen to Pathology, Radiology Breast, needle biopsy (Abnormal) Edited Result - FINAL    Narrative:  Ultrasound guided left breast mass biopsy   5 - 14 gauge core biopsy specimen were obtained and submitted   to pathology in 1 jar   Release to patient->Immediate       01/11/21 0915  Specimen to Pathology, Surgery Gynecology and Obstetrics Final result    Narrative:  Pre-op Diagnosis: Uterine prolapse [N81.4]   Procedure(s):   HYSTERECTOMY, VAGINAL   Number of specimens: 1   Name of specimens: uterus and cervix   Specimen total (fresh, frozen, permanent):->1       12/05/19 0844  Specimen to Pathology, Surgery General Surgery Final result    Narrative:  screening   Procedure(s):   COLONOSCOPY   Number of specimens: 1   Name of specimens: 1. Rectal polyp @ 0844   Specimen total (fresh, frozen, permanent):->1                  MRI Breast 12/2/22  Result:   MRI  Breast w/wo Contrast, w/CAD, Bilateral     History:  Patient is 77 y.o. and is seen for malignant neoplasm of central portion of left breast in female, estrogen receptor positive.       Films Compared:  Prior images (if available) were compared.     Technique:  A routine breast MRI was performed with a dedicated breast coil. Pre-contrast STIR were acquired. Then, pre and post contrast T1 weighted fat saturated images were acquired and subtracted with MIP reconstruction. 20 ml of intravenous gadolinium contrast was administered. The study was reviewed with [employer] software.     Findings:  The breasts have scattered fibroglandular tissue. The background parenchymal enhancement is minimal.      Left  Previously identified left breast mass (biopsy proven invasive lobular carcinoma) demonstrates decreased degree of enhancement compared to previous MRI.  There is persistent abnormal enhancement in the upper central left breast at the anterior-middle depth. This abnormal enhancement measures 8.1 cm (AP) x 2.5 cm (TV) x 2.7 cm (CC) (previously 7.6 x 4.7 x 3.9 cm). There is continued extension to the nipple areolar complex. This abnormal enhancement is 2.3 cm from the skin and 5.2 cm from the chest wall.     No axillary adenopathy.      Right  There is no evidence of suspicious masses, abnormal enhancement, or other abnormal findings in the right breast.     Impression:  Left  Interval decreased enhancement of left breast malignancy (biopsy proven ILC).  Residual abnormal enhancement still spans 8.1 cm AP x 2.5 cm transverse and extends to the left nipple areolar complex.     Right  There is no MR evidence of malignancy.     BI-RADS Category:   Overall: 6 - Known Biopsy-Proven Malignancy     Recommendation:  The patient is established with breast surgery and oncology. Further management per clinical team.    X-Ray Chest 1 View    Result Date: 5/24/2022  EXAMINATION: XR CHEST 1 VIEW CLINICAL HISTORY: post port a cath  placement; COMPARISON: AP portable chest 01/11/2021. FINDINGS: Right IJ Port-A-Cath in place.  Subtle patchy airspace opacities are questioned right mid and lower lung zones peripherally and are patchy airspace opacities left base raising possibility of multifocal airspace disease/possible multifocal pneumonia.  No effusion seen. Degenerative change glenohumeral joints.     Subtle patchy airspace opacities are questioned right mid and lower lung zones peripherally and also questioned left base raising possibility of subtle multifocal airspace disease.  PA and lateral views of chest recommended to better assess. Electronically signed by: Keila Wiggins MD Date:    05/24/2022 Time:    10:59    NM PET CT Routine FDG    Result Date: 5/13/2022  EXAMINATION: NM PET CT ROUTINE CLINICAL HISTORY: malignant neoplasm breast; Malignant neoplasm of unspecified site of left female breast TECHNIQUE: 11.98 mCi of F18-FDG was administered intravenously in the right forearm.  After an approximately 60 min distribution time, PET/CT images were acquired from the skull base to thigh.  Transmission images were acquired to correct for attenuation using a whole body low-dose CT scan with oral contrast and without IV contrast with the arms positioned above the head. Glycemia at the time of injection was 97 mg/dL. COMPARISON: MRI of the breast 05/03/2022. FINDINGS: Quality of the study: Adequate. In the head and neck, there are no hypermetabolic lesions worrisome for malignancy. There are no hypermetabolic mucosal lesions, and there are no pathologically enlarged or hypermetabolic lymph nodes.  There is likely physiologic prominent uptake within Waldeyer's ring. In the chest, there is mildly hypermetabolic irregular soft tissue within the upper aspect of the left breast measuring proximally 6.4 cm in maximum axial dimension with a maximum SUV of 1.8 on image 63.  There are no concerning pulmonary nodules or masses, and there are no  pathologically enlarged or hypermetabolic lymph nodes.  There are several non pathologically enlarged and non hypermetabolic axillary lymph nodes bilaterally. In the abdomen and pelvis, there is physiologic tracer distribution within the abdominal organs (including diffuse colonic uptake in keeping with metformin use) and excretion into the genitourinary system. In the bones, there are no definite hypermetabolic lesions worrisome for malignancy.  There mild-to-moderate S-shaped scoliosis of the thoracolumbar spine with associated degenerative changes. In the extremities, there are no hypermetabolic lesions worrisome for malignancy.  Mild periarticular uptake, right greater than left, in the shoulders is in keeping with soft tissue injury.     1.  Mildly hypermetabolic irregular soft tissue within the left breast in keeping with known lobular carcinoma.  This mass is better appreciated on MRI of the breast. 2.  No definite evidence of metastatic disease.  Please note that FDG PET has has suboptimal sensitivity for certain histologies such as lobular and tubular carcinomas. 3.  Other findings as above. Electronically signed by: Zak Patel Date:    05/13/2022 Time:    16:15    SURG [place] Surgery Fluoro Usage    Result Date: 5/24/2022  See OP Notes for results. IMPRESSION: See OP Notes for results. This procedure was auto-finalized by: Virtual Radiologist    US Breast Left Limited    Result Date: 5/19/2022  Result: US Breast Left Limited  History: Patient is 76 y.o. and is seen for diagnostic imaging. Films Compared: Compared to: 05/03/2022 MRI Breast w/wo Contrast, w/CAD, Bilateral, 03/21/2022 US Breast Biopsy with Imaging 1st site Left, 03/04/2022 Mammo Digital Diagnostic Bilat with George, 03/04/2022 US Breast Bilateral Limited, 12/15/2021 Mammo Digital Screening Bilat w/ George, 11/24/2020 Mammo Digital Diagnostic Bilat w/ George, 03/25/2019 Mammo Digital Diagnostic Bilat w/ George, 09/24/2018 US Breast Left Limited,  09/24/2018 Mammo Digital Diagnostic Left with CAD, 03/13/2018 US Breast Left Limited, 03/13/2018 Mammo Digital Diagnostic Left with CAD, 02/06/2018 Mammo Digital Screening Bilat with CAD, 12/16/2016 Mammo Digital Screening Bilat with CAD, 11/18/2015 Mammo Digital Screening Bilat with CAD, 10/27/2014 Mammo Digital Screening Bilat with CAD, and 10/22/2013 Mammo Digital Screening Bilat with Tomos  Findings:  There is a 20 mm x 19 mm x 16 mm irregularly shaped, hypoechoic mass with indistinct margins with shadowing seen in the upper central region of the left breast in the middle depth. Findings are similar in character as opposed to previous suggesting a poor response to chemotherapeutic regimen in a patient with documented history of lobular CIS.     Left Mass: Left breast 20 mm x 19 mm x 16 mm mass at the upper central middle position. Assessment: 3 - Probably benign. Short Interval Follow-Up in 3 Months is recommended. BI-RADS Category: Overall: 3 - Probably Benign  Recommendation: Short Interval Follow-Up is recommended in 3 Months.     Assessment:       1. Malignant neoplasm of central portion of left breast in female, estrogen receptor positive    2. Preop testing            Plan:     Options for management were discussed with the patient and her family. We reviewed the existing data noting the equivalency of breast conserving surgery with radiation therapy and mastectomy. We also reviewed the guidelines of the National Comprehensive Cancer Network for Stage 2 breast carcinoma. We discussed the need for lumpectomy margins to be negative for carcinoma, the necessity for postoperative radiation therapy after breast conservation in most cases, the possibility of a failed or false negative sentinel lymph node biopsy and the potential need for complete lymphadenectomy for a failed or positive sentinel lymph node biopsy were fully discussed. In the setting of mastectomy, delayed or immediate reconstruction options are  available and were discussed.     In the setting of lumpectomy, radiation therapy would be recommended majority of the time.  The duration and treatment side effects were discussed with the patient.  This will coordinated with the radiation oncologist pending final pathology.    We also discussed the role of systemic therapy in the treatment of early stage breast cancer.  We discussed that this is based on tumor biology and marisol status and will be determined based on final pathology.  We discussed that if the cancer is hormone positive, endocrine therapy would be recommended in most cases and its use can reduce the risk of recurrence as well as improve survival. Side effects of treatment were briefly discussed. We also discussed the potential role for chemotherapy based on a number of factors such as tumor phenotype (ER+ vs. triple negative vs. Wol0lce+) and this would be determined in coordination with the medical oncologist.    The patient doing well on neoadjuvant endocrine therapy. Diagnostic mammogram today shows partial imaging response. The patient would really prefer to have a lumpectomy but there has not been enough response to assure her that I would be able to clear her margins reasonably without concern for leaving residual disease behind.  Ultimately after discussion, she is opting for L mastectomy, LSNB    The patient is in agreement with the plan. Questions were encouraged and answered to patient's satisfaction. Kee will call our office with any questions or concerns.         45 minutes were spent on this encounter, 30 of which was face to face counseling and 15 minutes were spent on chart review and coordination of care.

## 2022-12-13 RX ORDER — SODIUM CHLORIDE 9 MG/ML
INJECTION, SOLUTION INTRAVENOUS CONTINUOUS
Status: CANCELLED | OUTPATIENT
Start: 2022-12-13

## 2022-12-14 ENCOUNTER — OFFICE VISIT (OUTPATIENT)
Dept: HEMATOLOGY/ONCOLOGY | Facility: CLINIC | Age: 77
End: 2022-12-14
Payer: MEDICARE

## 2022-12-14 VITALS
HEART RATE: 91 BPM | TEMPERATURE: 100 F | RESPIRATION RATE: 18 BRPM | WEIGHT: 207 LBS | OXYGEN SATURATION: 96 % | BODY MASS INDEX: 32.49 KG/M2 | SYSTOLIC BLOOD PRESSURE: 159 MMHG | DIASTOLIC BLOOD PRESSURE: 71 MMHG | HEIGHT: 67 IN

## 2022-12-14 DIAGNOSIS — C50.112 MALIGNANT NEOPLASM OF CENTRAL PORTION OF LEFT BREAST IN FEMALE, ESTROGEN RECEPTOR POSITIVE: Primary | ICD-10-CM

## 2022-12-14 DIAGNOSIS — Z17.0 MALIGNANT NEOPLASM OF CENTRAL PORTION OF LEFT BREAST IN FEMALE, ESTROGEN RECEPTOR POSITIVE: Primary | ICD-10-CM

## 2022-12-14 DIAGNOSIS — Z79.811 USE OF ANASTROZOLE: ICD-10-CM

## 2022-12-14 DIAGNOSIS — I10 PRIMARY HYPERTENSION: ICD-10-CM

## 2022-12-14 PROCEDURE — 99999 PR PBB SHADOW E&M-EST. PATIENT-LVL IV: ICD-10-PCS | Mod: PBBFAC,,, | Performed by: INTERNAL MEDICINE

## 2022-12-14 PROCEDURE — 99214 PR OFFICE/OUTPT VISIT, EST, LEVL IV, 30-39 MIN: ICD-10-PCS | Mod: S$PBB,,, | Performed by: INTERNAL MEDICINE

## 2022-12-14 PROCEDURE — 99999 PR PBB SHADOW E&M-EST. PATIENT-LVL IV: CPT | Mod: PBBFAC,,, | Performed by: INTERNAL MEDICINE

## 2022-12-14 PROCEDURE — 99214 OFFICE O/P EST MOD 30 MIN: CPT | Mod: S$PBB,,, | Performed by: INTERNAL MEDICINE

## 2022-12-14 PROCEDURE — 99214 OFFICE O/P EST MOD 30 MIN: CPT | Mod: PBBFAC | Performed by: INTERNAL MEDICINE

## 2022-12-14 NOTE — PROGRESS NOTES
PROGRESS NOTE    Subjective:       Patient ID: Kee Ramirez is a 77 y.o. female.  MRN: [id number]  : 1945    Chief Complaint: ILC of the left breast     History of Present Illness:   Kee Ramirez is a 77 y.o. female who presents with  ILC of the left breast.       Reports yearly mammograms , normal in . In  screening mammogram showed left breast architectural distortion. Diagnostic mammogram in March showed a 21 x 9  X 2 0 mm left breast mass. US guided biopsy showed ILC, ER strongly positive, AR 15% positive, Her 2 rhina negative.   A breast MRI showed a 7.6 x 4.7 cm mass with spiculated margins.No abnormal lymph nodes were found.      No history of breast mass or biopsies. She has been seeing Dr. Ortiz and is transferring care to use due to proximity. See full oncology history below.   She has been referred for neoadjuvant chemotherapy. We met today as a follow up visit to further discuss neoadjuvant therapy.  At her visit two weeks ago, we discussed neoadjuvant chemotherapy vs neoadjuvant endocrine therapy.  She is interested in breast conservation and is not a candidate for upfront lumpectomy given tumor size and extension to the nipple areolar complex.  She met with Dr. Valente who agrees that she may not be a candidate for breast conservation even after neoadjuvant therapies.  Patient wants to try.     An oncotype was sent to determine if she was high risk and would benefit from chemotherapy.  We were notified there was not enough tissue specimen from the original biopsy for the oncotype.  After extensive discussion, we elected to start neoadjuvant anastrozole.     2022: Started anastrozole      She was admitted to the hospital from -, found to have SBO. Has NGT for decompression and improved with conservative measures.     Interim history:    She has remained on anastrozole since her last visit with clinically responsive  disease.    She denies any hot flashes or joint pains.   Reports sinus congestion and cough for 2 days, is taking OTC Robitussin.     She had a follow up MRI and has seen Dr. Valente.     Oncology History:  As dcoumented by  and updated      03.21.2022 Left Breast, Core Needle Biopsies:   - Invasive lobular carcinoma, well-differentiated .   - Bloom-Damon score (5/9):        - Tubular Formation: 3/3        - Nuclear Pleomorphism: 1/3        - Mitotic Activity: 1/3.   - Background of lobular carinoma in-situ (LCIS), nuclear grade 1.   - No perineural or lymphovascular invasion is identified.   Estrogen receptor: Positive (strong intensity, >95% of tumor cell nuclei).   Progesterone receptor: Positive (strong intensity, 15% of tumor cell nuclei).   HER2 IHC: Negative.   Ki-67: 10%.  04.04.2022 [facility] -- Surgery FU w/Oncotype  04.06.2022 GS follow up  -Breast MRI for staging, discussed surgery options; pt would like BCT, will make definitive plan after MRI  05.03.2022 Breast MRI  -Irregular 7.6 cm enhancing spiculated mass in the upper LEFT breast, consistent with the patient's known biopsy-proven invasive lobular carcinoma. The enhancement does extend to the nipple-areolar complex.  -No suspicious abnormality in the RIGHT breast.   BI-RADS Category: Overall: 6 - known biopsy-proven malignancy  05.06.2022 Follow up with GS  -Plan for Axillary U/S to assess LAD, PetCT  [facility]. Due to size of mass/location and nature of lobular cancer, was not felt that BCT was optimal unless NA therapy was done 1st   -2 week follow up to discuss further   05.13.2022 PetCT  -Mildly hypermetabolic irregular soft tissue within the left breast in keeping with known lobular carcinoma. This mass is better appreciated on MRI of the breast.  -No definite evidence of metastatic disease.  Please note that FDG PET has has suboptimal sensitivity for certain histologies such as lobular and tubular  carcinomas.  05.18.2022 Initial [facility] eval    12/1/22  MRI     Impression:  Left  Interval decreased enhancement of left breast malignancy (biopsy proven ILC).  Residual abnormal enhancement still spans 8.1 cm AP x 2.5 cm transverse and extends to the left nipple areolar complex.     Right  There is no MR evidence of malignancy.     BI-RADS Category:   Overall: 6 - Known Biopsy-Proven Malignancy    Oncology History:  Oncology History   Malignant neoplasm of central portion of left female breast   5/6/2022 Initial Diagnosis    Malignant neoplasm of central portion of left female breast     5/27/2022 - 5/27/2022 Chemotherapy    Treatment Summary   Plan Name: OP BREAST DOSE-DENSE AC-T (DOXORUBICIN CYCLOPHOSPHAMIDE Q2W FOLLOWED BY PACLITAXEL WEEKLY)  Treatment Goal: Curative  Status: Inactive  Start Date:   End Date:   Provider: Mitali Carlin MD  Chemotherapy: DOXOrubicin chemo injection 130 mg, 60 mg/m2, Intravenous, Clinic/HOD 1 time, 0 of 4 cycles  cyclophosphamide 600 mg/m2 = 1,300 mg in sodium chloride 0.9% 250 mL chemo infusion, 600 mg/m2, Intravenous, Clinic/HOD 1 time, 0 of 4 cycles  PACLitaxeL (TAXOL) 80 mg/m2 = 174 mg in sodium chloride 0.9% 250 mL chemo infusion, 80 mg/m2, Intravenous, Clinic/HOD 1 time, 0 of 12 cycles       6/7/2022 Cancer Staged    Staging form: Breast, AJCC 8th Edition  - Clinical: Stage IIA (cT3, cN0, cM0, G1, ER+, WI+, HER2-)       6/24/2022 -  Chemotherapy    Treatment Summary   Plan Name: OP ANASTROZOLE DAILY  Treatment Goal: Curative  Status: Active  Start Date: 6/24/2022 (Planned)  End Date: 6/24/2022 (Planned)  Provider: Mitali Carlin MD  Chemotherapy: anastrozole (ARIMIDEX) 1 mg Tab, 1 mg, Oral, Daily, 0 of 1 cycle, Start date: --, End date: --           History:  Past Medical History:   Diagnosis Date    Breast cancer 05/01/2022    left    Cancer     Diabetes mellitus, type 2     Hyperlipidemia     Hypertension     Lobular carcinoma in situ 05/01/2022    left    Midline low  back pain without sciatica 07/31/2015    Thyroid disease     Venous stasis     bilateral legs      Past Surgical History:   Procedure Laterality Date    BREAST BIOPSY Left 03/21/2022    Core bx, + ILC    COLONOSCOPY      COLONOSCOPY N/A 12/05/2019    Procedure: COLONOSCOPY;  Surgeon: Luis Bogran-Reyes, MD;  Location: [facility];  Service: Endoscopy;  Laterality: N/A;    HYSTERECTOMY  12/01/2021    INSERTION OF TUNNELED CENTRAL VENOUS CATHETER (CVC) WITH SUBCUTANEOUS PORT Right 05/24/2022    Procedure: [id number]-CATH;  Surgeon: Darien Bear MD;  Location: [facility];  Service: General;  Laterality: Right;    SHOULDER ARTHROSCOPY W/ ROTATOR CUFF REPAIR Right 2001    TUBAL LIGATION      VAGINAL HYSTERECTOMY N/A 01/11/2021    Procedure: HYSTERECTOMY, VAGINAL ;  Surgeon: Jessie Dacosta MD;  Location: [facility];  Service: OB/GYN;  Laterality: N/A;     Family History   Problem Relation Age of Onset    Diabetes Mother     Hypertension Mother     Arthritis Mother     COPD Father     Hypertension Sister     Diabetes Sister     Cancer Sister         PANCREATIC    Breast cancer Sister      Social History     Tobacco Use    Smoking status: Never    Smokeless tobacco: Never   Substance and Sexual Activity    Alcohol use: No     Alcohol/week: 0.0 standard drinks    Drug use: No    Sexual activity: Not Currently     Partners: Male     Birth control/protection: None, See Surgical Hx        ROS:   Review of Systems   Constitutional: Negative for fever, malaise/fatigue and weight loss.   HENT: + congestion, hearing loss, nosebleeds and sore throat.    Eyes: Negative for double vision and photophobia.   Respiratory: + cough x a few days, no hemoptysis, sputum production, shortness of breath and wheezing.    Cardiovascular: Negative for chest pain, palpitations, orthopnea and leg swelling.   Gastrointestinal: Negative for abdominal pain, blood in stool, constipation, + diarrhea heartburn, nausea and vomiting.   Genitourinary:  "Negative for dysuria, hematuria and urgency.   Musculoskeletal: Negative for back pain, joint pain and myalgias.   Skin: Negative for itching and rash.   Neurological: Negative for dizziness, tingling, seizures, weakness and headaches.   Endo/Heme/Allergies: Negative for polydipsia. Does not bruise/bleed easily.   Psychiatric/Behavioral: Negative for depression and memory loss. The patient is not nervous/anxious and does not have insomnia.       Objective:     Vitals:    12/14/22 0852   BP: (!) 159/71   Pulse: 91   Resp: 18   Temp: 99.8 °F (37.7 °C)   TempSrc: Oral   SpO2: 96%   Weight: 93.9 kg (207 lb 0.2 oz)   Height: 5' 7" (1.702 m)   PainSc:   7       Wt Readings from Last 10 Encounters:   12/14/22 93.9 kg (207 lb 0.2 oz)   12/08/22 94.8 kg (209 lb)   11/28/22 95.9 kg (211 lb 6.4 oz)   11/09/22 96.3 kg (212 lb 4.9 oz)   09/16/22 96.2 kg (212 lb)   09/16/22 96.3 kg (212 lb 6.6 oz)   08/17/22 97.9 kg (215 lb 13.3 oz)   07/26/22 95.4 kg (210 lb 5.1 oz)   07/20/22 97.1 kg (214 lb)   07/07/22 98.3 kg (216 lb 11.4 oz)       Physical Examination:   Physical Exam  Vitals and nursing note reviewed.   Constitutional:       General: She is not in acute distress.     Appearance: She is not diaphoretic.   HENT:      Head: Normocephalic.   Eyes:      General: No scleral icterus.     Conjunctiva/sclera: Conjunctivae normal.   Neck:      Thyroid: No thyromegaly.   Cardiovascular:      Rate and Rhythm: Normal rate and regular rhythm.      Heart sounds: Normal heart sounds. No murmur heard.  Pulmonary:      Effort: Pulmonary effort is normal. No respiratory distress.      Breath sounds: No stridor. No wheezing or rales.   Chest:      Chest wall: No tenderness.     Musculoskeletal:         General: No tenderness or deformity. Normal range of motion.      Cervical back: Neck supple.   Lymphadenopathy:      Cervical: No cervical adenopathy.   Skin:     General: Skin is warm and dry.      Findings: No erythema or rash.      Comments: " + left breat mass at 12 0 clock,smaller, nipple everted now,  Feels softer, smaller and less distinct.   Neurological:      Mental Status: She is alert and oriented to person, place, and time.      Cranial Nerves: No cranial nerve deficit.      Coordination: Coordination normal.      Gait: Gait is intact.   Psychiatric:         Mood and Affect: Affect normal.         Cognition and Memory: Memory normal.         Judgment: Judgment normal.     Diagnostic Tests:  Significant Imaging: I have reviewed and interpreted all pertinent imaging results/findings.    Laboratory Data:  All pertinent labs have been reviewed.  Labs:   Lab Results   Component Value Date    WBC 7.92 07/22/2022    RBC 3.80 (L) 07/22/2022    HGB 11.2 (L) 07/22/2022    HCT 34.9 (L) 07/22/2022    MCV 92 07/22/2022     07/22/2022     09/12/2022     09/12/2022    K 4.5 09/12/2022    BUN 24 (H) 09/12/2022    CREATININE 0.7 09/12/2022    AST 14 09/12/2022    ALT 9 (L) 09/12/2022    BILITOT 0.5 09/12/2022       Assessment/Plan:   Malignant neoplasm of central portion of left breast in female, estrogen receptor positive  cT3N0, ER 95%, PR15%, Her 2 rhina negative, Grade 1, ki 67 10%      She is interested in breast conservation and is not a candidate for upfront lumpectomy given tumor size and extension to the nipple areolar complex.     We have previously discussed neoadjuvant chemotherapy vs neoadjuvant endocrine therapy. See previous notes for discussion. She is currently on neoadjuvant anastrozole and tolerating well.     Follow up MRI showed some response but persistent large area of enhancement with extension to the nipple areolar complex. Mastectomy is being recommended and she is agreeable.     Hold anastrozole 1 week prior to surgery and send oncotype of tissue sample at the time of surgery. I will see her 3-4 weeks post op with results and make further recommendations. Ok to hold anastrozole till my follow up visit.     DEXA normal  in June, continue calcium and vitamin D.      Primary Hypertension   Compliant with current medications  Continue PMD follow up.     ECOG SCORE           Discussion:   No follow-ups on file.    Plan was discussed with the patient at length, and she verbalized understanding. Kee was given an opportunity to ask questions that were answered to her satisfaction, and she was advised to call in the interval if any problems or questions arise.Electronically signed by Mitali Carlin MD          Route Chart for Scheduling    Med Onc Chart Routing      Follow up with physician . 1/25/23   Follow up with KAYLYN    Infusion scheduling note    Injection scheduling note    Labs    Imaging    Pharmacy appointment    Other referrals        Treatment Plan Information   OP ANASTROZOLE DAILY   Mitali Carlin MD   Upcoming Treatment Dates - OP ANASTROZOLE DAILY    6/24/2022       Antiemetics       Physician communication order       Take Home Chemotherapy       anastrozole (ARIMIDEX) 1 mg Tab    Therapy Plan Information  Flushes  heparin, porcine (PF) 100 unit/mL injection flush 500 Units  500 Units, Intravenous, Every visit  sodium chloride 0.9% flush 10 mL  10 mL, Intravenous, Every visit

## 2022-12-22 ENCOUNTER — ANESTHESIA EVENT (OUTPATIENT)
Dept: SURGERY | Facility: OTHER | Age: 77
End: 2022-12-22
Payer: MEDICARE

## 2022-12-22 ENCOUNTER — HOSPITAL ENCOUNTER (OUTPATIENT)
Dept: PREADMISSION TESTING | Facility: OTHER | Age: 77
Discharge: HOME | End: 2022-12-22
Attending: SURGERY
Payer: MEDICARE

## 2022-12-22 VITALS
WEIGHT: 206 LBS | HEART RATE: 57 BPM | DIASTOLIC BLOOD PRESSURE: 71 MMHG | BODY MASS INDEX: 32.33 KG/M2 | TEMPERATURE: 98 F | OXYGEN SATURATION: 99 % | SYSTOLIC BLOOD PRESSURE: 150 MMHG | HEIGHT: 67 IN

## 2022-12-22 DIAGNOSIS — Z01.818 PRE-OP TESTING: ICD-10-CM

## 2022-12-22 DIAGNOSIS — Z01.818 PREOP TESTING: ICD-10-CM

## 2022-12-22 LAB
ALBUMIN SERPL BCP-MCNC: 3.9 G/DL (ref 3.5–5.2)
ALP SERPL-CCNC: 55 U/L (ref 55–135)
ALT SERPL W/O P-5'-P-CCNC: 16 U/L (ref 10–44)
ANION GAP SERPL CALC-SCNC: 8 MMOL/L (ref 8–16)
AST SERPL-CCNC: 13 U/L (ref 10–40)
BASOPHILS # BLD AUTO: 0.03 K/UL (ref 0–0.2)
BASOPHILS NFR BLD: 0.5 % (ref 0–1.9)
BILIRUB SERPL-MCNC: 0.7 MG/DL (ref 0.1–1)
BUN SERPL-MCNC: 23 MG/DL (ref 8–23)
CALCIUM SERPL-MCNC: 9.8 MG/DL (ref 8.7–10.5)
CHLORIDE SERPL-SCNC: 106 MMOL/L (ref 95–110)
CO2 SERPL-SCNC: 26 MMOL/L (ref 23–29)
CREAT SERPL-MCNC: 0.8 MG/DL (ref 0.5–1.4)
DIFFERENTIAL METHOD: ABNORMAL
EOSINOPHIL # BLD AUTO: 0.3 K/UL (ref 0–0.5)
EOSINOPHIL NFR BLD: 5.7 % (ref 0–8)
ERYTHROCYTE [DISTWIDTH] IN BLOOD BY AUTOMATED COUNT: 13.2 % (ref 11.5–14.5)
EST. GFR  (NO RACE VARIABLE): >60 ML/MIN/1.73 M^2
GLUCOSE SERPL-MCNC: 120 MG/DL (ref 70–110)
HCT VFR BLD AUTO: 37.2 % (ref 37–48.5)
HGB BLD-MCNC: 11.9 G/DL (ref 12–16)
IMM GRANULOCYTES # BLD AUTO: 0.04 K/UL (ref 0–0.04)
IMM GRANULOCYTES NFR BLD AUTO: 0.7 % (ref 0–0.5)
LYMPHOCYTES # BLD AUTO: 1.7 K/UL (ref 1–4.8)
LYMPHOCYTES NFR BLD: 30.2 % (ref 18–48)
MCH RBC QN AUTO: 29 PG (ref 27–31)
MCHC RBC AUTO-ENTMCNC: 32 G/DL (ref 32–36)
MCV RBC AUTO: 91 FL (ref 82–98)
MONOCYTES # BLD AUTO: 0.7 K/UL (ref 0.3–1)
MONOCYTES NFR BLD: 12.5 % (ref 4–15)
NEUTROPHILS # BLD AUTO: 2.8 K/UL (ref 1.8–7.7)
NEUTROPHILS NFR BLD: 50.4 % (ref 38–73)
NRBC BLD-RTO: 0 /100 WBC
PLATELET # BLD AUTO: 337 K/UL (ref 150–450)
PMV BLD AUTO: 10 FL (ref 9.2–12.9)
POTASSIUM SERPL-SCNC: 4.7 MMOL/L (ref 3.5–5.1)
PROT SERPL-MCNC: 8 G/DL (ref 6–8.4)
RBC # BLD AUTO: 4.1 M/UL (ref 4–5.4)
SODIUM SERPL-SCNC: 140 MMOL/L (ref 136–145)
WBC # BLD AUTO: 5.62 K/UL (ref 3.9–12.7)

## 2022-12-22 PROCEDURE — 93005 ELECTROCARDIOGRAM TRACING: CPT

## 2022-12-22 PROCEDURE — 93010 EKG 12-LEAD: ICD-10-PCS | Mod: ,,, | Performed by: INTERNAL MEDICINE

## 2022-12-22 PROCEDURE — 80053 COMPREHEN METABOLIC PANEL: CPT | Performed by: SURGERY

## 2022-12-22 PROCEDURE — 36415 COLL VENOUS BLD VENIPUNCTURE: CPT | Performed by: SURGERY

## 2022-12-22 PROCEDURE — 93010 ELECTROCARDIOGRAM REPORT: CPT | Mod: ,,, | Performed by: INTERNAL MEDICINE

## 2022-12-22 PROCEDURE — 85025 COMPLETE CBC W/AUTO DIFF WBC: CPT | Performed by: SURGERY

## 2022-12-22 RX ORDER — SODIUM CHLORIDE, SODIUM LACTATE, POTASSIUM CHLORIDE, CALCIUM CHLORIDE 600; 310; 30; 20 MG/100ML; MG/100ML; MG/100ML; MG/100ML
INJECTION, SOLUTION INTRAVENOUS CONTINUOUS
Status: CANCELLED | OUTPATIENT
Start: 2022-12-22

## 2022-12-22 RX ORDER — LIDOCAINE HYDROCHLORIDE 10 MG/ML
0.5 INJECTION, SOLUTION EPIDURAL; INFILTRATION; INTRACAUDAL; PERINEURAL ONCE
Status: CANCELLED | OUTPATIENT
Start: 2022-12-22 | End: 2022-12-22

## 2022-12-22 NOTE — ANESTHESIA PREPROCEDURE EVALUATION
12/22/2022  Kee Ramirez is a 77 y.o., female.      Pre-op Assessment    I have reviewed the Patient Summary Reports.     I have reviewed the Nursing Notes. I have reviewed the NPO Status.   I have reviewed the Medications.     Review of Systems  Anesthesia Hx:  No previous Anesthesia  History of prior surgery of interest to airway management or planning: Denies Family Hx of Anesthesia complications.   Denies Personal Hx of Anesthesia complications.   Social:  Non-Smoker    Hematology/Oncology:  Hematology Normal      Current/Recent Cancer. Breast left   EENT/Dental:EENT/Dental Normal   Cardiovascular:   Hypertension, well controlled hyperlipidemia    Pulmonary:  Pulmonary Normal    Renal/:  Renal/ Normal     Hepatic/GI:  Hepatic/GI Normal    Musculoskeletal:   Arthritis     Neurological:  Neurology Normal    Endocrine:   Diabetes, type 2  Obesity / BMI > 30  Dermatological:  Skin Normal    Psych:  Psychiatric Normal           Physical Exam  General: Cooperative, Alert and Oriented    Airway:  Mallampati: II   Mouth Opening: Normal  Neck ROM: Normal ROM    Dental:  Dentures        Anesthesia Plan  Type of Anesthesia, risks & benefits discussed:    Anesthesia Type: Gen ETT  Intra-op Monitoring Plan: Standard ASA Monitors  Post Op Pain Control Plan: multimodal analgesia  Induction:  IV  Airway Plan: Video  Informed Consent: Informed consent signed with the Patient and all parties understand the risks and agree with anesthesia plan.  All questions answered.   ASA Score: 3  Anesthesia Plan Notes: Labs and EKG today  Devices on rt side    Ready For Surgery From Anesthesia Perspective.     .

## 2022-12-22 NOTE — DISCHARGE INSTRUCTIONS
Information to Prepare you for your Surgery    PRE-ADMIT TESTING -  [phone]    [address] [facility]          Your surgery has been scheduled at [facility]. We are pleased to have the opportunity to serve you. For Further Information please call [phone].    On the day of surgery please report to the Information Desk on the 1st floor.    CONTACT YOUR PHYSICIAN'S OFFICE THE DAY PRIOR TO YOUR SURGERY TO OBTAIN YOUR ARRIVAL TIME.     The evening before surgery do not eat anything after 9 p.m. ( this includes hard candy, chewing gum and mints).  You may only have GATORADE, POWERADE AND WATER  from 9 p.m. until you leave your home.   DO NOT DRINK ANY LIQUIDS ON THE WAY TO THE HOSPITAL.      Why does your anesthesiologist allow you to drink Gatorade/Powerade before surgery?  Gatorade/Powerade helps to increase your comfort before surgery and to decrease your nausea after surgery. The carbohydrates in Gatorade/Powerade help reduce your body's stress response to surgery.  If you are a diabetic-drink only water prior to surgery.      Current Visitor policy(12/27/2021) - Patients may have 2 visitors pre and post procedure. Only 2 visitors will be allowed in the Surgical building with the patient.     SPECIAL MEDICATION INSTRUCTIONS: TAKE medications checked off by the Anesthesiologist on your Medication List.    Angiogram Patients: Take medications as instructed by your physician, including aspirin.     Surgery Patients:    If you take ASPIRIN - Your PHYSICIAN/SURGEON will need to inform you IF/OR when you need to stop taking aspirin prior to your surgery.     The week prior to surgery do not ot take any medications containing IBUPROFEN or NSAIDS ( Advil, Motrin, Goodys, BC, Aleve, Naproxen etc) If you are not sure if you should take a medicine please call your surgeon's office.  Ok to take Tylenol    Do Not Wear any make-up (especially eye make-up) to  surgery. Please remove any false eyelashes or eyelash extensions. If you arrive the day of surgery with makeup/eyelashes on you will be required to remove prior to surgery. (There is a risk of corneal abrasions if eye makeup/eyelash extensions are not removed)      Leave all valuables at home.   Do Not wear any jewelry or watches, including any metal in body piercings. Jewelry must be removed prior to coming to the hospital.  There is a possibility that rings that are unable to be removed may be cut off if they are on the surgical extremity.    Please remove all hair extensions, wigs, clips and any other metal accessories/ ornaments from your hair.  These items may pose a flammable/fire risk in Surgery and must be removed.    Do not shave your surgical area at least 5 days prior to your surgery. The surgical prep will be performed at the hospital according to Infection Control regulations.    Contact Lens must be removed before surgery. Either do not wear the contact lens or bring a case and solution for storage.  Please bring a container for eyeglasses or dentures as required.  Bring any paperwork your physician has provided, such as consent forms,  history and physicals, doctor's orders, etc.   Bring comfortable clothes that are loose fitting to wear upon discharge. Take into consideration the type of surgery being performed.  Maintain your diet as advised per your physician the day prior to surgery.      Adequate rest the night before surgery is advised.   Park in the Parking lot behind the hospital or in the [facility] across the street from the parking lot. Parking is complimentary.  If you will be discharged the same day as your procedure, please arrange for a responsible adult to drive you home or to accompany you if traveling by taxi.   YOU WILL NOT BE PERMITTED TO DRIVE OR TO LEAVE THE HOSPITAL ALONE AFTER SURGERY.   If you are being discharged the same day, it is strongly recommended that you  arrange for someone to remain with you for the first 24 hrs following your surgery.    The Surgeon will speak to your family/visitor after your surgery regarding the outcome of your surgery and post op care.  The Surgeon may speak to you after your surgery, but there is a possibility you may not remember the details.  Please check with your family members regarding the conversation with the Surgeon.    We strongly recommend whoever is bringing you home be present for discharge instructions.  This will ensure a thorough understanding for your post op home care.    ALL CHILDREN MUST ALWAYS BE ACCOMPANIED BY AN ADULT.    Visitors-Refer to current Visitor policy handouts.    Thank you for your cooperation.  The Staff of [facility].            Bathing Instructions with Hibiclens    Shower the evening before and morning of your procedure with Chlorhexidine (Hibiclens)  do not use Chlorhexidine on your face or genitals. Do not get in your eyes.  Wash your face with water and your regular face wash/soap  Use your regular shampoo  Apply Chlorhexidine (Hibiclens) directly on your skin or on a wet washcloth and wash gently. When showering: Move away from the shower stream when applying Chlorhexidine (Hibiclens) to avoid rinsing off too soon.  Rinse thoroughly with warm water  Do not dilute Chlorhexidine (Hibiclens)   Dry off as usual, do not use any deodorant, powder, body lotions, perfume, after shave or cologne.

## 2023-01-03 ENCOUNTER — TELEPHONE (OUTPATIENT)
Dept: SURGERY | Facility: CLINIC | Age: 78
End: 2023-01-03
Payer: MEDICARE

## 2023-01-03 NOTE — TELEPHONE ENCOUNTER
Confirmed arrival time for surgery at the [facility] with Dr.Amy Valente on 01/04/23. Arrival time is for 5:00 am surgery is scheduled for 7:00 am . Nothing to eat after 9 pm this evening 1/3/23. Clear Liquids Gatorade up until the time patient leaves for the hospital. Please leave all jewelry home . Patient may have 2 people with her if needed. Patient voiced understanding to this call.

## 2023-01-04 ENCOUNTER — ANESTHESIA (OUTPATIENT)
Dept: SURGERY | Facility: OTHER | Age: 78
End: 2023-01-04
Payer: MEDICARE

## 2023-01-04 ENCOUNTER — HOSPITAL ENCOUNTER (OUTPATIENT)
Dept: RADIOLOGY | Facility: OTHER | Age: 78
Discharge: HOME | End: 2023-01-04
Attending: SURGERY | Admitting: SURGERY
Payer: MEDICARE

## 2023-01-04 ENCOUNTER — HOSPITAL ENCOUNTER (OUTPATIENT)
Dept: RADIOLOGY | Facility: OTHER | Age: 78
Discharge: HOME | End: 2023-01-04
Attending: SURGERY
Payer: MEDICARE

## 2023-01-04 ENCOUNTER — HOSPITAL ENCOUNTER (OUTPATIENT)
Facility: OTHER | Age: 78
Discharge: HOME | End: 2023-01-04
Attending: SURGERY | Admitting: SURGERY
Payer: MEDICARE

## 2023-01-04 VITALS
DIASTOLIC BLOOD PRESSURE: 68 MMHG | RESPIRATION RATE: 16 BRPM | WEIGHT: 206 LBS | SYSTOLIC BLOOD PRESSURE: 149 MMHG | HEART RATE: 86 BPM | OXYGEN SATURATION: 96 % | HEIGHT: 67 IN | BODY MASS INDEX: 32.33 KG/M2 | TEMPERATURE: 98 F

## 2023-01-04 DIAGNOSIS — C50.112 MALIGNANT NEOPLASM OF CENTRAL PORTION OF LEFT BREAST IN FEMALE, ESTROGEN RECEPTOR POSITIVE: ICD-10-CM

## 2023-01-04 DIAGNOSIS — C50.912 LOBULAR CARCINOMA OF LEFT BREAST: Primary | ICD-10-CM

## 2023-01-04 DIAGNOSIS — Z17.0 MALIGNANT NEOPLASM OF CENTRAL PORTION OF LEFT BREAST IN FEMALE, ESTROGEN RECEPTOR POSITIVE: ICD-10-CM

## 2023-01-04 DIAGNOSIS — K56.609 SBO (SMALL BOWEL OBSTRUCTION): ICD-10-CM

## 2023-01-04 DIAGNOSIS — Z01.818 PREOP TESTING: ICD-10-CM

## 2023-01-04 LAB
POCT GLUCOSE: 114 MG/DL (ref 70–110)
POCT GLUCOSE: 161 MG/DL (ref 70–110)

## 2023-01-04 PROCEDURE — 88300 SURGICAL PATH GROSS: CPT | Mod: 26,,, | Performed by: PATHOLOGY

## 2023-01-04 PROCEDURE — 88331 PATH CONSLTJ SURG 1 BLK 1SPC: CPT | Performed by: PATHOLOGY

## 2023-01-04 PROCEDURE — A9520 TC99 TILMANOCEPT DIAG 0.5MCI: HCPCS

## 2023-01-04 PROCEDURE — 88341 IMHCHEM/IMCYTCHM EA ADD ANTB: CPT | Mod: 26,,, | Performed by: PATHOLOGY

## 2023-01-04 PROCEDURE — 71000039 HC RECOVERY, EACH ADD'L HOUR: Performed by: SURGERY

## 2023-01-04 PROCEDURE — 71000015 HC POSTOP RECOV 1ST HR: Performed by: SURGERY

## 2023-01-04 PROCEDURE — 38900 IO MAP OF SENT LYMPH NODE: CPT | Mod: LT,,, | Performed by: SURGERY

## 2023-01-04 PROCEDURE — 88300 SURGICAL PATH GROSS: CPT | Performed by: PATHOLOGY

## 2023-01-04 PROCEDURE — 88342 CHG IMMUNOCYTOCHEMISTRY: ICD-10-PCS | Mod: 26,,, | Performed by: PATHOLOGY

## 2023-01-04 PROCEDURE — 37000008 HC ANESTHESIA 1ST 15 MINUTES: Performed by: SURGERY

## 2023-01-04 PROCEDURE — 88307 PR  SURG PATH,LEVEL V: ICD-10-PCS | Mod: 26,,, | Performed by: PATHOLOGY

## 2023-01-04 PROCEDURE — 88341 IMHCHEM/IMCYTCHM EA ADD ANTB: CPT | Mod: 59 | Performed by: PATHOLOGY

## 2023-01-04 PROCEDURE — 63600175 PHARM REV CODE 636 W HCPCS: Performed by: ANESTHESIOLOGY

## 2023-01-04 PROCEDURE — 38792 PR IDENTIFY SENTINEL 2DE: ICD-10-PCS | Mod: 59,LT,, | Performed by: SURGERY

## 2023-01-04 PROCEDURE — 88307 TISSUE EXAM BY PATHOLOGIST: CPT | Mod: 59 | Performed by: PATHOLOGY

## 2023-01-04 PROCEDURE — 88307 TISSUE EXAM BY PATHOLOGIST: CPT | Mod: 26,,, | Performed by: PATHOLOGY

## 2023-01-04 PROCEDURE — 88342 IMHCHEM/IMCYTCHM 1ST ANTB: CPT | Mod: 26,,, | Performed by: PATHOLOGY

## 2023-01-04 PROCEDURE — 88331 PR  PATH CONSULT IN SURG,W FRZ SEC: ICD-10-PCS | Mod: 26,,, | Performed by: PATHOLOGY

## 2023-01-04 PROCEDURE — 88331 PATH CONSLTJ SURG 1 BLK 1SPC: CPT | Mod: 26,,, | Performed by: PATHOLOGY

## 2023-01-04 PROCEDURE — 25000003 PHARM REV CODE 250: Performed by: ANESTHESIOLOGY

## 2023-01-04 PROCEDURE — 36000707: Performed by: SURGERY

## 2023-01-04 PROCEDURE — 71000016 HC POSTOP RECOV ADDL HR: Performed by: SURGERY

## 2023-01-04 PROCEDURE — C1729 CATH, DRAINAGE: HCPCS | Performed by: SURGERY

## 2023-01-04 PROCEDURE — 88342 IMHCHEM/IMCYTCHM 1ST ANTB: CPT | Mod: 59 | Performed by: PATHOLOGY

## 2023-01-04 PROCEDURE — 37000009 HC ANESTHESIA EA ADD 15 MINS: Performed by: SURGERY

## 2023-01-04 PROCEDURE — 25000003 PHARM REV CODE 250: Performed by: SURGERY

## 2023-01-04 PROCEDURE — 38500 PR BIOPSY/EXCISION, LYMPH NODE(S): ICD-10-PCS | Mod: 51,LT,, | Performed by: SURGERY

## 2023-01-04 PROCEDURE — 63600175 PHARM REV CODE 636 W HCPCS: Performed by: NURSE ANESTHETIST, CERTIFIED REGISTERED

## 2023-01-04 PROCEDURE — 36590 PR REMOVAL TUNNELED CV CATH W SUBQ PORT OR PUMP: ICD-10-PCS | Mod: 51,,, | Performed by: SURGERY

## 2023-01-04 PROCEDURE — 63600175 PHARM REV CODE 636 W HCPCS: Mod: JW,JG | Performed by: SURGERY

## 2023-01-04 PROCEDURE — 36000706: Performed by: SURGERY

## 2023-01-04 PROCEDURE — 36590 REMOVAL TUNNELED CV CATH: CPT | Mod: 51,,, | Performed by: SURGERY

## 2023-01-04 PROCEDURE — 88341 PR IHC OR ICC EACH ADD'L SINGLE ANTIBODY  STAINPR: ICD-10-PCS | Mod: 26,,, | Performed by: PATHOLOGY

## 2023-01-04 PROCEDURE — 71000033 HC RECOVERY, INTIAL HOUR: Performed by: SURGERY

## 2023-01-04 PROCEDURE — 38500 BIOPSY/REMOVAL LYMPH NODES: CPT | Mod: 51,LT,, | Performed by: SURGERY

## 2023-01-04 PROCEDURE — 19303 MAST SIMPLE COMPLETE: CPT | Mod: LT,,, | Performed by: SURGERY

## 2023-01-04 PROCEDURE — 63600175 PHARM REV CODE 636 W HCPCS: Performed by: SURGERY

## 2023-01-04 PROCEDURE — C9290 INJ, BUPIVACAINE LIPOSOME: HCPCS | Performed by: SURGERY

## 2023-01-04 PROCEDURE — 25000003 PHARM REV CODE 250: Performed by: NURSE ANESTHETIST, CERTIFIED REGISTERED

## 2023-01-04 PROCEDURE — 38900 PR INTRAOPERATIVE SENTINEL LYMPH NODE ID W DYE INJECTION: ICD-10-PCS | Mod: LT,,, | Performed by: SURGERY

## 2023-01-04 PROCEDURE — 76098 X-RAY EXAM SURGICAL SPECIMEN: CPT | Mod: TC

## 2023-01-04 PROCEDURE — 88300 PR  SURG PATH,GROSS,LEVEL I: ICD-10-PCS | Mod: 26,,, | Performed by: PATHOLOGY

## 2023-01-04 PROCEDURE — 82962 GLUCOSE BLOOD TEST: CPT | Performed by: SURGERY

## 2023-01-04 PROCEDURE — 19303 PR MASTECTOMY, SIMPLE, COMPLETE: ICD-10-PCS | Mod: LT,,, | Performed by: SURGERY

## 2023-01-04 PROCEDURE — 38792 RA TRACER ID OF SENTINL NODE: CPT | Mod: 59,LT,, | Performed by: SURGERY

## 2023-01-04 PROCEDURE — 27201423 OPTIME MED/SURG SUP & DEVICES STERILE SUPPLY: Performed by: SURGERY

## 2023-01-04 RX ORDER — PROPOFOL 10 MG/ML
VIAL (ML) INTRAVENOUS
Status: DISCONTINUED | OUTPATIENT
Start: 2023-01-04 | End: 2023-01-04

## 2023-01-04 RX ORDER — LIDOCAINE HYDROCHLORIDE 10 MG/ML
0.5 INJECTION, SOLUTION EPIDURAL; INFILTRATION; INTRACAUDAL; PERINEURAL ONCE
Status: DISCONTINUED | OUTPATIENT
Start: 2023-01-04 | End: 2023-01-04 | Stop reason: HOSPADM

## 2023-01-04 RX ORDER — MEPERIDINE HYDROCHLORIDE 25 MG/ML
12.5 INJECTION INTRAMUSCULAR; INTRAVENOUS; SUBCUTANEOUS ONCE AS NEEDED
Status: DISCONTINUED | OUTPATIENT
Start: 2023-01-04 | End: 2023-01-04 | Stop reason: HOSPADM

## 2023-01-04 RX ORDER — OXYCODONE HYDROCHLORIDE 5 MG/1
5 TABLET ORAL
Status: DISCONTINUED | OUTPATIENT
Start: 2023-01-04 | End: 2023-01-04 | Stop reason: HOSPADM

## 2023-01-04 RX ORDER — DEXAMETHASONE SODIUM PHOSPHATE 4 MG/ML
INJECTION, SOLUTION INTRA-ARTICULAR; INTRALESIONAL; INTRAMUSCULAR; INTRAVENOUS; SOFT TISSUE
Status: DISCONTINUED | OUTPATIENT
Start: 2023-01-04 | End: 2023-01-04

## 2023-01-04 RX ORDER — ONDANSETRON 2 MG/ML
4 INJECTION INTRAMUSCULAR; INTRAVENOUS ONCE
Status: COMPLETED | OUTPATIENT
Start: 2023-01-04 | End: 2023-01-04

## 2023-01-04 RX ORDER — LIDOCAINE HCL/PF 100 MG/5ML
SYRINGE (ML) INTRAVENOUS
Status: DISCONTINUED | OUTPATIENT
Start: 2023-01-04 | End: 2023-01-04

## 2023-01-04 RX ORDER — ROCURONIUM BROMIDE 10 MG/ML
INJECTION, SOLUTION INTRAVENOUS
Status: DISCONTINUED | OUTPATIENT
Start: 2023-01-04 | End: 2023-01-04

## 2023-01-04 RX ORDER — FENTANYL CITRATE 50 UG/ML
INJECTION, SOLUTION INTRAMUSCULAR; INTRAVENOUS
Status: DISCONTINUED | OUTPATIENT
Start: 2023-01-04 | End: 2023-01-04

## 2023-01-04 RX ORDER — SODIUM CHLORIDE, SODIUM LACTATE, POTASSIUM CHLORIDE, CALCIUM CHLORIDE 600; 310; 30; 20 MG/100ML; MG/100ML; MG/100ML; MG/100ML
INJECTION, SOLUTION INTRAVENOUS CONTINUOUS
Status: DISCONTINUED | OUTPATIENT
Start: 2023-01-04 | End: 2023-01-04 | Stop reason: HOSPADM

## 2023-01-04 RX ORDER — SODIUM CHLORIDE 0.9 % (FLUSH) 0.9 %
3 SYRINGE (ML) INJECTION
Status: DISCONTINUED | OUTPATIENT
Start: 2023-01-04 | End: 2023-01-04 | Stop reason: HOSPADM

## 2023-01-04 RX ORDER — SODIUM CHLORIDE 9 MG/ML
INJECTION, SOLUTION INTRAVENOUS CONTINUOUS
Status: DISCONTINUED | OUTPATIENT
Start: 2023-01-04 | End: 2023-01-04 | Stop reason: HOSPADM

## 2023-01-04 RX ORDER — CEFAZOLIN SODIUM 1 G/3ML
2 INJECTION, POWDER, FOR SOLUTION INTRAMUSCULAR; INTRAVENOUS
Status: COMPLETED | OUTPATIENT
Start: 2023-01-04 | End: 2023-01-04

## 2023-01-04 RX ORDER — ONDANSETRON 2 MG/ML
INJECTION INTRAMUSCULAR; INTRAVENOUS
Status: DISCONTINUED
Start: 2023-01-04 | End: 2023-01-04 | Stop reason: HOSPADM

## 2023-01-04 RX ORDER — PROCHLORPERAZINE EDISYLATE 5 MG/ML
5 INJECTION INTRAMUSCULAR; INTRAVENOUS EVERY 30 MIN PRN
Status: DISCONTINUED | OUTPATIENT
Start: 2023-01-04 | End: 2023-01-04 | Stop reason: HOSPADM

## 2023-01-04 RX ORDER — BUPIVACAINE HYDROCHLORIDE 2.5 MG/ML
INJECTION, SOLUTION EPIDURAL; INFILTRATION; INTRACAUDAL
Status: DISCONTINUED | OUTPATIENT
Start: 2023-01-04 | End: 2023-01-04 | Stop reason: HOSPADM

## 2023-01-04 RX ORDER — EPHEDRINE SULFATE 50 MG/ML
INJECTION, SOLUTION INTRAVENOUS
Status: DISCONTINUED | OUTPATIENT
Start: 2023-01-04 | End: 2023-01-04

## 2023-01-04 RX ORDER — HYDROCODONE BITARTRATE AND ACETAMINOPHEN 5; 325 MG/1; MG/1
1 TABLET ORAL EVERY 6 HOURS PRN
Qty: 20 TABLET | Refills: 0 | Status: SHIPPED | OUTPATIENT
Start: 2023-01-04 | End: 2023-03-13

## 2023-01-04 RX ORDER — HYDROMORPHONE HYDROCHLORIDE 2 MG/ML
0.4 INJECTION, SOLUTION INTRAMUSCULAR; INTRAVENOUS; SUBCUTANEOUS EVERY 5 MIN PRN
Status: DISCONTINUED | OUTPATIENT
Start: 2023-01-04 | End: 2023-01-04 | Stop reason: HOSPADM

## 2023-01-04 RX ORDER — SODIUM CHLORIDE, SODIUM LACTATE, POTASSIUM CHLORIDE, CALCIUM CHLORIDE 600; 310; 30; 20 MG/100ML; MG/100ML; MG/100ML; MG/100ML
INJECTION, SOLUTION INTRAVENOUS CONTINUOUS PRN
Status: DISCONTINUED | OUTPATIENT
Start: 2023-01-04 | End: 2023-01-04

## 2023-01-04 RX ORDER — ISOSULFAN BLUE 50 MG/5ML
INJECTION, SOLUTION SUBCUTANEOUS
Status: DISCONTINUED | OUTPATIENT
Start: 2023-01-04 | End: 2023-01-04 | Stop reason: HOSPADM

## 2023-01-04 RX ORDER — ONDANSETRON HYDROCHLORIDE 2 MG/ML
INJECTION, SOLUTION INTRAMUSCULAR; INTRAVENOUS
Status: DISCONTINUED | OUTPATIENT
Start: 2023-01-04 | End: 2023-01-04

## 2023-01-04 RX ADMIN — HYDROMORPHONE HYDROCHLORIDE 0.4 MG: 2 INJECTION INTRAMUSCULAR; INTRAVENOUS; SUBCUTANEOUS at 10:01

## 2023-01-04 RX ADMIN — DEXAMETHASONE SODIUM PHOSPHATE 4 MG: 4 INJECTION, SOLUTION INTRAMUSCULAR; INTRAVENOUS at 07:01

## 2023-01-04 RX ADMIN — ROCURONIUM BROMIDE 25 MG: 10 INJECTION, SOLUTION INTRAVENOUS at 07:01

## 2023-01-04 RX ADMIN — FENTANYL CITRATE 50 MCG: 50 INJECTION, SOLUTION INTRAMUSCULAR; INTRAVENOUS at 07:01

## 2023-01-04 RX ADMIN — EPHEDRINE SULFATE 5 MG: 50 INJECTION INTRAVENOUS at 09:01

## 2023-01-04 RX ADMIN — EPHEDRINE SULFATE 10 MG: 50 INJECTION INTRAVENOUS at 07:01

## 2023-01-04 RX ADMIN — PROPOFOL 30 MG: 10 INJECTION, EMULSION INTRAVENOUS at 07:01

## 2023-01-04 RX ADMIN — PROPOFOL 30 MG: 10 INJECTION, EMULSION INTRAVENOUS at 09:01

## 2023-01-04 RX ADMIN — EPHEDRINE SULFATE 10 MG: 50 INJECTION INTRAVENOUS at 10:01

## 2023-01-04 RX ADMIN — LIDOCAINE HYDROCHLORIDE 50 MG: 20 INJECTION, SOLUTION INTRAVENOUS at 07:01

## 2023-01-04 RX ADMIN — CARBOXYMETHYLCELLULOSE SODIUM 2 DROP: 2.5 SOLUTION/ DROPS OPHTHALMIC at 07:01

## 2023-01-04 RX ADMIN — SODIUM CHLORIDE, SODIUM LACTATE, POTASSIUM CHLORIDE, AND CALCIUM CHLORIDE: 600; 310; 30; 20 INJECTION, SOLUTION INTRAVENOUS at 09:01

## 2023-01-04 RX ADMIN — ONDANSETRON 4 MG: 2 INJECTION INTRAMUSCULAR; INTRAVENOUS at 08:01

## 2023-01-04 RX ADMIN — SODIUM CHLORIDE, SODIUM LACTATE, POTASSIUM CHLORIDE, AND CALCIUM CHLORIDE: 600; 310; 30; 20 INJECTION, SOLUTION INTRAVENOUS at 06:01

## 2023-01-04 RX ADMIN — OXYCODONE HYDROCHLORIDE 5 MG: 5 TABLET ORAL at 10:01

## 2023-01-04 RX ADMIN — PROPOFOL 150 MG: 10 INJECTION, EMULSION INTRAVENOUS at 07:01

## 2023-01-04 RX ADMIN — CEFAZOLIN 2 G: 330 INJECTION, POWDER, FOR SOLUTION INTRAMUSCULAR; INTRAVENOUS at 07:01

## 2023-01-04 RX ADMIN — FENTANYL CITRATE 50 MCG: 50 INJECTION, SOLUTION INTRAMUSCULAR; INTRAVENOUS at 08:01

## 2023-01-04 RX ADMIN — PROPOFOL 20 MG: 10 INJECTION, EMULSION INTRAVENOUS at 07:01

## 2023-01-04 RX ADMIN — GLYCOPYRROLATE 0.2 MG: 0.2 INJECTION, SOLUTION INTRAMUSCULAR; INTRAVITREAL at 07:01

## 2023-01-04 RX ADMIN — ONDANSETRON 4 MG: 2 INJECTION INTRAMUSCULAR; INTRAVENOUS at 11:01

## 2023-01-04 RX ADMIN — SUGAMMADEX 200 MG: 100 INJECTION, SOLUTION INTRAVENOUS at 09:01

## 2023-01-04 NOTE — PATIENT INSTRUCTIONS
POSTOPERATIVE INSTRUCTIONS FOLLOWING BIOPSY OR LUMPECTOMY    The following are post-operative instructions that will help you to recover from your surgery.  Please read over these instructions carefully and contact us if we can answer any of your questions or concerns.    Dressing/breast binder (surgi-bra)  A surgical bra may be placed around your chest after your surgery.  If you are given the bra, please wear it as close to 24 hours a day as possible until your post-operative clinic appointment.  If the elastic around the bra irritates your skin, you may wear a soft t-shirt underneath the bra.  You may go without wearing the bra long enough to shower, to launder and dry the bra.  If the bra is extremely uncomfortable, you may wear a supportive sports bra instead after 2 days.  You may shower the day after surgery.  Do not take a tub bath and do not soak the surgical site.    Activity   You should be able to return to your regular activities 2 days after your surgery.  However, do not engage in strenuous activities in which you use your upper body such as:  golf, tennis, aerobics, washing windows, raking the yard, mopping, vacuuming, heavy lifting (e.g children) until you are seen for your follow-up appointment in clinic.    Medication for pain  You may find that over the counter pain medications may be sufficient for your pain.  You will be given a prescription for pain medication for more severe pain.  You should not drive or operate machinery while taking these.  Please take narcotics with food.  Narcotics can cause, or worse, constipation.  You will need to increase your fluid intake, eat high fiber foods (such as fruits and bran) and make sure that you are up and walking. You may need to take an over the counter stool softener for constipation.    Please report the following:  Temperature greater than 101 degrees  Discharge or bad odor from the wound  Excessive bleeding, such as bloody dressing or extreme  bruising  Redness at incision and/or drain sites  Swelling or buildup of fluid around incision    Additional information  I will see you approximately 2 weeks following your surgery.  If this follow-up appointment has not been made, please call the office.    If you have any questions or problems, please call my office or my nurse.    MD Rupal Dunham, RN  [phone]    After hours and on weekends, you may call the main [facility] line at [phone] and ask to have the general surgery resident paged or have me paged.

## 2023-01-04 NOTE — ANESTHESIA PROCEDURE NOTES
Intubation    Date/Time: 1/4/2023 7:14 AM  Performed by: Shanon Ross CRNA  Authorized by: Jacklyn Harrison MD     Intubation:     Induction:  Intravenous    Intubated:  Postinduction    Mask Ventilation:  Easy mask    Attempts:  1    Attempted By:  CRNA    Method of Intubation:  Video laryngoscopy    Blade:  Domínguez 3    Laryngeal View Grade: Grade I - full view of cords      Difficult Airway Encountered?: No      Complications:  None    Airway Device:  Oral endotracheal tube    Airway Device Size:  7.0    Style/Cuff Inflation:  Cuffed    Inflation Amount (mL):  3    Tube secured:  21    Secured at:  The lips    Placement Verified By:  Capnometry    Complicating Factors:  Short neck and obesity    Findings Post-Intubation:  BS equal bilateral

## 2023-01-04 NOTE — OR NURSING
Pt's V/S stable on room air while in PACU. PRN pain meds and antiemetics given with good relief noted. Instructed on IS use and deep breathing encouraged. Incision and dressing to left breast C/D/I. Transferred to ACU via stretcher, safety precautions in place.

## 2023-01-04 NOTE — ANESTHESIA POSTPROCEDURE EVALUATION
Anesthesia Post Evaluation    Patient: Kee Ramirez    Procedure(s) Performed: Procedure(s) (LRB):  MASTECTOMY-Left (Left)  BIOPSY, LYMPH NODE, SENTINEL-Left (Left)  INJECTION, FOR SENTINEL NODE IDENTIFICATION-Left (Left)  LYMPHADENECTOMY, AXILLARY-Left (Left)  REMOVAL PORT (Right)    Final Anesthesia Type: general      Patient location during evaluation: PACU  Patient participation: Yes- Able to Participate  Level of consciousness: awake  Post-procedure vital signs: reviewed and stable  Pain management: adequate  Airway patency: patent    PONV status at discharge: No PONV  Anesthetic complications: no      Cardiovascular status: blood pressure returned to baseline and stable  Respiratory status: unassisted, spontaneous ventilation and room air  Hydration status: euvolemic  Follow-up not needed.          Vitals Value Taken Time   /63 01/04/23 1120   Temp 36.3 °C (97.4 °F) 01/04/23 1018   Pulse 82 01/04/23 1132   Resp 16 01/04/23 1118   SpO2 94 % 01/04/23 1132   Vitals shown include unvalidated device data.      No case tracking events are documented in the log.      Pain/Melissa Score: Pain Rating Prior to Med Admin: 8 (1/4/2023 10:52 AM)  Melissa Score: 9 (1/4/2023 11:18 AM)

## 2023-01-04 NOTE — TRANSFER OF CARE
"Anesthesia Transfer of Care Note    Patient: Kee Ramirez    Procedure(s) Performed: Procedure(s) (LRB):  MASTECTOMY-Left (Left)  BIOPSY, LYMPH NODE, SENTINEL-Left (Left)  INJECTION, FOR SENTINEL NODE IDENTIFICATION-Left (Left)  LYMPHADENECTOMY, AXILLARY-Left (Left)  REMOVAL PORT (Right)    Patient location: PACU    Anesthesia Type: general    Transport from OR: Transported from OR on 2-3 L/min O2 by NC with adequate spontaneous ventilation    Post pain: adequate analgesia    Post assessment: no apparent anesthetic complications    Post vital signs: stable    Level of consciousness: awake    Nausea/Vomiting: no nausea/vomiting    Complications: none    Transfer of care protocol was followed      Last vitals:   Visit Vitals  BP (!) 157/77 (BP Location: Left arm, Patient Position: Sitting)   Pulse 66   Temp 36.5 °C (97.7 °F) (Oral)   Resp 16   Ht 5' 7" (1.702 m)   Wt 93.4 kg (206 lb)   SpO2 97%   Breastfeeding No   BMI 32.26 kg/m²     "

## 2023-01-04 NOTE — BRIEF OP NOTE
[facility] - Surgery ([place])  Brief Operative Note    Surgery Date: 1/4/2023     Surgeon(s) and Role:     * Erika Mcgowan MD - Primary    Assisting Surgeon: None    Pre-op Diagnosis:  Malignant neoplasm of central portion of left breast in female, estrogen receptor positive [C50.112, Z17.0]    Post-op Diagnosis:  Post-Op Diagnosis Codes:     * Malignant neoplasm of central portion of left breast in female, estrogen receptor positive [C50.112, Z17.0]    Procedure(s) (LRB):  MASTECTOMY-Left (Left)  BIOPSY, LYMPH NODE, SENTINEL-Left (Left)  INJECTION, FOR SENTINEL NODE IDENTIFICATION-Left (Left)    REMOVAL PORT (Right)    Anesthesia: General    Operative Findings: Left breast total mastectomy with left sentinel lymph node biopsy was performed.  Two sentinel nodes were removed and were found to be negative.  Skin was noted to be healthy and was closed without any noted difficulties.      Estimated Blood Loss: minimal          Specimens:   Specimen (24h ago, onward)       Start     Ordered    01/04/23 0939  Specimen to Pathology, Surgery Breast  Once        Comments: Pre-op Diagnosis: Malignant neoplasm of central portion of left breast in female, estrogen receptor positive [C50.112, Z17.0]Procedure(s):MASTECTOMY-LeftBIOPSY, LYMPH NODE, SENTINEL-LeftINJECTION, FOR SENTINEL NODE IDENTIFICATION-LeftLYMPHADENECTOMY, AXILLARY-LeftREMOVAL PORT Number of specimens: 4Name of specimens: 1. Left axillary sentinel lymph node #1 hot and blue count 299 (frozen)2. Left axillary sentinel lymph node #2 hot and blue 652 (frozen)3. Left breast short stitch superior long lateral4. Right port ( gross ID)     References:    Click here for ordering Quick Tip   Question Answer Comment   Procedure Type: Breast    Specimen Class: Known or suspected malignancy    Which provider would you like to cc? ERIKA MCGOWAN    Release to patient Immediate        01/04/23 0948                      Discharge Note    OUTCOME: Patient tolerated  treatment/procedure well without complication and is now ready for discharge.    DISPOSITION: Home or Self Care    FINAL DIAGNOSIS:     * Malignant neoplasm of central portion of left breast in female, estrogen receptor positive [C50.112, Z17.0]    FOLLOWUP: In clinic    DISCHARGE INSTRUCTIONS:    Discharge Procedure Orders   Diet general     Call MD for:  temperature >100.4     Call MD for:  persistent nausea and vomiting     Call MD for:  severe uncontrolled pain     Call MD for:  difficulty breathing, headache or visual disturbances     Call MD for:  redness, tenderness, or signs of infection (pain, swelling, redness, odor or green/yellow discharge around incision site)     Call MD for:  extreme fatigue     Call MD for:  persistent dizziness or light-headedness     Call MD for:  hives

## 2023-01-04 NOTE — DISCHARGE INSTRUCTIONS
Anesthesia: After Your Surgery  Youve just had surgery. During surgery, you received medication called anesthesia to keep you comfortable and pain-free. After surgery, you may experience some pain or nausea. This is common. Here are some tips for feeling better and recovering after surgery.    Going home  Your doctor or nurse will show you how to take care of yourself when you go home. He or she will also answer your questions. Have an adult family member or friend drive you home. For the first 24 hours after your surgery:  Do not drive or use heavy equipment.  Do not make important decisions or sign legal documents.  Avoid alcohol.  Have someone stay with you, if needed. He or she can watch for problems and help keep you safe.  Take your time getting up from a seated or lying position. You may experience dizziness for 24 hours  Be sure to keep all follow-up appointments with your doctor. And rest after your procedure for as long as your doctor tells you to.    Coping with pain  If you have pain after surgery, pain medication will help you feel better. Take it as directed, before pain becomes severe. Also, ask your doctor or pharmacist about other ways to control pain, such as with heat, ice, and relaxation. And follow any other instructions your surgeon or nurse gives you.    URINARY RETENTION  Should you experience a decrease in your urine output or are unable to urinate following surgery, this can be due to the medications given during surgery.  We recommend you going to the nearest Emergency Department.    Tips for taking pain medication  To get the best relief possible, remember these points:  Pain medications can upset your stomach. Taking them with a little food may help.  Most pain relievers taken by mouth need at least 20 to 30 minutes to take effect.  Taking medication on a schedule can help you remember to take it. Try to time your medication so that you can take it before beginning an activity, such  as dressing, walking, or sitting down for dinner.  Constipation is a common side effect of pain medications. Contact your doctor before taking any medications like laxatives or stool softeners to help relieve constipation. Also ask about any dietary restrictions, because drinking lots of fluids and eating foods like fruits and vegetables that are high in fiber can also help. Remember, dont take laxatives unless your surgeon has prescribed them.  Mixing alcohol and pain medication can cause dizziness and slow your breathing. It can even be fatal. Dont drink alcohol while taking pain medication.  Pain medication can slow your reflexes. Dont drive or operate machinery while taking pain medication.  If your health care provider tells you to take acetaminophen to help relieve your pain, ask him or her how much you are supposed to take each day. (Acetaminophen is the generic name for Tylenol and other brand-name pain relievers.) Acetaminophen or other pain relievers may interact with your prescription medicines or other over-the-counter (OTC) drugs. Some prescription medications contain acetaminophen along with other active ingredients. Using both prescription and OTC acetaminophen for pain can cause you to overdose. The FDA recommends that you read the labels on your OTC medications carefully. This will help you to clearly understand the list of active ingredients, dosing instructions, and any warnings. It may also help you avoid taking too much acetaminophen. If you have questions or don't understand the information, ask your pharmacist or health care provider to explain it to you before you take the OTC medication.    Managing nausea  Some people have an upset stomach after surgery. This is often due to anesthesia, pain, pain medications, or the stress of surgery. The following tips will help you manage nausea and get good nutrition as you recover. If you were on a special diet before surgery, ask your doctor if you  should follow it during recovery. These tips may help:  Dont push yourself to eat. Your body will tell you when to eat and how much.  Start off with clear liquids and soup. They are easier to digest.  Progress to semi-solid foods (mashed potatoes, applesauce, and gelatin) as you feel ready.  Slowly move to solid foods. Dont eat fatty, rich, or spicy foods at first.  Dont force yourself to have three large meals a day. Instead, eat smaller amounts more often.  Take pain medications with a small amount of solid food, such as crackers or toast to avoid nausea.      Call your surgeon if    You feel too sleepy, dizzy, or groggy (medication may be too strong).  You have side effects like nausea, vomiting, or skin changes (rash, itching, or hives).   © 4895-1451 [employer]. [address] [place] [address]. All rights reserved. This information is not intended as a substitute for professional medical care. Always follow your healthcare professional's instructions.          Your Surgeon Gave You EXPAREL® (bupivacaine liposome injectable suspension)    To help control your pain after surgery, your surgeon injected EXPAREL into your surgical incision just before the end of the procedure.   EXPAREL is a local analgesic that contains the local anesthetic bupivacaine. Local anesthetics provide pain relief by numbing the tissue  around the surgical site.   EXPAREL is specifically designed to release pain medication over time and can control pain for up to 72 hours.   In addition to EXPAREL, your surgeon may provide other pain medications to control your pain.   Each patient is different and responds differently to painmedication. Depending on how you respond to EXPAREL, you may require less  additional pain medication during your recovery.    Possible Side Effects    Side effects can occur with any medication and it is important not to ignore anything you may be experiencing. Some patients  who  received EXPAREL experienced nausea, vomiting, or constipation. Rarely, patients who receive bupivacaine (the active ingredient in  EXPAREL) have experienced numbness and tingling in their mouth or lips, lightheadedness, or anxiety. Speak with your doctor right  away if you think you may be experiencing any of these sensations, or if you have other questions regarding possible side effects.    Your Recovery    When your pain is under control, your body can better focus on healing. This is not the time to test your pain tolerance, or grin and  bear it.Work with your surgeon and nurse to make your recovery as speedy and pain-free as possible.   Follow the post-op orders your nurse gave you.   Eat a healthy diet and drink plenty of water. Surgery stresses your body; your body responds by needing more energy to heal.      Important Information About EXPAREL  Products that contain bupivacaine, like EXPAREL, may cause a temporary loss of  sensation or the ability to move in the area where bupivacaine was injected.    Date Administered: 1/4/2023  Time Administered: 9:05 A.M.    Other Forms of Bupivicaine should not be administered within 96hrs following administration of EXPAREL.        FOLLOW INSTRUCTIONS GIVEN BY DR MCGOWAN

## 2023-01-04 NOTE — OP NOTE
Operative Note     1/4/2023    PRE-OP DIAGNOSIS: Malignant neoplasm of central portion of left breast in female, estrogen receptor positive [C50.112, Z17.0]      POST-OP DIAGNOSIS: Post-Op Diagnosis Codes:     * Malignant neoplasm of central portion of left breast in female, estrogen receptor positive [C50.112, Z17.0]    Procedure(s):  MASTECTOMY-Left  BIOPSY, LYMPH NODE, SENTINEL-Left  INJECTION, FOR SENTINEL NODE IDENTIFICATION-Left    REMOVAL PORT     SURGEON: Surgeon(s) and Role:     * Erika Valente MD - Primary    ANESTHESIA: General     OPERATIVE FINDINGS: Healthy appearing skin flaps at the completion of the mastectomy.  2 sentinel nodes were hot and blue and negative on frozen section.  Port removed.    INDICATION FOR PROCEDURE: This patient presents with a history of invasive carcinoma of the left breast    PROCEDURE IN DETAIL:  Kee Ramirez is a 77 y.o. female brought to the operating room for definitive surgery of invasive carcinoma of the left breast.  The patient has elected to undergo left simple mastectomy with sentinel lymph node biopsy for marisol assessment. The patient was informed of the possible risks and complications of the procedure, including but not limited to anesthetic risks, bleeding, infection, and need for additional surgery.  The patient concurred with the proposed plan, and has given informed consent.  The site of surgery was properly noted/marked in the preoperative holding area.    The patient was then brought to the operating room and placed in the supine position with both upper extremities extended.  local and general anesthesia was administered. Perioperative antibiotics were administered consisting of Ancef and a time out was performed confirming the patient, site, and procedure.   The patient's left breast was injected with technetium to facilitate sentinel lymph node identification.The bilateral chest and axilla was then prepped and draped in the usual sterile  fashion.    We then turned our attention to the left breast where an elliptical incision was fashioned to incorporate the nipple areolar complex.  The incision was made with a 10-blade and extended through the subcutaneous tissues with Bovie electrocautery.  Skin flaps were raised to the clavicle superiorly.  We then  turned our attention to the left axilla.  Blue dye was injected prior to the incision in the usual subareolar fashion. The gamma probe was used to identify an area of increased radioactivity within the lower axilla. The clavipectoral sheath was sharply incised to reveal the level I axillary lymph nodes. The probe was used to identify a single node with increased radioactivity.  This node was brought into the operative field and carefully dissected free of the surrounding lymphovascular structures.  The highest ex vivo count of the node was 652.  The node was then sent to pathology for frozen section evaluation, labeled as sentinel node #1.  A total of 2 axillary sentinel nodes and 0 axillary non-sentinel nodes were identified, excised and submitted to pathology.  Bed counts were obtained to confirm that the 10% rule had not been violated.   The wound was irrigated with normal saline, and all bleeding points were secured with Bovie electrocautery.     We then proceeded to raise the remainder of the flaps to the lateral border of the sternum medially, to the inframammary fold inferiorly, and to the anterior border of the latissimus dorsi muscle laterally. The breast tissue was sharply excised off the chest wall taking care to incorporate the pectoralis fascia while leaving the serratus fascia behind.  The resulting mastectomy specimen was marked using a short stitch superiorly and long stitch laterally.  The breast was sent to pathology for permanent evaluation.      Frozen section marisol evaluation revealed no evidence of metastatic disease.  Therefore, the operative field was irrigated with normal  saline and all bleeding points were secured with Bovie electrocautery.  A 19 Fr marisabel drain was placed under the mastectomy flap. The incision was closed using an interrupted 3-0 vicryl deep dermal stitch followed by a running 4-0 vicryl subcuticular.        We then turned our attention to the port on the right side.  A small incision was made and the port pocket was dissected out. Next, the port was removed from the anterior right pocket. A figure of 8 3-0 vicryl was used to close the catheter opening and the catheter was removed. Wound was irrigated and hemostasis was achieved. The port incision was closed using interuppted 3-0 vicryl followed by running 4-0 vicryl subcuticular. Dermabond was applied and all counts were correct at the end of the procedure. Patient tolerated the procedure well. Pressure was held at the IJ insertion site for 5 minutes      Dermabond was applied. A post surgical bra was placed on the patient. At the end of the operation, all sponge, instrument, and needle counts x 2 were correct.    ESTIMATED BLOOD LOSS: less than 50 mL    COMPLICATIONS: none    DISPOSITION: PACU - hemodynamically stable.    ATTESTATION:   I performed the procedure.  There was not a qualified resident available.

## 2023-01-04 NOTE — PLAN OF CARE
Kee Ramirez has met all discharge criteria from Phase II. Vital Signs are stable, ambulating  without difficulty.Pain is now under control and tolerable for the pt. Pain score 3 at this time.  Discharge instructions given, patient verbalized understanding. Discharged from facility via wheelchair in stable condition.

## 2023-01-05 ENCOUNTER — PATIENT MESSAGE (OUTPATIENT)
Dept: SURGERY | Facility: CLINIC | Age: 78
End: 2023-01-05
Payer: MEDICARE

## 2023-01-09 ENCOUNTER — PATIENT MESSAGE (OUTPATIENT)
Dept: ADMINISTRATIVE | Facility: HOSPITAL | Age: 78
End: 2023-01-09
Payer: MEDICARE

## 2023-01-10 ENCOUNTER — DOCUMENTATION ONLY (OUTPATIENT)
Dept: HEMATOLOGY/ONCOLOGY | Facility: CLINIC | Age: 78
End: 2023-01-10
Payer: MEDICARE

## 2023-01-10 DIAGNOSIS — Z17.0 MALIGNANT NEOPLASM OF CENTRAL PORTION OF LEFT BREAST IN FEMALE, ESTROGEN RECEPTOR POSITIVE: Primary | ICD-10-CM

## 2023-01-10 DIAGNOSIS — C50.112 MALIGNANT NEOPLASM OF CENTRAL PORTION OF LEFT BREAST IN FEMALE, ESTROGEN RECEPTOR POSITIVE: Primary | ICD-10-CM

## 2023-01-10 LAB
FINAL PATHOLOGIC DIAGNOSIS: NORMAL
FROZEN SECTION DIAGNOSIS: NORMAL
FROZEN SECTION FOOTNOTE: NORMAL
GROSS: NORMAL
Lab: NORMAL

## 2023-01-19 ENCOUNTER — OFFICE VISIT (OUTPATIENT)
Dept: SURGERY | Facility: CLINIC | Age: 78
End: 2023-01-19
Payer: MEDICARE

## 2023-01-19 VITALS
HEIGHT: 67 IN | BODY MASS INDEX: 34.69 KG/M2 | OXYGEN SATURATION: 98 % | DIASTOLIC BLOOD PRESSURE: 55 MMHG | WEIGHT: 221 LBS | TEMPERATURE: 98 F | HEART RATE: 62 BPM | SYSTOLIC BLOOD PRESSURE: 132 MMHG

## 2023-01-19 DIAGNOSIS — Z12.31 SCREENING MAMMOGRAM, ENCOUNTER FOR: ICD-10-CM

## 2023-01-19 DIAGNOSIS — C50.112 MALIGNANT NEOPLASM OF CENTRAL PORTION OF LEFT BREAST IN FEMALE, ESTROGEN RECEPTOR POSITIVE: Primary | ICD-10-CM

## 2023-01-19 DIAGNOSIS — Z17.0 MALIGNANT NEOPLASM OF CENTRAL PORTION OF LEFT BREAST IN FEMALE, ESTROGEN RECEPTOR POSITIVE: Primary | ICD-10-CM

## 2023-01-19 PROCEDURE — 99999 PR PBB SHADOW E&M-EST. PATIENT-LVL V: ICD-10-PCS | Mod: PBBFAC,,, | Performed by: SURGERY

## 2023-01-19 PROCEDURE — 99215 OFFICE O/P EST HI 40 MIN: CPT | Mod: PBBFAC | Performed by: SURGERY

## 2023-01-19 PROCEDURE — 99024 POSTOP FOLLOW-UP VISIT: CPT | Mod: POP,,, | Performed by: SURGERY

## 2023-01-19 PROCEDURE — 99999 PR PBB SHADOW E&M-EST. PATIENT-LVL V: CPT | Mod: PBBFAC,,, | Performed by: SURGERY

## 2023-01-19 PROCEDURE — 99024 PR POST-OP FOLLOW-UP VISIT: ICD-10-PCS | Mod: POP,,, | Performed by: SURGERY

## 2023-01-19 NOTE — PROGRESS NOTES
Post-Op  [facility]  Department of Surgery    PCP: Ravin Viera MD  MEDICAL ONCOLOGIST:    Dr. Carlin   RADIATION ONCOLOGIST:   N/a    DIAGNOSIS:    This is a 77 y.o. female with a stage pT3 N0 Mx grade 1 ER + ME + HER2 - ILC of the left breast.    TREATMENT SUMMARY:  The patient is status post left  mastectomy and sentinel node biopsy on 1/4/2023.  Final pathology showed     Final Pathologic Diagnosis 1.  Left axillary sentinel lymph node #1, hot and blue; count 299, excisional   biopsy: 1 lymph node, negative for metastatic carcinoma (0/1).          Confirmed by negative immunohistochemical staining with cytokeratins   AE1/AE3, cam 5.2 and wide spectrum keratin with          satisfactory positive and negative controls.   2.  Left axillary sentinel lymph node #2, hot and blue; count 652, excisional   biopsy: 1 lymph node, negative for metastatic carcinoma (0/1).          Confirmed by negative immunohistochemical staining with cytokeratins   AE1/AE3, cam 5.2 and wide spectrum keratin with          satisfactory positive and negative controls.   3.  Left breast, skin sparing mastecomy: Invasive lobular carcinoma,   measuring at least 51 mm (at least 5.1 cm) in greatest dimension (slide   measurement of largest          dimension).          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          Note:  Biopsy clip is grossly identified and biopsy site changes are   identified microscopically within area of tumor.          Margins:           - All margins are greater than 10 mm (greater than 1 cm) from   invasive tumor and LCIS.          Nipple and skin are present and uninvolved by tumor.  Note:  Tumor is   present in the soft tissue underlying the nipple skin but          does not involve nipple epidermis.          Microcalcifications are present and associated with lobular carcinoma   in situ and invasive carcinoma.          Background breast tissue shows fibrocystic changes including apocrine    metaplasia and stromal fibrosis.          See synoptic report in comment section for further details.   4.  Right port-a-cath, removal: Gross diganosis:  Port-a-cath.     Comment:  Synoptic Report   Specimen:   Procedure:  Total mastectomy   Specimen laterality:  Left breast   Tumor:   Tumor site:  Central   Histologic type:  Invasive lobular carcinoma   Histologic grade (Paola histologic score) Glandular (acinar)/tubular   differentiation:  Score 3          Nuclear pleomorphism:  Score 1          Mitotic rate:  Score 1          Overall grade:  Grade 1   Tumor size: Greatest dimension of largest invasive focus:  At least 51 mm (at   least 5.1 cm). Note:  The largest grossly identified area of tumor is   measured on the corresponding microscopic slides at 51                  mm.  However there are additional sections of tumor in   adjacent fibrous areas that are also tumor but were not                  discernible grossly and are not contiguous for additional   measurement.  Therefore this tumor is best measured                  at at least 51 mm in greatest dimension.  Only one area of   mass lesion with adjacent fibrotic tissue was                  identified grossly, therefore this is interpreted to be a   single mass lesion.   Tumor focality:  Single focus of invasive carcinoma.   Ductal carcinoma in Situ:  Not identified   Lobular carcinoma in Situ:  Present   Tumor extent:  Skin and nipple are present and uninvolved by tumor.  Skeletal   muscle is not present for evaluation.   Lymphovascular invasion:  Not identified   Dermal lymphovascular invasion:  Not identified   Microcalcifications:  Present in invasive carcinoma and lobular carcinoma in   situ.   Treatment effect in breast:  No definite response to presurgical therapy in   the invasive carcinoma.   Treatment effect in the lymph nodes:  No definite response to presurgical   therapy in lymph nodes.   Margins:  All margins are negative for invasive  "carcinoma. Distance from   invasive carcinoma to closest margin:  Invasive carcinoma is located greater   than 10 mm (greater than 1 cm)          from all surgical margins.   Regional lymph nodes:   Regional lymph node status:  Regional lymph nodes are present and all   regional lymph nodes are negative for tumor. Number of lymph nodes with   macrometastasis (greater than 2 mm):  0          Number of lymph nodes with micrometastasis:  0          Number of lymph nodes with isolated tumor cells:  0     Total number of lymph nodes examined (sentinel and nonsentinel):  2          Total number of sentinel nodes examined:  2   Distant metastasis:  Not applicable   Pathologic stage classification:   TNM descriptors: "y":  Posttreatment   Primary tumor:        ypT3:  Tumor greater than 50 mm in greatest dimension.   Regional lymph nodes:        y(sn)pN0:  No regional lymph node metastasis identified.   Distant metastasis:        ypMX:  Cannot be assessed.   Additional findings:  Fibrocystic changes.   Additional special studies/biomarker reporting:   Immunohistochemical stains for biomarker reporting were completed on the   patient's prior left breast core biopsy material from March 21, 2022, case   number [facility]-, with the following results:   "ER/IL/Her-2 rhina and Ki67 testing performed at [facility]   ([facility]-). Diagnosed by   Carolyn Infante MD. Her diagnoses are as follows:   OUTSIDE SLIDES FOR BREAST BIOMARKERS LABELED [facility]-/[facility]-   LEFT BREAST   Ancillary studies- (Performed on block 1A)   Estrogen receptor: Positive (strong intensity, >95% of tumor cell nuclei).   Progesterone receptor: Positive (strong intensity, 15% of tumor cell nuclei).   HER2 IHC: Negative.   Ki-67: 10%.       INTERVAL HISTORY:   Kee Ramirez comes in for a post-op check.  She denies fever, chills, chest pain or shortness of breath.  Her pain is well controlled.  Drain output is <30 CC's for the last three " days per patient log.     MEDICATIONS:  Current Outpatient Medications   Medication Sig Dispense Refill    amLODIPine (NORVASC) 10 MG tablet Take 1 tablet (10 mg total) by mouth once daily. 90 tablet 3    anastrozole (ARIMIDEX) 1 mg Tab Take 1 tablet (1 mg total) by mouth once daily. 90 tablet 3    aspirin (ECOTRIN) 81 MG EC tablet Take 81 mg by mouth once daily.      cloNIDine (CATAPRES) 0.1 MG tablet TAKE 1 TABLET BY MOUTH THREE TIMES DAILY 90 tablet 11    gabapentin (NEURONTIN) 300 MG capsule Take 1 capsule (300 mg total) by mouth 3 (three) times daily. (Patient taking differently: Take 300 mg by mouth as needed.) 90 capsule 5    ibuprofen (ADVIL,MOTRIN) 800 MG tablet Take 1 tablet (800 mg total) by mouth 3 (three) times daily. 90 tablet 3    levothyroxine (SYNTHROID) 112 MCG tablet Take 1 tablet (112 mcg total) by mouth before breakfast. 90 tablet 3    losartan (COZAAR) 100 MG tablet Take 1 tablet (100 mg total) by mouth once daily. 90 tablet 3    lovastatin (MEVACOR) 20 MG tablet Take 2 tablets (40 mg total) by mouth every evening. 180 tablet 3    metFORMIN (GLUCOPHAGE) 500 MG tablet Take 1 tablet (500 mg total) by mouth daily with breakfast. 90 tablet 3    methylcellulose (ARTIFICIAL TEARS) 1 % ophthalmic solution Place 1 drop into both eyes as needed.      multivitamin (THERAGRAN) per tablet Take 1 tablet by mouth once daily.      rOPINIRole (REQUIP) 1 MG tablet Take 1 tablet (1 mg total) by mouth every evening. 90 tablet 3    triamcinolone acetonide 0.5% (KENALOG) 0.5 % Crea Apply topically 2 (two) times daily. 15 g 5    HYDROcodone-acetaminophen (NORCO) 5-325 mg per tablet Take 1 tablet by mouth every 6 (six) hours as needed for Pain. (Patient not taking: Reported on 1/19/2023) 20 tablet 0     No current facility-administered medications for this visit.       ALLERGIES:   Review of patient's allergies indicates:   Allergen Reactions    Naproxen sodium Itching       PHYSICAL EXAMINATION:   General:  This is  a well appearing female with appropriate speech, affect and gait.     Breast:  Incision clean, dry, and intact. Palpable hematoma at lateral incision border. Drain output <30CC's for the >  3 days.       IMPRESSION:   The patient has had an uneventful postoperative course.    PLAN:   1. KAYLYN will reach out in 1 week to check on improvement of hematoma adjacent to incision.   2. right mammogram in March 2023.   3. The patient is advised in continued exam of the breast chest wall and to report to this office sooner should she note any areas of abnormality or concern.   4.  She has been instructed to meet with med onc and rad onc for discussion of adjuvant treatment recommendations.  Tumor board.

## 2023-01-20 ENCOUNTER — PATIENT MESSAGE (OUTPATIENT)
Dept: REHABILITATION | Facility: HOSPITAL | Age: 78
End: 2023-01-20
Payer: MEDICARE

## 2023-01-24 ENCOUNTER — TUMOR BOARD CONFERENCE (OUTPATIENT)
Dept: SURGERY | Facility: CLINIC | Age: 78
End: 2023-01-24
Payer: MEDICARE

## 2023-01-24 NOTE — PROGRESS NOTES
Oncology History   Malignant neoplasm of central portion of left female breast   3/21/2022 Initial Diagnosis    Malignant neoplasm of central portion of left female breast     3/21/2022 Biopsy    Left Breast, Core Needle Biopsies:   - Invasive lobular carcinoma, well-differentiated .   - Bloom-Damon score (5/9):        - Tubular Formation: 3/3        - Nuclear Pleomorphism: 1/3        - Mitotic Activity: 1/3.   - Background of lobular carinoma in-situ (LCIS), nuclear grade 1.   - No perineural or lymphovascular invasion is identified.        3/21/2022 Breast Tumor Markers    Estrogen: Positive  Progesterone: Positive  HER2: Negative     5/27/2022 - 5/27/2022 Chemotherapy    This was considered, but patient did not undergo chemotherapy.   Treatment Summary   Plan Name: OP BREAST DOSE-DENSE AC-T (DOXORUBICIN CYCLOPHOSPHAMIDE Q2W FOLLOWED BY PACLITAXEL WEEKLY)  Treatment Goal: Curative  Status: Inactive  Start Date:   End Date:   Provider: Mitali Carlin MD  Chemotherapy: DOXOrubicin chemo injection 130 mg, 60 mg/m2, Intravenous, Clinic/HOD 1 time, 0 of 4 cycles  cyclophosphamide 600 mg/m2 = 1,300 mg in sodium chloride 0.9% 250 mL chemo infusion, 600 mg/m2, Intravenous, Clinic/HOD 1 time, 0 of 4 cycles  PACLitaxeL (TAXOL) 80 mg/m2 = 174 mg in sodium chloride 0.9% 250 mL chemo infusion, 80 mg/m2, Intravenous, Clinic/HOD 1 time, 0 of 12 cycles       6/7/2022 Cancer Staged    Staging form: Breast, AJCC 8th Edition  - Clinical: Stage IIA (cT3, cN0, cM0, G1, ER+, [place]+, HER2-)       6/7/2022 Notable Event    Oncotype performed initially to assess for benefit of NACT to optimize for surgery but not enough tissue. Proceeded with AI x 6 months.      6/7/2022 Genetic Testing    Weavly: Negative                                           1/4/2023 Breast Surgery    Left Mastectomy with L SLNB.      1/24/2023 Tumor Conference    No definitive treatment response to neoadjuvant AI use on final surgical pathology. Oncotype  will now be sent on the surgical specimen. Could include CDK46 inhibitor with her endocrine if Oncotype is low given some response to Anastrozole and large tumor size, but patient is node negative. May consider to simply change to different AI. Radiation Oncology would like to meet with patient to discuss XRT, but team does not feel strongly about her proceeding.

## 2023-01-25 ENCOUNTER — OFFICE VISIT (OUTPATIENT)
Dept: HEMATOLOGY/ONCOLOGY | Facility: CLINIC | Age: 78
End: 2023-01-25
Payer: MEDICARE

## 2023-01-25 ENCOUNTER — CLINICAL SUPPORT (OUTPATIENT)
Dept: REHABILITATION | Facility: HOSPITAL | Age: 78
End: 2023-01-25
Attending: SURGERY
Payer: MEDICARE

## 2023-01-25 ENCOUNTER — OFFICE VISIT (OUTPATIENT)
Dept: SURGERY | Facility: CLINIC | Age: 78
End: 2023-01-25
Payer: MEDICARE

## 2023-01-25 ENCOUNTER — PATIENT MESSAGE (OUTPATIENT)
Dept: SURGERY | Facility: CLINIC | Age: 78
End: 2023-01-25

## 2023-01-25 VITALS
BODY MASS INDEX: 33.05 KG/M2 | SYSTOLIC BLOOD PRESSURE: 165 MMHG | DIASTOLIC BLOOD PRESSURE: 74 MMHG | SYSTOLIC BLOOD PRESSURE: 165 MMHG | HEIGHT: 67 IN | HEART RATE: 76 BPM | WEIGHT: 210.56 LBS | DIASTOLIC BLOOD PRESSURE: 74 MMHG | WEIGHT: 210 LBS | HEIGHT: 67 IN | HEART RATE: 76 BPM | TEMPERATURE: 98 F | OXYGEN SATURATION: 98 % | RESPIRATION RATE: 18 BRPM | BODY MASS INDEX: 32.96 KG/M2

## 2023-01-25 DIAGNOSIS — M25.611 STIFFNESS OF RIGHT SHOULDER JOINT: ICD-10-CM

## 2023-01-25 DIAGNOSIS — Z17.0 MALIGNANT NEOPLASM OF CENTRAL PORTION OF LEFT BREAST IN FEMALE, ESTROGEN RECEPTOR POSITIVE: ICD-10-CM

## 2023-01-25 DIAGNOSIS — C50.912 LOBULAR CARCINOMA OF LEFT BREAST: ICD-10-CM

## 2023-01-25 DIAGNOSIS — R29.898 WEAKNESS OF RIGHT UPPER EXTREMITY: ICD-10-CM

## 2023-01-25 DIAGNOSIS — I10 PRIMARY HYPERTENSION: ICD-10-CM

## 2023-01-25 DIAGNOSIS — C50.112 MALIGNANT NEOPLASM OF CENTRAL PORTION OF LEFT BREAST IN FEMALE, ESTROGEN RECEPTOR POSITIVE: ICD-10-CM

## 2023-01-25 DIAGNOSIS — Z09 FOLLOW-UP EXAM: Primary | ICD-10-CM

## 2023-01-25 DIAGNOSIS — Z79.811 USE OF ANASTROZOLE: ICD-10-CM

## 2023-01-25 DIAGNOSIS — Z91.89 AT RISK FOR LYMPHEDEMA: ICD-10-CM

## 2023-01-25 DIAGNOSIS — K56.609 SBO (SMALL BOWEL OBSTRUCTION): ICD-10-CM

## 2023-01-25 DIAGNOSIS — R29.3 ABNORMAL POSTURE: ICD-10-CM

## 2023-01-25 DIAGNOSIS — C50.112 MALIGNANT NEOPLASM OF CENTRAL PORTION OF LEFT BREAST IN FEMALE, ESTROGEN RECEPTOR POSITIVE: Primary | ICD-10-CM

## 2023-01-25 DIAGNOSIS — R29.898 WEAKNESS OF LEFT UPPER EXTREMITY: ICD-10-CM

## 2023-01-25 DIAGNOSIS — Z17.0 MALIGNANT NEOPLASM OF CENTRAL PORTION OF LEFT BREAST IN FEMALE, ESTROGEN RECEPTOR POSITIVE: Primary | ICD-10-CM

## 2023-01-25 DIAGNOSIS — M25.612 STIFFNESS OF LEFT SHOULDER JOINT: Primary | ICD-10-CM

## 2023-01-25 PROCEDURE — 99214 OFFICE O/P EST MOD 30 MIN: CPT | Mod: 25,PBBFAC | Performed by: INTERNAL MEDICINE

## 2023-01-25 PROCEDURE — 99024 POSTOP FOLLOW-UP VISIT: CPT | Mod: POP,,, | Performed by: NURSE PRACTITIONER

## 2023-01-25 PROCEDURE — 97140 MANUAL THERAPY 1/> REGIONS: CPT

## 2023-01-25 PROCEDURE — 99999 PR PBB SHADOW E&M-EST. PATIENT-LVL IV: CPT | Mod: PBBFAC,,, | Performed by: INTERNAL MEDICINE

## 2023-01-25 PROCEDURE — 99213 OFFICE O/P EST LOW 20 MIN: CPT | Mod: PBBFAC | Performed by: NURSE PRACTITIONER

## 2023-01-25 PROCEDURE — 97162 PT EVAL MOD COMPLEX 30 MIN: CPT

## 2023-01-25 PROCEDURE — 99215 PR OFFICE/OUTPT VISIT, EST, LEVL V, 40-54 MIN: ICD-10-PCS | Mod: S$PBB,,, | Performed by: INTERNAL MEDICINE

## 2023-01-25 PROCEDURE — 99999 PR PBB SHADOW E&M-EST. PATIENT-LVL III: ICD-10-PCS | Mod: PBBFAC,,, | Performed by: NURSE PRACTITIONER

## 2023-01-25 PROCEDURE — 99999 PR PBB SHADOW E&M-EST. PATIENT-LVL IV: ICD-10-PCS | Mod: PBBFAC,,, | Performed by: INTERNAL MEDICINE

## 2023-01-25 PROCEDURE — 99999 PR PBB SHADOW E&M-EST. PATIENT-LVL III: CPT | Mod: PBBFAC,,, | Performed by: NURSE PRACTITIONER

## 2023-01-25 PROCEDURE — 99024 PR POST-OP FOLLOW-UP VISIT: ICD-10-PCS | Mod: POP,,, | Performed by: NURSE PRACTITIONER

## 2023-01-25 PROCEDURE — 99215 OFFICE O/P EST HI 40 MIN: CPT | Mod: S$PBB,,, | Performed by: INTERNAL MEDICINE

## 2023-01-25 NOTE — PLAN OF CARE
[facility]  Physical Therapy Initial Evaluation    Date: 1/25/2023   Name: Kee Ramirez  Clinic Number: [id number]    Therapy Diagnosis:   Encounter Diagnoses   Name Primary?    Stiffness of left shoulder joint Yes    Weakness of left upper extremity     Stiffness of right shoulder joint     Weakness of right upper extremity     At risk for lymphedema     Abnormal posture     Malignant neoplasm of central portion of left breast in female, estrogen receptor positive      Physician: Erika Valente MD    Physician Orders: PT Eval and Treat  Medical Diagnosis: C50.112,Z17.0 (ICD-10-CM) - Malignant neoplasm of central portion of left breast in female, estrogen receptor positive  Evaluation Date: 1/25/2023  Authorization Period Expiration: 01/19/2024  Plan of Care Certification Period: 03/24/2023  Visit # / Visits Authorized: 1 / 1  Insurance: MEDICARE  FOTO: 1/3    Time In: 11:00 AM  Time Out: 12:02 PM  Total Billable Time: 62 minutes    Precautions: Standard, Diabetes, cancer, and HTN    History   History of Present Illness: Kee is a 77 y.o. female that presents to [facility] at the [facility] s/p LEFT breast surgery.     Diagnosis: Stage IIA (cT3, cN0, cM0, G1, ER+, [place]+, HER2-) ILC of LEFT breast    Chief Complaint: difficulty lifting, pushing, and pulling with LUE following left mastectomy 3 weeks ago. Patient attributes difficulty to tightness / pulling near incision. Patient reported blood-tinged discharge from incision during oncologist appointment this morning and was able to see Rashida Mccain NP to drain fluid and receive gauze dressing.     Surgical History:  Kee Ramirez  has a past surgical history that includes Tubal ligation; Shoulder arthroscopy w/ rotator cuff repair (Right, 2001); Colonoscopy; Colonoscopy (N/A, 12/05/2019); Vaginal hysterectomy (N/A, 01/11/2021); Insertion of tunneled central venous catheter (CVC) with subcutaneous  "port (Right, 05/24/2022); Breast biopsy (Left, 03/21/2022); Hysterectomy (12/01/2021); Mastectomy (Left, 1/4/2023); [place] lymph node biopsy (Left, 1/4/2023); Injection for sentinel node identification (Left, 1/4/2023); and Mediport removal (Right, 1/4/2023).    Cancer-Related Surgery and Date: LEFT mastectomy and LEFT SLNB (2 lymph nodes removed) on 01/04/2023 per Dr. Valente.     Treatment:  Chemotherapy: possible adjuvant oral chemo  Radiation: radiology consult 01/27/2023  Hormone Therapy: 6 months neoadjuvant estrogen therapy    Past Medical History:   Diagnosis Date    Bowel obstruction     Breast cancer 05/01/2022    left    Cancer     COVID-19 07/2022    Diabetes mellitus, type 2     Hyperlipidemia     Hypertension     Lobular carcinoma in situ 05/01/2022    left    Midline low back pain without sciatica 07/31/2015    Thyroid disease     Venous stasis     bilateral legs        Medications:  Kee has a current medication list which includes the following prescription(s): amlodipine, anastrozole, aspirin, clonidine, gabapentin, hydrocodone-acetaminophen, ibuprofen, levothyroxine, losartan, lovastatin, metformin, methylcellulose, multivitamin, ropinirole, triamcinolone acetonide 0.5%, and [DISCONTINUED] premarin.    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   Best: 0/10, Worst: 2/10 (more discomfort than pain)  Pain location: left shoulder     Objective   Mental Status: A/O x 3    Postural Exam / Scapula Alignment: FHP, rounded shoulders    Skin Integrity: Therapist deferred formal skin / incision assessment today secondary to recent gauze dressing placement for fluid drainage prior to physical therapy evaluation. Patient verbalized understanding and agreeable for therapist to perform skin integrity assessment at next available time.    Scar Location: Not assessed  Appearance: Not assessed  Signs of Infection: Not assessed  Drainage: Patient reports bloody / blood-tinged discharge  Color: Not assessed    Edema: mild  Location: residual post-operative swelling near L axilla    Axillary Web Syndrome / Cording:  Location: none  Degree of Cording: N/A   Number of Cords Present: N/A    Sensation: WNL         Shoulder Range of Motion:   Active ROM Right Left   Flexion 155 70   Abduction 180 35   Extension 70 40   IR/90deg 90 90 @ 35 deg   ER/90deg 60 10 @ 35 deg          Upper Extremity Strength:   (R) UE (L) UE   Shoulder Flexion: 3+/5 2-/5   Shoulder Abduction: 3+/5 2-/5   Shoulder IR 3+/5 2-/5   Shoulder ER 3+/5 2-/5   Elbow Flexion: 3+/5 3+/5   Elbow Extension: 3+/5 3+/5   Wrist Flexion: 4/5 4/5   Wrist Extension: 4-/5 4-/5    29.3 lb 18.5 lb       Baseline Measurements of BUE's for Early Detection of Lymphedema:     LANDMARK RIGHT UE LEFT UE DIFFERENCE   E + 8" 37 cm 38 cm 1 cm   E + 6" 36 cm 35.5 cm 0.5 cm   E + 4" 35 cm 34.5 cm 0.5 cm   E + 2" 30.5 cm 30 cm 0.5 cm   Elbow 26.5 cm 25 cm 1.5 cm   W+ 8" 26 cm 25 cm 1 cm   W +  6" 24 cm 23 cm 1 cm   W + 4" 20 cm 20 cm 0 cm   Wrist 17.5 cm 18.5 cm 1 cm   DPC 21 cm 20 cm 1 cm   IP Thumb 7 cm 7 cm 0 cm     Functional Mobility (bed mobility, transfers): independent / mod I    ADL's: independent    Gait Assessment  - AD Used: none  - Assistance: independent  - Distance: community distances     Patient " Education Provided     - Role of physical therapy in multi-disciplinary team  - Goals for physical therapy, scheduling  - Home exercise program, exercise technique  - Energy conservation techniques  - Lymphedema etiology and management plans.    - Written lymphedema risk reductions and precautions provided    Patient has no cultural, educational or language barriers to learning provided.    Treatment and Instruction of Home Exercise Program      Total Time Separate from Evaluation: 15 Minutes    Kee received individual therapeutic exercises to improve postural correction and alignment, stretching and soft tissue mobility, and strengthening for 5 minutes including the following:     - Butterfly stretch (hands at chin)  10 sec x 5 reps  - Doorway stretch (hands below shoulders) 30 sec x 2 reps      Kee received the following manual therapy techniques were performed to increased myofascial/soft tissue length, mobility and pliability, increase PROM, AROM and function as well as to decrease pain for 10 minutes    - Soft tissue mobilization to L pec minor (gentle)    Written Home Exercises Provided: yes. Exercises were reviewed and Kee was able to demonstrate them prior to the end of the session. Kee demonstrated good  understanding of the education provided.     See EMR under Patient Instructions for exercises provided 1/25/2023.    Functional Limitations Reporting      CMS Impairment / Limitation / Restriction for FOTO Shoulder Survey    Therapist reviewed FOTO scores for Kee Ramirez on 1/25/2023.   FOTO documents entered into [employer] - see Media section.    Limitations Score: 51%  Category: Other         Assessment   This is a 77 y.o. female referred to outpatient physical therapy and presents with a medical diagnosis of LEFT breast cancer and was seen today post-operatively to establish physical therapy plan of care for impairments following surgery including: limited bilateral upper extremity active range of  motion, limited bilateral upper extremity strength, impaired soft tissue mobility, abnormal posture, post-operative edema, and increased risk for lymphedema.      Patient instructed in home exercise program this session and was able to perform all exercises given independently. Patient instructed to follow up with therapist if any concerns arise with program established. Patient will continue to benefit from skilled physical therapy to address the impairments stated in chart below, provide patient/family education and to maximize patient's level of independence in home and community environment     Patient Prognosis is Good.    Anticipated barriers to physical therapy:   - Prolonged time between surgery and beginning physical therapy     Patient's spiritual, cultural and educational needs considered and patient agreeable to plan of care and goals as stated below:     Medical necessity is demonstrated by the following IMPAIRMENTS / PROBLEM LIST:    History  Co-morbidities and personal factors that may impact the plan of care Co-morbidities:   advanced age, diabetes, high BMI, history of cancer, and HTN    Personal Factors:   no deficits     moderate   Examination  Body Structures and Functions, activity limitations and participation restrictions that may impact the plan of care Body Regions:   upper extremities  trunk    Body Systems:    gross symmetry  ROM  strength  gross coordinated movement  edema  scar formation  skin integrity    Participation Restrictions:   None    Activity limitations:   Learning and applying knowledge  no deficits    General Tasks and Commands  no deficits    Communication  no deficits    Mobility  lifting and carrying objects    Self care  no deficits    Domestic Life  cooking  doing house work (cleaning house, washing dishes, laundry)    Interactions/Relationships  no deficits    Life Areas  no deficits    Community and Social Life  no deficits         moderate   Clinical Presentation  evolving clinical presentation with changing clinical characteristics moderate   Decision Making/ Complexity Score: moderate       Goals: Patient agrees with goals set  Short Term Goals: 4 Weeks  Patient will demonstrate 100% understanding of lymphedema risk reduction practices, including self-monitoring for lymphedema (progressing, not met).  Patient will demonstrate independence with established home exercise program for improved strength, functional mobility, range of motion, posture, and endurance. (progressing, not met).   Patient will increase LEFT shoulder FLEXION active range of motion to 125 degrees for improved independence with functional reaching, carrying, pushing, and pulling tasks (progressing, not met).   Patient will increase LEFT shoulder ABDUCTION active range of motion to 110 degrees for improved independence with functional reaching, carrying, pushing, and pulling tasks (progressing, not met).   Strength will be assessed and appropriate goals set (progressing, not met).     Long Term Goals: 8 Weeks   Patient will be independent with updated home exercise program for improved functional mobility, posture, strength, and endurance (progressing, not met).  Patient will increase BILATERAL shoulder FLEXION active range of motion to 180 degrees for improved independence with functional reaching, carrying, pushing, and pulling tasks (progressing, not met).   Patient will increase LEFT shoulder ABDUCTION active range of motion to 180 degrees for improved independence with functional reaching, carrying, pushing, and pulling tasks (progressing, not met).   Patient will increase gross upper extremity musculature to 5/5 for improved independence with functional reaching, carrying, pushing, and pulling tasks (progressing, not met).   Patient will report decrease in overall worst pain to 2/10 at discharge for improved tolerance to functional reaching, carrying, pushing, and pulling activities of daily living  (progressing, not met).  Patient will report compliance with walking program 5x/week for 10 minutes/day to improve overall cardiovascular function and decrease cancer-related fatigue at discharge (progressing, not met).       Plan   Outpatient physical therapy 2x week for 8 weeks to include the following: Gait Training, Manual Therapy, Neuromuscular Re-ed, Patient Education, Self Care, Therapeutic Activities, and Therapeutic Exercise.    Plan of Care Certification Period: 1/25/2023 to 03/24/2023.    Therapist: Irene Avelar, PT  1/25/2023

## 2023-01-25 NOTE — PROGRESS NOTES
Form Post-Op  [facility]  Department of Surgery    PCP: Ravin Viera MD  MEDICAL ONCOLOGIST:    Dr. Carlin   RADIATION ONCOLOGIST:   N/a    DIAGNOSIS:    This is a 77 y.o. 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    TREATMENT SUMMARY:  The patient is status post left  mastectomy and sentinel node biopsy on 1/4/2023.  Final pathology showed     Final Pathologic Diagnosis 1.  Left axillary sentinel lymph node #1, hot and blue; count 299, excisional   biopsy: 1 lymph node, negative for metastatic carcinoma (0/1).          Confirmed by negative immunohistochemical staining with cytokeratins   AE1/AE3, cam 5.2 and wide spectrum keratin with          satisfactory positive and negative controls.   2.  Left axillary sentinel lymph node #2, hot and blue; count 652, excisional   biopsy: 1 lymph node, negative for metastatic carcinoma (0/1).          Confirmed by negative immunohistochemical staining with cytokeratins   AE1/AE3, cam 5.2 and wide spectrum keratin with          satisfactory positive and negative controls.   3.  Left breast, skin sparing mastecomy: Invasive lobular carcinoma,   measuring at least 51 mm (at least 5.1 cm) in greatest dimension (slide   measurement of largest          dimension).          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          Note:  Biopsy clip is grossly identified and biopsy site changes are   identified microscopically within area of tumor.          Margins:           - All margins are greater than 10 mm (greater than 1 cm) from   invasive tumor and LCIS.          Nipple and skin are present and uninvolved by tumor.  Note:  Tumor is   present in the soft tissue underlying the nipple skin but          does not involve nipple epidermis.          Microcalcifications are present and associated with lobular carcinoma   in situ and invasive carcinoma.          Background breast tissue shows fibrocystic changes including apocrine    metaplasia and stromal fibrosis.          See synoptic report in comment section for further details.   4.  Right port-a-cath, removal: Gross diganosis:  Port-a-cath.     Comment:  Synoptic Report   Specimen:   Procedure:  Total mastectomy   Specimen laterality:  Left breast   Tumor:   Tumor site:  Central   Histologic type:  Invasive lobular carcinoma   Histologic grade (Paola histologic score) Glandular (acinar)/tubular   differentiation:  Score 3          Nuclear pleomorphism:  Score 1          Mitotic rate:  Score 1          Overall grade:  Grade 1   Tumor size: Greatest dimension of largest invasive focus:  At least 51 mm (at   least 5.1 cm). Note:  The largest grossly identified area of tumor is   measured on the corresponding microscopic slides at 51                  mm.  However there are additional sections of tumor in   adjacent fibrous areas that are also tumor but were not                  discernible grossly and are not contiguous for additional   measurement.  Therefore this tumor is best measured                  at at least 51 mm in greatest dimension.  Only one area of   mass lesion with adjacent fibrotic tissue was                  identified grossly, therefore this is interpreted to be a   single mass lesion.   Tumor focality:  Single focus of invasive carcinoma.   Ductal carcinoma in Situ:  Not identified   Lobular carcinoma in Situ:  Present   Tumor extent:  Skin and nipple are present and uninvolved by tumor.  Skeletal   muscle is not present for evaluation.   Lymphovascular invasion:  Not identified   Dermal lymphovascular invasion:  Not identified   Microcalcifications:  Present in invasive carcinoma and lobular carcinoma in   situ.   Treatment effect in breast:  No definite response to presurgical therapy in   the invasive carcinoma.   Treatment effect in the lymph nodes:  No definite response to presurgical   therapy in lymph nodes.   Margins:  All margins are negative for invasive  "carcinoma. Distance from   invasive carcinoma to closest margin:  Invasive carcinoma is located greater   than 10 mm (greater than 1 cm)          from all surgical margins.   Regional lymph nodes:   Regional lymph node status:  Regional lymph nodes are present and all   regional lymph nodes are negative for tumor. Number of lymph nodes with   macrometastasis (greater than 2 mm):  0          Number of lymph nodes with micrometastasis:  0          Number of lymph nodes with isolated tumor cells:  0     Total number of lymph nodes examined (sentinel and nonsentinel):  2          Total number of sentinel nodes examined:  2   Distant metastasis:  Not applicable   Pathologic stage classification:   TNM descriptors: "y":  Posttreatment   Primary tumor:        ypT3:  Tumor greater than 50 mm in greatest dimension.   Regional lymph nodes:        y(sn)pN0:  No regional lymph node metastasis identified.   Distant metastasis:        ypMX:  Cannot be assessed.   Additional findings:  Fibrocystic changes.   Additional special studies/biomarker reporting:   Immunohistochemical stains for biomarker reporting were completed on the   patient's prior left breast core biopsy material from March 21, 2022, case   number [id number]-, with the following results:   "ER/IL/Her-2 rhina and Ki67 testing performed at [facility]   ([facility]-). Diagnosed by   Carolyn Infante MD. Her diagnoses are as follows:   OUTSIDE SLIDES FOR BREAST BIOMARKERS LABELED [facility]-/[facility]-   LEFT BREAST   Ancillary studies- (Performed on block 1A)   Estrogen receptor: Positive (strong intensity, >95% of tumor cell nuclei).   Progesterone receptor: Positive (strong intensity, 15% of tumor cell nuclei).   HER2 IHC: Negative.   Ki-67: 10%.       INTERVAL HISTORY:   Kee Ramirez comes in for a post-op check.  She denies fever, chills, chest pain or shortness of breath.  Her pain is well controlled.  Reports drainage from left breast incision that " started a few days ago. She has been using gauze to cover opening.   MEDICATIONS:  Current Outpatient Medications   Medication Sig Dispense Refill    amLODIPine (NORVASC) 10 MG tablet Take 1 tablet (10 mg total) by mouth once daily. 90 tablet 3    anastrozole (ARIMIDEX) 1 mg Tab Take 1 tablet (1 mg total) by mouth once daily. 90 tablet 3    aspirin (ECOTRIN) 81 MG EC tablet Take 81 mg by mouth once daily.      cloNIDine (CATAPRES) 0.1 MG tablet TAKE 1 TABLET BY MOUTH THREE TIMES DAILY 90 tablet 11    gabapentin (NEURONTIN) 300 MG capsule Take 1 capsule (300 mg total) by mouth 3 (three) times daily. (Patient taking differently: Take 300 mg by mouth as needed.) 90 capsule 5    ibuprofen (ADVIL,MOTRIN) 800 MG tablet Take 1 tablet (800 mg total) by mouth 3 (three) times daily. 90 tablet 3    levothyroxine (SYNTHROID) 112 MCG tablet Take 1 tablet (112 mcg total) by mouth before breakfast. 90 tablet 3    losartan (COZAAR) 100 MG tablet Take 1 tablet (100 mg total) by mouth once daily. 90 tablet 3    lovastatin (MEVACOR) 20 MG tablet Take 2 tablets (40 mg total) by mouth every evening. 180 tablet 3    metFORMIN (GLUCOPHAGE) 500 MG tablet Take 1 tablet (500 mg total) by mouth daily with breakfast. 90 tablet 3    methylcellulose (ARTIFICIAL TEARS) 1 % ophthalmic solution Place 1 drop into both eyes as needed.      multivitamin (THERAGRAN) per tablet Take 1 tablet by mouth once daily.      rOPINIRole (REQUIP) 1 MG tablet Take 1 tablet (1 mg total) by mouth every evening. 90 tablet 3    triamcinolone acetonide 0.5% (KENALOG) 0.5 % Crea Apply topically 2 (two) times daily. 15 g 5    HYDROcodone-acetaminophen (NORCO) 5-325 mg per tablet Take 1 tablet by mouth every 6 (six) hours as needed for Pain. (Patient not taking: Reported on 1/19/2023) 20 tablet 0     No current facility-administered medications for this visit.       ALLERGIES:   Review of patient's allergies indicates:   Allergen Reactions    Naproxen sodium Itching        PHYSICAL EXAMINATION:   General:  This is a well appearing female with appropriate speech, affect and gait.     Breast:  Incision clean, dry, and intact. Palpable hematoma at medial incision border. There are two small pin-point openings along the incision.      Fine Needle Aspiration Procedure Note    Pre-operative Diagnosis: left breast hematoma     Post-operative Diagnosis: same    Location: left breast    Anesthesia: 1% plain lidocaine    Procedure Details   The Procedure, risks and complications have been discussed in detail (including, but not limited to pain, infection, bleeding) with the patient, and the patient has signed consent to have the surgery completed.    The skin was sterilely prepped and draped over the affected area in the usual fashion.  Fine Needle Aspiration was performed with a 18 gauge using ultrasound guidance.  Contents of Aspiration: ~ 40 ml of serosanguinous fluid.  EBL: none  Sterile dressing placed.  May place ice pack over affected area during the first 24 hours.    Condition:  Stable    Complications:  none.    IMPRESSION:   The patient has had a post-operative complication with hematoma and incision opening.     PLAN:   1. Discussed TB recommendations   2. Drained about 40 cc of sanguineous drainage from medial palpable hematoma pocket using US guidance; patient tolerated well. I will see her back Monday.     The patient is in agreement with the plan. Questions were encouraged and answered to patient's satisfaction. Kee will call our office with any questions or concerns.

## 2023-01-25 NOTE — PROGRESS NOTES
PROGRESS NOTE    Subjective:       Patient ID: Kee Ramirez is a 77 y.o. female.  MRN: [id number]  : 1945    Chief Complaint: ILC of the left breast     History of Present Illness:   Kee Ramirez is a 77 y.o. female who presents with  ILC of the left breast.       Reports yearly mammograms , normal in . In  screening mammogram showed left breast architectural distortion. Diagnostic mammogram in March showed a 21 x 9  X 2 0 mm left breast mass. US guided biopsy showed ILC, ER strongly positive, AR 15% positive, Her 2 rhina negative.   A breast MRI showed a 7.6 x 4.7 cm mass with spiculated margins.No abnormal lymph nodes were found.      No history of breast mass or biopsies. She has been seeing Dr. Ortiz and is transferring care to use due to proximity. See full oncology history below.   She has been referred for neoadjuvant chemotherapy. We met today as a follow up visit to further discuss neoadjuvant therapy.  At her visit two weeks ago, we discussed neoadjuvant chemotherapy vs neoadjuvant endocrine therapy.  She is interested in breast conservation and is not a candidate for upfront lumpectomy given tumor size and extension to the nipple areolar complex.  She met with Dr. Valente who agrees that she may not be a candidate for breast conservation even after neoadjuvant therapies.  Patient wants to try.     An oncotype was sent to determine if she was high risk and would benefit from chemotherapy.  We were notified there was not enough tissue specimen from the original biopsy for the oncotype.  After extensive discussion, we elected to start neoadjuvant anastrozole.     2022: Started anastrozole      She was admitted to the hospital from -, found to have SBO. Has NGT for decompression and improved with conservative measures.     Interim history:    She completed 6 months of neoadjuvant anastrozole and has had a left mastectomy  without reconstruction on 1/4/23.   She has been recovering well but noticed left serosanguinous discharge at the site of incision for the past 2 days.  She denies any pain.     Oncology History:  As dcoumented by  and updated      03.21.2022 Left Breast, Core Needle Biopsies:   - Invasive lobular carcinoma, well-differentiated .   - Bloom-Damon score (5/9):        - Tubular Formation: 3/3        - Nuclear Pleomorphism: 1/3        - Mitotic Activity: 1/3.   - Background of lobular carinoma in-situ (LCIS), nuclear grade 1.   - No perineural or lymphovascular invasion is identified.   Estrogen receptor: Positive (strong intensity, >95% of tumor cell nuclei).   Progesterone receptor: Positive (strong intensity, 15% of tumor cell nuclei).   HER2 IHC: Negative.   Ki-67: 10%.  04.04.2022 [facility] TB -- Surgery FU w/Oncotype  04.06.2022 [facility] follow up  -Breast MRI for staging, discussed surgery options; pt would like BCT, will make definitive plan after MRI  05.03.2022 Breast MRI  -Irregular 7.6 cm enhancing spiculated mass in the upper LEFT breast, consistent with the patient's known biopsy-proven invasive lobular carcinoma. The enhancement does extend to the nipple-areolar complex.  -No suspicious abnormality in the RIGHT breast.   BI-RADS Category: Overall: 6 - known biopsy-proven malignancy  05.06.2022 Follow up with [facility]  -Plan for Axillary U/S to assess LAD, PetCT  -[facility] referral for NA therapy. Due to size of mass/location and nature of lobular cancer, was not felt that BCT was optimal unless NA therapy was done 1st   -2 week follow up to discuss further   05.13.2022 PetCT  -Mildly hypermetabolic irregular soft tissue within the left breast in keeping with known lobular carcinoma. This mass is better appreciated on MRI of the breast.  -No definite evidence of metastatic disease.  Please note that FDG PET has has suboptimal sensitivity for certain histologies such as lobular and tubular  carcinomas.  05.18.2022 Initial [facility] eval    12/1/22  MRI     Impression:  Left  Interval decreased enhancement of left breast malignancy (biopsy proven ILC).  Residual abnormal enhancement still spans 8.1 cm AP x 2.5 cm transverse and extends to the left nipple areolar complex.     Right  There is no MR evidence of malignancy.     BI-RADS Category:   Overall: 6 - Known Biopsy-Proven Malignancy    Final Pathologic Diagnosis 1.  Left axillary sentinel lymph node #1, hot and blue; count 299, excisional   biopsy: 1 lymph node, negative for metastatic carcinoma (0/1).          Confirmed by negative immunohistochemical staining with cytokeratins   AE1/AE3, cam 5.2 and wide spectrum keratin with          satisfactory positive and negative controls.   2.  Left axillary sentinel lymph node #2, hot and blue; count 652, excisional   biopsy: 1 lymph node, negative for metastatic carcinoma (0/1).          Confirmed by negative immunohistochemical staining with cytokeratins   AE1/AE3, cam 5.2 and wide spectrum keratin with          satisfactory positive and negative controls.   3.  Left breast, skin sparing mastecomy: Invasive lobular carcinoma,   measuring at least 51 mm (at least 5.1 cm) in greatest dimension (slide   measurement of largest          dimension).          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          Note:  Biopsy clip is grossly identified and biopsy site changes are   identified microscopically within area of tumor.          Margins:           - All margins are greater than 10 mm (greater than 1 cm) from   invasive tumor and LCIS.          Nipple and skin are present and uninvolved by tumor.  Note:  Tumor is   present in the soft tissue underlying the nipple skin but          does not involve nipple epidermis.          Microcalcifications are present and associated with lobular carcinoma   in situ and invasive carcinoma.          Background breast tissue shows fibrocystic changes including  apocrine   metaplasia and stromal fibrosis.          See synoptic report in comment section for further details.   4.  Right port-a-cath, removal: Gross diganosis:  Port-a-cath.         ypT3:  Tumor greater than 50 mm in greatest dimension.   Regional lymph nodes:        y(sn)pN0:  No regional lymph node metastasis identified.   Distant metastasis:     Oncology History:  Oncology History   Malignant neoplasm of central portion of left female breast   3/21/2022 Initial Diagnosis    Malignant neoplasm of central portion of left female breast     3/21/2022 Biopsy    Left Breast, Core Needle Biopsies:   - Invasive lobular carcinoma, well-differentiated .   - Bloom-Damon score (5/9):        - Tubular Formation: 3/3        - Nuclear Pleomorphism: 1/3        - Mitotic Activity: 1/3.   - Background of lobular carinoma in-situ (LCIS), nuclear grade 1.   - No perineural or lymphovascular invasion is identified.        3/21/2022 Breast Tumor Markers    Estrogen: Positive  Progesterone: Positive  HER2: Negative     5/27/2022 - 5/27/2022 Chemotherapy    This was considered, but patient did not undergo chemotherapy.   Treatment Summary   Plan Name: OP BREAST DOSE-DENSE AC-T (DOXORUBICIN CYCLOPHOSPHAMIDE Q2W FOLLOWED BY PACLITAXEL WEEKLY)  Treatment Goal: Curative  Status: Inactive  Start Date:   End Date:   Provider: Mitali Carlin MD  Chemotherapy: DOXOrubicin chemo injection 130 mg, 60 mg/m2, Intravenous, Clinic/HOD 1 time, 0 of 4 cycles  cyclophosphamide 600 mg/m2 = 1,300 mg in sodium chloride 0.9% 250 mL chemo infusion, 600 mg/m2, Intravenous, Clinic/HOD 1 time, 0 of 4 cycles  PACLitaxeL (TAXOL) 80 mg/m2 = 174 mg in sodium chloride 0.9% 250 mL chemo infusion, 80 mg/m2, Intravenous, Clinic/HOD 1 time, 0 of 12 cycles       6/7/2022 Cancer Staged    Staging form: Breast, AJCC 8th Edition  - Clinical: Stage IIA (cT3, cN0, cM0, G1, ER+, MO+, HER2-)       6/7/2022 Notable Event    Oncotype performed initially to assess for  benefit of NACT to optimize for surgery but not enough tissue. Proceeded with AI x 6 months.      6/7/2022 Genetic Testing    Ubitricity Pradip: Negative                                           1/4/2023 Breast Surgery    Left Mastectomy with L SLNB.      1/24/2023 Tumor Conference    No definitive treatment response to neoadjuvant AI use on final surgical pathology. Oncotype will now be sent on the surgical specimen. Could include CDK46 inhibitor with her endocrine if Oncotype is low given some response to Anastrozole and large tumor size, but patient is node negative. May consider to simply change to different AI. Radiation Oncology would like to meet with patient to discuss XRT, but team does not feel strongly about her proceeding.            History:  Past Medical History:   Diagnosis Date    Bowel obstruction     Breast cancer 05/01/2022    left    Cancer     COVID-19 07/2022    Diabetes mellitus, type 2     Hyperlipidemia     Hypertension     Lobular carcinoma in situ 05/01/2022    left    Midline low back pain without sciatica 07/31/2015    Thyroid disease     Venous stasis     bilateral legs      Past Surgical History:   Procedure Laterality Date    BREAST BIOPSY Left 03/21/2022    Core bx, + ILC    COLONOSCOPY      COLONOSCOPY N/A 12/05/2019    Procedure: COLONOSCOPY;  Surgeon: Luis Bogran-Reyes, MD;  Location: [facility];  Service: Endoscopy;  Laterality: N/A;    HYSTERECTOMY  12/01/2021    INJECTION FOR SENTINEL NODE IDENTIFICATION Left 1/4/2023    Procedure: INJECTION, FOR SENTINEL NODE IDENTIFICATION;  Surgeon: Erika Valente MD;  Location: [facility];  Service: General;  Laterality: Left;    INSERTION OF TUNNELED CENTRAL VENOUS CATHETER (CVC) WITH SUBCUTANEOUS PORT Right 05/24/2022    Procedure: HENEKWEFN-AJAN-O-CATH;  Surgeon: Darien Bear MD;  Location: [facility];  Service: General;  Laterality: Right;    MASTECTOMY Left 1/4/2023    Procedure: MASTECTOMY;  Surgeon: Erika Valente MD;  Location: [facility];   Service: General;  Laterality: Left;    MEDIPORT REMOVAL Right 1/4/2023    Procedure: REMOVAL PORT;  Surgeon: Erika Valente MD;  Location: [facility];  Service: General;  Laterality: Right;    SENTINEL LYMPH NODE BIOPSY Left 1/4/2023    Procedure: BIOPSY, LYMPH NODE, SENTINEL;  Surgeon: Erika Valente MD;  Location: [facility];  Service: General;  Laterality: Left;    SHOULDER ARTHROSCOPY W/ ROTATOR CUFF REPAIR Right 2001    TUBAL LIGATION      VAGINAL HYSTERECTOMY N/A 01/11/2021    Procedure: HYSTERECTOMY, VAGINAL ;  Surgeon: Jessie Dacosta MD;  Location: [facility];  Service: OB/GYN;  Laterality: N/A;     Family History   Problem Relation Age of Onset    Diabetes Mother     Hypertension Mother     Arthritis Mother     COPD Father     Hypertension Sister     Diabetes Sister     Cancer Sister         PANCREATIC    Breast cancer Sister      Social History     Tobacco Use    Smoking status: Never    Smokeless tobacco: Never   Substance and Sexual Activity    Alcohol use: No     Alcohol/week: 0.0 standard drinks    Drug use: No    Sexual activity: Not Currently     Partners: Male     Birth control/protection: None, See Surgical Hx        ROS:   Review of Systems   Constitutional: Negative for fever, malaise/fatigue and weight loss.   HENT: no hearing loss, nosebleeds and sore throat.    Eyes: Negative for double vision and photophobia.   Respiratory: no hemoptysis, sputum production, shortness of breath and wheezing.    Cardiovascular: Negative for chest pain, palpitations, orthopnea and leg swelling.   Gastrointestinal: Negative for abdominal pain, blood in stool, constipation, + diarrhea heartburn, nausea and vomiting.   Genitourinary: Negative for dysuria, hematuria and urgency.   Musculoskeletal: Negative for back pain, joint pain and myalgias.   Skin: Negative for itching and rash.   Neurological: Negative for dizziness, tingling, seizures, weakness and headaches.   Endo/Heme/Allergies: Negative for polydipsia. Does not  "bruise/bleed easily.   Psychiatric/Behavioral: Negative for depression and memory loss. The patient is not nervous/anxious and does not have insomnia.       Objective:     Vitals:    01/25/23 0856   BP: (!) 165/74   Pulse: 76   Resp: 18   Temp: 97.6 °F (36.4 °C)   TempSrc: Oral   SpO2: 98%   Weight: 95.5 kg (210 lb 8.6 oz)   Height: 5' 7" (1.702 m)   PainSc: 0-No pain         Wt Readings from Last 10 Encounters:   01/25/23 95.3 kg (210 lb)   01/25/23 95.5 kg (210 lb 8.6 oz)   01/19/23 100.2 kg (221 lb)   12/22/22 93.4 kg (206 lb)   12/22/22 93.4 kg (206 lb)   12/14/22 93.9 kg (207 lb 0.2 oz)   12/08/22 94.8 kg (209 lb)   11/28/22 95.9 kg (211 lb 6.4 oz)   11/09/22 96.3 kg (212 lb 4.9 oz)   09/16/22 96.2 kg (212 lb)       Physical Examination:   Physical Exam  Vitals and nursing note reviewed.   Constitutional:       General: She is not in acute distress.     Appearance: She is not diaphoretic.   HENT:      Head: Normocephalic.   Eyes:      General: No scleral icterus.     Conjunctiva/sclera: Conjunctivae normal.   Neck:      Thyroid: No thyromegaly.   Cardiovascular:      Rate and Rhythm: Normal rate and regular rhythm.      Heart sounds: Normal heart sounds. No murmur heard.  Pulmonary:      Effort: Pulmonary effort is normal. No respiratory distress.      Breath sounds: No stridor. No wheezing or rales.   Chest:      Chest wall: No tenderness.     Musculoskeletal:         General: No tenderness or deformity. Normal range of motion.      Cervical back: Neck supple.   Lymphadenopathy:      Cervical: No cervical adenopathy.   Skin:     General: Skin is warm and dry.      Findings: No erythema or rash.      Comments:+ left mastectomy w/o reconstruction. + serosanguinous discharge and small openings along the incision line.   Neurological:      Mental Status: She is alert and oriented to person, place, and time.      Cranial Nerves: No cranial nerve deficit.      Coordination: Coordination normal.      Gait: Gait is " intact.   Psychiatric:         Mood and Affect: Affect normal.         Cognition and Memory: Memory normal.         Judgment: Judgment normal.     Diagnostic Tests:  Significant Imaging: I have reviewed and interpreted all pertinent imaging results/findings.    Laboratory Data:  All pertinent labs have been reviewed.  Labs:   Lab Results   Component Value Date    WBC 5.62 12/22/2022    RBC 4.10 12/22/2022    HGB 11.9 (L) 12/22/2022    HCT 37.2 12/22/2022    MCV 91 12/22/2022     12/22/2022     (H) 12/22/2022     12/22/2022    K 4.7 12/22/2022    BUN 23 12/22/2022    CREATININE 0.8 12/22/2022    AST 13 12/22/2022    ALT 16 12/22/2022    BILITOT 0.7 12/22/2022       Assessment/Plan:   Malignant neoplasm of central portion of left breast in female, estrogen receptor positive  cT3N0, ER 95%, PR15%, Her 2 rhina negative, Grade 1, ki 67 10%     ypT3 yp (sn) N0   Oncotype low risk, 13     She was interested in breast conservation and received 6 months of neoadjuvant anastrozole.   Follow up MRI showed some response but persistent large area of enhancement with extension to the nipple areolar complex. Mastectomy was recommended.   Final pathology and TB recommendations were discussed. There was 5 cm of ILC, node negative, no significant treatment response, G1, Ki 67 stable at 10%.  Per guidelines, oncotype was sent, is 13, low risk, no benefit of chemotherapy.       Will get Rad onc opinion for PMRT.     Resume AI once she recovers from surgery, have sent her to the surgical clinic for evaluation today.     DEXA normal in June, continue calcium and vitamin D.      Primary Hypertension   Compliant with current medications  Continue PMD follow up.     ECOG SCORE    0 - Fully active-able to carry on all pre-disease performance without restriction       Discussion:   No follow-ups on file.    Plan was discussed with the patient at length, and she verbalized understanding. Kee was given an opportunity to ask  questions that were answered to her satisfaction, and she was advised to call in the interval if any problems or questions arise.Electronically signed by Mitali Carlin MD          Route Chart for Scheduling    Med Onc Chart Routing      Follow up with physician . 2/22/23, morning appt   Follow up with KAYLYN    Infusion scheduling note    Injection scheduling note    Labs    Imaging    Pharmacy appointment    Other referrals        Treatment Plan Information   OP ANASTROZOLE DAILY   Mitali Carlin MD   Upcoming Treatment Dates - OP ANASTROZOLE DAILY    6/24/2022       Antiemetics       Physician communication order       Take Home Chemotherapy       anastrozole (ARIMIDEX) 1 mg Tab    Therapy Plan Information  Flushes  heparin, porcine (PF) 100 unit/mL injection flush 500 Units  500 Units, Intravenous, Every visit  sodium chloride 0.9% flush 10 mL  10 mL, Intravenous, Every visit

## 2023-01-25 NOTE — PATIENT INSTRUCTIONS
POST OP PATIENT EDUCATION    When to call your doctor:  - If any part of your affected arm or axilla is hot, red, or has increased swelling   - If you develop a temperature over 101 degrees Fahrenheit      Lymphedema - Identification and Prevention     Lymphedema - is the swelling of a body area or extremity caused by the accumulation of lymphatic fluid.  There is a risk for lymphedema with the removal of lymph nodes, trauma or radiation therapy.  Treatment of breast cancer often involves surgery: mastectomy or lumpectomy. Some of the lymph nodes in the underarm (called axillary lymph nodes) may be removed and checked to see if they contain cancer cells.     During breast surgery when axillary lymph nodes are removed (with sentinel node biopsy or axillary dissection) or are treated with radiation therapy, the lymphatic system may become impaired. This may prevent lymphatic fluid from leaving the area therefore, causing lymphedema.     Lymphatic fluid is a normal part of the circulatory system. Its function is to remove waste products and to produce cells vital to fighting infection. Swelling occurs when the vessels become restricted and the lymphatic fluid is unable to freely flow through them.  If lymphedema is left untreated, the affected limb could progressively become more swollen, which could lead to hardening of the skin, bulkiness in the limb, infection and impaired wound healing.         There are things you can do to decrease the chance of developing lymphedema.                                          [email or web address]                                                                                                                                                  The information presented is intended for general information and educational purposes. It is not intended to replace the  advice of your health care provider. Contact your health care provider if you  believe you have a health problem.                                                    POST OP EXERCISES - SAFE TO DO THE FIRST 2 WEEKS AFTER SURGERY UNTIL YOUR FOLLOW UP APPOINTMENT WITH PHYSICAL/OCCUPATIONAL THERAPY    Scapular Retraction (Standing)    With arms at sides, squeeze shoulder blades together. Do not shrug shoulders and do not hold your breath. Hold 5 seconds. Repeat 10 times, 2-3 sets, 2 x day.       Exaggerated Breathing and Relaxation      Practice deep breathing frequently in the first few days following surgery even before you begin exercising. This exercise helps with tissue extensibility in the chest wall.  Inhale slowly and deeply through the nose and exhale through pursed lips. Concentrate on relaxing as you let the air out of your lungs. Repeat three (3) to four (4) times, remembering to breath in deeply and then relaxing. This exercise helps to ease the sensation of pulling and discomfort that may be experienced while exercising.      Ball Squeeze OR Hand pumps       Perform this exercise three (3) times a day for 1-3 minutes each time.    The ball squeeze or hand pumps helps to prevent or reduce temporary swelling that may occur in the affected arm. This exercise may be performed standing, sitting or while lying in bed. During this exercise the affected arm should be slightly bent and held upward. Support your arm with a pillow if you are uncomfortable holding it up.    a. Hold a rubber ball in your hand on the affected side and lift your arm upward.  b. Alternate squeezing and relaxing the ball.        AROM: Elbow Flexion / Extension        With left hand palm up, gently bend elbow as far as possible. Then straighten arm as far as possible. Do this in standing.   Repeat 10 times per set. Do 2-3 sets per session. Do 1-3 sessions per day.

## 2023-01-27 ENCOUNTER — OFFICE VISIT (OUTPATIENT)
Dept: RADIATION ONCOLOGY | Facility: CLINIC | Age: 78
End: 2023-01-27
Payer: MEDICARE

## 2023-01-27 VITALS
TEMPERATURE: 99 F | WEIGHT: 208.44 LBS | DIASTOLIC BLOOD PRESSURE: 75 MMHG | HEART RATE: 73 BPM | BODY MASS INDEX: 32.72 KG/M2 | HEIGHT: 67 IN | OXYGEN SATURATION: 98 % | SYSTOLIC BLOOD PRESSURE: 162 MMHG

## 2023-01-27 DIAGNOSIS — C50.112 MALIGNANT NEOPLASM OF CENTRAL PORTION OF LEFT BREAST IN FEMALE, ESTROGEN RECEPTOR POSITIVE: Primary | ICD-10-CM

## 2023-01-27 DIAGNOSIS — C50.912 LOBULAR CARCINOMA OF LEFT BREAST: ICD-10-CM

## 2023-01-27 DIAGNOSIS — Z17.0 MALIGNANT NEOPLASM OF CENTRAL PORTION OF LEFT BREAST IN FEMALE, ESTROGEN RECEPTOR POSITIVE: Primary | ICD-10-CM

## 2023-01-27 PROCEDURE — 99204 PR OFFICE/OUTPT VISIT, NEW, LEVL IV, 45-59 MIN: ICD-10-PCS | Mod: S$PBB,,, | Performed by: RADIOLOGY

## 2023-01-27 PROCEDURE — 99999 PR PBB SHADOW E&M-EST. PATIENT-LVL III: ICD-10-PCS | Mod: PBBFAC,,, | Performed by: RADIOLOGY

## 2023-01-27 PROCEDURE — 99204 OFFICE O/P NEW MOD 45 MIN: CPT | Mod: S$PBB,,, | Performed by: RADIOLOGY

## 2023-01-27 PROCEDURE — 99999 PR PBB SHADOW E&M-EST. PATIENT-LVL III: CPT | Mod: PBBFAC,,, | Performed by: RADIOLOGY

## 2023-01-27 PROCEDURE — 99213 OFFICE O/P EST LOW 20 MIN: CPT | Mod: PBBFAC | Performed by: RADIOLOGY

## 2023-01-27 NOTE — PROGRESS NOTES
REFERRING PHYSICIAN:   Erika Valente MD, Mitali Carlin MD    DIAGNOSIS:  cT3 N0 M0 (ypT3N0) invasive lobular carcinoma of the left breast    HISTORY OF PRESENT ILLNESS:   Ms. Ramirez is a 77-year-old female who was recently diagnosed with left breast cancer after she presented with a palpable mass in the central aspect of the left breast.  Original workup was done at an outside facility and she recently transferred her care here.  A mammogram March 2022 revealed a 21 x 9 x 20 mm irregularly-shaped mass in the upper central region the left breast.  No lymphadenopathy was noted.  Biopsy lesion on March 21, 2022 revealed grade 1, invasive lobular carcinoma, which was ER positive (5%), ND positive (15%), and HER2 negative, with Ki-67 index of 10%.  An MRI of the bilateral breasts on May 3, 2022 revealed a 7.6 x 4.7 x 3.9 cm irregular mass with spiculated margins in the upper left breast.  The abnormal enhancement extends to the nipple areolar complex.  Again no axillary adenopathy is noted .she received 6 months of neoadjuvant anastrozole.  A repeat mammogram on September 16, 2022 again demonstrated the known mass measures approximately 62 mm.  A repeat MRI of the bilateral.  She underwent left skin sparing mastectomy and sentinel node biopsy on January 4, 2023.  Pathology revealed left breast with 51 mm, grade 1, invasive lobular carcinoma with all margins greater than 10 mm.  2/2 sentinel lymph nodes were negative for involvement.  Tumor is present at the soft tissue underlying the skin but does not involve nipple epidermis.  She is here today for discussion regarding further treatment.  Her Oncotype returned at 13.     At present, patient is healing from surgery without any unexpected side effects. She is noted to have serosanguinous discharge from the mastectomy scar which is being monitored by surgical oncology. She denies right chest wall pain or edema.  She also denies fever, night sweats, or weight loss.  She is noted  to have hospitalization for small-bowel obstruction during the course of neoadjuvant anastrozole.    REVIEW OF SYSTEMS:  As above.  In addition, patient denies headaches, visual problems, dizziness, chest pain, shortness of breath, cough, nausea, vomiting, diarrhea, or any new bony pains. Patient also denies easy bruising, skin rashes, or numbness or tingling.    GYN HISTORY:   Menarche at age 14.  .  Menopause at age 62.  She denies hormone replacement therapy.      ECO    PAST MEDICAL HISTORY:  Past Medical History:   Diagnosis Date    Bowel obstruction     Breast cancer 2022    left    Cancer     COVID-19 2022    Diabetes mellitus, type 2     Hyperlipidemia     Hypertension     Lobular carcinoma in situ 2022    left    Midline low back pain without sciatica 2015    Thyroid disease     Venous stasis     bilateral legs       PAST SURGICAL HISTORY:  Past Surgical History:   Procedure Laterality Date    BREAST BIOPSY Left 2022    Core bx, + ILC    COLONOSCOPY      COLONOSCOPY N/A 2019    Procedure: COLONOSCOPY;  Surgeon: Luis Bogran-Reyes, MD;  Location: [facility];  Service: Endoscopy;  Laterality: N/A;    HYSTERECTOMY  2021    INJECTION FOR SENTINEL NODE IDENTIFICATION Left 2023    Procedure: INJECTION, FOR SENTINEL NODE IDENTIFICATION;  Surgeon: Erika Valente MD;  Location: [facility];  Service: General;  Laterality: Left;    INSERTION OF TUNNELED CENTRAL VENOUS CATHETER (CVC) WITH SUBCUTANEOUS PORT Right 2022    Procedure: [id number];  Surgeon: Darien Bear MD;  [facility];  Service: General;  Laterality: Right;    MASTECTOMY Left 2023    Procedure: MASTECTOMY;  Surgeon: Erika Valente MD;  Location: [facility];  Service: General;  Laterality: Left;    MEDIPORT REMOVAL Right 2023    Procedure: REMOVAL PORT;  Surgeon: Erika Valente MD;  Location: [facility];  Service: General;  Laterality: Right;    SENTINEL LYMPH NODE BIOPSY Left  1/4/2023    Procedure: BIOPSY, LYMPH NODE, SENTINEL;  Surgeon: Erika Valente MD;  Location: [facility];  Service: General;  Laterality: Left;    SHOULDER ARTHROSCOPY W/ ROTATOR CUFF REPAIR Right 2001    TUBAL LIGATION      VAGINAL HYSTERECTOMY N/A 01/11/2021    Procedure: HYSTERECTOMY, VAGINAL ;  Surgeon: Jessie Dacosta MD;  Location: [facility];  Service: OB/GYN;  Laterality: N/A;       ALLERGIES:   Review of patient's allergies indicates:   Allergen Reactions    Naproxen sodium Itching       MEDICATIONS:  Current Outpatient Medications   Medication Sig    amLODIPine (NORVASC) 10 MG tablet Take 1 tablet (10 mg total) by mouth once daily.    anastrozole (ARIMIDEX) 1 mg Tab Take 1 tablet (1 mg total) by mouth once daily.    aspirin (ECOTRIN) 81 MG EC tablet Take 81 mg by mouth once daily.    cloNIDine (CATAPRES) 0.1 MG tablet TAKE 1 TABLET BY MOUTH THREE TIMES DAILY    gabapentin (NEURONTIN) 300 MG capsule Take 1 capsule (300 mg total) by mouth 3 (three) times daily. (Patient taking differently: Take 300 mg by mouth as needed.)    HYDROcodone-acetaminophen (NORCO) 5-325 mg per tablet Take 1 tablet by mouth every 6 (six) hours as needed for Pain. (Patient not taking: Reported on 1/19/2023)    ibuprofen (ADVIL,MOTRIN) 800 MG tablet Take 1 tablet (800 mg total) by mouth 3 (three) times daily.    levothyroxine (SYNTHROID) 112 MCG tablet Take 1 tablet (112 mcg total) by mouth before breakfast.    losartan (COZAAR) 100 MG tablet Take 1 tablet (100 mg total) by mouth once daily.    lovastatin (MEVACOR) 20 MG tablet Take 2 tablets (40 mg total) by mouth every evening.    metFORMIN (GLUCOPHAGE) 500 MG tablet Take 1 tablet (500 mg total) by mouth daily with breakfast.    methylcellulose (ARTIFICIAL TEARS) 1 % ophthalmic solution Place 1 drop into both eyes as needed.    multivitamin (THERAGRAN) per tablet Take 1 tablet by mouth once daily.    rOPINIRole (REQUIP) 1 MG tablet Take 1 tablet (1 mg total) by mouth every evening.     triamcinolone acetonide 0.5% (KENALOG) 0.5 % Crea Apply topically 2 (two) times daily.     No current facility-administered medications for this visit.       SOCIAL HISTORY:  Social History     Socioeconomic History    Marital status:     Years of education: college   Tobacco Use    Smoking status: Never    Smokeless tobacco: Never   Substance and Sexual Activity    Alcohol use: No     Alcohol/week: 0.0 standard drinks    Drug use: No    Sexual activity: Not Currently     Partners: Male     Birth control/protection: None, See Surgical Hx     Social Determinants of Health     Financial Resource Strain: Low Risk     Difficulty of Paying Living Expenses: Not hard at all   Food Insecurity: No Food Insecurity    Worried About Running Out of Food in the Last Year: Never true    Ran Out of Food in the Last Year: Never true   Transportation Needs: No Transportation Needs    Lack of Transportation (Medical): No    Lack of Transportation (Non-Medical): No   Physical Activity: Inactive    Days of Exercise per Week: 0 days    Minutes of Exercise per Session: 0 min   Stress: No Stress Concern Present    Feeling of Stress : Only a little   Social Connections: Unknown    Frequency of Communication with Friends and Family: More than three times a week    Frequency of Social Gatherings with Friends and Family: Three times a week    Active Member of Clubs or Organizations: Yes    Attends Club or Organization Meetings: More than 4 times per year    Marital Status:    Housing Stability: Low Risk     Unable to Pay for Housing in the Last Year: No    Number of Places Lived in the Last Year: 1    Unstable Housing in the Last Year: No       FAMILY HISTORY:  Family History   Problem Relation Age of Onset    Diabetes Mother     Hypertension Mother     Arthritis Mother     COPD Father     Hypertension Sister     Diabetes Sister     Cancer Sister         PANCREATIC    Breast cancer Sister          PHYSICAL EXAMINATION:  Vitals:  "   01/27/23 1316   BP: (!) 162/75   BP Location: Right arm   Patient Position: Sitting   BP Method: Large (Automatic)   Pulse: 73   Temp: 99 °F (37.2 °C)   TempSrc: Oral   SpO2: 98%   Weight: 94.6 kg (208 lb 7.1 oz)   Height: 5' 7" (1.702 m)   Body mass index is 32.65 kg/m².   GENERAL: Patient is alert and oriented, in no acute distress.  HEENT:Extraocular muscles are intact.  Oropharynx is clear without lesions.  There is no cervical or supraclavicular lymphadenopathy palpated.  No thyromegaly noted.  HEART: Regular rate and rhythm.  LUNGS: Clear to auscultation bilaterally.  BREAST EXAM: Right chestwall with scar secondary to mastectomy. Drainage noted in the bandage. Mild fluctuance noted near the scar. No abnormal masses palpated in the left chestwall or axilla. The right breast and right axilla are also without palpbable masses  ABDOMEN:Soft, nontender, nondistended, without hepatosplenomegaly.  Normoactive bowel sounds.  EXTREMITIES: No clubbing, cyanosis, or edema.  NEUROLOGICAL: Cranial nerve II through XII grossly intact.  Sensation is intact.  Strength is 5 out of 5 in the upper and lower extremities bilaterally.       ASSESSMENT:   This is a 77-year-old female with cT3 N0 M0 (ypT3N0), grade 1, invasive lobular carcinoma of the left breast to received neoadjuvant anastrozole followed by left skin sparing mastectomy and sentinel node biopsy on January 4, 2023, with pathology revealing residual grade 1 invasive lobular carcinoma measuring 51 mm, ER/MI positive, HER2 negative, Ki-67 index 10%, oncotype 13.    PLAN:   After discussion in the multidisciplinary breast conference and review of the pathology and images of the radiological studies, Ms. Ramirez is here today for consideration for postmastectomy chest wall radiation.  After review of the images of the MRI prior to start of neoadjuvant endocrine therapy, this is highly suspicious for extension into the nipple areolar complex.  She is noted to have some " response to treatment with anastrozole.  Given the initial presentation with tumor measuring greater than 5 cm as well as a suspicion for nipple areolar complex involvement, I recommend postmastectomy radiation to the left chest wall.  I plan to deliver approximately 4200 cGy+1000 cGy boost.    The risks, benefits, and side effects of radiation were explained in detail to the patient.  All questions were answered and informed consent was signed.  I plan to see the patient back for radiation planning CT in 2-3 weeks, once the drainage discontinues.    Psychosocial Distress screening score of Distress Score: 1 noted and reviewed. No intervention indicated.     I spent approximately 60 minutes reviewing the available records and evaluating the patient, out of which over 50% of the time was spent face to face with the patient in counseling and coordinating this patient's care.

## 2023-01-29 ENCOUNTER — PATIENT MESSAGE (OUTPATIENT)
Dept: HEMATOLOGY/ONCOLOGY | Facility: CLINIC | Age: 78
End: 2023-01-29
Payer: MEDICARE

## 2023-01-30 ENCOUNTER — OFFICE VISIT (OUTPATIENT)
Dept: SURGERY | Facility: CLINIC | Age: 78
End: 2023-01-30
Payer: MEDICARE

## 2023-01-30 VITALS
BODY MASS INDEX: 32.65 KG/M2 | HEIGHT: 67 IN | DIASTOLIC BLOOD PRESSURE: 75 MMHG | SYSTOLIC BLOOD PRESSURE: 178 MMHG | HEART RATE: 69 BPM | WEIGHT: 208 LBS

## 2023-01-30 DIAGNOSIS — Z17.0 MALIGNANT NEOPLASM OF CENTRAL PORTION OF LEFT BREAST IN FEMALE, ESTROGEN RECEPTOR POSITIVE: Primary | ICD-10-CM

## 2023-01-30 DIAGNOSIS — C50.112 MALIGNANT NEOPLASM OF CENTRAL PORTION OF LEFT BREAST IN FEMALE, ESTROGEN RECEPTOR POSITIVE: Primary | ICD-10-CM

## 2023-01-30 PROCEDURE — 99999 PR PBB SHADOW E&M-EST. PATIENT-LVL III: ICD-10-PCS | Mod: PBBFAC,,, | Performed by: NURSE PRACTITIONER

## 2023-01-30 PROCEDURE — 99024 PR POST-OP FOLLOW-UP VISIT: ICD-10-PCS | Mod: POP,,, | Performed by: NURSE PRACTITIONER

## 2023-01-30 PROCEDURE — 99999 PR PBB SHADOW E&M-EST. PATIENT-LVL III: CPT | Mod: PBBFAC,,, | Performed by: NURSE PRACTITIONER

## 2023-01-30 PROCEDURE — 99024 POSTOP FOLLOW-UP VISIT: CPT | Mod: POP,,, | Performed by: NURSE PRACTITIONER

## 2023-01-30 PROCEDURE — 99213 OFFICE O/P EST LOW 20 MIN: CPT | Mod: PBBFAC | Performed by: NURSE PRACTITIONER

## 2023-01-30 NOTE — PROGRESS NOTES
Form Post-Op  [facility]  Department of Surgery    PCP: Ravin Viera MD  MEDICAL ONCOLOGIST:    Dr. Carlin   RADIATION ONCOLOGIST:   N/a    DIAGNOSIS:    This is a 77 y.o. female with a stage pT3 N0 Mx grade 1 ER + NH + HER2 - ILC of the left breast.    TREATMENT SUMMARY:  The patient is status post left  mastectomy and sentinel node biopsy on 1/4/2023.  Final pathology showed     Final Pathologic Diagnosis 1.  Left axillary sentinel lymph node #1, hot and blue; count 299, excisional   biopsy: 1 lymph node, negative for metastatic carcinoma (0/1).          Confirmed by negative immunohistochemical staining with cytokeratins   AE1/AE3, cam 5.2 and wide spectrum keratin with          satisfactory positive and negative controls.   2.  Left axillary sentinel lymph node #2, hot and blue; count 652, excisional   biopsy: 1 lymph node, negative for metastatic carcinoma (0/1).          Confirmed by negative immunohistochemical staining with cytokeratins   AE1/AE3, cam 5.2 and wide spectrum keratin with          satisfactory positive and negative controls.   3.  Left breast, skin sparing mastecomy: Invasive lobular carcinoma,   measuring at least 51 mm (at least 5.1 cm) in greatest dimension (slide   measurement of largest          dimension).          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          Note:  Biopsy clip is grossly identified and biopsy site changes are   identified microscopically within area of tumor.          Margins:           - All margins are greater than 10 mm (greater than 1 cm) from   invasive tumor and LCIS.          Nipple and skin are present and uninvolved by tumor.  Note:  Tumor is   present in the soft tissue underlying the nipple skin but          does not involve nipple epidermis.          Microcalcifications are present and associated with lobular carcinoma   in situ and invasive carcinoma.          Background breast tissue shows fibrocystic changes including apocrine    metaplasia and stromal fibrosis.          See synoptic report in comment section for further details.   4.  Right port-a-cath, removal: Gross diganosis:  Port-a-cath.     Comment:  Synoptic Report   Specimen:   Procedure:  Total mastectomy   Specimen laterality:  Left breast   Tumor:   Tumor site:  Central   Histologic type:  Invasive lobular carcinoma   Histologic grade (Paola histologic score) Glandular (acinar)/tubular   differentiation:  Score 3          Nuclear pleomorphism:  Score 1          Mitotic rate:  Score 1          Overall grade:  Grade 1   Tumor size: Greatest dimension of largest invasive focus:  At least 51 mm (at   least 5.1 cm). Note:  The largest grossly identified area of tumor is   measured on the corresponding microscopic slides at 51                  mm.  However there are additional sections of tumor in   adjacent fibrous areas that are also tumor but were not                  discernible grossly and are not contiguous for additional   measurement.  Therefore this tumor is best measured                  at at least 51 mm in greatest dimension.  Only one area of   mass lesion with adjacent fibrotic tissue was                  identified grossly, therefore this is interpreted to be a   single mass lesion.   Tumor focality:  Single focus of invasive carcinoma.   Ductal carcinoma in Situ:  Not identified   Lobular carcinoma in Situ:  Present   Tumor extent:  Skin and nipple are present and uninvolved by tumor.  Skeletal   muscle is not present for evaluation.   Lymphovascular invasion:  Not identified   Dermal lymphovascular invasion:  Not identified   Microcalcifications:  Present in invasive carcinoma and lobular carcinoma in   situ.   Treatment effect in breast:  No definite response to presurgical therapy in   the invasive carcinoma.   Treatment effect in the lymph nodes:  No definite response to presurgical   therapy in lymph nodes.   Margins:  All margins are negative for invasive  "carcinoma. Distance from   invasive carcinoma to closest margin:  Invasive carcinoma is located greater   than 10 mm (greater than 1 cm)          from all surgical margins.   Regional lymph nodes:   Regional lymph node status:  Regional lymph nodes are present and all   regional lymph nodes are negative for tumor. Number of lymph nodes with   macrometastasis (greater than 2 mm):  0          Number of lymph nodes with micrometastasis:  0          Number of lymph nodes with isolated tumor cells:  0     Total number of lymph nodes examined (sentinel and nonsentinel):  2          Total number of sentinel nodes examined:  2   Distant metastasis:  Not applicable   Pathologic stage classification:   TNM descriptors: "y":  Posttreatment   Primary tumor:        ypT3:  Tumor greater than 50 mm in greatest dimension.   Regional lymph nodes:        y(sn)pN0:  No regional lymph node metastasis identified.   Distant metastasis:        ypMX:  Cannot be assessed.   Additional findings:  Fibrocystic changes.   Additional special studies/biomarker reporting:   Immunohistochemical stains for biomarker reporting were completed on the   patient's prior left breast core biopsy material from March 21, 2022, case   number [id number]-, with the following results:   "ER/MI/Her-2 rhina and Ki67 testing performed at [facility]   ([facility]-). Diagnosed by   Carolyn Infante MD. Her diagnoses are as follows:   OUTSIDE SLIDES FOR BREAST BIOMARKERS LABELED [facility]-/[facility]-   LEFT BREAST   Ancillary studies- (Performed on block 1A)   Estrogen receptor: Positive (strong intensity, >95% of tumor cell nuclei).   Progesterone receptor: Positive (strong intensity, 15% of tumor cell nuclei).   HER2 IHC: Negative.   Ki-67: 10%.       INTERVAL HISTORY:   Kee Ramirez comes in for a post-op check.  She denies fever, chills, chest pain or shortness of breath.  Her pain is well controlled.  Reports continued drainage from left breast " incision that started a few days ago. She has been using gauze to cover opening. She was seen in clinic for this on 1/25/2023 and 40 cc of dark red bloody drainage was aspirated. Today patient presents for follow up with continued drainage from incision. Patient reports this morning a large amount of dark red blood drained from incision that soaked through her gauze and ABD pad. Patient denies pain or discomfort to the area.     MEDICATIONS:  Current Outpatient Medications   Medication Sig Dispense Refill    amLODIPine (NORVASC) 10 MG tablet Take 1 tablet (10 mg total) by mouth once daily. 90 tablet 3    aspirin (ECOTRIN) 81 MG EC tablet Take 81 mg by mouth once daily.      cloNIDine (CATAPRES) 0.1 MG tablet TAKE 1 TABLET BY MOUTH THREE TIMES DAILY 90 tablet 11    HYDROcodone-acetaminophen (NORCO) 5-325 mg per tablet Take 1 tablet by mouth every 6 (six) hours as needed for Pain. 20 tablet 0    levothyroxine (SYNTHROID) 112 MCG tablet Take 1 tablet (112 mcg total) by mouth before breakfast. 90 tablet 3    losartan (COZAAR) 100 MG tablet Take 1 tablet (100 mg total) by mouth once daily. 90 tablet 3    lovastatin (MEVACOR) 20 MG tablet Take 2 tablets (40 mg total) by mouth every evening. 180 tablet 3    metFORMIN (GLUCOPHAGE) 500 MG tablet Take 1 tablet (500 mg total) by mouth daily with breakfast. 90 tablet 3    methylcellulose (ARTIFICIAL TEARS) 1 % ophthalmic solution Place 1 drop into both eyes as needed.      multivitamin (THERAGRAN) per tablet Take 1 tablet by mouth once daily.      rOPINIRole (REQUIP) 1 MG tablet Take 1 tablet (1 mg total) by mouth every evening. 90 tablet 3    triamcinolone acetonide 0.5% (KENALOG) 0.5 % Crea Apply topically 2 (two) times daily. 15 g 5    anastrozole (ARIMIDEX) 1 mg Tab Take 1 tablet (1 mg total) by mouth once daily. (Patient not taking: Reported on 1/27/2023.) 90 tablet 3    gabapentin (NEURONTIN) 300 MG capsule Take 1 capsule (300 mg total) by mouth 3 (three) times daily.  (Patient not taking: Reported on 1/27/2023) 90 capsule 5    ibuprofen (ADVIL,MOTRIN) 800 MG tablet Take 1 tablet (800 mg total) by mouth 3 (three) times daily. (Patient not taking: Reported on 1/27/2023) 90 tablet 3     No current facility-administered medications for this visit.       ALLERGIES:   Review of patient's allergies indicates:   Allergen Reactions    Naproxen sodium Itching       PHYSICAL EXAMINATION:   General:  This is a well appearing female with appropriate speech, affect and gait.     Breast:  Incision with 2 cm medial opening of incision. Hematoma at medial incision border.    IMPRESSION:   The patient has had a post-operative complication with hematoma and incision opening.     PLAN:   1. Patient very upset and worried about incision opening. Reassurance given.   2. I will see her back Thursday\.     The patient is in agreement with the plan. Questions were encouraged and answered to patient's satisfaction. Kee will call our office with any questions or concerns.

## 2023-02-07 ENCOUNTER — OFFICE VISIT (OUTPATIENT)
Dept: SURGERY | Facility: CLINIC | Age: 78
End: 2023-02-07
Payer: MEDICARE

## 2023-02-07 VITALS
HEART RATE: 57 BPM | SYSTOLIC BLOOD PRESSURE: 204 MMHG | WEIGHT: 208 LBS | HEIGHT: 67 IN | BODY MASS INDEX: 32.65 KG/M2 | DIASTOLIC BLOOD PRESSURE: 85 MMHG

## 2023-02-07 DIAGNOSIS — Z09 FOLLOW-UP EXAM: Primary | ICD-10-CM

## 2023-02-07 PROCEDURE — 99999 PR PBB SHADOW E&M-EST. PATIENT-LVL III: ICD-10-PCS | Mod: PBBFAC,,, | Performed by: NURSE PRACTITIONER

## 2023-02-07 PROCEDURE — 99999 PR PBB SHADOW E&M-EST. PATIENT-LVL III: CPT | Mod: PBBFAC,,, | Performed by: NURSE PRACTITIONER

## 2023-02-07 PROCEDURE — 99024 PR POST-OP FOLLOW-UP VISIT: ICD-10-PCS | Mod: POP,,, | Performed by: NURSE PRACTITIONER

## 2023-02-07 PROCEDURE — 99024 POSTOP FOLLOW-UP VISIT: CPT | Mod: POP,,, | Performed by: NURSE PRACTITIONER

## 2023-02-07 PROCEDURE — 99213 OFFICE O/P EST LOW 20 MIN: CPT | Mod: PBBFAC | Performed by: NURSE PRACTITIONER

## 2023-02-07 NOTE — PROGRESS NOTES
Form Post-Op  [facility], [place]  Department of Surgery    PCP: Ravin Viera MD  MEDICAL ONCOLOGIST:    Dr. Carlin   RADIATION ONCOLOGIST:   N/a    DIAGNOSIS:    This is a 77 y.o. female with a stage pT3 N0 Mx grade 1 ER + [place] + HER2 - ILC of the left breast.    TREATMENT SUMMARY:  The patient is status post left  mastectomy and sentinel node biopsy on 1/4/2023.  Final pathology showed     Final Pathologic Diagnosis 1.  Left axillary sentinel lymph node #1, hot and blue; count 299, excisional   biopsy: 1 lymph node, negative for metastatic carcinoma (0/1).          Confirmed by negative immunohistochemical staining with cytokeratins   AE1/AE3, cam 5.2 and wide spectrum keratin with          satisfactory positive and negative controls.   2.  Left axillary sentinel lymph node #2, hot and blue; count 652, excisional   biopsy: 1 lymph node, negative for metastatic carcinoma (0/1).          Confirmed by negative immunohistochemical staining with cytokeratins   AE1/AE3, cam 5.2 and wide spectrum keratin with          satisfactory positive and negative controls.   3.  Left breast, skin sparing mastecomy: Invasive lobular carcinoma,   measuring at least 51 mm (at least 5.1 cm) in greatest dimension (slide   measurement of largest          dimension).          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          Note:  Biopsy clip is grossly identified and biopsy site changes are   identified microscopically within area of tumor.          Margins:           - All margins are greater than 10 mm (greater than 1 cm) from   invasive tumor and LCIS.          Nipple and skin are present and uninvolved by tumor.  Note:  Tumor is   present in the soft tissue underlying the nipple skin but          does not involve nipple epidermis.          Microcalcifications are present and associated with lobular carcinoma   in situ and invasive carcinoma.          Background breast tissue shows fibrocystic changes including apocrine    metaplasia and stromal fibrosis.          See synoptic report in comment section for further details.   4.  Right port-a-cath, removal: Gross diganosis:  Port-a-cath.     Comment:  Synoptic Report   Specimen:   Procedure:  Total mastectomy   Specimen laterality:  Left breast   Tumor:   Tumor site:  Central   Histologic type:  Invasive lobular carcinoma   Histologic grade (Paola histologic score) Glandular (acinar)/tubular   differentiation:  Score 3          Nuclear pleomorphism:  Score 1          Mitotic rate:  Score 1          Overall grade:  Grade 1   Tumor size: Greatest dimension of largest invasive focus:  At least 51 mm (at   least 5.1 cm). Note:  The largest grossly identified area of tumor is   measured on the corresponding microscopic slides at 51                  mm.  However there are additional sections of tumor in   adjacent fibrous areas that are also tumor but were not                  discernible grossly and are not contiguous for additional   measurement.  Therefore this tumor is best measured                  at at least 51 mm in greatest dimension.  Only one area of   mass lesion with adjacent fibrotic tissue was                  identified grossly, therefore this is interpreted to be a   single mass lesion.   Tumor focality:  Single focus of invasive carcinoma.   Ductal carcinoma in Situ:  Not identified   Lobular carcinoma in Situ:  Present   Tumor extent:  Skin and nipple are present and uninvolved by tumor.  Skeletal   muscle is not present for evaluation.   Lymphovascular invasion:  Not identified   Dermal lymphovascular invasion:  Not identified   Microcalcifications:  Present in invasive carcinoma and lobular carcinoma in   situ.   Treatment effect in breast:  No definite response to presurgical therapy in   the invasive carcinoma.   Treatment effect in the lymph nodes:  No definite response to presurgical   therapy in lymph nodes.   Margins:  All margins are negative for invasive  "carcinoma. Distance from   invasive carcinoma to closest margin:  Invasive carcinoma is located greater   than 10 mm (greater than 1 cm)          from all surgical margins.   Regional lymph nodes:   Regional lymph node status:  Regional lymph nodes are present and all   regional lymph nodes are negative for tumor. Number of lymph nodes with   macrometastasis (greater than 2 mm):  0          Number of lymph nodes with micrometastasis:  0          Number of lymph nodes with isolated tumor cells:  0     Total number of lymph nodes examined (sentinel and nonsentinel):  2          Total number of sentinel nodes examined:  2   Distant metastasis:  Not applicable   Pathologic stage classification:   TNM descriptors: "y":  Posttreatment   Primary tumor:        ypT3:  Tumor greater than 50 mm in greatest dimension.   Regional lymph nodes:        y(sn)pN0:  No regional lymph node metastasis identified.   Distant metastasis:        ypMX:  Cannot be assessed.   Additional findings:  Fibrocystic changes.   Additional special studies/biomarker reporting:   Immunohistochemical stains for biomarker reporting were completed on the   patient's prior left breast core biopsy material from March 21, 2022, case   number [facility]-, with the following results:   "ER/ND/Her-2 rhina and Ki67 testing performed at [facility]   ([facility]-). Diagnosed by   Carolyn Infante MD. Her diagnoses are as follows:   OUTSIDE SLIDES FOR BREAST BIOMARKERS LABELED [facility]-/[facility]-   LEFT BREAST   Ancillary studies- (Performed on block 1A)   Estrogen receptor: Positive (strong intensity, >95% of tumor cell nuclei).   Progesterone receptor: Positive (strong intensity, 15% of tumor cell nuclei).   HER2 IHC: Negative.   Ki-67: 10%.       INTERVAL HISTORY:   Kee Ramirez comes in for a post-op check.  She denies fever, chills, chest pain or shortness of breath.  Her pain is well controlled.  Reports continued drainage from left breast " incision that started a few days ago. She has been using gauze to cover opening. She was seen in clinic for this on 1/25/2023 and 40 cc of dark red bloody drainage was aspirated. Today patient presents for follow up with continued drainage from incision. Patient reports this morning a large amount of dark red blood drained from incision that soaked through her gauze and ABD pad. Patient denies pain or discomfort to the area. Patient continues with drainage of the incision - has slowed down. Swelling has improved as well.     MEDICATIONS:  Current Outpatient Medications   Medication Sig Dispense Refill    amLODIPine (NORVASC) 10 MG tablet Take 1 tablet (10 mg total) by mouth once daily. 90 tablet 3    aspirin (ECOTRIN) 81 MG EC tablet Take 81 mg by mouth once daily.      cloNIDine (CATAPRES) 0.1 MG tablet TAKE 1 TABLET BY MOUTH THREE TIMES DAILY 90 tablet 11    HYDROcodone-acetaminophen (NORCO) 5-325 mg per tablet Take 1 tablet by mouth every 6 (six) hours as needed for Pain. 20 tablet 0    levothyroxine (SYNTHROID) 112 MCG tablet Take 1 tablet (112 mcg total) by mouth before breakfast. 90 tablet 3    losartan (COZAAR) 100 MG tablet Take 1 tablet (100 mg total) by mouth once daily. 90 tablet 3    lovastatin (MEVACOR) 20 MG tablet Take 2 tablets (40 mg total) by mouth every evening. 180 tablet 3    metFORMIN (GLUCOPHAGE) 500 MG tablet Take 1 tablet (500 mg total) by mouth daily with breakfast. 90 tablet 3    methylcellulose (ARTIFICIAL TEARS) 1 % ophthalmic solution Place 1 drop into both eyes as needed.      multivitamin (THERAGRAN) per tablet Take 1 tablet by mouth once daily.      rOPINIRole (REQUIP) 1 MG tablet Take 1 tablet (1 mg total) by mouth every evening. 90 tablet 3    triamcinolone acetonide 0.5% (KENALOG) 0.5 % Crea Apply topically 2 (two) times daily. 15 g 5    anastrozole (ARIMIDEX) 1 mg Tab Take 1 tablet (1 mg total) by mouth once daily. (Patient not taking: Reported on 1/27/2023.) 90 tablet 3     gabapentin (NEURONTIN) 300 MG capsule Take 1 capsule (300 mg total) by mouth 3 (three) times daily. (Patient not taking: Reported on 1/27/2023) 90 capsule 5    ibuprofen (ADVIL,MOTRIN) 800 MG tablet Take 1 tablet (800 mg total) by mouth 3 (three) times daily. (Patient not taking: Reported on 1/27/2023) 90 tablet 3     No current facility-administered medications for this visit.       ALLERGIES:   Review of patient's allergies indicates:   Allergen Reactions    Naproxen sodium Itching       PHYSICAL EXAMINATION:   General:  This is a well appearing female with appropriate speech, affect and gait.     Breast:  medial incision with 1.5 cm area of necrosis. Hematoma at medial incision border has improved.    IMPRESSION:   The patient has had a post-operative complication with hematoma and incision opening. Necrosis trimmed today.     PLAN:   1. Will start medi-honey daily.   2. I will see her back in 2 weeks  3. Can re-start PT in 1 week    The patient is in agreement with the plan. Questions were encouraged and answered to patient's satisfaction. Kee will call our office with any questions or concerns.

## 2023-02-08 ENCOUNTER — PATIENT MESSAGE (OUTPATIENT)
Dept: HEMATOLOGY/ONCOLOGY | Facility: CLINIC | Age: 78
End: 2023-02-08
Payer: MEDICARE

## 2023-02-14 ENCOUNTER — CLINICAL SUPPORT (OUTPATIENT)
Dept: REHABILITATION | Facility: HOSPITAL | Age: 78
End: 2023-02-14
Payer: MEDICARE

## 2023-02-14 DIAGNOSIS — M25.611 STIFFNESS OF RIGHT SHOULDER JOINT: ICD-10-CM

## 2023-02-14 DIAGNOSIS — Z91.89 AT RISK FOR LYMPHEDEMA: ICD-10-CM

## 2023-02-14 DIAGNOSIS — R29.898 WEAKNESS OF RIGHT UPPER EXTREMITY: Primary | ICD-10-CM

## 2023-02-14 DIAGNOSIS — R29.898 WEAKNESS OF LEFT UPPER EXTREMITY: ICD-10-CM

## 2023-02-14 DIAGNOSIS — M25.612 STIFFNESS OF LEFT SHOULDER JOINT: ICD-10-CM

## 2023-02-14 PROCEDURE — 97140 MANUAL THERAPY 1/> REGIONS: CPT

## 2023-02-14 PROCEDURE — 97110 THERAPEUTIC EXERCISES: CPT

## 2023-02-14 NOTE — PATIENT INSTRUCTIONS
Wall walk abduction    Standing with the wall on your involved side, place forearm on the wall.    Walk your arm up the wall as shown in picture.   Do 10 reps per set. Do 2 sets per session. Do 1 session per day.    ROM: Flexion - Wand (Supine)        Lie on back holding wand. Raise arms over head.   Repeat 10 times per set. Do 2 sets per session. Do 2 sessions per day.     [email or web address]         Supine Pec Stretch    Lie on your back with your knees bent. Interlock your hands and place them on your forehead. Allow your elbows to fall towards the floor until feeling a comfortable stretch. Hold the position. Progress to hands behind the head and neck.  Do 10 reps per set. Do 1 set per session. Do 1 session per day.         WAND EXTERNAL ROTATION - SUPINE ER    Lie on your back holding a cane or wand with both hands.     On the affected side, place a small rolled up towel or pillow under your elbow. Maintain approx. 90 degree bend at the elbow with your arm approximately 30-45 degrees away from your side.     Use your other arm to pull the wand/cane to rotate the affected arm back into a stretch. Hold and then return to starting position and then repeat.   Do 10 reps per set. Do 2 sets per session. Do 1 session per day.

## 2023-02-14 NOTE — PROGRESS NOTES
Physical Therapy Daily Treatment Note       Date: 2/14/2023   Name: Kee Ramirez  Clinic Number: [id number]    Therapy Diagnosis:   Encounter Diagnoses   Name Primary?    Weakness of right upper extremity Yes    Weakness of left upper extremity     Stiffness of left shoulder joint     Stiffness of right shoulder joint     At risk for lymphedema      Physician: Erika Valente MD    Physician Orders: PT Eval and Treat  Medical Diagnosis: C50.112,Z17.0 (ICD-10-CM) - Malignant neoplasm of central portion of left breast in female, estrogen receptor positive  Evaluation Date: 1/25/2023  Authorization Period Expiration: 01/19/2024  Plan of Care Certification Period: 03/24/2023  Visit # / Visits Authorized: 1 / 1  Insurance: MEDICARE  FOTO: 1/3    Time In: 1:00 pm  Time Out: 2:00 pm  Total Billable Time: 60 minutes    Precautions:  Standard, Diabetes, cancer, and HTN      Subjective     Pt reports: no pain today and she has a small area of drainage. It is just a little bit of drainage at this time.   She was compliant with home exercise program.  Response to previous treatment: no adverse events  Pain Scale: Kee rates pain on a scale of 0/10 on VAS.   Pain location: shoulder left    Fatigue: getting better  Functional change: a lot better with the reaching  Treatment:   Chemotherapy: possible adjuvant oral chemo  Radiation: simulation tomorrow  Hormone Therapy: 6 months neoadjuvant estrogen therapy  Surgery date: LEFT mastectomy and LEFT SLNB (2 lymph nodes removed) on 01/04/2023 per Dr. Valente      Objective     Objective Measures updated at progress report unless specified.     Treatment     Kee received individual therapeutic exercises to improve postural correction and alignment, stretching and soft tissue mobility, and strengthening for 45 minutes including the following:   Pulleys, flex/abd 2'/2'  Butterflies with hands on forehead, 1 set x 10 reps  Supine wand  flexion 1 x 10  Supine wand ER at 45, 1 set x 10 reps  Serratus punches 1 set x 10 reps  Scapula retractions, 1 set x 10 reps   Wall walks flexion, 1 set x 10 reps  Wall walks abd, 1 set x 10 reps  PROM flex, abd, ER, 1 set x 10 reps    Kee received the following manual therapy techniques were performed to increased myofascial/soft tissue length, mobility and pliability, increase PROM, AROM and function as well as to decrease pain for 15 minutes  - pec side bending in ER  - posterior capsule stretch L shoulder  - Grade 1 & 2 mobs L shoulder     Home Exercise Program and Patient Education   Education provided re:  - role of PT in multi - disciplinary team, goals for PT  - progress towards goals   - compliance with HEP    Written Home Exercises Provided: yes.  Exercises were reviewed and Kee was able to demonstrate them prior to the end of the session.  Kee demonstrated good  understanding of the education provided.     See EMR under Patient Instructions for exercises provided  eval and 2/14/23 .    Pt has no cultural, educational or language barriers to learning provided.    Assessment     Patient is responding well to physical therapy. She was able to demonstrate radiation position with reports of minimal stretching at the start of the session. After manual therapy she was able to assume radiation position with no difficulty. She required occasional cues during the session to keep her exercises in a pain free range. She also required occasional cues to avoid substitutions with wall walks and wand ER stretch. Will progress as tolerated.     Pt prognosis is Good. Pt will continue to benefit from skilled outpatient physical therapy to address the deficits listed in the problem list chart on initial evaluation, provide pt/family education and to maximize pt's level of independence in the home and community environment.     Anticipated barriers to physical therapy: Prolonged time between surgery and beginning  physical therapy    Goals as follows:  Short Term Goals: 4 Weeks  Patient will demonstrate 100% understanding of lymphedema risk reduction practices, including self-monitoring for lymphedema (progressing, not met).  Patient will demonstrate independence with established home exercise program for improved strength, functional mobility, range of motion, posture, and endurance. (progressing, not met).   Patient will increase LEFT shoulder FLEXION active range of motion to 125 degrees for improved independence with functional reaching, carrying, pushing, and pulling tasks (progressing, not met).   Patient will increase LEFT shoulder ABDUCTION active range of motion to 110 degrees for improved independence with functional reaching, carrying, pushing, and pulling tasks (progressing, not met).   Strength will be assessed and appropriate goals set (progressing, not met).      Long Term Goals: 8 Weeks   Patient will be independent with updated home exercise program for improved functional mobility, posture, strength, and endurance (progressing, not met).  Patient will increase BILATERAL shoulder FLEXION active range of motion to 180 degrees for improved independence with functional reaching, carrying, pushing, and pulling tasks (progressing, not met).   Patient will increase LEFT shoulder ABDUCTION active range of motion to 180 degrees for improved independence with functional reaching, carrying, pushing, and pulling tasks (progressing, not met).   Patient will increase gross upper extremity musculature to 5/5 for improved independence with functional reaching, carrying, pushing, and pulling tasks (progressing, not met).   Patient will report decrease in overall worst pain to 2/10 at discharge for improved tolerance to functional reaching, carrying, pushing, and pulling activities of daily living (progressing, not met).  Patient will report compliance with walking program 5x/week for 10 minutes/day to improve overall  cardiovascular function and decrease cancer-related fatigue at discharge (progressing, not met).         Plan     Outpatient physical therapy 2x week for 8 weeks to include the following: Gait Training, Manual Therapy, Neuromuscular Re-ed, Patient Education, Self Care, Therapeutic Activities, and Therapeutic Exercise.     Plan of Care Certification Period: 1/25/2023 to 03/24/2023    Therapist: Marley Matos, PT  2/14/2023

## 2023-02-15 ENCOUNTER — TELEPHONE (OUTPATIENT)
Dept: RADIATION ONCOLOGY | Facility: CLINIC | Age: 78
End: 2023-02-15
Payer: MEDICARE

## 2023-02-15 NOTE — TELEPHONE ENCOUNTER
Call to pt to see if her scar has healed. Pt due to have a CT simulation today. Per Dr Brown, scar needs to be healed prior to simulation. Pt reports that an area is still draining and not completely healed. Has another appt with breast surgery 2/20/23. Will reschedule CT simulation until after 2/20 to allow for further healing. Dr Brown to be notified. New CT simulation is scheduled on 2/24/23 at 11:00 AM.

## 2023-02-16 ENCOUNTER — CLINICAL SUPPORT (OUTPATIENT)
Dept: REHABILITATION | Facility: HOSPITAL | Age: 78
End: 2023-02-16
Payer: MEDICARE

## 2023-02-16 DIAGNOSIS — M25.612 STIFFNESS OF LEFT SHOULDER JOINT: ICD-10-CM

## 2023-02-16 DIAGNOSIS — R29.898 WEAKNESS OF LEFT UPPER EXTREMITY: ICD-10-CM

## 2023-02-16 DIAGNOSIS — M25.611 STIFFNESS OF RIGHT SHOULDER JOINT: ICD-10-CM

## 2023-02-16 DIAGNOSIS — R29.898 WEAKNESS OF RIGHT UPPER EXTREMITY: Primary | ICD-10-CM

## 2023-02-16 DIAGNOSIS — Z91.89 AT RISK FOR LYMPHEDEMA: ICD-10-CM

## 2023-02-16 PROCEDURE — 97112 NEUROMUSCULAR REEDUCATION: CPT

## 2023-02-16 PROCEDURE — 97110 THERAPEUTIC EXERCISES: CPT

## 2023-02-16 PROCEDURE — 97140 MANUAL THERAPY 1/> REGIONS: CPT

## 2023-02-16 NOTE — PROGRESS NOTES
"                                                    Physical Therapy Daily Treatment Note     Date: 2/16/2023   Name: Kee Ramirez  Clinic Number: [id number]    Therapy Diagnosis:   Encounter Diagnoses   Name Primary?    Weakness of right upper extremity Yes    Weakness of left upper extremity     Stiffness of left shoulder joint     Stiffness of right shoulder joint     At risk for lymphedema      Physician: Erika Valente MD    Physician Orders: PT Eval and Treat  Medical Diagnosis: C50.112,Z17.0 (ICD-10-CM) - Malignant neoplasm of central portion of left breast in female, estrogen receptor positive  Evaluation Date: 01/25/2023  Authorization Period Expiration: 01/19/2024  Plan of Care Certification Period: 03/24/2023  Visit # / Visits Authorized: 2 / 20 (plus eval)  Insurance: MEDICARE  FOTO: 1/3    Time In: 10:55 AM  Time Out: 11:55 AM  Total Billable Time: 60 minutes    Precautions:  Standard, Diabetes, cancer, and HTN      Subjective     Patient reports: XRT simulation cancelled, rescheduled to next Friday (02/24/2023) secondary to "leakage" at one spot in incision. Patient to see Rashida Mccain NP on Monday (02/20/2023) for check-in regarding "leakage." Patient states shoulders continuing to feel "looser" but still experiences tightness with end range of motion overhead reaching.     She was compliant with home exercise program.  Response to Previous Treatment: no adverse events    Pain Scale: Kee rates pain on a scale of 0/10 on VAS.   Pain Location: N/A    Fatigue: none  Functional Change: less difficulty with reaching    Treatment:   Chemotherapy: possible adjuvant oral chemo  Radiation: simulation scheduled 02/24/2023  Hormone Therapy: 6 months neoadjuvant estrogen therapy    Surgery Date: LEFT mastectomy and LEFT SLNB (2 lymph nodes removed) on 01/04/2023 per Dr. Valente.      Objective     Objective Measures updated at progress report unless specified.     Treatment     Kee received individual " therapeutic exercises to improve postural correction and alignment, stretching and soft tissue mobility, and strengthening for 30 minutes including the following:     Seated Exercises:  - Pulleys (flex/abd)     2 min each  - Butterflies with hands on forehead  1 set x 10 reps  - Supine wand flexion     1 set x 10 reps  - Supine wand ER at 45   1 set x 10 reps  - PROM flex, abd, ER    1 set x 10 reps each      Kee received the following individual neuromuscular re-education exercises to isolate interscapular and shoulder stabilizing musculature for 20 minutes:    - Scapula retractions     2 min   - Bilateral shoulder ER (red)    2 min  - Rows (red)      2 min  - Bilateral shoulder horizontal abduction (red) 2 min  - Serratus punches     1 set x 10 reps      Kee received the following manual therapy techniques were performed to increased myofascial/soft tissue length, mobility and pliability, increase PROM, AROM and function as well as to decrease pain for 10 minutes:    - Soft tissue mobilization L pec minor    Home Exercise Program and Patient Education     Education Provided Regarding:  - Role of physical therapy in multi-disciplinary team  - Goals for physical therapy, progress towards goals   - Compliance with home exercise program    Written Home Exercises Provided: yes. Exercises were reviewed and Kee was able to demonstrate them prior to the end of the session. Kee demonstrated good  understanding of the education provided.     See EMR under Patient Instructions for exercises provided  eval, 2/14/23, and 02/16/2023 .    Patient has no cultural, educational or language barriers to learning provided.    Assessment     Patient is responding well to physical therapy. Patient was able to perform exercise progressions and new exercises without increase in symptoms prior to leaving the clinic. Improved strength demonstrated with added theraband exercises. Min verbal cues to reduce upper trapezius compensation  (mod carryover). Improved flexibility with added cervical stretches and manual techniques. Verbal cues, demonstration required for proper rhomboid activation during scapular retractions and resisted rows (good carryover). Patient still able to demonstrate radiation position with minimal stretching reported. Will progress as tolerated.     Pt prognosis is Good. Pt will continue to benefit from skilled outpatient physical therapy to address the deficits listed in the problem list chart on initial evaluation, provide pt/family education and to maximize pt's level of independence in the home and community environment.     Anticipated barriers to physical therapy: Prolonged time between surgery and beginning physical therapy    Goals as follows:  Short Term Goals: 4 Weeks  Patient will demonstrate 100% understanding of lymphedema risk reduction practices, including self-monitoring for lymphedema (progressing, not met).  Patient will demonstrate independence with established home exercise program for improved strength, functional mobility, range of motion, posture, and endurance. (progressing, not met).   Patient will increase LEFT shoulder FLEXION active range of motion to 125 degrees for improved independence with functional reaching, carrying, pushing, and pulling tasks (progressing, not met).   Patient will increase LEFT shoulder ABDUCTION active range of motion to 110 degrees for improved independence with functional reaching, carrying, pushing, and pulling tasks (progressing, not met).   Strength will be assessed and appropriate goals set (progressing, not met).      Long Term Goals: 8 Weeks   Patient will be independent with updated home exercise program for improved functional mobility, posture, strength, and endurance (progressing, not met).  Patient will increase BILATERAL shoulder FLEXION active range of motion to 180 degrees for improved independence with functional reaching, carrying, pushing, and pulling  tasks (progressing, not met).   Patient will increase LEFT shoulder ABDUCTION active range of motion to 180 degrees for improved independence with functional reaching, carrying, pushing, and pulling tasks (progressing, not met).   Patient will increase gross upper extremity musculature to 5/5 for improved independence with functional reaching, carrying, pushing, and pulling tasks (progressing, not met).   Patient will report decrease in overall worst pain to 2/10 at discharge for improved tolerance to functional reaching, carrying, pushing, and pulling activities of daily living (progressing, not met).  Patient will report compliance with walking program 5x/week for 10 minutes/day to improve overall cardiovascular function and decrease cancer-related fatigue at discharge (progressing, not met).         Plan     Outpatient physical therapy 2x week for 8 weeks to include the following: Gait Training, Manual Therapy, Neuromuscular Re-ed, Patient Education, Self Care, Therapeutic Activities, and Therapeutic Exercise.     Plan of Care Certification Period: 1/25/2023 to 03/24/2023    Therapist: Irene Avelar, PT  2/16/2023

## 2023-02-20 ENCOUNTER — OFFICE VISIT (OUTPATIENT)
Dept: SURGERY | Facility: CLINIC | Age: 78
End: 2023-02-20
Payer: MEDICARE

## 2023-02-20 ENCOUNTER — CLINICAL SUPPORT (OUTPATIENT)
Dept: REHABILITATION | Facility: HOSPITAL | Age: 78
End: 2023-02-20
Payer: MEDICARE

## 2023-02-20 VITALS
HEIGHT: 67 IN | BODY MASS INDEX: 32.65 KG/M2 | SYSTOLIC BLOOD PRESSURE: 158 MMHG | DIASTOLIC BLOOD PRESSURE: 67 MMHG | OXYGEN SATURATION: 98 % | HEART RATE: 35 BPM | TEMPERATURE: 96 F | WEIGHT: 208 LBS

## 2023-02-20 DIAGNOSIS — M25.612 STIFFNESS OF LEFT SHOULDER JOINT: ICD-10-CM

## 2023-02-20 DIAGNOSIS — Z09 FOLLOW-UP EXAM: Primary | ICD-10-CM

## 2023-02-20 DIAGNOSIS — M25.611 STIFFNESS OF RIGHT SHOULDER JOINT: ICD-10-CM

## 2023-02-20 DIAGNOSIS — Z91.89 AT RISK FOR LYMPHEDEMA: ICD-10-CM

## 2023-02-20 DIAGNOSIS — R29.898 WEAKNESS OF LEFT UPPER EXTREMITY: ICD-10-CM

## 2023-02-20 DIAGNOSIS — R29.898 WEAKNESS OF RIGHT UPPER EXTREMITY: Primary | ICD-10-CM

## 2023-02-20 PROCEDURE — 99212 OFFICE O/P EST SF 10 MIN: CPT | Mod: S$PBB,,, | Performed by: NURSE PRACTITIONER

## 2023-02-20 PROCEDURE — 97140 MANUAL THERAPY 1/> REGIONS: CPT

## 2023-02-20 PROCEDURE — 99212 PR OFFICE/OUTPT VISIT, EST, LEVL II, 10-19 MIN: ICD-10-PCS | Mod: S$PBB,,, | Performed by: NURSE PRACTITIONER

## 2023-02-20 PROCEDURE — 99214 OFFICE O/P EST MOD 30 MIN: CPT | Mod: PBBFAC | Performed by: NURSE PRACTITIONER

## 2023-02-20 PROCEDURE — 97110 THERAPEUTIC EXERCISES: CPT

## 2023-02-20 PROCEDURE — 99999 PR PBB SHADOW E&M-EST. PATIENT-LVL IV: ICD-10-PCS | Mod: PBBFAC,,, | Performed by: NURSE PRACTITIONER

## 2023-02-20 PROCEDURE — 99999 PR PBB SHADOW E&M-EST. PATIENT-LVL IV: CPT | Mod: PBBFAC,,, | Performed by: NURSE PRACTITIONER

## 2023-02-20 PROCEDURE — 97112 NEUROMUSCULAR REEDUCATION: CPT

## 2023-02-20 NOTE — PROGRESS NOTES
Form Post-Op  [facility], [place]  Department of Surgery    PCP: Ravin Viera MD  MEDICAL ONCOLOGIST:    Dr. Carlin   RADIATION ONCOLOGIST:   N/a    DIAGNOSIS:    This is a 77 y.o. 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    TREATMENT SUMMARY:  The patient is status post left  mastectomy and sentinel node biopsy on 1/4/2023.  Final pathology showed     Final Pathologic Diagnosis 1.  Left axillary sentinel lymph node #1, hot and blue; count 299, excisional   biopsy: 1 lymph node, negative for metastatic carcinoma (0/1).          Confirmed by negative immunohistochemical staining with cytokeratins   AE1/AE3, cam 5.2 and wide spectrum keratin with          satisfactory positive and negative controls.   2.  Left axillary sentinel lymph node #2, hot and blue; count 652, excisional   biopsy: 1 lymph node, negative for metastatic carcinoma (0/1).          Confirmed by negative immunohistochemical staining with cytokeratins   AE1/AE3, cam 5.2 and wide spectrum keratin with          satisfactory positive and negative controls.   3.  Left breast, skin sparing mastecomy: Invasive lobular carcinoma,   measuring at least 51 mm (at least 5.1 cm) in greatest dimension (slide   measurement of largest          dimension).          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          Note:  Biopsy clip is grossly identified and biopsy site changes are   identified microscopically within area of tumor.          Margins:           - All margins are greater than 10 mm (greater than 1 cm) from   invasive tumor and LCIS.          Nipple and skin are present and uninvolved by tumor.  Note:  Tumor is   present in the soft tissue underlying the nipple skin but          does not involve nipple epidermis.          Microcalcifications are present and associated with lobular carcinoma   in situ and invasive carcinoma.          Background breast tissue shows fibrocystic changes including apocrine    metaplasia and stromal fibrosis.          See synoptic report in comment section for further details.   4.  Right port-a-cath, removal: Gross diganosis:  Port-a-cath.     Comment:  Synoptic Report   Specimen:   Procedure:  Total mastectomy   Specimen laterality:  Left breast   Tumor:   Tumor site:  Central   Histologic type:  Invasive lobular carcinoma   Histologic grade (Paola histologic score) Glandular (acinar)/tubular   differentiation:  Score 3          Nuclear pleomorphism:  Score 1          Mitotic rate:  Score 1          Overall grade:  Grade 1   Tumor size: Greatest dimension of largest invasive focus:  At least 51 mm (at   least 5.1 cm). Note:  The largest grossly identified area of tumor is   measured on the corresponding microscopic slides at 51                  mm.  However there are additional sections of tumor in   adjacent fibrous areas that are also tumor but were not                  discernible grossly and are not contiguous for additional   measurement.  Therefore this tumor is best measured                  at at least 51 mm in greatest dimension.  Only one area of   mass lesion with adjacent fibrotic tissue was                  identified grossly, therefore this is interpreted to be a   single mass lesion.   Tumor focality:  Single focus of invasive carcinoma.   Ductal carcinoma in Situ:  Not identified   Lobular carcinoma in Situ:  Present   Tumor extent:  Skin and nipple are present and uninvolved by tumor.  Skeletal   muscle is not present for evaluation.   Lymphovascular invasion:  Not identified   Dermal lymphovascular invasion:  Not identified   Microcalcifications:  Present in invasive carcinoma and lobular carcinoma in   situ.   Treatment effect in breast:  No definite response to presurgical therapy in   the invasive carcinoma.   Treatment effect in the lymph nodes:  No definite response to presurgical   therapy in lymph nodes.   Margins:  All margins are negative for invasive  "carcinoma. Distance from   invasive carcinoma to closest margin:  Invasive carcinoma is located greater   than 10 mm (greater than 1 cm)          from all surgical margins.   Regional lymph nodes:   Regional lymph node status:  Regional lymph nodes are present and all   regional lymph nodes are negative for tumor. Number of lymph nodes with   macrometastasis (greater than 2 mm):  0          Number of lymph nodes with micrometastasis:  0          Number of lymph nodes with isolated tumor cells:  0     Total number of lymph nodes examined (sentinel and nonsentinel):  2          Total number of sentinel nodes examined:  2   Distant metastasis:  Not applicable   Pathologic stage classification:   TNM descriptors: "y":  Posttreatment   Primary tumor:        ypT3:  Tumor greater than 50 mm in greatest dimension.   Regional lymph nodes:        y(sn)pN0:  No regional lymph node metastasis identified.   Distant metastasis:        ypMX:  Cannot be assessed.   Additional findings:  Fibrocystic changes.   Additional special studies/biomarker reporting:   Immunohistochemical stains for biomarker reporting were completed on the   patient's prior left breast core biopsy material from March 21, 2022, case   number [facility]-, with the following results:   "ER/MA/Her-2 rhina and Ki67 testing performed at [facility]   ([facility]-). Diagnosed by   Carolyn Infante MD. Her diagnoses are as follows:   OUTSIDE SLIDES FOR BREAST BIOMARKERS LABELED [facility]-/[facility]-   LEFT BREAST   Ancillary studies- (Performed on block 1A)   Estrogen receptor: Positive (strong intensity, >95% of tumor cell nuclei).   Progesterone receptor: Positive (strong intensity, 15% of tumor cell nuclei).   HER2 IHC: Negative.   Ki-67: 10%.       INTERVAL HISTORY:   Kee Ramirez comes in for a post-op check.  She denies fever, chills, chest pain or shortness of breath.  Her pain is well controlled.  Reports continued drainage from left breast " incision that started a few days ago. She has been using gauze to cover opening. She was seen in clinic for this on 1/25/2023 and 40 cc of dark red bloody drainage was aspirated. Today patient presents for follow up with continued drainage from incision. Patient reports this morning a large amount of dark red blood drained from incision that soaked through her gauze and ABD pad. Patient denies pain or discomfort to the area. Patient reports drainage of the incision - has slowed down. Swelling has improved as well. She was evaluated on 2/7/2023 and there was a small area of necrosis at previously area of drainage. She was started on medi-honey daily. Today reports almost complete closure of wound. There is a small opening with drainage. There is an area more medial that has opened and started draining. Patient denies pain or discomfort today.     MEDICATIONS:  Current Outpatient Medications   Medication Sig Dispense Refill    amLODIPine (NORVASC) 10 MG tablet Take 1 tablet (10 mg total) by mouth once daily. 90 tablet 3    anastrozole (ARIMIDEX) 1 mg Tab Take 1 tablet (1 mg total) by mouth once daily. 90 tablet 3    aspirin (ECOTRIN) 81 MG EC tablet Take 81 mg by mouth once daily.      cloNIDine (CATAPRES) 0.1 MG tablet TAKE 1 TABLET BY MOUTH THREE TIMES DAILY 90 tablet 11    gabapentin (NEURONTIN) 300 MG capsule Take 1 capsule (300 mg total) by mouth 3 (three) times daily. 90 capsule 5    HYDROcodone-acetaminophen (NORCO) 5-325 mg per tablet Take 1 tablet by mouth every 6 (six) hours as needed for Pain. 20 tablet 0    ibuprofen (ADVIL,MOTRIN) 800 MG tablet Take 1 tablet (800 mg total) by mouth 3 (three) times daily. 90 tablet 3    levothyroxine (SYNTHROID) 112 MCG tablet Take 1 tablet (112 mcg total) by mouth before breakfast. 90 tablet 3    losartan (COZAAR) 100 MG tablet Take 1 tablet (100 mg total) by mouth once daily. 90 tablet 3    lovastatin (MEVACOR) 20 MG tablet Take 2 tablets (40 mg total) by mouth every  evening. 180 tablet 3    metFORMIN (GLUCOPHAGE) 500 MG tablet Take 1 tablet (500 mg total) by mouth daily with breakfast. 90 tablet 3    methylcellulose (ARTIFICIAL TEARS) 1 % ophthalmic solution Place 1 drop into both eyes as needed.      multivitamin (THERAGRAN) per tablet Take 1 tablet by mouth once daily.      rOPINIRole (REQUIP) 1 MG tablet Take 1 tablet (1 mg total) by mouth every evening. 90 tablet 3    triamcinolone acetonide 0.5% (KENALOG) 0.5 % Crea Apply topically 2 (two) times daily. 15 g 5     No current facility-administered medications for this visit.       ALLERGIES:   Review of patient's allergies indicates:   Allergen Reactions    Naproxen sodium Itching       PHYSICAL EXAMINATION:   General:  This is a well appearing female with appropriate speech, affect and gait.     Breast:  medial incision with pin-point opening with serous drainage. There is a 1 cm area of hypergranulation medial to wound. Hematoma at medial incision border has improved.    IMPRESSION:   The patient has had a post-operative complication with hematoma and incision opening.     PLAN:   1. Continue with medi-honey until opening has scabbed over  2. I will see her back in this week for follow up    The patient is in agreement with the plan. Questions were encouraged and answered to patient's satisfaction. Kee will call our office with any questions or concerns.

## 2023-02-20 NOTE — PROGRESS NOTES
"                                                    Physical Therapy Daily Treatment Note     Date: 2/20/2023   Name: Kee Ramirez  Clinic Number: [id number]    Therapy Diagnosis:   Encounter Diagnoses   Name Primary?    Weakness of right upper extremity Yes    Weakness of left upper extremity     Stiffness of left shoulder joint     Stiffness of right shoulder joint     At risk for lymphedema      Physician: Erika Valente MD    Physician Orders: PT Eval and Treat  Medical Diagnosis: C50.112,Z17.0 (ICD-10-CM) - Malignant neoplasm of central portion of left breast in female, estrogen receptor positive  Evaluation Date: 01/25/2023  Authorization Period Expiration: 01/19/2024  Plan of Care Certification Period: 03/24/2023  Visit # / Visits Authorized: 3 / 20 (plus eval)  Insurance: MEDICARE  FOTO: 1/3    Time In: 11:00 AM  Time Out: 12:00 PM  Total Billable Time: 60 minutes    Precautions:  Standard, Diabetes, cancer, and HTN      Subjective     Patient reports: continued improvements in overhead range of motion when reaching for items. Patient attended appointment with Rashida Mccain NP this morning and reported "one spot is doing well, but the second is taking its time to heal up." XRT simulation scheduled this Friday cancelled to allow for adequate tissue healing.     She was compliant with home exercise program.  Response to Previous Treatment: no adverse events    Pain Scale: Kee rates pain on a scale of 0/10 on VAS.   Pain Location: N/A    Fatigue: none  Functional Change: less difficulty with overhead reaching and washing back (internally rotating)    Treatment:   Chemotherapy: possible adjuvant oral chemo  Radiation: simulation TBD until tissue healing  Hormone Therapy: 6 months neoadjuvant estrogen therapy    Surgery Date: LEFT mastectomy and LEFT SLNB (2 lymph nodes removed) on 01/04/2023 per Dr. Valente.      Objective     Objective Measures updated at progress report unless specified.     Shoulder Range of " Motion: measured 02/20/2023  Active ROM Right Left   Flexion 155 70 / 115   Abduction 180 35 / 125   Extension 70 62   IR/90deg 90 90 @ 35 deg   ER/90deg 60 10 @ 35 deg     Treatment     Kee received individual therapeutic exercises to improve postural correction and alignment, stretching and soft tissue mobility, and strengthening for 25 minutes including the following:    Standing Exercises:  - Doorway pec stretch    30 sec x 3 reps     Seated Exercises: with vertical half-foam roller in chair  - Pulleys (flex/abd)     2 min each  - Butterflies with LTR (forehead)   2 min  - Supine wand flexion     2 min  - Supine wand LUE ER at 45   2 min  - PROM flex, abd, ER (L only)  1 set x 10 reps each      Kee received the following individual neuromuscular re-education exercises to isolate interscapular and shoulder stabilizing musculature for 20 minutes:    - Scapula retractions     2 min   - Bilateral shoulder ER (red)    2 min  - Rows (red)      2 min  - Bilateral shoulder horizontal abduction (red) 2 min      Kee received the following manual therapy techniques were performed to increased myofascial/soft tissue length, mobility and pliability, increase PROM, AROM and function as well as to decrease pain for 15 minutes:    - Soft tissue mobilization L pec minor    Home Exercise Program and Patient Education     Education Provided Regarding:  - Role of physical therapy in multi-disciplinary team  - Goals for physical therapy, progress towards goals   - Compliance with home exercise program    Written Home Exercises Provided: yes. Exercises were reviewed and Kee was able to demonstrate them prior to the end of the session. Kee demonstrated good  understanding of the education provided.     See EMR under Patient Instructions for exercises provided  eval, 2/14/23, and 02/16/2023 .    Patient has no cultural, educational or language barriers to learning provided.    Assessment     Patient is responding well to  physical therapy. Patient was able to perform exercise progressions and new exercises without increase in symptoms prior to leaving the clinic. Patient performed seated exercises with vertical half-foam roller as tactile cues to promote more upright posture (good carryover). Patient demonstrated 90 degree improvement in left shoulder abduction and approx 45 degree improvement in left shoulder flexion since initial evaluation. Progressed butterfly stretch with LTR for added flexibility. Will progress as tolerated.     Patient prognosis is Good. Patient will continue to benefit from skilled outpatient physical therapy to address the deficits listed in the problem list chart on initial evaluation, provide patient / family education and to maximize patient's level of independence in the home and community environment.     Anticipated barriers to physical therapy:   - Prolonged time between surgery and beginning physical therapy    Goals as follows:  Short Term Goals: 4 Weeks  Patient will demonstrate 100% understanding of lymphedema risk reduction practices, including self-monitoring for lymphedema (progressing, not met).  Patient will demonstrate independence with established home exercise program for improved strength, functional mobility, range of motion, posture, and endurance. (progressing, not met).   Patient will increase LEFT shoulder FLEXION active range of motion to 125 degrees for improved independence with functional reaching, carrying, pushing, and pulling tasks (progressing, not met).   Patient will increase LEFT shoulder ABDUCTION active range of motion to 110 degrees for improved independence with functional reaching, carrying, pushing, and pulling tasks (progressing, not met).   Strength will be assessed and appropriate goals set (progressing, not met).      Long Term Goals: 8 Weeks   Patient will be independent with updated home exercise program for improved functional mobility, posture, strength, and  endurance (progressing, not met).  Patient will increase BILATERAL shoulder FLEXION active range of motion to 180 degrees for improved independence with functional reaching, carrying, pushing, and pulling tasks (progressing, not met).   Patient will increase LEFT shoulder ABDUCTION active range of motion to 180 degrees for improved independence with functional reaching, carrying, pushing, and pulling tasks (progressing, not met).   Patient will increase gross upper extremity musculature to 5/5 for improved independence with functional reaching, carrying, pushing, and pulling tasks (progressing, not met).   Patient will report decrease in overall worst pain to 2/10 at discharge for improved tolerance to functional reaching, carrying, pushing, and pulling activities of daily living (progressing, not met).  Patient will report compliance with walking program 5x/week for 10 minutes/day to improve overall cardiovascular function and decrease cancer-related fatigue at discharge (progressing, not met).         Plan     Outpatient physical therapy 2x week for 8 weeks to include the following: Gait Training, Manual Therapy, Neuromuscular Re-ed, Patient Education, Self Care, Therapeutic Activities, and Therapeutic Exercise.     Plan of Care Certification Period: 1/25/2023 to 03/24/2023    Therapist: Irene Avelar, PT  2/20/2023

## 2023-02-22 ENCOUNTER — PATIENT MESSAGE (OUTPATIENT)
Dept: RADIATION ONCOLOGY | Facility: CLINIC | Age: 78
End: 2023-02-22
Payer: MEDICARE

## 2023-02-22 ENCOUNTER — CLINICAL SUPPORT (OUTPATIENT)
Dept: REHABILITATION | Facility: HOSPITAL | Age: 78
End: 2023-02-22
Payer: MEDICARE

## 2023-02-22 ENCOUNTER — OFFICE VISIT (OUTPATIENT)
Dept: HEMATOLOGY/ONCOLOGY | Facility: CLINIC | Age: 78
End: 2023-02-22
Payer: MEDICARE

## 2023-02-22 VITALS
BODY MASS INDEX: 34.05 KG/M2 | SYSTOLIC BLOOD PRESSURE: 183 MMHG | DIASTOLIC BLOOD PRESSURE: 77 MMHG | TEMPERATURE: 98 F | RESPIRATION RATE: 16 BRPM | OXYGEN SATURATION: 97 % | WEIGHT: 217.38 LBS | HEART RATE: 63 BPM

## 2023-02-22 DIAGNOSIS — M25.612 STIFFNESS OF LEFT SHOULDER JOINT: ICD-10-CM

## 2023-02-22 DIAGNOSIS — R29.898 WEAKNESS OF LEFT UPPER EXTREMITY: ICD-10-CM

## 2023-02-22 DIAGNOSIS — R29.898 WEAKNESS OF RIGHT UPPER EXTREMITY: Primary | ICD-10-CM

## 2023-02-22 DIAGNOSIS — C50.112 MALIGNANT NEOPLASM OF CENTRAL PORTION OF LEFT BREAST IN FEMALE, ESTROGEN RECEPTOR POSITIVE: ICD-10-CM

## 2023-02-22 DIAGNOSIS — C50.912 LOBULAR CARCINOMA OF LEFT BREAST: Primary | ICD-10-CM

## 2023-02-22 DIAGNOSIS — Z91.89 AT RISK FOR LYMPHEDEMA: ICD-10-CM

## 2023-02-22 DIAGNOSIS — Z17.0 MALIGNANT NEOPLASM OF CENTRAL PORTION OF LEFT BREAST IN FEMALE, ESTROGEN RECEPTOR POSITIVE: ICD-10-CM

## 2023-02-22 DIAGNOSIS — M25.611 STIFFNESS OF RIGHT SHOULDER JOINT: ICD-10-CM

## 2023-02-22 PROCEDURE — 99214 PR OFFICE/OUTPT VISIT, EST, LEVL IV, 30-39 MIN: ICD-10-PCS | Mod: S$PBB,,, | Performed by: INTERNAL MEDICINE

## 2023-02-22 PROCEDURE — 97140 MANUAL THERAPY 1/> REGIONS: CPT

## 2023-02-22 PROCEDURE — 99214 OFFICE O/P EST MOD 30 MIN: CPT | Mod: S$PBB,,, | Performed by: INTERNAL MEDICINE

## 2023-02-22 PROCEDURE — 97110 THERAPEUTIC EXERCISES: CPT

## 2023-02-22 PROCEDURE — 99213 OFFICE O/P EST LOW 20 MIN: CPT | Mod: PBBFAC | Performed by: INTERNAL MEDICINE

## 2023-02-22 PROCEDURE — 99999 PR PBB SHADOW E&M-EST. PATIENT-LVL III: ICD-10-PCS | Mod: PBBFAC,,, | Performed by: INTERNAL MEDICINE

## 2023-02-22 PROCEDURE — 99999 PR PBB SHADOW E&M-EST. PATIENT-LVL III: CPT | Mod: PBBFAC,,, | Performed by: INTERNAL MEDICINE

## 2023-02-22 PROCEDURE — 97112 NEUROMUSCULAR REEDUCATION: CPT

## 2023-02-22 NOTE — PROGRESS NOTES
"                                                    Physical Therapy Daily Treatment Note     Date: 2/22/2023   Name: Kee Ramirez  Clinic Number: [id number]    Therapy Diagnosis:   Encounter Diagnoses   Name Primary?    Weakness of right upper extremity Yes    Weakness of left upper extremity     Stiffness of left shoulder joint     Stiffness of right shoulder joint     At risk for lymphedema        Physician: Erika Valente MD    Physician Orders: PT Eval and Treat  Medical Diagnosis: C50.112,Z17.0 (ICD-10-CM) - Malignant neoplasm of central portion of left breast in female, estrogen receptor positive  Evaluation Date: 01/25/2023  Authorization Period Expiration: 01/19/2024  Plan of Care Certification Period: 03/24/2023  Visit # / Visits Authorized: 4 / 20 (plus eval)  Insurance: MEDICARE  FOTO: 1/3    Time In: 10:05 AM  Time Out: 11:00 AM  Total Billable Time: 55 minutes    Precautions:  Standard, Diabetes, cancer, and HTN      Subjective     Patient reports: no new complaints. Patient reports "spot" still improving but will likely have XRT simulation cancelled to allow for full healing. Shoulders and overhead reaching still "loosening" well.     She was compliant with home exercise program.  Response to Previous Treatment: no adverse events    Pain Scale: Kee rates pain on a scale of 0/10 on VAS.   Pain Location: N/A    Fatigue: 1/10  Functional Change: less difficulty with overhead reaching and washing back (internally rotating)    Treatment:   Chemotherapy: possible adjuvant oral chemo  Radiation: simulation TBD until tissue healing  Hormone Therapy: 6 months neoadjuvant estrogen therapy    Surgery Date: LEFT mastectomy and LEFT SLNB (2 lymph nodes removed) on 01/04/2023 per Dr. Valente.      Objective     Objective Measures updated at progress report unless specified.     Shoulder Range of Motion: measured 02/20/2023  Active ROM Right Left   Flexion 155 115   Abduction 180 125   Extension 70 62   IR/90deg 90 " 90 @ 35 deg   ER/90deg 60 10 @ 35 deg     Treatment     Kee received individual therapeutic exercises to improve postural correction and alignment, stretching and soft tissue mobility, and strengthening for 25 minutes including the following:    Seated Exercises:  - Pulleys (flex/abd)     2 min each  - Biceps curls, 2#     2 min  - Supine wand flexion, 1#   2 min  - Supine wand LUE ER at 45, 1#  2 min  - PROM flex, abd, ER (L only)  1 set x 10 reps each    Standing Exercises:  - Triceps pushdowns (red)   2 min      Kee received the following individual neuromuscular re-education exercises to isolate interscapular and shoulder stabilizing musculature for 20 minutes:    - Bilateral shoulder ER (red)    2 min  - Bilateral shoulder horizontal abduction (red) 2 min  - Rows (red)      2 min  - Shoulder extension (red)    2 min      Kee received the following manual therapy techniques were performed to increased myofascial/soft tissue length, mobility and pliability, increase PROM, AROM and function as well as to decrease pain for 10 minutes:    - Soft tissue mobilization L pec minor    Home Exercise Program and Patient Education     Education Provided Regarding:  - Role of physical therapy in multi-disciplinary team  - Goals for physical therapy, progress towards goals   - Compliance with home exercise program    Written Home Exercises Provided: yes. Exercises were reviewed and Kee was able to demonstrate them prior to the end of the session. Kee demonstrated good  understanding of the education provided.     See EMR under Patient Instructions for exercises provided  eval, 2/14/23, and 02/16/2023 .    Patient has no cultural, educational or language barriers to learning provided.    Assessment     Patient is responding well to physical therapy. Patient was able to perform exercise progressions and new exercises without increase in symptoms prior to leaving the clinic. Improved strength demonstrated with added  biceps curls and resistance band exercises. Continued improvements in LUE soft tissue mobility and active range of motion with manual techniques. Will continue to progress as tolerated.    Patient prognosis is Good. Patient will continue to benefit from skilled outpatient physical therapy to address the deficits listed in the problem list chart on initial evaluation, provide patient / family education and to maximize patient's level of independence in the home and community environment.     Anticipated barriers to physical therapy:   - Prolonged time between surgery and beginning physical therapy    Goals as follows:  Short Term Goals: 4 Weeks  Patient will demonstrate 100% understanding of lymphedema risk reduction practices, including self-monitoring for lymphedema (progressing, not met).  Patient will demonstrate independence with established home exercise program for improved strength, functional mobility, range of motion, posture, and endurance. (progressing, not met).   Patient will increase LEFT shoulder FLEXION active range of motion to 125 degrees for improved independence with functional reaching, carrying, pushing, and pulling tasks (progressing, not met).   Patient will increase LEFT shoulder ABDUCTION active range of motion to 110 degrees for improved independence with functional reaching, carrying, pushing, and pulling tasks (progressing, not met).   Strength will be assessed and appropriate goals set (progressing, not met).      Long Term Goals: 8 Weeks   Patient will be independent with updated home exercise program for improved functional mobility, posture, strength, and endurance (progressing, not met).  Patient will increase BILATERAL shoulder FLEXION active range of motion to 180 degrees for improved independence with functional reaching, carrying, pushing, and pulling tasks (progressing, not met).   Patient will increase LEFT shoulder ABDUCTION active range of motion to 180 degrees for improved  independence with functional reaching, carrying, pushing, and pulling tasks (progressing, not met).   Patient will increase gross upper extremity musculature to 5/5 for improved independence with functional reaching, carrying, pushing, and pulling tasks (progressing, not met).   Patient will report decrease in overall worst pain to 2/10 at discharge for improved tolerance to functional reaching, carrying, pushing, and pulling activities of daily living (progressing, not met).  Patient will report compliance with walking program 5x/week for 10 minutes/day to improve overall cardiovascular function and decrease cancer-related fatigue at discharge (progressing, not met).      Plan     Outpatient physical therapy 2x week for 8 weeks to include the following: Gait Training, Manual Therapy, Neuromuscular Re-ed, Patient Education, Self Care, Therapeutic Activities, and Therapeutic Exercise.     Plan of Care Certification Period: 1/25/2023 to 03/24/2023    Therapist: Irene Avelar, PT  2/22/2023

## 2023-02-22 NOTE — PROGRESS NOTES
PROGRESS NOTE    Subjective:       Patient ID: Kee Ramirez is a 77 y.o. female.  MRN: [id number]  : 1945    Chief Complaint: ILC of the left breast     History of Present Illness:   Kee Ramirez is a 77 y.o. female who presents with  ILC of the left breast.       Reports yearly mammograms , normal in . In  screening mammogram showed left breast architectural distortion. Diagnostic mammogram in March showed a 21 x 9  X 2 0 mm left breast mass. US guided biopsy showed ILC, ER strongly positive, IN 15% positive, Her 2 rhina negative.   A breast MRI showed a 7.6 x 4.7 cm mass with spiculated margins.No abnormal lymph nodes were found.      No history of breast mass or biopsies. She has been seeing Dr. Ortiz and is transferring care to use due to proximity. See full oncology history below.   She has been referred for neoadjuvant chemotherapy. We met today as a follow up visit to further discuss neoadjuvant therapy.  At her visit two weeks ago, we discussed neoadjuvant chemotherapy vs neoadjuvant endocrine therapy.  She is interested in breast conservation and is not a candidate for upfront lumpectomy given tumor size and extension to the nipple areolar complex.  She met with Dr. Valente who agrees that she may not be a candidate for breast conservation even after neoadjuvant therapies.  Patient wants to try.     An oncotype was sent to determine if she was high risk and would benefit from chemotherapy.  We were notified there was not enough tissue specimen from the original biopsy for the oncotype.  After extensive discussion, we elected to start neoadjuvant anastrozole.     2022: Started anastrozole      She was admitted to the hospital from -, found to have SBO. Has NGT for decompression and improved with conservative measures.     Interim history:    She completed 6 months of neoadjuvant anastrozole and has had a left mastectomy  without reconstruction on 1/4/23.     Since her last visit, she has been dealing with healing issues at the incision site. She has been seeing the surgical NP and has a fleshy 1 cm mass at the medial incision site. There is continued discharge but generally improving.     Oncology History:  As dcoumented by  and updated      03.21.2022 Left Breast, Core Needle Biopsies:   - Invasive lobular carcinoma, well-differentiated .   - Bloom-Damon score (5/9):        - Tubular Formation: 3/3        - Nuclear Pleomorphism: 1/3        - Mitotic Activity: 1/3.   - Background of lobular carinoma in-situ (LCIS), nuclear grade 1.   - No perineural or lymphovascular invasion is identified.   Estrogen receptor: Positive (strong intensity, >95% of tumor cell nuclei).   Progesterone receptor: Positive (strong intensity, 15% of tumor cell nuclei).   HER2 IHC: Negative.   Ki-67: 10%.  04.04.2022 [facility] -- Surgery FU w/Oncotype  04.06.2022  follow up  -Breast MRI for staging, discussed surgery options; pt would like BCT, will make definitive plan after MRI  05.03.2022 Breast MRI  -Irregular 7.6 cm enhancing spiculated mass in the upper LEFT breast, consistent with the patient's known biopsy-proven invasive lobular carcinoma. The enhancement does extend to the nipple-areolar complex.  -No suspicious abnormality in the RIGHT breast.   BI-RADS Category: Overall: 6 - known biopsy-proven malignancy  05.06.2022 Follow up with [facility]  -Plan for Axillary U/S to assess LAD, [facility]  [facility]. Due to size of mass/location and nature of lobular cancer, was not felt that BCT was optimal unless NA therapy was done 1st   -2 week follow up to discuss further   05.13.2022 PetCT  -Mildly hypermetabolic irregular soft tissue within the left breast in keeping with known lobular carcinoma. This mass is better appreciated on MRI of the breast.  -No definite evidence of metastatic disease.  Please note that FDG PET has has  suboptimal sensitivity for certain histologies such as lobular and tubular carcinomas.  05.18.2022 Initial [facility] eval    12/1/22  MRI     Impression:  Left  Interval decreased enhancement of left breast malignancy (biopsy proven ILC).  Residual abnormal enhancement still spans 8.1 cm AP x 2.5 cm transverse and extends to the left nipple areolar complex.     Right  There is no MR evidence of malignancy.     BI-RADS Category:   Overall: 6 - Known Biopsy-Proven Malignancy    Final Pathologic Diagnosis 1.  Left axillary sentinel lymph node #1, hot and blue; count 299, excisional   biopsy: 1 lymph node, negative for metastatic carcinoma (0/1).          Confirmed by negative immunohistochemical staining with cytokeratins   AE1/AE3, cam 5.2 and wide spectrum keratin with          satisfactory positive and negative controls.   2.  Left axillary sentinel lymph node #2, hot and blue; count 652, excisional   biopsy: 1 lymph node, negative for metastatic carcinoma (0/1).          Confirmed by negative immunohistochemical staining with cytokeratins   AE1/AE3, cam 5.2 and wide spectrum keratin with          satisfactory positive and negative controls.   3.  Left breast, skin sparing mastecomy: Invasive lobular carcinoma,   measuring at least 51 mm (at least 5.1 cm) in greatest dimension (slide   measurement of largest          dimension).          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          Note:  Biopsy clip is grossly identified and biopsy site changes are   identified microscopically within area of tumor.          Margins:           - All margins are greater than 10 mm (greater than 1 cm) from   invasive tumor and LCIS.          Nipple and skin are present and uninvolved by tumor.  Note:  Tumor is   present in the soft tissue underlying the nipple skin but          does not involve nipple epidermis.          Microcalcifications are present and associated with lobular carcinoma   in situ and invasive carcinoma.           Background breast tissue shows fibrocystic changes including apocrine   metaplasia and stromal fibrosis.          See synoptic report in comment section for further details.   4.  Right port-a-cath, removal: Gross diganosis:  Port-a-cath.         ypT3:  Tumor greater than 50 mm in greatest dimension.   Regional lymph nodes:        y(sn)pN0:  No regional lymph node metastasis identified.   Distant metastasis:     Oncology History:  Oncology History   Malignant neoplasm of central portion of left female breast   3/21/2022 Initial Diagnosis    Malignant neoplasm of central portion of left female breast     3/21/2022 Biopsy    Left Breast, Core Needle Biopsies:   - Invasive lobular carcinoma, well-differentiated .   - Bloom-Damon score (5/9):        - Tubular Formation: 3/3        - Nuclear Pleomorphism: 1/3        - Mitotic Activity: 1/3.   - Background of lobular carinoma in-situ (LCIS), nuclear grade 1.   - No perineural or lymphovascular invasion is identified.        3/21/2022 Breast Tumor Markers    Estrogen: Positive  Progesterone: Positive  HER2: Negative     5/27/2022 - 5/27/2022 Chemotherapy    This was considered, but patient did not undergo chemotherapy.   Treatment Summary   Plan Name: OP BREAST DOSE-DENSE AC-T (DOXORUBICIN CYCLOPHOSPHAMIDE Q2W FOLLOWED BY PACLITAXEL WEEKLY)  Treatment Goal: Curative  Status: Inactive  Start Date:   End Date:   Provider: Mitali Carlni MD  Chemotherapy: DOXOrubicin chemo injection 130 mg, 60 mg/m2, Intravenous, Clinic/HOD 1 time, 0 of 4 cycles  cyclophosphamide 600 mg/m2 = 1,300 mg in sodium chloride 0.9% 250 mL chemo infusion, 600 mg/m2, Intravenous, Clinic/HOD 1 time, 0 of 4 cycles  PACLitaxeL (TAXOL) 80 mg/m2 = 174 mg in sodium chloride 0.9% 250 mL chemo infusion, 80 mg/m2, Intravenous, Clinic/HOD 1 time, 0 of 12 cycles       6/7/2022 Cancer Staged    Staging form: Breast, AJCC 8th Edition  - Clinical: Stage IIA (cT3, cN0, cM0, G1, ER+, IA+, HER2-)        6/7/2022 Notable Event    Oncotype performed initially to assess for benefit of NACT to optimize for surgery but not enough tissue. Proceeded with AI x 6 months.      6/7/2022 Genetic Testing    Samia Moseley: Negative                                           1/4/2023 Breast Surgery    Left Mastectomy with L SLNB.      1/24/2023 Tumor Conference    No definitive treatment response to neoadjuvant AI use on final surgical pathology. Oncotype will now be sent on the surgical specimen. Could include CDK46 inhibitor with her endocrine if Oncotype is low given some response to Anastrozole and large tumor size, but patient is node negative. May consider to simply change to different AI. Radiation Oncology would like to meet with patient to discuss XRT, but team does not feel strongly about her proceeding.            History:  Past Medical History:   Diagnosis Date    Bowel obstruction     Breast cancer 05/01/2022    left    Cancer     COVID-19 07/2022    Diabetes mellitus, type 2     Hyperlipidemia     Hypertension     Lobular carcinoma in situ 05/01/2022    left    Midline low back pain without sciatica 07/31/2015    Thyroid disease     Venous stasis     bilateral legs      Past Surgical History:   Procedure Laterality Date    BREAST BIOPSY Left 03/21/2022    Core bx, + ILC    COLONOSCOPY      COLONOSCOPY N/A 12/05/2019    Procedure: COLONOSCOPY;  Surgeon: Luis Bogran-Reyes, MD;  Location: [facility];  Service: Endoscopy;  Laterality: N/A;    HYSTERECTOMY  12/01/2021    INJECTION FOR SENTINEL NODE IDENTIFICATION Left 1/4/2023    Procedure: INJECTION, FOR SENTINEL NODE IDENTIFICATION;  Surgeon: Erika Valente MD;  Location: [facility];  Service: General;  Laterality: Left;    INSERTION OF TUNNELED CENTRAL VENOUS CATHETER (CVC) WITH SUBCUTANEOUS PORT Right 05/24/2022    Procedure: [id number];  Surgeon: Darien Bear MD;  [facility];  Service: General;  Laterality: Right;    MASTECTOMY Left 1/4/2023     Procedure: MASTECTOMY;  Surgeon: Erika Valente MD;  Location: [facility];  Service: General;  Laterality: Left;    MEDIPORT REMOVAL Right 1/4/2023    Procedure: REMOVAL PORT;  Surgeon: Erika Valente MD;  Location: [facility];  Service: General;  Laterality: Right;    SENTINEL LYMPH NODE BIOPSY Left 1/4/2023    Procedure: BIOPSY, LYMPH NODE, SENTINEL;  Surgeon: Erika Valente MD;  Location: [facility];  Service: General;  Laterality: Left;    SHOULDER ARTHROSCOPY W/ ROTATOR CUFF REPAIR Right 2001    TUBAL LIGATION      VAGINAL HYSTERECTOMY N/A 01/11/2021    Procedure: HYSTERECTOMY, VAGINAL ;  Surgeon: Jessie Dacosta MD;  Location: [facility];  Service: OB/GYN;  Laterality: N/A;     Family History   Problem Relation Age of Onset    Diabetes Mother     Hypertension Mother     Arthritis Mother     COPD Father     Hypertension Sister     Diabetes Sister     Cancer Sister         PANCREATIC    Breast cancer Sister      Social History     Tobacco Use    Smoking status: Never    Smokeless tobacco: Never   Substance and Sexual Activity    Alcohol use: No     Alcohol/week: 0.0 standard drinks    Drug use: No    Sexual activity: Not Currently     Partners: Male     Birth control/protection: None, See Surgical Hx        ROS:   Review of Systems   Constitutional: Negative for fever, malaise/fatigue and weight loss.   HENT: no hearing loss, nosebleeds and sore throat.    Eyes: Negative for double vision and photophobia.   Respiratory: no hemoptysis, sputum production, shortness of breath and wheezing.    Cardiovascular: Negative for chest pain, palpitations, orthopnea and leg swelling.   Gastrointestinal: Negative for abdominal pain, blood in stool, constipation, + diarrhea heartburn, nausea and vomiting.   Genitourinary: Negative for dysuria, hematuria and urgency.   Musculoskeletal: Negative for back pain, joint pain and myalgias.   Skin: Negative for itching and rash.   Neurological: Negative for dizziness, tingling, seizures,  weakness and headaches.   Endo/Heme/Allergies: Negative for polydipsia. Does not bruise/bleed easily.   Psychiatric/Behavioral: Negative for depression and memory loss. The patient is not nervous/anxious and does not have insomnia.       Objective:     Vitals:    02/22/23 0906   BP: (!) 183/77   Pulse: 63   Resp: 16   Temp: 98 °F (36.7 °C)   SpO2: 97%   Weight: 98.6 kg (217 lb 6 oz)   PainSc: 0-No pain           Wt Readings from Last 10 Encounters:   02/22/23 98.6 kg (217 lb 6 oz)   02/20/23 94.3 kg (208 lb)   02/07/23 94.3 kg (208 lb)   01/30/23 94.3 kg (208 lb)   01/27/23 94.6 kg (208 lb 7.1 oz)   01/25/23 95.3 kg (210 lb)   01/25/23 95.5 kg (210 lb 8.6 oz)   01/19/23 100.2 kg (221 lb)   12/22/22 93.4 kg (206 lb)   12/22/22 93.4 kg (206 lb)       Physical Examination:   Physical Exam  Vitals and nursing note reviewed.   Constitutional:       General: She is not in acute distress.     Appearance: She is not diaphoretic.   HENT:      Head: Normocephalic.   Eyes:      General: No scleral icterus.     Conjunctiva/sclera: Conjunctivae normal.   Neck:      Thyroid: No thyromegaly.   Cardiovascular:      Rate and Rhythm: Normal rate and regular rhythm.      Heart sounds: Normal heart sounds. No murmur heard.  Pulmonary:      Effort: Pulmonary effort is normal. No respiratory distress.      Breath sounds: No stridor. No wheezing or rales.   Chest:      Chest wall: No tenderness.     Musculoskeletal:         General: No tenderness or deformity. Normal range of motion.      Cervical back: Neck supple.   Lymphadenopathy:      Cervical: No cervical adenopathy.   Skin:     General: Skin is warm and dry.      Findings: No erythema or rash.      Comments:+ left mastectomy w/o reconstruction. + serosanguinous discharge and small openings along the incision line. + fleshy nodule 1 cm at the medial incision.   Neurological:      Mental Status: She is alert and oriented to person, place, and time.      Cranial Nerves: No cranial  nerve deficit.      Coordination: Coordination normal.      Gait: Gait is intact.   Psychiatric:         Mood and Affect: Affect normal.         Cognition and Memory: Memory normal.         Judgment: Judgment normal.     Diagnostic Tests:  Significant Imaging: I have reviewed and interpreted all pertinent imaging results/findings.    Laboratory Data:  All pertinent labs have been reviewed.  Labs:   Lab Results   Component Value Date    WBC 5.62 12/22/2022    RBC 4.10 12/22/2022    HGB 11.9 (L) 12/22/2022    HCT 37.2 12/22/2022    MCV 91 12/22/2022     12/22/2022     (H) 12/22/2022     12/22/2022    K 4.7 12/22/2022    BUN 23 12/22/2022    CREATININE 0.8 12/22/2022    AST 13 12/22/2022    ALT 16 12/22/2022    BILITOT 0.7 12/22/2022       Assessment/Plan:   Malignant neoplasm of central portion of left breast in female, estrogen receptor positive  cT3N0, ER 95%, PR15%, Her 2 rhina negative, Grade 1, ki 67 10%     ypT3 yp (sn) N0   Oncotype low risk, 13     She was interested in breast conservation and received 6 months of neoadjuvant anastrozole.   Follow up MRI showed some response but persistent large area of enhancement with extension to the nipple areolar complex. Mastectomy was recommended.   Final pathology and TB recommendations were discussed. There was 5 cm of ILC, node negative, no significant treatment response, G1, Ki 67 stable at 10%.  Per guidelines, oncotype was sent, is 13, low risk, no benefit of chemotherapy.     RT is recommended but not started yet due to healing issues.     At this time, resume anastrozole and consider holding it during RT if preferred by rad onc, otherwise can continue. I will see her back in one month.     DEXA normal in June, continue calcium and vitamin D.      Primary Hypertension   Compliant with current medications  Continue PMD follow up.     ECOG SCORE           Discussion:   No follow-ups on file.    Plan was discussed with the patient at length, and  she verbalized understanding. Kee was given an opportunity to ask questions that were answered to her satisfaction, and she was advised to call in the interval if any problems or questions arise.Electronically signed by Mitali Carlin MD          Route Chart for Scheduling    Med Onc Chart Routing      Follow up with physician . 3/22   Follow up with KAYLYN    Infusion scheduling note    Injection scheduling note    Labs    Imaging    Pharmacy appointment    Other referrals        Treatment Plan Information   OP ANASTROZOLE DAILY   Mitali Carlin MD   Upcoming Treatment Dates - OP ANASTROZOLE DAILY    6/24/2022       Antiemetics       Physician communication order       Take Home Chemotherapy       anastrozole (ARIMIDEX) 1 mg Tab    Therapy Plan Information  Flushes  heparin, porcine (PF) 100 unit/mL injection flush 500 Units  500 Units, Intravenous, Every visit  sodium chloride 0.9% flush 10 mL  10 mL, Intravenous, Every visit

## 2023-02-23 ENCOUNTER — OFFICE VISIT (OUTPATIENT)
Dept: SURGERY | Facility: CLINIC | Age: 78
End: 2023-02-23
Payer: MEDICARE

## 2023-02-23 VITALS
BODY MASS INDEX: 34.06 KG/M2 | WEIGHT: 217 LBS | SYSTOLIC BLOOD PRESSURE: 185 MMHG | HEIGHT: 67 IN | HEART RATE: 59 BPM | DIASTOLIC BLOOD PRESSURE: 75 MMHG

## 2023-02-23 DIAGNOSIS — Z17.0 MALIGNANT NEOPLASM OF CENTRAL PORTION OF LEFT BREAST IN FEMALE, ESTROGEN RECEPTOR POSITIVE: Primary | ICD-10-CM

## 2023-02-23 DIAGNOSIS — Z09 POSTOP CHECK: ICD-10-CM

## 2023-02-23 DIAGNOSIS — C50.112 MALIGNANT NEOPLASM OF CENTRAL PORTION OF LEFT BREAST IN FEMALE, ESTROGEN RECEPTOR POSITIVE: Primary | ICD-10-CM

## 2023-02-23 PROCEDURE — 99999 PR PBB SHADOW E&M-EST. PATIENT-LVL III: ICD-10-PCS | Mod: PBBFAC,,, | Performed by: NURSE PRACTITIONER

## 2023-02-23 PROCEDURE — 99999 PR PBB SHADOW E&M-EST. PATIENT-LVL III: CPT | Mod: PBBFAC,,, | Performed by: NURSE PRACTITIONER

## 2023-02-23 PROCEDURE — 99024 PR POST-OP FOLLOW-UP VISIT: ICD-10-PCS | Mod: POP,,, | Performed by: NURSE PRACTITIONER

## 2023-02-23 PROCEDURE — 99024 POSTOP FOLLOW-UP VISIT: CPT | Mod: POP,,, | Performed by: NURSE PRACTITIONER

## 2023-02-23 PROCEDURE — 99213 OFFICE O/P EST LOW 20 MIN: CPT | Mod: PBBFAC | Performed by: NURSE PRACTITIONER

## 2023-02-23 NOTE — PROGRESS NOTES
Form Post-Op  [facility]  Department of Surgery    PCP: Ravin Viera MD  MEDICAL ONCOLOGIST:    Dr. Carlin   RADIATION ONCOLOGIST:   N/a    DIAGNOSIS:    This is a 77 y.o. 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    TREATMENT SUMMARY:  The patient is status post left  mastectomy and sentinel node biopsy on 1/4/2023.  Final pathology showed     Final Pathologic Diagnosis 1.  Left axillary sentinel lymph node #1, hot and blue; count 299, excisional   biopsy: 1 lymph node, negative for metastatic carcinoma (0/1).          Confirmed by negative immunohistochemical staining with cytokeratins   AE1/AE3, cam 5.2 and wide spectrum keratin with          satisfactory positive and negative controls.   2.  Left axillary sentinel lymph node #2, hot and blue; count 652, excisional   biopsy: 1 lymph node, negative for metastatic carcinoma (0/1).          Confirmed by negative immunohistochemical staining with cytokeratins   AE1/AE3, cam 5.2 and wide spectrum keratin with          satisfactory positive and negative controls.   3.  Left breast, skin sparing mastecomy: Invasive lobular carcinoma,   measuring at least 51 mm (at least 5.1 cm) in greatest dimension (slide   measurement of largest          dimension).          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          Note:  Biopsy clip is grossly identified and biopsy site changes are   identified microscopically within area of tumor.          Margins:           - All margins are greater than 10 mm (greater than 1 cm) from   invasive tumor and LCIS.          Nipple and skin are present and uninvolved by tumor.  Note:  Tumor is   present in the soft tissue underlying the nipple skin but          does not involve nipple epidermis.          Microcalcifications are present and associated with lobular carcinoma   in situ and invasive carcinoma.          Background breast tissue shows fibrocystic changes including apocrine    metaplasia and stromal fibrosis.          See synoptic report in comment section for further details.   4.  Right port-a-cath, removal: Gross diganosis:  Port-a-cath.     Comment:  Synoptic Report   Specimen:   Procedure:  Total mastectomy   Specimen laterality:  Left breast   Tumor:   Tumor site:  Central   Histologic type:  Invasive lobular carcinoma   Histologic grade (Paola histologic score) Glandular (acinar)/tubular   differentiation:  Score 3          Nuclear pleomorphism:  Score 1          Mitotic rate:  Score 1          Overall grade:  Grade 1   Tumor size: Greatest dimension of largest invasive focus:  At least 51 mm (at   least 5.1 cm). Note:  The largest grossly identified area of tumor is   measured on the corresponding microscopic slides at 51                  mm.  However there are additional sections of tumor in   adjacent fibrous areas that are also tumor but were not                  discernible grossly and are not contiguous for additional   measurement.  Therefore this tumor is best measured                  at at least 51 mm in greatest dimension.  Only one area of   mass lesion with adjacent fibrotic tissue was                  identified grossly, therefore this is interpreted to be a   single mass lesion.   Tumor focality:  Single focus of invasive carcinoma.   Ductal carcinoma in Situ:  Not identified   Lobular carcinoma in Situ:  Present   Tumor extent:  Skin and nipple are present and uninvolved by tumor.  Skeletal   muscle is not present for evaluation.   Lymphovascular invasion:  Not identified   Dermal lymphovascular invasion:  Not identified   Microcalcifications:  Present in invasive carcinoma and lobular carcinoma in   situ.   Treatment effect in breast:  No definite response to presurgical therapy in   the invasive carcinoma.   Treatment effect in the lymph nodes:  No definite response to presurgical   therapy in lymph nodes.   Margins:  All margins are negative for invasive  "carcinoma. Distance from   invasive carcinoma to closest margin:  Invasive carcinoma is located greater   than 10 mm (greater than 1 cm)          from all surgical margins.   Regional lymph nodes:   Regional lymph node status:  Regional lymph nodes are present and all   regional lymph nodes are negative for tumor. Number of lymph nodes with   macrometastasis (greater than 2 mm):  0          Number of lymph nodes with micrometastasis:  0          Number of lymph nodes with isolated tumor cells:  0     Total number of lymph nodes examined (sentinel and nonsentinel):  2          Total number of sentinel nodes examined:  2   Distant metastasis:  Not applicable   Pathologic stage classification:   TNM descriptors: "y":  Posttreatment   Primary tumor:        ypT3:  Tumor greater than 50 mm in greatest dimension.   Regional lymph nodes:        y(sn)pN0:  No regional lymph node metastasis identified.   Distant metastasis:        ypMX:  Cannot be assessed.   Additional findings:  Fibrocystic changes.   Additional special studies/biomarker reporting:   Immunohistochemical stains for biomarker reporting were completed on the   patient's prior left breast core biopsy material from March 21, 2022, case   number [id number]-, with the following results:   "ER/NC/Her-2 rhina and Ki67 testing performed at [facility]   ([facility]-). Diagnosed by   Carolyn Infante MD. Her diagnoses are as follows:   OUTSIDE SLIDES FOR BREAST BIOMARKERS LABELED [facility]-/[facility]-   LEFT BREAST   Ancillary studies- (Performed on block 1A)   Estrogen receptor: Positive (strong intensity, >95% of tumor cell nuclei).   Progesterone receptor: Positive (strong intensity, 15% of tumor cell nuclei).   HER2 IHC: Negative.   Ki-67: 10%.       INTERVAL HISTORY:   Kee Ramirez comes in for a post-op check.  She denies fever, chills, chest pain or shortness of breath.  Her pain is well controlled.  Reports continued drainage from left breast " incision that started a few days ago. She has been using gauze to cover opening. She was seen in clinic for this on 1/25/2023 and 40 cc of dark red bloody drainage was aspirated. Today patient presents for follow up with continued drainage from incision. Patient reports this morning a large amount of dark red blood drained from incision that soaked through her gauze and ABD pad. Patient denies pain or discomfort to the area. Patient reports drainage of the incision - has slowed down. Swelling has improved as well. She was evaluated on 2/7/2023 and there was a small area of necrosis at previously area of drainage. She was started on medi-honey daily. Today reports almost complete closure of wound. There is a small opening with drainage. There is an area more medial that has opened and started draining. Patient denies pain or discomfort today.     MEDICATIONS:  Current Outpatient Medications   Medication Sig Dispense Refill    amLODIPine (NORVASC) 10 MG tablet Take 1 tablet (10 mg total) by mouth once daily. 90 tablet 3    anastrozole (ARIMIDEX) 1 mg Tab Take 1 tablet (1 mg total) by mouth once daily. 90 tablet 3    aspirin (ECOTRIN) 81 MG EC tablet Take 81 mg by mouth once daily.      cloNIDine (CATAPRES) 0.1 MG tablet TAKE 1 TABLET BY MOUTH THREE TIMES DAILY 90 tablet 11    gabapentin (NEURONTIN) 300 MG capsule Take 1 capsule (300 mg total) by mouth 3 (three) times daily. 90 capsule 5    HYDROcodone-acetaminophen (NORCO) 5-325 mg per tablet Take 1 tablet by mouth every 6 (six) hours as needed for Pain. 20 tablet 0    ibuprofen (ADVIL,MOTRIN) 800 MG tablet Take 1 tablet (800 mg total) by mouth 3 (three) times daily. 90 tablet 3    levothyroxine (SYNTHROID) 112 MCG tablet Take 1 tablet (112 mcg total) by mouth before breakfast. 90 tablet 3    losartan (COZAAR) 100 MG tablet Take 1 tablet (100 mg total) by mouth once daily. 90 tablet 3    lovastatin (MEVACOR) 20 MG tablet Take 2 tablets (40 mg total) by mouth every  evening. 180 tablet 3    metFORMIN (GLUCOPHAGE) 500 MG tablet Take 1 tablet (500 mg total) by mouth daily with breakfast. 90 tablet 3    methylcellulose (ARTIFICIAL TEARS) 1 % ophthalmic solution Place 1 drop into both eyes as needed.      multivitamin (THERAGRAN) per tablet Take 1 tablet by mouth once daily.      rOPINIRole (REQUIP) 1 MG tablet Take 1 tablet (1 mg total) by mouth every evening. 90 tablet 3    triamcinolone acetonide 0.5% (KENALOG) 0.5 % Crea Apply topically 2 (two) times daily. 15 g 5     No current facility-administered medications for this visit.       ALLERGIES:   Review of patient's allergies indicates:   Allergen Reactions    Naproxen sodium Itching       PHYSICAL EXAMINATION:   General:  This is a well appearing female with appropriate speech, affect and gait.     Breast:  medial incision with pin-point opening with serous drainage. There is a 1 cm area of hypergranulation medial to wound. Hematoma at medial incision border has improved.    IMPRESSION:   The patient has had a post-operative complication with hematoma and incision opening.     PLAN:   1. Continue with medi-honey until opening has scabbed over  2. Silver nitrate to area of hypergranulation.   3. Patient will follow up in 2 weeks or sooner for new or worsening breast concerns     The patient is in agreement with the plan. Questions were encouraged and answered to patient's satisfaction. Kee will call our office with any questions or concerns.

## 2023-03-01 ENCOUNTER — HOSPITAL ENCOUNTER (OUTPATIENT)
Dept: RADIATION THERAPY | Facility: HOSPITAL | Age: 78
Discharge: HOME | End: 2023-03-01
Attending: RADIOLOGY
Payer: MEDICARE

## 2023-03-02 ENCOUNTER — PATIENT MESSAGE (OUTPATIENT)
Dept: SURGERY | Facility: CLINIC | Age: 78
End: 2023-03-02
Payer: MEDICARE

## 2023-03-02 NOTE — PROGRESS NOTES
Physical Therapy Daily Treatment Note     Date: 3/3/2023   Name: Kee Ramirez  Clinic Number: [id number]    Therapy Diagnosis:   Encounter Diagnoses   Name Primary?    Weakness of right upper extremity Yes    Weakness of left upper extremity     Stiffness of left shoulder joint     Stiffness of right shoulder joint     At risk for lymphedema      Physician: Erika Valente MD    Physician Orders: PT Eval and Treat  Medical Diagnosis: C50.112,Z17.0 (ICD-10-CM) - Malignant neoplasm of central portion of left breast in female, estrogen receptor positive  Evaluation Date: 01/25/2023  Authorization Period Expiration: 01/19/2024  Plan of Care Certification Period: 03/24/2023  Visit # / Visits Authorized: 5 / 20 (plus eval)  Insurance: MEDICARE  FOTO: 2/3    Time In: 10:00 AM  Time Out: 11:00 AM  Total Billable Time: 60 minutes    Precautions:  Standard, Diabetes, cancer, and HTN      Subjective     Patient reports: feeling approx 75% improved since beginning physical therapy. Patient using LUE more frequently for everyday tasks but reports difficulty wringing mop with both hands. Patient scheduled for radiation simulation next Friday (03/10/2023).     She was compliant with home exercise program.  Response to Previous Treatment: no adverse events    Pain Scale: Kee rates pain on a scale of 0/10 on VAS.   Pain Location: N/A    Fatigue: minimal  Functional Change: improved tolerance to overhead reaching    Treatment:   Chemotherapy: possible adjuvant oral chemo  Radiation: simulation TBD until tissue healing  Hormone Therapy: 6 months neoadjuvant estrogen therapy    Surgery Date: LEFT mastectomy and LEFT SLNB (2 lymph nodes removed) on 01/04/2023 per Dr. Valente.      Objective     Shoulder Range of Motion: measured 02/20/2023  Active ROM Right Left   Flexion 180 150   Abduction 180 132   Extension 70 70   IR/90deg 90 70 @ 45 deg   ER/90deg 65 45 @ 45 deg       Upper  "Extremity Strength:    (R) UE (L) UE   Shoulder Flexion: 4/5 4-/5   Shoulder Abduction: 4/5 4-/5   Shoulder IR 4/5 4-/5   Shoulder ER 4/5 4-/5   Elbow Flexion: 4/5 4-/5   Elbow Extension: 4/5 4-/5   Wrist Flexion: 4/5 4/5   Wrist Extension: 4/5 4/5    39.2 lbs 32.6 lbs       Baseline Measurements of BUE's for Early Detection of Lymphedema:      LANDMARK RIGHT UE LEFT UE DIFFERENCE   E + 8" 36.5 cm 38 cm 1.5 cm   E + 6" 36 cm 36 cm 0 cm   E + 4" 33.5 cm 33 cm 0.5 cm   E + 2" 28.5 cm 28.5 cm 0 cm   Elbow 25.5 cm 24.5 cm 1 cm   W+ 8" 25.5 cm 25 cm 0.5 cm   W +  6" 24.5 cm 23 cm 1.5 cm   W + 4" 20.5 cm 19 cm 0.5 cm   Wrist 17 cm 17 cm 0 cm   DPC 20 cm 19.5 cm 0.5 cm   IP Thumb 7 cm 7 cm 0 cm        CMS Impairment / Limitation / Restriction for FOTO Shoulder Survey     Therapist reviewed FOTO scores for Kee Ramirez on 03/03/2023.   FOTO documents entered into [employer] - see Media section.     Limitations Score: 40%  Category: Carrying, Other           Treatment     Kee underwent individual physical performance testing as part of reassessment for 20 minutes:    - FOTO reassessment  - Active range of motion measurements  - Manual muscle testing  - Circumferential measurements for early detection of lymphedema      Kee received individual therapeutic exercises to improve postural correction and alignment, stretching and soft tissue mobility, and strengthening for 15 minutes including the following:    UBE: Seat 5, Level 1 (fwd / bkwd)  2 min each    Mat Exercises:   - PROM flex, abd, ER (L only)  1 set x 10 reps each      Exercises held 03/03/2023 secondary to time constraints:  - Triceps pushdowns (red)   2 min  - Biceps curls, 2#     2 min  - Supine wand flexion, 1#   2 min  - Supine wand LUE ER at 45, 1#  2 min  - Pulleys (flex/abd)     2 min each  - Bilateral shoulder horizontal abduction (red) 2 min  - Bilateral shoulder ER (red)    2 min    Kee received the following individual neuromuscular re-education " exercises to isolate interscapular and shoulder stabilizing musculature for 10 minutes:    - Rows (red)      2 min  - Shoulder extension (red)    2 min      Kee received the following individual therapeutic activities for improved tolerance to overhead lifting and wringing motion (to operate mop) for 15 minutes:    - Shelf lifts (vimal), 1#     1 min each  - Pronation / supination with 1# wand (vimal)  1 min each  - Wrist extension (vimal), 1#    1 min each  - Wrist flexion (vimal), 1#     1 min each      Home Exercise Program and Patient Education     Education Provided Regarding:  - Role of physical therapy in multi-disciplinary team  - Goals for physical therapy, progress towards goals   - Compliance with home exercise program    Written Home Exercises Provided: yes. Exercises were reviewed and Kee was able to demonstrate them prior to the end of the session. Kee demonstrated good  understanding of the education provided.     See EMR under Patient Instructions for exercises provided  eval, 2/14/23, and 02/16/2023 .    Patient has no cultural, educational or language barriers to learning provided.    Assessment     Patient is responding well to physical therapy. Patient was able to perform exercise progressions and new exercises without increase in symptoms prior to leaving the clinic. Improved pronation / supination tolerance with added therapeutic activities. Held portion of therapeutic exercise program secondary to time constraints from progress reassessment. Verbal, tactile cues required to avoid upper trapezius compensation.     Patient demonstrated the following active range of motion improvements:     - 25 degree improvement in right shoulder flexion  - 80 degree improvement in left shoulder flexion  - 97 degree improvement in left shoulder abduction  - Overall improved internal / external rotation active range of motion (measured at increased angle of shoulder abduction).     Patient also demonstrates  overall improvement in gross upper extremity strength demonstrated with manual muscle testing. No evidence of lymphedema present at this time. Patient would continue to benefit from skilled physical therapy to further address remaining upper extremity range of motion and strength deficits to return to prior level of function. Will continue to progress as tolerated.    Patient prognosis is Good. Patient will continue to benefit from skilled outpatient physical therapy to address the deficits listed in the problem list chart on initial evaluation, provide patient / family education and to maximize patient's level of independence in the home and community environment.     Anticipated barriers to physical therapy:   - Prolonged time between surgery and beginning physical therapy    Goals as follows:  Short Term Goals: 4 Weeks  Patient will demonstrate 100% understanding of lymphedema risk reduction practices, including self-monitoring for lymphedema (progressing, not met).  Patient will demonstrate independence with established home exercise program for improved strength, functional mobility, range of motion, posture, and endurance. (progressing, not met).   Patient will increase LEFT shoulder FLEXION active range of motion to 125 degrees for improved independence with functional reaching, carrying, pushing, and pulling tasks (progressing, not met).   Patient will increase LEFT shoulder ABDUCTION active range of motion to 110 degrees for improved independence with functional reaching, carrying, pushing, and pulling tasks (progressing, not met).   Strength will be assessed and appropriate goals set (progressing, not met).      Long Term Goals: 8 Weeks   Patient will be independent with updated home exercise program for improved functional mobility, posture, strength, and endurance (progressing, not met).  Patient will increase BILATERAL shoulder FLEXION active range of motion to 180 degrees for improved independence with  functional reaching, carrying, pushing, and pulling tasks (progressing, not met).   Patient will increase LEFT shoulder ABDUCTION active range of motion to 180 degrees for improved independence with functional reaching, carrying, pushing, and pulling tasks (progressing, not met).   Patient will increase gross upper extremity musculature to 5/5 for improved independence with functional reaching, carrying, pushing, and pulling tasks (progressing, not met).   Patient will report decrease in overall worst pain to 2/10 at discharge for improved tolerance to functional reaching, carrying, pushing, and pulling activities of daily living (progressing, not met).  Patient will report compliance with walking program 5x/week for 10 minutes/day to improve overall cardiovascular function and decrease cancer-related fatigue at discharge (progressing, not met).      Plan     Outpatient physical therapy 2x week for 8 weeks to include the following: Gait Training, Manual Therapy, Neuromuscular Re-ed, Patient Education, Self Care, Therapeutic Activities, and Therapeutic Exercise.     Plan of Care Certification Period: 1/25/2023 to 03/24/2023    Therapist: Irene Avelar, PT  3/3/2023

## 2023-03-03 ENCOUNTER — CLINICAL SUPPORT (OUTPATIENT)
Dept: REHABILITATION | Facility: HOSPITAL | Age: 78
End: 2023-03-03
Payer: MEDICARE

## 2023-03-03 DIAGNOSIS — R29.898 WEAKNESS OF LEFT UPPER EXTREMITY: ICD-10-CM

## 2023-03-03 DIAGNOSIS — R29.898 WEAKNESS OF RIGHT UPPER EXTREMITY: Primary | ICD-10-CM

## 2023-03-03 DIAGNOSIS — M25.611 STIFFNESS OF RIGHT SHOULDER JOINT: ICD-10-CM

## 2023-03-03 DIAGNOSIS — M25.612 STIFFNESS OF LEFT SHOULDER JOINT: ICD-10-CM

## 2023-03-03 DIAGNOSIS — Z91.89 AT RISK FOR LYMPHEDEMA: ICD-10-CM

## 2023-03-03 PROCEDURE — 97112 NEUROMUSCULAR REEDUCATION: CPT

## 2023-03-03 PROCEDURE — 97750 PHYSICAL PERFORMANCE TEST: CPT

## 2023-03-03 PROCEDURE — 97110 THERAPEUTIC EXERCISES: CPT

## 2023-03-03 PROCEDURE — 97530 THERAPEUTIC ACTIVITIES: CPT

## 2023-03-07 ENCOUNTER — CLINICAL SUPPORT (OUTPATIENT)
Dept: REHABILITATION | Facility: HOSPITAL | Age: 78
End: 2023-03-07
Payer: MEDICARE

## 2023-03-07 DIAGNOSIS — Z91.89 AT RISK FOR LYMPHEDEMA: ICD-10-CM

## 2023-03-07 DIAGNOSIS — M25.612 STIFFNESS OF LEFT SHOULDER JOINT: ICD-10-CM

## 2023-03-07 DIAGNOSIS — R29.898 WEAKNESS OF RIGHT UPPER EXTREMITY: Primary | ICD-10-CM

## 2023-03-07 DIAGNOSIS — R29.898 WEAKNESS OF LEFT UPPER EXTREMITY: ICD-10-CM

## 2023-03-07 DIAGNOSIS — M25.611 STIFFNESS OF RIGHT SHOULDER JOINT: ICD-10-CM

## 2023-03-07 PROCEDURE — 97110 THERAPEUTIC EXERCISES: CPT

## 2023-03-07 PROCEDURE — 97112 NEUROMUSCULAR REEDUCATION: CPT

## 2023-03-07 PROCEDURE — 97535 SELF CARE MNGMENT TRAINING: CPT

## 2023-03-07 PROCEDURE — 97140 MANUAL THERAPY 1/> REGIONS: CPT

## 2023-03-07 NOTE — PROGRESS NOTES
"                                                    Physical Therapy Daily Treatment Note     Date: 3/7/2023   Name: Kee Ramirez  Clinic Number: [id number]    Therapy Diagnosis:   Encounter Diagnoses   Name Primary?    Weakness of right upper extremity Yes    Weakness of left upper extremity     Stiffness of left shoulder joint     Stiffness of right shoulder joint     At risk for lymphedema        Physician: Erika Valente MD    Physician Orders: PT Eval and Treat  Medical Diagnosis: C50.112,Z17.0 (ICD-10-CM) - Malignant neoplasm of central portion of left breast in female, estrogen receptor positive  Evaluation Date: 01/25/2023  Authorization Period Expiration: 01/19/2024  Plan of Care Certification Period: 03/24/2023  Visit # / Visits Authorized: 6 / 20 (plus eval)  Insurance: MEDICARE  FOTO: 2/3    Time In: 10:06 AM  Time Out: 11:00 AM  Total Billable Time: 54 minutes    Precautions:  Standard, Diabetes, cancer, and HTN      Subjective     Patient reports: no new complaints. Continued "pulling" sensation through left axilla with overhead reaching. XRT simulation scheduled this Friday (03/10/2023). Patient has not attempted mopping since reporting difficulty with wringing motion last visit.     She was compliant with home exercise program.  Response to Previous Treatment: no adverse events    Pain Scale: Kee rates pain on a scale of 0/10 on VAS.   Pain Location: N/A    Fatigue: "not too bad"   Functional Change: overall improved tolerance to overhead reaching    Treatment:   Chemotherapy: possible adjuvant oral chemo  Radiation: simulation scheduled 3/10/2023   Hormone Therapy: 6 months neoadjuvant estrogen therapy    Surgery Date: LEFT mastectomy and LEFT SLNB (2 lymph nodes removed) on 01/04/2023 per Dr. Valente.      Objective     Shoulder Range of Motion: measured 02/20/2023  Active ROM Right Left   Flexion 180 150   Abduction 180 132   Extension 70 70   IR/90deg 90 70 @ 45 deg   ER/90deg 65 45 @ 45 deg "       Treatment     Kee received individual therapeutic exercises to improve postural correction and alignment, stretching and soft tissue mobility, and strengthening for 17 minutes including the following:    UBE: Seat 5, Level 1 (fwd / bkwd)  2 min each    Seated Exercises:  - Pulleys (flex, abd)    2 min each  - Biceps curls, 2#     2 min    Standing Exercises:  - Triceps pushdowns (red)   2 min    Mat Exercises:   - PROM flex, abd, ER (L only)  1 set x 15 reps each  - Supine wand flexion, 1#   2 min      Kee received the following individual neuromuscular re-education exercises to isolate interscapular and shoulder stabilizing musculature for 17 minutes:    - Rows (red)      2 min  - Shoulder extension (red)    2 min  - Bilateral shoulder horizontal abduction (red) 2 min  - Bilateral shoulder ER (red)    2 min      Kee received the following individual manual therapy techniques for improved soft tissue mobilization for 10 minutes:    - Soft tissue mobilization to L pec minor      Kee received the following individual self-care / home management education for 10 minutes:    Educated patient regarding patients' individual response to treatment but emphasized skin changes, range of motion limitations, and fatigue as common side effects from XRT. Instructed patient to notify therapist of any worsening joint stiffness / soft tissue tightness and to not be discouraged with any XRT-related symptoms. Also encouraged patient to perform exercises in accordance to fatigue levels. Patient verbalized understanding, agreement.       Home Exercise Program and Patient Education     Education Provided Regarding:  - Role of physical therapy in multi-disciplinary team  - Goals for physical therapy, progress towards goals   - Compliance with home exercise program    Written Home Exercises Provided: yes. Exercises were reviewed and Kee was able to demonstrate them prior to the end of the session. Kee demonstrated good   understanding of the education provided.     See EMR under Patient Instructions for exercises provided  eval, 2/14/23, and 02/16/2023 .    Patient has no cultural, educational or language barriers to learning provided.    Assessment     Patient is responding well to physical therapy. Patient was able to perform exercise progressions and new exercises without increase in symptoms prior to leaving the clinic. Verbal, tactile cues to avoid upper trapezius compensation and for proper rhomboid activation with scapular retractions (good carryover). Improved soft tissue mobilization with manual techniques. Patient able to demonstrate radiation position without complaint at end of session. Will continue to progress as tolerated.    Patient prognosis is Good. Patient will continue to benefit from skilled outpatient physical therapy to address the deficits listed in the problem list chart on initial evaluation, provide patient / family education and to maximize patient's level of independence in the home and community environment.     Anticipated barriers to physical therapy:   - Prolonged time between surgery and beginning physical therapy    Goals as Follows:  Short Term Goals: 4 Weeks  Patient will demonstrate 100% understanding of lymphedema risk reduction practices, including self-monitoring for lymphedema (progressing, not met).  Patient will demonstrate independence with established home exercise program for improved strength, functional mobility, range of motion, posture, and endurance. (progressing, not met).   Patient will increase LEFT shoulder FLEXION active range of motion to 125 degrees for improved independence with functional reaching, carrying, pushing, and pulling tasks (progressing, not met).   Patient will increase LEFT shoulder ABDUCTION active range of motion to 110 degrees for improved independence with functional reaching, carrying, pushing, and pulling tasks (progressing, not met).   Strength will be  assessed and appropriate goals set (progressing, not met).      Long Term Goals: 8 Weeks   Patient will be independent with updated home exercise program for improved functional mobility, posture, strength, and endurance (progressing, not met).  Patient will increase BILATERAL shoulder FLEXION active range of motion to 180 degrees for improved independence with functional reaching, carrying, pushing, and pulling tasks (progressing, not met).   Patient will increase LEFT shoulder ABDUCTION active range of motion to 180 degrees for improved independence with functional reaching, carrying, pushing, and pulling tasks (progressing, not met).   Patient will increase gross upper extremity musculature to 5/5 for improved independence with functional reaching, carrying, pushing, and pulling tasks (progressing, not met).   Patient will report decrease in overall worst pain to 2/10 at discharge for improved tolerance to functional reaching, carrying, pushing, and pulling activities of daily living (progressing, not met).  Patient will report compliance with walking program 5x/week for 10 minutes/day to improve overall cardiovascular function and decrease cancer-related fatigue at discharge (progressing, not met).      Plan     Outpatient physical therapy 2x week for 8 weeks to include the following: Gait Training, Manual Therapy, Neuromuscular Re-ed, Patient Education, Self Care, Therapeutic Activities, and Therapeutic Exercise.    Plan of Care Certification Period: 1/25/2023 to 03/24/2023    Therapist: Irene Avelar, PT  3/7/2023

## 2023-03-08 NOTE — PROGRESS NOTES
Form Post-Op  [facility]  Department of Surgery    PCP: Ravin Viera MD  MEDICAL ONCOLOGIST:    Dr. Carlin   RADIATION ONCOLOGIST:   N/a    DIAGNOSIS:    This is a 77 y.o. female with a stage pT3 N0 Mx grade 1 ER + MI + HER2 - ILC of the left breast.    TREATMENT SUMMARY:  The patient is status post left  mastectomy and sentinel node biopsy on 1/4/2023.  Final pathology showed     Final Pathologic Diagnosis 1.  Left axillary sentinel lymph node #1, hot and blue; count 299, excisional   biopsy: 1 lymph node, negative for metastatic carcinoma (0/1).          Confirmed by negative immunohistochemical staining with cytokeratins   AE1/AE3, cam 5.2 and wide spectrum keratin with          satisfactory positive and negative controls.   2.  Left axillary sentinel lymph node #2, hot and blue; count 652, excisional   biopsy: 1 lymph node, negative for metastatic carcinoma (0/1).          Confirmed by negative immunohistochemical staining with cytokeratins   AE1/AE3, cam 5.2 and wide spectrum keratin with          satisfactory positive and negative controls.   3.  Left breast, skin sparing mastecomy: Invasive lobular carcinoma,   measuring at least 51 mm (at least 5.1 cm) in greatest dimension (slide   measurement of largest          dimension).          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          Note:  Biopsy clip is grossly identified and biopsy site changes are   identified microscopically within area of tumor.          Margins:           - All margins are greater than 10 mm (greater than 1 cm) from   invasive tumor and LCIS.          Nipple and skin are present and uninvolved by tumor.  Note:  Tumor is   present in the soft tissue underlying the nipple skin but          does not involve nipple epidermis.          Microcalcifications are present and associated with lobular carcinoma   in situ and invasive carcinoma.          Background breast tissue shows fibrocystic changes including apocrine    metaplasia and stromal fibrosis.          See synoptic report in comment section for further details.   4.  Right port-a-cath, removal: Gross diganosis:  Port-a-cath.     Comment:  Synoptic Report   Specimen:   Procedure:  Total mastectomy   Specimen laterality:  Left breast   Tumor:   Tumor site:  Central   Histologic type:  Invasive lobular carcinoma   Histologic grade (Paola histologic score) Glandular (acinar)/tubular   differentiation:  Score 3          Nuclear pleomorphism:  Score 1          Mitotic rate:  Score 1          Overall grade:  Grade 1   Tumor size: Greatest dimension of largest invasive focus:  At least 51 mm (at   least 5.1 cm). Note:  The largest grossly identified area of tumor is   measured on the corresponding microscopic slides at 51                  mm.  However there are additional sections of tumor in   adjacent fibrous areas that are also tumor but were not                  discernible grossly and are not contiguous for additional   measurement.  Therefore this tumor is best measured                  at at least 51 mm in greatest dimension.  Only one area of   mass lesion with adjacent fibrotic tissue was                  identified grossly, therefore this is interpreted to be a   single mass lesion.   Tumor focality:  Single focus of invasive carcinoma.   Ductal carcinoma in Situ:  Not identified   Lobular carcinoma in Situ:  Present   Tumor extent:  Skin and nipple are present and uninvolved by tumor.  Skeletal   muscle is not present for evaluation.   Lymphovascular invasion:  Not identified   Dermal lymphovascular invasion:  Not identified   Microcalcifications:  Present in invasive carcinoma and lobular carcinoma in   situ.   Treatment effect in breast:  No definite response to presurgical therapy in   the invasive carcinoma.   Treatment effect in the lymph nodes:  No definite response to presurgical   therapy in lymph nodes.   Margins:  All margins are negative for invasive  "carcinoma. Distance from   invasive carcinoma to closest margin:  Invasive carcinoma is located greater   than 10 mm (greater than 1 cm)          from all surgical margins.   Regional lymph nodes:   Regional lymph node status:  Regional lymph nodes are present and all   regional lymph nodes are negative for tumor. Number of lymph nodes with   macrometastasis (greater than 2 mm):  0          Number of lymph nodes with micrometastasis:  0          Number of lymph nodes with isolated tumor cells:  0     Total number of lymph nodes examined (sentinel and nonsentinel):  2          Total number of sentinel nodes examined:  2   Distant metastasis:  Not applicable   Pathologic stage classification:   TNM descriptors: "y":  Posttreatment   Primary tumor:        ypT3:  Tumor greater than 50 mm in greatest dimension.   Regional lymph nodes:        y(sn)pN0:  No regional lymph node metastasis identified.   Distant metastasis:        ypMX:  Cannot be assessed.   Additional findings:  Fibrocystic changes.   Additional special studies/biomarker reporting:   Immunohistochemical stains for biomarker reporting were completed on the   patient's prior left breast core biopsy material from March 21, 2022, case   number [facility]-, with the following results:   "ER/CO/Her-2 rhina and Ki67 testing performed at [facility]   ([facility]-). Diagnosed by   Carolyn Infante MD. Her diagnoses are as follows:   OUTSIDE SLIDES FOR BREAST BIOMARKERS LABELED [facility]-/[facility]-   LEFT BREAST   Ancillary studies- (Performed on block 1A)   Estrogen receptor: Positive (strong intensity, >95% of tumor cell nuclei).   Progesterone receptor: Positive (strong intensity, 15% of tumor cell nuclei).   HER2 IHC: Negative.   Ki-67: 10%.       INTERVAL HISTORY:   Kee Ramirez comes in for a post-op check. Patient had a complicated post operative course with a two areas of her mastectomy that were slow to heal. Medial wound with " "hypergranulation was treated with silver nitrate by NP Rashida Mccain at last visit on 2/23/23. Patient states there is still a small "bubble" but that this area has improved. No further drainage. She denies fever, chills, chest pain or shortness of breath.  Her pain is well controlled.  Patient denies pain or discomfort today.     MEDICATIONS:  Current Outpatient Medications   Medication Sig Dispense Refill    amLODIPine (NORVASC) 10 MG tablet Take 1 tablet (10 mg total) by mouth once daily. 90 tablet 3    anastrozole (ARIMIDEX) 1 mg Tab Take 1 tablet (1 mg total) by mouth once daily. 90 tablet 3    aspirin (ECOTRIN) 81 MG EC tablet Take 81 mg by mouth once daily.      cloNIDine (CATAPRES) 0.1 MG tablet TAKE 1 TABLET BY MOUTH THREE TIMES DAILY 90 tablet 11    gabapentin (NEURONTIN) 300 MG capsule Take 1 capsule (300 mg total) by mouth 3 (three) times daily. 90 capsule 5    ibuprofen (ADVIL,MOTRIN) 800 MG tablet Take 1 tablet (800 mg total) by mouth 3 (three) times daily. 90 tablet 3    levothyroxine (SYNTHROID) 112 MCG tablet Take 1 tablet (112 mcg total) by mouth before breakfast. 90 tablet 3    losartan (COZAAR) 100 MG tablet Take 1 tablet (100 mg total) by mouth once daily. 90 tablet 3    lovastatin (MEVACOR) 20 MG tablet Take 2 tablets (40 mg total) by mouth every evening. 180 tablet 3    metFORMIN (GLUCOPHAGE) 500 MG tablet Take 1 tablet (500 mg total) by mouth daily with breakfast. 90 tablet 3    methylcellulose (ARTIFICIAL TEARS) 1 % ophthalmic solution Place 1 drop into both eyes as needed.      multivitamin (THERAGRAN) per tablet Take 1 tablet by mouth once daily.      triamcinolone acetonide 0.5% (KENALOG) 0.5 % Crea Apply topically 2 (two) times daily. 15 g 5    HYDROcodone-acetaminophen (NORCO) 5-325 mg per tablet Take 1 tablet by mouth every 6 (six) hours as needed for Pain. 20 tablet 0    rOPINIRole (REQUIP) 1 MG tablet Take 1 tablet (1 mg total) by mouth every evening. (Patient not taking: Reported on " 3/9/2023) 90 tablet 3     No current facility-administered medications for this visit.       ALLERGIES:   Review of patient's allergies indicates:   Allergen Reactions    Naproxen sodium Itching       PHYSICAL EXAMINATION:   General:  This is a well appearing female with appropriate speech, affect and gait.     Breast:  medial incision with pin-point opening with serous drainage. There is a 1 cm area of hypergranulation medial to wound. Hematoma at medial incision border has improved.    IMPRESSION:   The patient has had a post-operative complication with hematoma and incision opening.     PLAN:   1. Area of hypergranulation has improved on examination.   2. Repeated silver nitrate to area of hypergranulation today with good tolerance.   3. Mammogram today. Report still pending.   4. Ok to proceed with XRT at this time.     The patient is in agreement with the plan. Questions were encouraged and answered to patient's satisfaction. Kee will call our office with any questions or concerns.

## 2023-03-09 ENCOUNTER — HOSPITAL ENCOUNTER (OUTPATIENT)
Dept: RADIOLOGY | Facility: HOSPITAL | Age: 78
Discharge: HOME | End: 2023-03-09
Attending: PHYSICIAN ASSISTANT
Payer: MEDICARE

## 2023-03-09 ENCOUNTER — OFFICE VISIT (OUTPATIENT)
Dept: SURGERY | Facility: CLINIC | Age: 78
End: 2023-03-09
Payer: MEDICARE

## 2023-03-09 VITALS
BODY MASS INDEX: 33.9 KG/M2 | HEIGHT: 67 IN | TEMPERATURE: 98 F | WEIGHT: 216 LBS | HEART RATE: 58 BPM | DIASTOLIC BLOOD PRESSURE: 71 MMHG | SYSTOLIC BLOOD PRESSURE: 160 MMHG | OXYGEN SATURATION: 99 %

## 2023-03-09 DIAGNOSIS — Z12.31 SCREENING MAMMOGRAM, ENCOUNTER FOR: ICD-10-CM

## 2023-03-09 DIAGNOSIS — C50.112 MALIGNANT NEOPLASM OF CENTRAL PORTION OF LEFT BREAST IN FEMALE, ESTROGEN RECEPTOR POSITIVE: Primary | ICD-10-CM

## 2023-03-09 DIAGNOSIS — Z17.0 MALIGNANT NEOPLASM OF CENTRAL PORTION OF LEFT BREAST IN FEMALE, ESTROGEN RECEPTOR POSITIVE: Primary | ICD-10-CM

## 2023-03-09 PROCEDURE — 99214 OFFICE O/P EST MOD 30 MIN: CPT | Mod: PBBFAC | Performed by: SURGERY

## 2023-03-09 PROCEDURE — 77067 SCR MAMMO BI INCL CAD: CPT | Mod: 26,52,, | Performed by: RADIOLOGY

## 2023-03-09 PROCEDURE — 77063 BREAST TOMOSYNTHESIS BI: CPT | Mod: 26,52,, | Performed by: RADIOLOGY

## 2023-03-09 PROCEDURE — 99999 PR PBB SHADOW E&M-EST. PATIENT-LVL IV: CPT | Mod: PBBFAC,,, | Performed by: SURGERY

## 2023-03-09 PROCEDURE — 99024 POSTOP FOLLOW-UP VISIT: CPT | Mod: POP,,, | Performed by: SURGERY

## 2023-03-09 PROCEDURE — 99024 PR POST-OP FOLLOW-UP VISIT: ICD-10-PCS | Mod: POP,,, | Performed by: SURGERY

## 2023-03-09 PROCEDURE — 99999 PR PBB SHADOW E&M-EST. PATIENT-LVL IV: ICD-10-PCS | Mod: PBBFAC,,, | Performed by: SURGERY

## 2023-03-09 PROCEDURE — 77067 MAMMO DIGITAL SCREENING RIGHT WITH TOMO: ICD-10-PCS | Mod: 26,52,, | Performed by: RADIOLOGY

## 2023-03-09 PROCEDURE — 77063 MAMMO DIGITAL SCREENING RIGHT WITH TOMO: ICD-10-PCS | Mod: 26,52,, | Performed by: RADIOLOGY

## 2023-03-09 PROCEDURE — 77067 SCR MAMMO BI INCL CAD: CPT | Mod: TC,52

## 2023-03-10 ENCOUNTER — CLINICAL SUPPORT (OUTPATIENT)
Dept: REHABILITATION | Facility: HOSPITAL | Age: 78
End: 2023-03-10
Payer: MEDICARE

## 2023-03-10 ENCOUNTER — HOSPITAL ENCOUNTER (OUTPATIENT)
Dept: RADIATION THERAPY | Facility: HOSPITAL | Age: 78
Discharge: HOME | End: 2023-03-10
Attending: RADIOLOGY
Payer: MEDICARE

## 2023-03-10 DIAGNOSIS — R29.898 WEAKNESS OF LEFT UPPER EXTREMITY: ICD-10-CM

## 2023-03-10 DIAGNOSIS — Z91.89 AT RISK FOR LYMPHEDEMA: ICD-10-CM

## 2023-03-10 DIAGNOSIS — M25.612 STIFFNESS OF LEFT SHOULDER JOINT: ICD-10-CM

## 2023-03-10 DIAGNOSIS — R29.898 WEAKNESS OF RIGHT UPPER EXTREMITY: Primary | ICD-10-CM

## 2023-03-10 DIAGNOSIS — M25.611 STIFFNESS OF RIGHT SHOULDER JOINT: ICD-10-CM

## 2023-03-10 PROCEDURE — 97140 MANUAL THERAPY 1/> REGIONS: CPT

## 2023-03-10 PROCEDURE — 77332 PR  RADN TREATMENT AID(S) SIMPLE: ICD-10-PCS | Mod: 26,,, | Performed by: RADIOLOGY

## 2023-03-10 PROCEDURE — 77014 HC CT GUIDANCE RADIATION THERAPY FLDS PLACEMENT: CPT | Mod: TC | Performed by: RADIOLOGY

## 2023-03-10 PROCEDURE — 97110 THERAPEUTIC EXERCISES: CPT

## 2023-03-10 PROCEDURE — 77332 RADIATION TREATMENT AID(S): CPT | Mod: 26,,, | Performed by: RADIOLOGY

## 2023-03-10 PROCEDURE — 77290 THER RAD SIMULAJ FIELD CPLX: CPT | Mod: 26,,, | Performed by: RADIOLOGY

## 2023-03-10 PROCEDURE — 97530 THERAPEUTIC ACTIVITIES: CPT

## 2023-03-10 PROCEDURE — 77332 RADIATION TREATMENT AID(S): CPT | Mod: TC | Performed by: RADIOLOGY

## 2023-03-10 PROCEDURE — 77263 THER RADIOLOGY TX PLNG CPLX: CPT | Mod: ,,, | Performed by: RADIOLOGY

## 2023-03-10 PROCEDURE — 77263 PR  RADIATION THERAPY PLAN COMPLEX: ICD-10-PCS | Mod: ,,, | Performed by: RADIOLOGY

## 2023-03-10 PROCEDURE — 77290 THER RAD SIMULAJ FIELD CPLX: CPT | Mod: TC | Performed by: RADIOLOGY

## 2023-03-10 PROCEDURE — 77290 PR  SET RADN THERAPY FIELD COMPLEX: ICD-10-PCS | Mod: 26,,, | Performed by: RADIOLOGY

## 2023-03-10 NOTE — PROGRESS NOTES
"                                                    Physical Therapy Daily Treatment Note     Date: 3/10/2023   Name: Kee Ramirez  Clinic Number: [id number]    Therapy Diagnosis:   Encounter Diagnoses   Name Primary?    Weakness of right upper extremity Yes    Weakness of left upper extremity     Stiffness of left shoulder joint     Stiffness of right shoulder joint     At risk for lymphedema      Physician: Erika Valente MD    Physician Orders: PT Eval and Treat  Medical Diagnosis: C50.112,Z17.0 (ICD-10-CM) - Malignant neoplasm of central portion of left breast in female, estrogen receptor positive  Evaluation Date: 01/25/2023  Authorization Period Expiration: 01/19/2024  Plan of Care Certification Period: 03/24/2023  Visit # / Visits Authorized: 7 / 20 (plus eval)  Insurance: MEDICARE  FOTO: 2/3    Time In: 10:05 AM  Time Out: 10:52 AM  Total Billable Time: 47 minutes  (Patient requested to leave session approx 10 minutes early secondary to scheduled XRT simulation at 11:00 AM)    Precautions:  Standard, Diabetes, cancer, and HTN      Subjective     Patient reports: no new complaints. LUE ROM continuing to improve, and patient has XRT scheduled this morning after PT appointment.     She was compliant with home exercise program.  Response to Previous Treatment: minimal muscle soreness    Pain Scale: Kee rates pain on a scale of 0/10 on VAS.   Pain Location: N/A    Fatigue: "not too bad"   Functional Change: improved tolerance to overhead reaching    Treatment:   Chemotherapy: possible adjuvant oral chemo  Radiation: simulation scheduled 3/10/2023   Hormone Therapy: 6 months neoadjuvant estrogen therapy    Surgery Date: LEFT mastectomy and LEFT SLNB (2 lymph nodes removed) on 01/04/2023 per Dr. Valente.      Objective     Shoulder Range of Motion: measured 02/20/2023  Active ROM Right Left   Flexion 180 150   Abduction 180 132   Extension 70 70   IR/90deg 90 70 @ 45 deg   ER/90deg 65 45 @ 45 deg       Treatment "     Kee received individual therapeutic exercises to improve postural correction and alignment, stretching and soft tissue mobility, and strengthening for 15 minutes including the following:    UBE: Seat 5, Level 1 (fwd / bkwd)  2 min each    Seated Exercises:  - Pulleys (flex, abd)    2 min each  - Biceps curls, 3#     2 set x 10 reps    Mat Exercises:   - PROM flex, abd, ER (L only)  1 set x 15 reps each      Held the following 03/10/2023 secondary to time constraints:  - Supine wand flexion, 1#   2 min  - Triceps pushdowns (red)   2 min      Kee received the following individual neuromuscular re-education exercises to isolate interscapular and shoulder stabilizing musculature for 7 minutes:    - Bilateral shoulder horizontal abduction (green) 2 set x 10 rep  - Bilateral shoulder ER (green)   2 set x 10 rep    Held the following 03/10/2023 secondary to time constraints:  - Rows (red)      2 min  - Shoulder extension (red)    2 min      Kee received the following individual manual therapy techniques for improved soft tissue mobilization for 10 minutes:    - Soft tissue mobilization to L pec minor      Kee received the following individual therapeutic activities to improve functional pronation / supination motion for mopping for 15 minutes:    - Pronation / supination with 1# bar (vimal)  1 min x 2 rep  - Wrist flexion (vimal), 1#     2 min  - Wrist extension (vimal), 1#    2 min      Home Exercise Program and Patient Education     Education Provided Regarding:  - Role of physical therapy in multi-disciplinary team  - Goals for physical therapy, progress towards goals   - Compliance with home exercise program    Written Home Exercises Provided: Patient instructed to cont prior HEP. Exercises were reviewed and Kee was able to demonstrate them prior to the end of the session. Kee demonstrated good  understanding of the education provided.     See EMR under Patient Instructions for exercises provided  lou  2/14/23, and 02/16/2023 .    Patient has no cultural, educational or language barriers to learning provided.    Assessment     Patient is responding well to physical therapy. Patient was able to perform exercise progressions and new exercises without increase in symptoms prior to leaving the clinic. Held full therapeutic exercise program secondary to patient attending XRT simulation. Improved strength demonstrated with bicep curls and resistance band exercises. Will continue to progress as tolerated.    Patient prognosis is Good. Patient will continue to benefit from skilled outpatient physical therapy to address the deficits listed in the problem list chart on initial evaluation, provide patient / family education and to maximize patient's level of independence in the home and community environment.     Anticipated barriers to physical therapy:   - Prolonged time between surgery and beginning physical therapy    Goals as Follows:  Short Term Goals: 4 Weeks  Patient will demonstrate 100% understanding of lymphedema risk reduction practices, including self-monitoring for lymphedema (progressing, not met).  Patient will demonstrate independence with established home exercise program for improved strength, functional mobility, range of motion, posture, and endurance. (progressing, not met).   Patient will increase LEFT shoulder FLEXION active range of motion to 125 degrees for improved independence with functional reaching, carrying, pushing, and pulling tasks (progressing, not met).   Patient will increase LEFT shoulder ABDUCTION active range of motion to 110 degrees for improved independence with functional reaching, carrying, pushing, and pulling tasks (progressing, not met).   Strength will be assessed and appropriate goals set (progressing, not met).      Long Term Goals: 8 Weeks   Patient will be independent with updated home exercise program for improved functional mobility, posture, strength, and endurance  (progressing, not met).  Patient will increase BILATERAL shoulder FLEXION active range of motion to 180 degrees for improved independence with functional reaching, carrying, pushing, and pulling tasks (progressing, not met).   Patient will increase LEFT shoulder ABDUCTION active range of motion to 180 degrees for improved independence with functional reaching, carrying, pushing, and pulling tasks (progressing, not met).   Patient will increase gross upper extremity musculature to 5/5 for improved independence with functional reaching, carrying, pushing, and pulling tasks (progressing, not met).   Patient will report decrease in overall worst pain to 2/10 at discharge for improved tolerance to functional reaching, carrying, pushing, and pulling activities of daily living (progressing, not met).  Patient will report compliance with walking program 5x/week for 10 minutes/day to improve overall cardiovascular function and decrease cancer-related fatigue at discharge (progressing, not met).      Plan     Outpatient physical therapy 2x week for 8 weeks to include the following: Gait Training, Manual Therapy, Neuromuscular Re-ed, Patient Education, Self Care, Therapeutic Activities, and Therapeutic Exercise.    Plan of Care Certification Period: 1/25/2023 to 03/24/2023    Therapist: Irene Avelar, PT  3/10/2023

## 2023-03-13 ENCOUNTER — PATIENT MESSAGE (OUTPATIENT)
Dept: SURGERY | Facility: CLINIC | Age: 78
End: 2023-03-13
Payer: MEDICARE

## 2023-03-14 ENCOUNTER — CLINICAL SUPPORT (OUTPATIENT)
Dept: REHABILITATION | Facility: HOSPITAL | Age: 78
End: 2023-03-14
Payer: MEDICARE

## 2023-03-14 DIAGNOSIS — M25.611 STIFFNESS OF RIGHT SHOULDER JOINT: ICD-10-CM

## 2023-03-14 DIAGNOSIS — R29.898 WEAKNESS OF RIGHT UPPER EXTREMITY: Primary | ICD-10-CM

## 2023-03-14 DIAGNOSIS — M25.612 STIFFNESS OF LEFT SHOULDER JOINT: ICD-10-CM

## 2023-03-14 DIAGNOSIS — Z91.89 AT RISK FOR LYMPHEDEMA: ICD-10-CM

## 2023-03-14 DIAGNOSIS — R29.898 WEAKNESS OF LEFT UPPER EXTREMITY: ICD-10-CM

## 2023-03-14 PROCEDURE — 97140 MANUAL THERAPY 1/> REGIONS: CPT

## 2023-03-14 PROCEDURE — 77293 RESPIRATOR MOTION MGMT SIMUL: CPT | Mod: TC | Performed by: RADIOLOGY

## 2023-03-14 PROCEDURE — 77334 PR  RADN TREATMENT AID(S) COMPLX: ICD-10-PCS | Mod: 26,,, | Performed by: RADIOLOGY

## 2023-03-14 PROCEDURE — 77295 3-D RADIOTHERAPY PLAN: CPT | Mod: 26,,, | Performed by: RADIOLOGY

## 2023-03-14 PROCEDURE — 77295 3-D RADIOTHERAPY PLAN: CPT | Mod: TC | Performed by: RADIOLOGY

## 2023-03-14 PROCEDURE — 77334 RADIATION TREATMENT AID(S): CPT | Mod: 26,,, | Performed by: RADIOLOGY

## 2023-03-14 PROCEDURE — 77300 RADIATION THERAPY DOSE PLAN: CPT | Mod: TC | Performed by: RADIOLOGY

## 2023-03-14 PROCEDURE — 97112 NEUROMUSCULAR REEDUCATION: CPT

## 2023-03-14 PROCEDURE — 77300 PR RADIATION THERAPY,DOSIMETRY PLAN: ICD-10-PCS | Mod: 26,,, | Performed by: RADIOLOGY

## 2023-03-14 PROCEDURE — 77334 RADIATION TREATMENT AID(S): CPT | Mod: TC | Performed by: RADIOLOGY

## 2023-03-14 PROCEDURE — 97110 THERAPEUTIC EXERCISES: CPT

## 2023-03-14 PROCEDURE — 77293 RESPIRATOR MOTION MGMT SIMUL: CPT | Mod: 26,,, | Performed by: RADIOLOGY

## 2023-03-14 PROCEDURE — 77300 RADIATION THERAPY DOSE PLAN: CPT | Mod: 26,,, | Performed by: RADIOLOGY

## 2023-03-14 PROCEDURE — 77293 PR RESPIRATORY MOTION MGMT SIMULATION: ICD-10-PCS | Mod: 26,,, | Performed by: RADIOLOGY

## 2023-03-14 PROCEDURE — 77295 PR 3D RADIOTHERAPY PLAN: ICD-10-PCS | Mod: 26,,, | Performed by: RADIOLOGY

## 2023-03-14 NOTE — PROGRESS NOTES
"                                                    Physical Therapy Daily Treatment Note     Date: 3/14/2023   Name: Kee Ramirez  Clinic Number: [id number]    Therapy Diagnosis:   Encounter Diagnoses   Name Primary?    Weakness of right upper extremity Yes    Weakness of left upper extremity     Stiffness of left shoulder joint     Stiffness of right shoulder joint     At risk for lymphedema      Physician: Erika Valente MD    Physician Orders: PT Eval and Treat  Medical Diagnosis: C50.112,Z17.0 (ICD-10-CM) - Malignant neoplasm of central portion of left breast in female, estrogen receptor positive  Evaluation Date: 01/25/2023  Authorization Period Expiration: 01/19/2024  Plan of Care Certification Period: 03/24/2023  Visit # / Visits Authorized: 7 / 20 (plus eval)  Insurance: MEDICARE  FOTO: 2/3    Time In: 10:06 AM  Time Out: 11:00 AM  Total Billable Time: 54 minutes    Precautions:  Standard, Diabetes, cancer, and HTN      Subjective     Patient reports: able to achieve XRT position without complaint. Patient still not certain of XRT appointment times. Theraband exercises still difficult, and patient occasionally unable to perform sets of 10 repetitions without rest.    She was compliant with home exercise program.  Response to Previous Treatment: minimal muscle soreness    Pain Scale: Kee rates pain on a scale of 0/10 on VAS  Pain Location: N/A    Fatigue: "not too bad"   Functional Change: improved tolerance to overhead reaching    Treatment:   Chemotherapy: possible adjuvant oral chemo  Radiation: simulation scheduled 3/10/2023   Hormone Therapy: 6 months neoadjuvant estrogen therapy    Surgery Date: LEFT mastectomy and LEFT SLNB (2 lymph nodes removed) on 01/04/2023 per Dr. Valente.      Objective     Shoulder Range of Motion: measured 02/20/2023  Active ROM Right Left   Flexion 180 150   Abduction 180 132   Extension 70 70   IR/90deg 90 70 @ 45 deg   ER/90deg 65 45 @ 45 deg     Treatment     Kee " received individual therapeutic exercises to improve postural correction and alignment, stretching and soft tissue mobility, and strengthening for 24 minutes including the following:    UBE: Seat 5, Level 1 (fwd / bkwd)  2 min each    Seated Exercises:  - Pulleys (flex, abd)    2 min each  - Biceps curls, 3#     3 set x 10 reps    Mat Exercises:   - Supine wand flexion, 2#   1 set x 10 reps  - PROM flex, abd, ER (L only)  1 set x 15 reps each      Kee received the following individual neuromuscular re-education exercises to isolate interscapular and shoulder stabilizing musculature for 20 minutes:    Seated with Vertical 1/2 Foam Roller in Chair:  - Bilateral shoulder horizontal abduction (red) 3 set x 10 rep  - Bilateral shoulder ER (red)    3 set x 10 rep    Standing Exercises:  - Shoulder rows (red)     3 set x 10 rep  - Shoulder extension (red)    3 set x 10 rep    Unsupported Sitting Exercises:  - Shoulder flexion     1 set x 10 reps  - Shoulder scaption     1 set x 10 reps      Kee received the following individual manual therapy techniques for improved soft tissue mobilization for 10 minutes:    - Soft tissue mobilization to L pec minor      Home Exercise Program and Patient Education     Education Provided Regarding:  - Role of physical therapy in multi-disciplinary team  - Goals for physical therapy, progress towards goals   - Compliance with home exercise program    Written Home Exercises Provided: Patient instructed to cont prior HEP. Exercises were reviewed and Kee was able to demonstrate them prior to the end of the session. Kee demonstrated good  understanding of the education provided.     See EMR under Patient Instructions for exercises provided  eval, 02/14/2023, and 02/16/2023 .    Patient has no cultural, educational or language barriers to learning provided.    Assessment     Patient is responding well to physical therapy. Patient was able to perform exercise progressions and new  exercises without increase in symptoms prior to leaving the clinic. Improved strength demonstrated with increased repetitions of bicep curls. Tactile cues with half foam roller during theraband exercises for upright posture (mod carryover). Improved soft tissue mobilization with manual techniques. Tactile cues also required for active shoulder flexion and scaption to avoid upper trapezius compensation. Will continue to progress as tolerated.    Patient prognosis is Good. Patient will continue to benefit from skilled outpatient physical therapy to address the deficits listed in the problem list chart on initial evaluation, provide patient / family education and to maximize patient's level of independence in the home and community environment.     Anticipated barriers to physical therapy:   - Prolonged time between surgery and beginning physical therapy    Goals as Follows:  Short Term Goals: 4 Weeks  Patient will demonstrate 100% understanding of lymphedema risk reduction practices, including self-monitoring for lymphedema (progressing, not met).  Patient will demonstrate independence with established home exercise program for improved strength, functional mobility, range of motion, posture, and endurance. (progressing, not met).   Patient will increase LEFT shoulder FLEXION active range of motion to 125 degrees for improved independence with functional reaching, carrying, pushing, and pulling tasks (progressing, not met).   Patient will increase LEFT shoulder ABDUCTION active range of motion to 110 degrees for improved independence with functional reaching, carrying, pushing, and pulling tasks (progressing, not met).   Strength will be assessed and appropriate goals set (progressing, not met).      Long Term Goals: 8 Weeks   Patient will be independent with updated home exercise program for improved functional mobility, posture, strength, and endurance (progressing, not met).  Patient will increase BILATERAL  shoulder FLEXION active range of motion to 180 degrees for improved independence with functional reaching, carrying, pushing, and pulling tasks (progressing, not met).   Patient will increase LEFT shoulder ABDUCTION active range of motion to 180 degrees for improved independence with functional reaching, carrying, pushing, and pulling tasks (progressing, not met).   Patient will increase gross upper extremity musculature to 5/5 for improved independence with functional reaching, carrying, pushing, and pulling tasks (progressing, not met).   Patient will report decrease in overall worst pain to 2/10 at discharge for improved tolerance to functional reaching, carrying, pushing, and pulling activities of daily living (progressing, not met).  Patient will report compliance with walking program 5x/week for 10 minutes/day to improve overall cardiovascular function and decrease cancer-related fatigue at discharge (progressing, not met).      Plan     Outpatient physical therapy 2x week for 8 weeks to include the following: Gait Training, Manual Therapy, Neuromuscular Re-ed, Patient Education, Self Care, Therapeutic Activities, and Therapeutic Exercise.    Plan of Care Certification Period: 01/25/2023 to 03/24/2023    Therapist: Irene Avelar, PT  3/14/2023

## 2023-03-15 ENCOUNTER — OFFICE VISIT (OUTPATIENT)
Dept: INTERNAL MEDICINE | Facility: CLINIC | Age: 78
End: 2023-03-15
Payer: MEDICARE

## 2023-03-15 ENCOUNTER — LAB VISIT (OUTPATIENT)
Dept: LAB | Facility: HOSPITAL | Age: 78
End: 2023-03-15
Payer: MEDICARE

## 2023-03-15 VITALS
DIASTOLIC BLOOD PRESSURE: 60 MMHG | OXYGEN SATURATION: 95 % | WEIGHT: 220.25 LBS | BODY MASS INDEX: 34.57 KG/M2 | SYSTOLIC BLOOD PRESSURE: 128 MMHG | HEIGHT: 67 IN | HEART RATE: 71 BPM

## 2023-03-15 DIAGNOSIS — I10 HYPERTENSION, UNSPECIFIED TYPE: ICD-10-CM

## 2023-03-15 DIAGNOSIS — E03.9 HYPOTHYROIDISM, UNSPECIFIED TYPE: ICD-10-CM

## 2023-03-15 DIAGNOSIS — E78.2 MIXED HYPERLIPIDEMIA: ICD-10-CM

## 2023-03-15 DIAGNOSIS — Z00.00 ANNUAL PHYSICAL EXAM: Primary | ICD-10-CM

## 2023-03-15 DIAGNOSIS — E11.69 TYPE 2 DIABETES MELLITUS WITH OTHER SPECIFIED COMPLICATION, WITHOUT LONG-TERM CURRENT USE OF INSULIN: ICD-10-CM

## 2023-03-15 DIAGNOSIS — E66.01 CLASS 2 SEVERE OBESITY DUE TO EXCESS CALORIES WITH SERIOUS COMORBIDITY AND BODY MASS INDEX (BMI) OF 36.0 TO 36.9 IN ADULT: ICD-10-CM

## 2023-03-15 PROBLEM — U07.1 COVID-19: Status: RESOLVED | Noted: 2022-07-21 | Resolved: 2023-03-15

## 2023-03-15 LAB
CHOLEST SERPL-MCNC: 227 MG/DL (ref 120–199)
CHOLEST/HDLC SERPL: 2.6 {RATIO} (ref 2–5)
HDLC SERPL-MCNC: 87 MG/DL (ref 40–75)
HDLC SERPL: 38.3 % (ref 20–50)
LDLC SERPL CALC-MCNC: 127.2 MG/DL (ref 63–159)
NONHDLC SERPL-MCNC: 140 MG/DL
TRIGL SERPL-MCNC: 64 MG/DL (ref 30–150)
TSH SERPL DL<=0.005 MIU/L-ACNC: 1.12 UIU/ML (ref 0.4–4)

## 2023-03-15 PROCEDURE — 99203 OFFICE O/P NEW LOW 30 MIN: CPT | Mod: S$PBB,GC,,

## 2023-03-15 PROCEDURE — 84443 ASSAY THYROID STIM HORMONE: CPT

## 2023-03-15 PROCEDURE — 36415 COLL VENOUS BLD VENIPUNCTURE: CPT

## 2023-03-15 PROCEDURE — 99203 PR OFFICE/OUTPT VISIT, NEW, LEVL III, 30-44 MIN: ICD-10-PCS | Mod: S$PBB,GC,,

## 2023-03-15 PROCEDURE — 80061 LIPID PANEL: CPT

## 2023-03-15 PROCEDURE — 99213 OFFICE O/P EST LOW 20 MIN: CPT | Mod: PBBFAC

## 2023-03-15 PROCEDURE — 99999 PR PBB SHADOW E&M-EST. PATIENT-LVL III: ICD-10-PCS | Mod: PBBFAC,GC,,

## 2023-03-15 PROCEDURE — 99999 PR PBB SHADOW E&M-EST. PATIENT-LVL III: CPT | Mod: PBBFAC,GC,,

## 2023-03-16 ENCOUNTER — CLINICAL SUPPORT (OUTPATIENT)
Dept: REHABILITATION | Facility: HOSPITAL | Age: 78
End: 2023-03-16
Payer: MEDICARE

## 2023-03-16 DIAGNOSIS — R29.898 WEAKNESS OF RIGHT UPPER EXTREMITY: Primary | ICD-10-CM

## 2023-03-16 DIAGNOSIS — Z91.89 AT RISK FOR LYMPHEDEMA: ICD-10-CM

## 2023-03-16 DIAGNOSIS — R29.898 WEAKNESS OF LEFT UPPER EXTREMITY: ICD-10-CM

## 2023-03-16 DIAGNOSIS — M25.612 STIFFNESS OF LEFT SHOULDER JOINT: ICD-10-CM

## 2023-03-16 DIAGNOSIS — M25.611 STIFFNESS OF RIGHT SHOULDER JOINT: ICD-10-CM

## 2023-03-16 PROCEDURE — 77280 THER RAD SIMULAJ FIELD SMPL: CPT | Mod: 26,,, | Performed by: RADIOLOGY

## 2023-03-16 PROCEDURE — 97140 MANUAL THERAPY 1/> REGIONS: CPT

## 2023-03-16 PROCEDURE — 97110 THERAPEUTIC EXERCISES: CPT

## 2023-03-16 PROCEDURE — 77280 PR  SET RADN THERAPY FIELD SIMPLE: ICD-10-PCS | Mod: 26,,, | Performed by: RADIOLOGY

## 2023-03-16 PROCEDURE — 77280 THER RAD SIMULAJ FIELD SMPL: CPT | Mod: TC | Performed by: RADIOLOGY

## 2023-03-16 NOTE — PROGRESS NOTES
"                                                    Physical Therapy Daily Treatment Note     Date: 3/16/2023   Name: Kee Ramirez  Clinic Number: [id number]    Therapy Diagnosis:   Encounter Diagnoses   Name Primary?    Weakness of right upper extremity Yes    Weakness of left upper extremity     Stiffness of left shoulder joint     Stiffness of right shoulder joint     At risk for lymphedema      Physician: Erika Valente MD    Physician Orders: PT Eval and Treat  Medical Diagnosis: C50.112,Z17.0 (ICD-10-CM) - Malignant neoplasm of central portion of left breast in female, estrogen receptor positive  Evaluation Date: 01/25/2023  Authorization Period Expiration: 12/31/2023  Plan of Care Certification Period: 03/24/2023  Visit # / Visits Authorized: 9 / 19 (plus eval)  Insurance: MEDICARE  FOTO: 2/3    Time In: 3:20 PM (late arrival from radiation simulation)  Time Out: 3:50 PM  Total Billable Time: 30 minutes    Precautions:  Standard, Diabetes, cancer, and HTN      Subjective     Patient reports: being tired this afternoon, she would like to do a little something. She was able to do her radiation simulation with no difficulty.    She was compliant with home exercise program.  Response to Previous Treatment: no adverse events    Pain Scale: Kee rates pain on a scale of 0/10 on VAS  Pain Location: N/A    Fatigue: "not too bad" 2/10  Functional Change: improved tolerance to overhead reaching    Treatment:   Chemotherapy: possible adjuvant oral chemo  Radiation: simulation scheduled 3/10/2023   Hormone Therapy: 6 months neoadjuvant estrogen therapy    Surgery Date: LEFT mastectomy and LEFT SLNB (2 lymph nodes removed) on 01/04/2023 per Dr. Valente.      Objective     Shoulder Range of Motion: measured 03/16/2023  Active ROM Right Left   Flexion 165 160   Abduction 180 180   Extension 70 70   IR/90deg 90 80   ER/90deg 65 70     Treatment     Kee received individual therapeutic exercises to improve postural correction " and alignment, stretching and soft tissue mobility, and strengthening for 20 minutes including the following:    UBE: Seat 5, Level 1 (fwd / bkwd)  2 min each    Seated Exercises:  - Pulleys (flex, abd)    2 min each    Mat Exercises:   - Supine wand flexion, 2#   1 set x 10 reps  - PROM flex, abd, ER (L only)  1 set x 15 reps each    Exercises held today:  - Biceps curls, 3#     3 set x 10 reps    Kee received the following individual neuromuscular re-education exercises to isolate interscapular and shoulder stabilizing musculature for 0 minutes:    Exercises held today:  Seated with Vertical 1/2 Foam Roller in Chair:  - Bilateral shoulder horizontal abduction (red) 3 set x 10 rep  - Bilateral shoulder ER (red)    3 set x 10 rep    Standing Exercises:  - Shoulder rows (red)     3 set x 10 rep  - Shoulder extension (red)    3 set x 10 rep    Unsupported Sitting Exercises:  - Shoulder flexion     1 set x 10 reps  - Shoulder scaption     1 set x 10 reps      Kee received the following individual manual therapy techniques for improved soft tissue mobilization for 10 minutes:    - Soft tissue mobilization to L pec minor      Home Exercise Program and Patient Education     Education Provided Regarding:  - Role of physical therapy in multi-disciplinary team  - Goals for physical therapy, progress towards goals   - Compliance with home exercise program    Written Home Exercises Provided: Patient instructed to cont prior HEP. Exercises were reviewed and Kee was able to demonstrate them prior to the end of the session. Kee demonstrated good  understanding of the education provided.     See EMR under Patient Instructions for exercises provided  eval, 02/14/2023, and 02/16/2023 .    Patient has no cultural, educational or language barriers to learning provided.    Assessment     Patient is responding well to physical therapy. She was able to demonstrate AROM WFL with no complaints of pain/discomfort. She did not  perform all of her usual exercises due to fatigue from her radiation simulation. She was able to perform all of today's activities with no increase in symptoms prior to leaving the clinic. Attempt to resume her usual exercises next visit. Will continue to progress as tolerated.    Patient prognosis is Good. Patient will continue to benefit from skilled outpatient physical therapy to address the deficits listed in the problem list chart on initial evaluation, provide patient / family education and to maximize patient's level of independence in the home and community environment.     Anticipated barriers to physical therapy:   - Prolonged time between surgery and beginning physical therapy    Goals as Follows:  Short Term Goals: 4 Weeks  Patient will demonstrate 100% understanding of lymphedema risk reduction practices, including self-monitoring for lymphedema (progressing, not met).  Patient will demonstrate independence with established home exercise program for improved strength, functional mobility, range of motion, posture, and endurance. (progressing, not met).   Patient will increase LEFT shoulder FLEXION active range of motion to 125 degrees for improved independence with functional reaching, carrying, pushing, and pulling tasks (exceeded, 3/16/23).   Patient will increase LEFT shoulder ABDUCTION active range of motion to 110 degrees for improved independence with functional reaching, carrying, pushing, and pulling tasks (exceeded, 3/16/23).   Strength will be assessed and appropriate goals set (met).      Long Term Goals: 8 Weeks   Patient will be independent with updated home exercise program for improved functional mobility, posture, strength, and endurance (progressing, not met).  Patient will increase BILATERAL shoulder FLEXION active range of motion to 180 degrees for improved independence with functional reaching, carrying, pushing, and pulling tasks (progressing, not met).   Patient will increase LEFT  shoulder ABDUCTION active range of motion to 180 degrees for improved independence with functional reaching, carrying, pushing, and pulling tasks (progressing, not met).   Patient will increase gross upper extremity musculature to 5/5 for improved independence with functional reaching, carrying, pushing, and pulling tasks (progressing, not met).   Patient will report decrease in overall worst pain to 2/10 at discharge for improved tolerance to functional reaching, carrying, pushing, and pulling activities of daily living (progressing, not met).  Patient will report compliance with walking program 5x/week for 10 minutes/day to improve overall cardiovascular function and decrease cancer-related fatigue at discharge (progressing, not met).      Plan     Outpatient physical therapy 2x week for 8 weeks to include the following: Gait Training, Manual Therapy, Neuromuscular Re-ed, Patient Education, Self Care, Therapeutic Activities, and Therapeutic Exercise.    Plan of Care Certification Period: 01/25/2023 to 03/24/2023    Therapist: Marley Matos, PT  3/16/2023

## 2023-03-20 PROCEDURE — 77412 RADIATION TX DELIVERY LVL 3: CPT | Performed by: RADIOLOGY

## 2023-03-20 PROCEDURE — 77387 GUIDANCE FOR RADJ TX DLVR: CPT | Mod: TC | Performed by: RADIOLOGY

## 2023-03-20 PROCEDURE — G6002 PR STEREOSCOPIC XRAY GUIDE FOR RADIATION TX DELIV: ICD-10-PCS | Mod: 26,,, | Performed by: RADIOLOGY

## 2023-03-20 PROCEDURE — G6002 STEREOSCOPIC X-RAY GUIDANCE: HCPCS | Mod: 26,,, | Performed by: RADIOLOGY

## 2023-03-22 ENCOUNTER — OFFICE VISIT (OUTPATIENT)
Dept: HEMATOLOGY/ONCOLOGY | Facility: CLINIC | Age: 78
End: 2023-03-22
Payer: MEDICARE

## 2023-03-22 ENCOUNTER — DOCUMENTATION ONLY (OUTPATIENT)
Dept: RADIATION ONCOLOGY | Facility: CLINIC | Age: 78
End: 2023-03-22
Payer: MEDICARE

## 2023-03-22 VITALS
OXYGEN SATURATION: 100 % | SYSTOLIC BLOOD PRESSURE: 171 MMHG | DIASTOLIC BLOOD PRESSURE: 77 MMHG | HEIGHT: 67 IN | BODY MASS INDEX: 34.53 KG/M2 | HEART RATE: 75 BPM | WEIGHT: 220 LBS | TEMPERATURE: 98 F | RESPIRATION RATE: 19 BRPM

## 2023-03-22 DIAGNOSIS — Z17.0 MALIGNANT NEOPLASM OF CENTRAL PORTION OF LEFT BREAST IN FEMALE, ESTROGEN RECEPTOR POSITIVE: ICD-10-CM

## 2023-03-22 DIAGNOSIS — C50.112 MALIGNANT NEOPLASM OF CENTRAL PORTION OF LEFT BREAST IN FEMALE, ESTROGEN RECEPTOR POSITIVE: ICD-10-CM

## 2023-03-22 PROCEDURE — 77412 RADIATION TX DELIVERY LVL 3: CPT | Performed by: RADIOLOGY

## 2023-03-22 PROCEDURE — 99214 OFFICE O/P EST MOD 30 MIN: CPT | Mod: PBBFAC | Performed by: INTERNAL MEDICINE

## 2023-03-22 PROCEDURE — 99214 PR OFFICE/OUTPT VISIT, EST, LEVL IV, 30-39 MIN: ICD-10-PCS | Mod: S$PBB,,, | Performed by: INTERNAL MEDICINE

## 2023-03-22 PROCEDURE — 99214 OFFICE O/P EST MOD 30 MIN: CPT | Mod: S$PBB,,, | Performed by: INTERNAL MEDICINE

## 2023-03-22 PROCEDURE — G6002 PR STEREOSCOPIC XRAY GUIDE FOR RADIATION TX DELIV: ICD-10-PCS | Mod: 26,,, | Performed by: RADIOLOGY

## 2023-03-22 PROCEDURE — 99999 PR PBB SHADOW E&M-EST. PATIENT-LVL IV: CPT | Mod: PBBFAC,,, | Performed by: INTERNAL MEDICINE

## 2023-03-22 PROCEDURE — 77387 GUIDANCE FOR RADJ TX DLVR: CPT | Mod: TC | Performed by: RADIOLOGY

## 2023-03-22 PROCEDURE — 99999 PR PBB SHADOW E&M-EST. PATIENT-LVL IV: ICD-10-PCS | Mod: PBBFAC,,, | Performed by: INTERNAL MEDICINE

## 2023-03-22 PROCEDURE — G6002 STEREOSCOPIC X-RAY GUIDANCE: HCPCS | Mod: 26,,, | Performed by: RADIOLOGY

## 2023-03-22 RX ORDER — ANASTROZOLE 1 MG/1
1 TABLET ORAL DAILY
Qty: 90 TABLET | Refills: 3 | Status: SHIPPED | OUTPATIENT
Start: 2023-03-22 | End: 2024-03-21

## 2023-03-22 NOTE — PLAN OF CARE
Day 2 of outpatient radiation to the left chest wall. Nursing assessment completed. Miaderm cream given. Skin care discussed.

## 2023-03-22 NOTE — PROGRESS NOTES
PROGRESS NOTE    Subjective:       Patient ID: Kee Ramirez is a 77 y.o. female.  MRN: [id number]  : 1945    Chief Complaint: ILC of the left breast     History of Present Illness:   Kee Ramirez is a 77 y.o. female who presents with  ILC of the left breast.       Reports yearly mammograms , normal in . In  screening mammogram showed left breast architectural distortion. Diagnostic mammogram in March showed a 21 x 9  X 2 0 mm left breast mass. US guided biopsy showed ILC, ER strongly positive, WI 15% positive, Her 2 rhina negative.   A breast MRI showed a 7.6 x 4.7 cm mass with spiculated margins.No abnormal lymph nodes were found.      No history of breast mass or biopsies. She has been seeing Dr. Ortiz and is transferring care to use due to proximity. See full oncology history below.   She has been referred for neoadjuvant chemotherapy. We met today as a follow up visit to further discuss neoadjuvant therapy.  At her visit two weeks ago, we discussed neoadjuvant chemotherapy vs neoadjuvant endocrine therapy.  She is interested in breast conservation and is not a candidate for upfront lumpectomy given tumor size and extension to the nipple areolar complex.  She met with Dr. Valente who agrees that she may not be a candidate for breast conservation even after neoadjuvant therapies.  Patient wants to try.     An oncotype was sent to determine if she was high risk and would benefit from chemotherapy.  We were notified there was not enough tissue specimen from the original biopsy for the oncotype.  After extensive discussion, we elected to start neoadjuvant anastrozole.     2022: Started anastrozole      She was admitted to the hospital from -, found to have SBO. Has NGT for decompression and improved with conservative measures.     She completed 6 months of neoadjuvant anastrozole and has had a left mastectomy without reconstruction  on 1/4/23.     Interim history:    She had post op hematoma/ hypergranulation and poor wound healing and now those issues have resolved with medi honey and silver nitrate. She has been doing PT twice a week and it has helped significantly.     Resumed anastrozole 2/22. Denies hot flashes or joint pains.     Started RT on 3/20. 15 treatments planned.     Had a right mammogram on 3/9 and it was normal.     Oncology History:  As dcoumented by  and updated      03.21.2022 Left Breast, Core Needle Biopsies:   - Invasive lobular carcinoma, well-differentiated .   - Bloom-Damon score (5/9):        - Tubular Formation: 3/3        - Nuclear Pleomorphism: 1/3        - Mitotic Activity: 1/3.   - Background of lobular carinoma in-situ (LCIS), nuclear grade 1.   - No perineural or lymphovascular invasion is identified.   Estrogen receptor: Positive (strong intensity, >95% of tumor cell nuclei).   Progesterone receptor: Positive (strong intensity, 15% of tumor cell nuclei).   HER2 IHC: Negative.   Ki-67: 10%.  04.04.2022 [facility] -- Surgery FU w/Oncotype  04.06.2022  follow up  -Breast MRI for staging, discussed surgery options; pt would like BCT, will make definitive plan after MRI  05.03.2022 Breast MRI  -Irregular 7.6 cm enhancing spiculated mass in the upper LEFT breast, consistent with the patient's known biopsy-proven invasive lobular carcinoma. The enhancement does extend to the nipple-areolar complex.  -No suspicious abnormality in the RIGHT breast.   BI-RADS Category: Overall: 6 - known biopsy-proven malignancy  05.06.2022 Follow up with [facility]  -Plan for Axillary U/S to assess LAD, PetCT  -HemOnc referral for NA therapy. Due to size of mass/location and nature of lobular cancer, was not felt that BCT was optimal unless NA therapy was done 1st   -2 week follow up to discuss further   05.13.2022 PetCT  -Mildly hypermetabolic irregular soft tissue within the left breast in keeping with known lobular carcinoma.  This mass is better appreciated on MRI of the breast.  -No definite evidence of metastatic disease.  Please note that FDG PET has has suboptimal sensitivity for certain histologies such as lobular and tubular carcinomas.  05.18.2022 Initial [facility] eval    12/1/22  MRI     Impression:  Left  Interval decreased enhancement of left breast malignancy (biopsy proven ILC).  Residual abnormal enhancement still spans 8.1 cm AP x 2.5 cm transverse and extends to the left nipple areolar complex.     Right  There is no MR evidence of malignancy.     BI-RADS Category:   Overall: 6 - Known Biopsy-Proven Malignancy    Final Pathologic Diagnosis 1.  Left axillary sentinel lymph node #1, hot and blue; count 299, excisional   biopsy: 1 lymph node, negative for metastatic carcinoma (0/1).          Confirmed by negative immunohistochemical staining with cytokeratins   AE1/AE3, cam 5.2 and wide spectrum keratin with          satisfactory positive and negative controls.   2.  Left axillary sentinel lymph node #2, hot and blue; count 652, excisional   biopsy: 1 lymph node, negative for metastatic carcinoma (0/1).          Confirmed by negative immunohistochemical staining with cytokeratins   AE1/AE3, cam 5.2 and wide spectrum keratin with          satisfactory positive and negative controls.   3.  Left breast, skin sparing mastecomy: Invasive lobular carcinoma,   measuring at least 51 mm (at least 5.1 cm) in greatest dimension (slide   measurement of largest          dimension).          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          Note:  Biopsy clip is grossly identified and biopsy site changes are   identified microscopically within area of tumor.          Margins:           - All margins are greater than 10 mm (greater than 1 cm) from   invasive tumor and LCIS.          Nipple and skin are present and uninvolved by tumor.  Note:  Tumor is   present in the soft tissue underlying the nipple skin but          does not  involve nipple epidermis.          Microcalcifications are present and associated with lobular carcinoma   in situ and invasive carcinoma.          Background breast tissue shows fibrocystic changes including apocrine   metaplasia and stromal fibrosis.          See synoptic report in comment section for further details.   4.  Right port-a-cath, removal: Gross diganosis:  Port-a-cath.         ypT3:  Tumor greater than 50 mm in greatest dimension.   Regional lymph nodes:        y(sn)pN0:  No regional lymph node metastasis identified.   Distant metastasis:     Oncology History:  Oncology History   Malignant neoplasm of central portion of left female breast   3/21/2022 Initial Diagnosis    Malignant neoplasm of central portion of left female breast     3/21/2022 Biopsy    Left Breast, Core Needle Biopsies:   - Invasive lobular carcinoma, well-differentiated .   - Bloom-Damon score (5/9):        - Tubular Formation: 3/3        - Nuclear Pleomorphism: 1/3        - Mitotic Activity: 1/3.   - Background of lobular carinoma in-situ (LCIS), nuclear grade 1.   - No perineural or lymphovascular invasion is identified.        3/21/2022 Breast Tumor Markers    Estrogen: Positive  Progesterone: Positive  HER2: Negative     5/27/2022 - 5/27/2022 Chemotherapy    This was considered, but patient did not undergo chemotherapy.   Treatment Summary   Plan Name: OP BREAST DOSE-DENSE AC-T (DOXORUBICIN CYCLOPHOSPHAMIDE Q2W FOLLOWED BY PACLITAXEL WEEKLY)  Treatment Goal: Curative  Status: Inactive  Start Date:   End Date:   Provider: Mitali Carlin MD  Chemotherapy: DOXOrubicin chemo injection 130 mg, 60 mg/m2, Intravenous, Clinic/HOD 1 time, 0 of 4 cycles  cyclophosphamide 600 mg/m2 = 1,300 mg in sodium chloride 0.9% 250 mL chemo infusion, 600 mg/m2, Intravenous, Clinic/HOD 1 time, 0 of 4 cycles  PACLitaxeL (TAXOL) 80 mg/m2 = 174 mg in sodium chloride 0.9% 250 mL chemo infusion, 80 mg/m2, Intravenous, Clinic/HOD 1 time, 0 of 12  cycles       6/7/2022 Cancer Staged    Staging form: Breast, AJCC 8th Edition  - Clinical: Stage IIA (cT3, cN0, cM0, G1, ER+, NE+, HER2-)       6/7/2022 Notable Event    Oncotype performed initially to assess for benefit of NACT to optimize for surgery but not enough tissue. Proceeded with AI x 6 months.      6/7/2022 Genetic Testing    Intervention Insights: Negative                                           1/4/2023 Breast Surgery    Left Mastectomy with L SLNB.      1/24/2023 Tumor Conference    No definitive treatment response to neoadjuvant AI use on final surgical pathology. Oncotype will now be sent on the surgical specimen. Could include CDK46 inhibitor with her endocrine if Oncotype is low given some response to Anastrozole and large tumor size, but patient is node negative. May consider to simply change to different AI. Radiation Oncology would like to meet with patient to discuss XRT, but team does not feel strongly about her proceeding.            History:  Past Medical History:   Diagnosis Date    Bowel obstruction     Breast cancer 05/01/2022    left    Cancer     COVID-19 07/2022    Diabetes mellitus, type 2     Hyperlipidemia     Hypertension     Lobular carcinoma in situ 05/01/2022    left    Midline low back pain without sciatica 07/31/2015    Thyroid disease     Venous stasis     bilateral legs      Past Surgical History:   Procedure Laterality Date    BREAST BIOPSY Left 03/21/2022    Core bx, + ILC    COLONOSCOPY      COLONOSCOPY N/A 12/05/2019    Procedure: COLONOSCOPY;  Surgeon: Luis Bogran-Reyes, MD;  Location: [facility];  Service: Endoscopy;  Laterality: N/A;    HYSTERECTOMY  12/01/2021    INJECTION FOR SENTINEL NODE IDENTIFICATION Left 1/4/2023    Procedure: INJECTION, FOR SENTINEL NODE IDENTIFICATION;  Surgeon: Erika Valente MD;  Location: [facility];  Service: General;  Laterality: Left;    INSERTION OF TUNNELED CENTRAL VENOUS CATHETER (CVC) WITH SUBCUTANEOUS PORT Right 05/24/2022    Procedure:  [id number];  Surgeon: Darien Bear MD;  Location: [facility];  Service: General;  Laterality: Right;    MASTECTOMY Left 1/4/2023    Procedure: MASTECTOMY;  Surgeon: Erika Valente MD;  Location: [facility] OR;  Service: General;  Laterality: Left;    MEDIPORT REMOVAL Right 1/4/2023    Procedure: REMOVAL PORT;  Surgeon: Erika Valente MD;  Location: [facility] OR;  Service: General;  Laterality: Right;    SENTINEL LYMPH NODE BIOPSY Left 1/4/2023    Procedure: BIOPSY, LYMPH NODE, SENTINEL;  Surgeon: Erika Valente MD;  [facility];  Service: General;  Laterality: Left;    SHOULDER ARTHROSCOPY W/ ROTATOR CUFF REPAIR Right 2001    TUBAL LIGATION      VAGINAL HYSTERECTOMY N/A 01/11/2021    Procedure: HYSTERECTOMY, VAGINAL ;  Surgeon: Jessie Dacosta MD;  Location: [facility];  Service: OB/GYN;  Laterality: N/A;     Family History   Problem Relation Age of Onset    Diabetes Mother     Hypertension Mother     Arthritis Mother     COPD Father     Hypertension Sister     Diabetes Sister     Cancer Sister         PANCREATIC    Breast cancer Sister      Social History     Tobacco Use    Smoking status: Never    Smokeless tobacco: Never   Substance and Sexual Activity    Alcohol use: No     Alcohol/week: 0.0 standard drinks    Drug use: No    Sexual activity: Not Currently     Partners: Male     Birth control/protection: None, See Surgical Hx        ROS:   Review of Systems   Constitutional: Negative for fever, malaise/fatigue and weight loss.   HENT: no hearing loss, nosebleeds and sore throat.    Eyes: Negative for double vision and photophobia.   Respiratory: no hemoptysis, sputum production, shortness of breath and wheezing.    Cardiovascular: Negative for chest pain, palpitations, orthopnea and leg swelling.   Gastrointestinal: Negative for abdominal pain, blood in stool, constipation, diarrhea heartburn, nausea and vomiting.   Genitourinary: Negative for dysuria, hematuria and urgency.   Musculoskeletal: Negative for  "back pain, joint pain and myalgias.   Skin: Negative for itching and rash.   Neurological: Negative for dizziness, tingling, seizures, weakness and headaches.   Endo/Heme/Allergies: Negative for polydipsia. Does not bruise/bleed easily.   Psychiatric/Behavioral: Negative for depression and memory loss. The patient is not nervous/anxious and does not have insomnia.       Objective:     Vitals:    03/22/23 0842   BP: (!) 171/77   Pulse: 75   Resp: 19   Temp: 98.1 °F (36.7 °C)   TempSrc: Oral   SpO2: 100%   Weight: 99.8 kg (220 lb 0.3 oz)   Height: 5' 7" (1.702 m)   PainSc: 0-No pain           Wt Readings from Last 10 Encounters:   03/22/23 99.8 kg (220 lb 0.3 oz)   03/15/23 99.9 kg (220 lb 3.8 oz)   03/09/23 98 kg (216 lb)   02/23/23 98.4 kg (217 lb)   02/22/23 98.6 kg (217 lb 6 oz)   02/20/23 94.3 kg (208 lb)   02/07/23 94.3 kg (208 lb)   01/30/23 94.3 kg (208 lb)   01/27/23 94.6 kg (208 lb 7.1 oz)   01/25/23 95.3 kg (210 lb)       Physical Examination:   Physical Exam  Vitals and nursing note reviewed.   Constitutional:       General: She is not in acute distress.     Appearance: She is not diaphoretic.   HENT:      Head: Normocephalic.   Eyes:      General: No scleral icterus.     Conjunctiva/sclera: Conjunctivae normal.   Neck:      Thyroid: No thyromegaly.   Cardiovascular:      Rate and Rhythm: Normal rate and regular rhythm.      Heart sounds: Normal heart sounds. No murmur heard.  Pulmonary:      Effort: Pulmonary effort is normal. No respiratory distress.      Breath sounds: No stridor. No wheezing or rales.   Chest:      Chest wall: No tenderness.     Musculoskeletal:         General: No tenderness or deformity. Normal range of motion.      Cervical back: Neck supple.   Lymphadenopathy:      Cervical: No cervical adenopathy.   Skin:     General: Skin is warm and dry.      Findings: No erythema or rash.      Comments:+ left mastectomy w/o reconstruction. Healed incision .   Neurological:      Mental Status: " She is alert and oriented to person, place, and time.      Cranial Nerves: No cranial nerve deficit.      Coordination: Coordination normal.      Gait: Gait is intact.   Psychiatric:         Mood and Affect: Affect normal.         Cognition and Memory: Memory normal.         Judgment: Judgment normal.     Diagnostic Tests:  Significant Imaging: I have reviewed and interpreted all pertinent imaging results/findings.    Laboratory Data:  All pertinent labs have been reviewed.  Labs:   Lab Results   Component Value Date    WBC 5.62 12/22/2022    RBC 4.10 12/22/2022    HGB 11.9 (L) 12/22/2022    HCT 37.2 12/22/2022    MCV 91 12/22/2022     12/22/2022     (H) 12/22/2022     12/22/2022    K 4.7 12/22/2022    BUN 23 12/22/2022    CREATININE 0.8 12/22/2022    AST 13 12/22/2022    ALT 16 12/22/2022    BILITOT 0.7 12/22/2022       Assessment/Plan:   Malignant neoplasm of central portion of left breast in female, estrogen receptor positive  cT3N0, ER 95%, PR15%, Her 2 rhina negative, Grade 1, ki 67 10%     ypT3 yp (sn) N0   Oncotype low risk, 13     She was interested in breast conservation and received 6 months of neoadjuvant anastrozole.   Follow up MRI showed some response but persistent large area of enhancement with extension to the nipple areolar complex. Mastectomy was recommended.   Final pathology and TB recommendations were discussed. There was 5 cm of ILC, node negative, no significant treatment response, G1, Ki 67 stable at 10%.  Per guidelines, oncotype was sent, is 13, low risk, no benefit of chemotherapy.     She is now on adjuvant RT.     At this time, continue anastrozole.I will see her back in 3 months.     DEXA normal at baseline. continue calcium and vitamin D. Repeat in June 2024     Primary Hypertension   Compliant with current medications, check at home.   Continue PMD follow up.     ECOG SCORE           Discussion:   No follow-ups on file.    Plan was discussed with the patient at  length, and she verbalized understanding. Kee was given an opportunity to ask questions that were answered to her satisfaction, and she was advised to call in the interval if any problems or questions arise.Electronically signed by Mitali Carlin MD          Route Chart for Scheduling    Med Onc Chart Routing      Follow up with physician 3 months.   Follow up with KAYLYN    Infusion scheduling note    Injection scheduling note    Labs    Imaging    Pharmacy appointment    Other referrals           Treatment Plan Information   OP ANASTROZOLE DAILY   Mitali Carlin MD   Upcoming Treatment Dates - OP ANASTROZOLE DAILY    6/24/2022       Antiemetics       Physician communication order       Take Home Chemotherapy       anastrozole (ARIMIDEX) 1 mg Tab    Therapy Plan Information  Flushes  heparin, porcine (PF) 100 unit/mL injection flush 500 Units  500 Units, Intravenous, Every visit  sodium chloride 0.9% flush 10 mL  10 mL, Intravenous, Every visit

## 2023-03-23 PROCEDURE — G6002 STEREOSCOPIC X-RAY GUIDANCE: HCPCS | Mod: 26,,, | Performed by: RADIOLOGY

## 2023-03-23 PROCEDURE — 77412 RADIATION TX DELIVERY LVL 3: CPT | Performed by: RADIOLOGY

## 2023-03-23 PROCEDURE — G6002 PR STEREOSCOPIC XRAY GUIDE FOR RADIATION TX DELIV: ICD-10-PCS | Mod: 26,,, | Performed by: RADIOLOGY

## 2023-03-23 PROCEDURE — 77387 GUIDANCE FOR RADJ TX DLVR: CPT | Mod: TC | Performed by: RADIOLOGY

## 2023-03-24 PROCEDURE — G6002 STEREOSCOPIC X-RAY GUIDANCE: HCPCS | Mod: 26,,, | Performed by: RADIOLOGY

## 2023-03-24 PROCEDURE — 77336 RADIATION PHYSICS CONSULT: CPT | Performed by: RADIOLOGY

## 2023-03-24 PROCEDURE — 77387 GUIDANCE FOR RADJ TX DLVR: CPT | Mod: TC | Performed by: RADIOLOGY

## 2023-03-24 PROCEDURE — G6002 PR STEREOSCOPIC XRAY GUIDE FOR RADIATION TX DELIV: ICD-10-PCS | Mod: 26,,, | Performed by: RADIOLOGY

## 2023-03-24 PROCEDURE — 77412 RADIATION TX DELIVERY LVL 3: CPT | Performed by: RADIOLOGY

## 2023-03-27 PROCEDURE — G6002 PR STEREOSCOPIC XRAY GUIDE FOR RADIATION TX DELIV: ICD-10-PCS | Mod: 26,,, | Performed by: RADIOLOGY

## 2023-03-27 PROCEDURE — G6002 STEREOSCOPIC X-RAY GUIDANCE: HCPCS | Mod: 26,,, | Performed by: RADIOLOGY

## 2023-03-27 PROCEDURE — 77412 RADIATION TX DELIVERY LVL 3: CPT | Performed by: RADIOLOGY

## 2023-03-27 PROCEDURE — 77387 GUIDANCE FOR RADJ TX DLVR: CPT | Mod: TC | Performed by: RADIOLOGY

## 2023-03-28 ENCOUNTER — CLINICAL SUPPORT (OUTPATIENT)
Dept: REHABILITATION | Facility: HOSPITAL | Age: 78
End: 2023-03-28
Payer: MEDICARE

## 2023-03-28 ENCOUNTER — DOCUMENTATION ONLY (OUTPATIENT)
Dept: RADIATION ONCOLOGY | Facility: CLINIC | Age: 78
End: 2023-03-28
Payer: MEDICARE

## 2023-03-28 DIAGNOSIS — R29.898 WEAKNESS OF LEFT UPPER EXTREMITY: ICD-10-CM

## 2023-03-28 DIAGNOSIS — M25.611 STIFFNESS OF RIGHT SHOULDER JOINT: ICD-10-CM

## 2023-03-28 DIAGNOSIS — M25.612 STIFFNESS OF LEFT SHOULDER JOINT: ICD-10-CM

## 2023-03-28 DIAGNOSIS — Z91.89 AT RISK FOR LYMPHEDEMA: ICD-10-CM

## 2023-03-28 DIAGNOSIS — R29.898 WEAKNESS OF RIGHT UPPER EXTREMITY: Primary | ICD-10-CM

## 2023-03-28 PROCEDURE — 77387 GUIDANCE FOR RADJ TX DLVR: CPT | Mod: TC | Performed by: RADIOLOGY

## 2023-03-28 PROCEDURE — 97750 PHYSICAL PERFORMANCE TEST: CPT

## 2023-03-28 PROCEDURE — 97110 THERAPEUTIC EXERCISES: CPT

## 2023-03-28 PROCEDURE — 77417 THER RADIOLOGY PORT IMAGE(S): CPT | Performed by: RADIOLOGY

## 2023-03-28 PROCEDURE — 97535 SELF CARE MNGMENT TRAINING: CPT

## 2023-03-28 PROCEDURE — G6002 STEREOSCOPIC X-RAY GUIDANCE: HCPCS | Mod: 26,,, | Performed by: RADIOLOGY

## 2023-03-28 PROCEDURE — 97112 NEUROMUSCULAR REEDUCATION: CPT

## 2023-03-28 PROCEDURE — G6002 PR STEREOSCOPIC XRAY GUIDE FOR RADIATION TX DELIV: ICD-10-PCS | Mod: 26,,, | Performed by: RADIOLOGY

## 2023-03-28 NOTE — PROGRESS NOTES
Physical Therapy Daily Treatment Note     Date: 3/28/2023   Name: Kee Ramirez  Clinic Number: [id number]    Therapy Diagnosis:   Encounter Diagnoses   Name Primary?    Weakness of right upper extremity Yes    Weakness of left upper extremity     Stiffness of left shoulder joint     Stiffness of right shoulder joint     At risk for lymphedema      Physician: Erika Valente MD    Physician Orders: PT Eval and Treat  Medical Diagnosis: C50.112,Z17.0 (ICD-10-CM) - Malignant neoplasm of central portion of left breast in female, estrogen receptor positive  Evaluation Date: 01/25/2023  Authorization Period Expiration: 12/31/2023  Plan of Care Certification Period: 03/28/2023 -  05/26/2023  Visit # / Visits Authorized: 10 / 19 (plus eval)  Insurance: MEDICARE  FOTO: 2/3    Time In: 11:03 AM (late arrival from radiation simulation)  Time Out: 11:58 AM  Total Billable Time: 55 minutes    Precautions:  Standard, diabetes, cancer, and HTN      Subjective     Patient reports: feeling approx 80% improved since beginning physical therapy. Patient feeling tired this morning from XRT and MD appointment. Patient reports skin darkening from XRT but denies any joint stiffness or tightness.     She was compliant with home exercise program.  Response to Previous Treatment: no adverse events    Pain Scale: Kee rates pain on a scale of 0/10 on VAS  Pain Location: N/A    Fatigue: 8/10  Functional Change: improved tolerance to overhead reaching    Treatment:   Chemotherapy: possible adjuvant oral chemo  Radiation: simulation scheduled 3/10/2023   Hormone Therapy: 6 months neoadjuvant estrogen therapy    Surgery Date: LEFT mastectomy and LEFT SLNB (2 lymph nodes removed) on 01/04/2023 per Dr. Valente.      Objective     Shoulder Range of Motion: measured 03/28/2023  Active ROM Right Left   Flexion 180 160   Abduction 180 180   Extension 70 70   IR/90deg 90 90   ER/90deg 90 65     Upper  "Extremity Strength: 03/28/2023    (R) UE (L) UE   Shoulder Flexion: 4/5 4/5   Shoulder Abduction: 4/5 4/5   Shoulder IR 4/5 4/5   Shoulder ER 4/5 4/5   Elbow Flexion: 4/5 4/5   Elbow Extension: 4/5 4/5    38.1 lb 34.8 lb      Baseline Measurements of BUE's for Early Detection of Lymphedema:   LANDMARK RIGHT UE LEFT UE DIFFERENCE   E + 8" 36 cm 35 cm 1 cm   E + 6" 35 cm 34.5 cm 0.5 cm   E + 4" 34 cm 33 cm 1 cm   E + 2" 28.5 cm 28.5 cm 0 cm   Elbow 26 cm 25 cm 1 cm   W+ 8" 25 cm 25 cm 0 cm   W +  6" 23 cm 22.5 cm 0.5 cm   W + 4" 19 cm 18.5 cm 0.5 cm   Wrist 16.5 cm 17 cm 0.5 cm   DPC 20 cm 19 cm 1 cm   IP Thumb 6.5 cm 6.5 cm 0 cm        Treatment     Kee received the following individual physical performance testing as part of plan of care update for 15 minutes:    - Active range of motion measurements  - Manual muscle testing  - Circumferential measurements for detection of lymphedema      Kee received the following individual self-care / home management education for 10 minutes:    Discussed possibly reducing physical therapy frequency to 1x/week as patient completes XRT. Therapist reassured patient that active range of motion and strength continuing to improve and instructed patient to notify therapist(s) of worsening skin changes or joint stiffness. Patient verbalized understanding, agreement. Patient requested to keep upcoming appointment on Thursday (03/30/2023) but agreeable to consider reducing frequency in upcoming weeks.       Kee received individual therapeutic exercises to improve postural correction and alignment, stretching and soft tissue mobility, and strengthening for 20 minutes including the following:    UBE: Seat 5, Level 1 (fwd / bkwd) - held 2 min each    Seated Exercises:  - Pulleys (flex, abd)   2 min each  - Biceps curls, 3#   3 set x 10 reps    Mat Exercises:   - Supine wand flexion, 2# - held 1 set x 10 reps  - PROM flex, abd, ER (L only) 1 set x 15 reps each      Kee received " the following individual neuromuscular re-education exercises to isolate interscapular and shoulder stabilizing musculature for 10 minutes:    Seated with Vertical 1/2 Foam Roller in Chair:  - Shoulder horizontal abd (red)  3 set x 10 rep  - Bilateral shoulder ER (red)   3 set x 10 rep    Standing Exercises: held 03/28/2023   - Shoulder rows (red)    3 set x 10 rep  - Shoulder extension (red)   3 set x 10 rep    Unsupported Sitting Exercises: held 03/28/2023  - Shoulder flexion    1 set x 10 reps  - Shoulder scaption    1 set x 10 reps      Home Exercise Program and Patient Education     Education Provided Regarding:  - Role of physical therapy in multi-disciplinary team  - Goals for physical therapy, progress towards goals   - Compliance with home exercise program    Written Home Exercises Provided: Patient instructed to cont prior HEP. Exercises were reviewed and Kee was able to demonstrate them prior to the end of the session. Kee demonstrated good  understanding of the education provided.     See EMR under Patient Instructions for exercises provided  eval, 02/14/2023, and 02/16/2023 .    Patient has no cultural, educational or language barriers to learning provided.    Assessment     Patient is responding well to physical therapy. Patient able to perform exercises without increase in symptoms prior to leaving the clinic. Partially held exercises secondary to time constraints from reassessment. Patient has demonstrated the following improvements in active range of motion:    - 90 degree improvement in left shoulder flexion AROM  - 25 degree improvement in right shoulder flexion AROM  - 145 degree improvement in left shoulder abduction AROM  - 30 degree improvement in left shoulder extension AROM  - 30 degree improvement in right shoulder ER AROM    Patient also demonstrates overall improvement in left shoulder internal / external active range of motion (measured at 90 degrees) as well as increased gross LUE  strength via manual muscle testing. No indication of lymphedema per today's circumferential measurements. Patient will continue to benefit from physical therapy to further improve LUE AROM, strength, and tolerance to lifting / carrying / pushing / pulling / reaching activities of daily living for overall improved functional independence. Will continue to progress as tolerated.    Patient prognosis is Good. Patient will continue to benefit from skilled outpatient physical therapy to address the deficits listed in the problem list chart on initial evaluation, provide patient / family education and to maximize patient's level of independence in the home and community environment.     Anticipated barriers to physical therapy:   - Prolonged time between surgery and beginning physical therapy    Goals as Follows:  Short Term Goals: 4 Weeks  Patient will demonstrate 100% understanding of lymphedema risk reduction practices, including self-monitoring for lymphedema (met, 03/28/2023).  Patient will demonstrate independence with established home exercise program for improved strength, functional mobility, range of motion, posture, and endurance. (met, 03/28/2023t).   Patient will increase LEFT shoulder FLEXION active range of motion to 125 degrees for improved independence with functional reaching, carrying, pushing, and pulling tasks (exceeded, 3/16/23).   Patient will increase LEFT shoulder ABDUCTION active range of motion to 110 degrees for improved independence with functional reaching, carrying, pushing, and pulling tasks (exceeded, 3/16/23).   Strength will be assessed and appropriate goals set (met).      Long Term Goals: 8 Weeks   Patient will be independent with updated home exercise program for improved functional mobility, posture, strength, and endurance (progressing, not met).  Patient will increase BILATERAL shoulder FLEXION active range of motion to 180 degrees for improved independence with functional  reaching, carrying, pushing, and pulling tasks (progressing, not met).   Patient will increase LEFT shoulder ABDUCTION active range of motion to 180 degrees for improved independence with functional reaching, carrying, pushing, and pulling tasks (met, 03/28/2023).   Patient will increase gross upper extremity musculature to 5/5 for improved independence with functional reaching, carrying, pushing, and pulling tasks (progressing, not met).   Patient will report decrease in overall worst pain to 2/10 at discharge for improved tolerance to functional reaching, carrying, pushing, and pulling activities of daily living (progressing, not met).  Patient will report compliance with walking program 5x/week for 10 minutes/day to improve overall cardiovascular function and decrease cancer-related fatigue at discharge (progressing, not met).      Plan     Outpatient physical therapy 2x week for 8 weeks to include the following: Gait Training, Manual Therapy, Neuromuscular Re-ed, Patient Education, Self Care, Therapeutic Activities, and Therapeutic Exercise.    Plan of Care Certification Period: 03/28/2023 -  05/26/2023    Therapist: Irene Avelar, PT  3/28/2023

## 2023-03-28 NOTE — PLAN OF CARE
"  Outpatient Therapy Updated Plan of Care     Visit Date: 3/28/2023  Name: Kee Ramirez  Clinic Number: [id number]    Therapy Diagnosis:   Encounter Diagnoses   Name Primary?    Weakness of right upper extremity Yes    Weakness of left upper extremity     Stiffness of left shoulder joint     Stiffness of right shoulder joint     At risk for lymphedema      Physician: Erika Valente MD    Physician Orders: PT Eval and Treat  Medical Diagnosis: C50.112,Z17.0 (ICD-10-CM) - Malignant neoplasm of central portion of left breast in female, estrogen receptor positive  Evaluation Date: 01/25/2023    Total Visits Received: 11  Cancelled Visits: unknown  No Show Visits: unknown    Current Certification Period: 01/25/2023 to 03/24/2023  Precautions:   Visits from Evaluation Date: 10  Functional Level Prior to Evaluation: Standard, diabetes, cancer, and HTN    Subjective     Update: feeling approx 80% improved since beginning physical therapy. Patient notes ongoing fatigue and skin darkening from XRT but denies any joint stiffness or tightness.     Objective     Update:     Shoulder Range of Motion: 03/28/2023  Active ROM Right Left   Flexion 180 160   Abduction 180 180   Extension 70 70   IR/90deg 90 90   ER/90deg 90 65      Upper Extremity Strength: 03/28/2023    (R) UE (L) UE   Shoulder Flexion: 4/5 4/5   Shoulder Abduction: 4/5 4/5   Shoulder IR 4/5 4/5   Shoulder ER 4/5 4/5   Elbow Flexion: 4/5 4/5   Elbow Extension: 4/5 4/5    38.1 lb 34.8 lb      Baseline Measurements of BUE's for Early Detection of Lymphedema: 03/28/2023  LANDMARK RIGHT UE LEFT UE DIFFERENCE   E + 8" 36 cm 35 cm 1 cm   E + 6" 35 cm 34.5 cm 0.5 cm   E + 4" 34 cm 33 cm 1 cm   E + 2" 28.5 cm 28.5 cm 0 cm   Elbow 26 cm 25 cm 1 cm   W+ 8" 25 cm 25 cm 0 cm   W +  6" 23 cm 22.5 cm 0.5 cm   W + 4" 19 cm 18.5 cm 0.5 cm   Wrist 16.5 cm 17 cm 0.5 cm   DPC 20 cm 19 cm 1 cm   IP Thumb 6.5 cm 6.5 cm 0 cm        Assessment     Update: Patient has demonstrated the " following improvements in active range of motion since initial eval (01/25/2023):     - 90 degree improvement in left shoulder flexion AROM  - 25 degree improvement in right shoulder flexion AROM  - 145 degree improvement in left shoulder abduction AROM  - 30 degree improvement in left shoulder extension AROM  - 30 degree improvement in right shoulder ER AROM     Patient also demonstrates overall improvement in left shoulder internal / external active range of motion (measured at 90 degrees) as well as increased gross LUE strength via manual muscle testing. No indication of lymphedema per today's circumferential measurements.     Previous Short Term Goals Status: progressing, not met  New Short Term Goals Status: met / exceeded short term goals  Long Term Goal Status: continue per initial plan of care    Reasons for Recertification of Therapy: Patient will continue to benefit from physical therapy to further improve LUE AROM, strength, and tolerance to lifting / carrying / pushing / pulling / reaching activities of daily living for overall improved functional independence.     Plan     Updated Certification Period: 3/28/2023 to 05/26/2023  Recommended Treatment Plan: 2 times per week for 8 weeks: Gait Training, Manual Therapy, Neuromuscular Re-ed, Patient Education, Self Care, Therapeutic Activities, and Therapeutic Exercise  Other Recommendations: continue per written plan of care.    Irene Avelar, PT  3/28/2023      I CERTIFY THE NEED FOR THESE SERVICES FURNISHED UNDER THIS PLAN OF TREATMENT AND WHILE UNDER MY CARE    Physician's comments:        Physician's Signature: ___________________________________________________

## 2023-03-29 PROCEDURE — G6002 STEREOSCOPIC X-RAY GUIDANCE: HCPCS | Mod: 26,,, | Performed by: RADIOLOGY

## 2023-03-29 PROCEDURE — 77387 GUIDANCE FOR RADJ TX DLVR: CPT | Mod: TC | Performed by: RADIOLOGY

## 2023-03-29 PROCEDURE — G6002 PR STEREOSCOPIC XRAY GUIDE FOR RADIATION TX DELIV: ICD-10-PCS | Mod: 26,,, | Performed by: RADIOLOGY

## 2023-03-29 PROCEDURE — 77412 RADIATION TX DELIVERY LVL 3: CPT | Performed by: RADIOLOGY

## 2023-03-30 ENCOUNTER — CLINICAL SUPPORT (OUTPATIENT)
Dept: REHABILITATION | Facility: HOSPITAL | Age: 78
End: 2023-03-30
Payer: MEDICARE

## 2023-03-30 DIAGNOSIS — M25.611 STIFFNESS OF RIGHT SHOULDER JOINT: ICD-10-CM

## 2023-03-30 DIAGNOSIS — R29.898 WEAKNESS OF LEFT UPPER EXTREMITY: ICD-10-CM

## 2023-03-30 DIAGNOSIS — R29.898 WEAKNESS OF RIGHT UPPER EXTREMITY: Primary | ICD-10-CM

## 2023-03-30 DIAGNOSIS — Z91.89 AT RISK FOR LYMPHEDEMA: ICD-10-CM

## 2023-03-30 DIAGNOSIS — M25.612 STIFFNESS OF LEFT SHOULDER JOINT: ICD-10-CM

## 2023-03-30 PROCEDURE — 97530 THERAPEUTIC ACTIVITIES: CPT

## 2023-03-30 PROCEDURE — 77387 GUIDANCE FOR RADJ TX DLVR: CPT | Mod: TC | Performed by: RADIOLOGY

## 2023-03-30 PROCEDURE — G6002 STEREOSCOPIC X-RAY GUIDANCE: HCPCS | Mod: 26,,, | Performed by: RADIOLOGY

## 2023-03-30 PROCEDURE — 77412 RADIATION TX DELIVERY LVL 3: CPT | Performed by: RADIOLOGY

## 2023-03-30 PROCEDURE — G6002 PR STEREOSCOPIC XRAY GUIDE FOR RADIATION TX DELIV: ICD-10-PCS | Mod: 26,,, | Performed by: RADIOLOGY

## 2023-03-30 PROCEDURE — 97112 NEUROMUSCULAR REEDUCATION: CPT

## 2023-03-30 PROCEDURE — 97110 THERAPEUTIC EXERCISES: CPT

## 2023-03-30 NOTE — PROGRESS NOTES
Physical Therapy Daily Treatment Note     Date: 3/30/2023   Name: Kee Ramirez  Clinic Number: [id number]    Therapy Diagnosis:   Encounter Diagnoses   Name Primary?    Weakness of right upper extremity Yes    Weakness of left upper extremity     Stiffness of left shoulder joint     Stiffness of right shoulder joint     At risk for lymphedema      Physician: Erika Valente MD    Physician Orders: PT Eval and Treat  Medical Diagnosis: C50.112,Z17.0 (ICD-10-CM) - Malignant neoplasm of central portion of left breast in female, estrogen receptor positive  Evaluation Date: 01/25/2023  Authorization Period Expiration: 12/31/2023  Plan of Care Certification Period: 03/28/2023 -  05/26/2023  Visit # / Visits Authorized: 11 / 19 (plus eval)  Insurance: MEDICARE  FOTO: 2/3    Time In: 10:10 AM (late arrival from radiation)  Time Out: 11:05 AM  Total Billable Time: 55 minutes    Precautions:  Standard, diabetes, cancer, and HTN      Subjective     Patient reports: approx [facility] done with XRT and feeling fatigued. Patient still attempting to perform exercises from HEP each day but often requires mid-day nap after XRT.     She was compliant with home exercise program.  Response to Previous Treatment: no adverse events    Pain Scale: Kee rates pain on a scale of 0/10 on VAS  Pain Location: N/A    Fatigue: 7/10  Functional Change: improved tolerance to overhead reaching    Treatment:   Chemotherapy: possible adjuvant oral chemo  Radiation: simulation scheduled 3/10/2023   Hormone Therapy: 6 months neoadjuvant estrogen therapy    Surgery Date: LEFT mastectomy and LEFT SLNB (2 lymph nodes removed) on 01/04/2023 per Dr. Valente.      Objective     Shoulder Range of Motion: 03/28/2023  Active ROM Right Left   Flexion 180 160   Abduction 180 180   Extension 70 70   IR/90deg 90 90   ER/90deg 90 65     Treatment     Kee received individual therapeutic exercises to improve postural  correction and alignment, stretching and soft tissue mobility, and strengthening for 30 minutes including the following:    UBE: Seat 5, Level 1 (fwd / bkwd)  2 min each    Seated Exercises:  - Pulleys (flex, abd)   2 min each    Mat Exercises:   - Supine wand flexion, 2#   1 set x 10 reps  - Open book stretch   10 sec x 10 reps  - PROM flex, abd, ER (L only) 1 set x 15 reps each      Kee received the following individual therapeutic activities to improve functional lifting, reaching performance for 10 minutes:    - Bicep curls, 3#   2 min  - Shelf lifts, 1# (vimal)   1 set x 10 reps  - Shoulder flexion   1 set x 10 reps  - Shoulder scaption   1 set x 10 reps      Kee received the following individual neuromuscular re-education exercises to isolate interscapular and shoulder stabilizing musculature for 15 minutes:    Seated with Vertical 1/2 Foam Roller in Chair:  - Shoulder horizontal abd (red)  2 min  - Bilateral shoulder ER (red)   2 min  - Shoulder rows (red)    2 min    Standing Exercises:  - Shoulder extension (red)   2 min       Home Exercise Program and Patient Education     Education Provided Regarding:  - Role of physical therapy in multi-disciplinary team  - Goals for physical therapy, progress towards goals   - Compliance with home exercise program    Written Home Exercises Provided: Patient instructed to cont prior HEP. Exercises were reviewed and Kee was able to demonstrate them prior to the end of the session. Kee demonstrated good  understanding of the education provided.     See EMR under Patient Instructions for exercises provided  eval, 02/14/2023, and 02/16/2023 .    Patient has no cultural, educational or language barriers to learning provided.    Assessment     Patient is responding well to physical therapy. Patient able to perform exercises without increase in symptoms prior to leaving the clinic. Resumed therapeutic exercise program with occasional rest breaks. Improved flexibility  demonstrated with open book stretch. Noted slight upper trapezius and shoulder abduction compensation with shelf lifts using RUE secondary to fatigue, relieved with rest. Tactile cues at elbows for isolated shoulder ER during resisted shoulder bilateral ER with resistance band (good carryover). Patient agreeable to attempt 1x/week frequency for physical therapy for remaining XRT regimen. Will continue to progress as tolerated.    Patient prognosis is Good. Patient will continue to benefit from skilled outpatient physical therapy to address the deficits listed in the problem list chart on initial evaluation, provide patient / family education and to maximize patient's level of independence in the home and community environment.     Anticipated barriers to physical therapy:   - Prolonged time between surgery and beginning physical therapy    Goals as Follows:  Short Term Goals: 4 Weeks  Patient will demonstrate 100% understanding of lymphedema risk reduction practices, including self-monitoring for lymphedema (met, 03/28/2023).  Patient will demonstrate independence with established home exercise program for improved strength, functional mobility, range of motion, posture, and endurance. (met, 03/28/2023).   Patient will increase LEFT shoulder FLEXION active range of motion to 125 degrees for improved independence with functional reaching, carrying, pushing, and pulling tasks (exceeded, 3/16/23).   Patient will increase LEFT shoulder ABDUCTION active range of motion to 110 degrees for improved independence with functional reaching, carrying, pushing, and pulling tasks (exceeded, 3/16/23).   Strength will be assessed and appropriate goals set (met).      Long Term Goals: 8 Weeks   Patient will be independent with updated home exercise program for improved functional mobility, posture, strength, and endurance (progressing, not met).  Patient will increase BILATERAL shoulder FLEXION active range of motion to 180 degrees  for improved independence with functional reaching, carrying, pushing, and pulling tasks (progressing, not met).   Patient will increase LEFT shoulder ABDUCTION active range of motion to 180 degrees for improved independence with functional reaching, carrying, pushing, and pulling tasks (met, 03/28/2023).   Patient will increase gross upper extremity musculature to 5/5 for improved independence with functional reaching, carrying, pushing, and pulling tasks (progressing, not met).   Patient will report decrease in overall worst pain to 2/10 at discharge for improved tolerance to functional reaching, carrying, pushing, and pulling activities of daily living (progressing, not met).  Patient will report compliance with walking program 5x/week for 10 minutes/day to improve overall cardiovascular function and decrease cancer-related fatigue at discharge (progressing, not met).      Plan     Outpatient physical therapy 2x week for 8 weeks to include the following: Gait Training, Manual Therapy, Neuromuscular Re-ed, Patient Education, Self Care, Therapeutic Activities, and Therapeutic Exercise.    Plan of Care Certification Period: 03/28/2023 -  05/26/2023    Therapist: Irene Avelar, PT  3/30/2023

## 2023-03-31 PROCEDURE — 77336 RADIATION PHYSICS CONSULT: CPT | Performed by: RADIOLOGY

## 2023-03-31 PROCEDURE — G6002 STEREOSCOPIC X-RAY GUIDANCE: HCPCS | Mod: 26,,, | Performed by: RADIOLOGY

## 2023-03-31 PROCEDURE — 77387 GUIDANCE FOR RADJ TX DLVR: CPT | Mod: TC | Performed by: RADIOLOGY

## 2023-03-31 PROCEDURE — 77412 RADIATION TX DELIVERY LVL 3: CPT | Performed by: RADIOLOGY

## 2023-03-31 PROCEDURE — G6002 PR STEREOSCOPIC XRAY GUIDE FOR RADIATION TX DELIV: ICD-10-PCS | Mod: 26,,, | Performed by: RADIOLOGY

## 2023-04-03 ENCOUNTER — HOSPITAL ENCOUNTER (OUTPATIENT)
Dept: RADIATION THERAPY | Facility: HOSPITAL | Age: 78
Discharge: HOME | End: 2023-04-03
Attending: RADIOLOGY
Payer: MEDICARE

## 2023-04-03 PROCEDURE — 77412 RADIATION TX DELIVERY LVL 3: CPT | Performed by: RADIOLOGY

## 2023-04-03 PROCEDURE — G6002 STEREOSCOPIC X-RAY GUIDANCE: HCPCS | Mod: 26,,, | Performed by: RADIOLOGY

## 2023-04-03 PROCEDURE — 77387 GUIDANCE FOR RADJ TX DLVR: CPT | Mod: TC | Performed by: RADIOLOGY

## 2023-04-03 PROCEDURE — G6002 PR STEREOSCOPIC XRAY GUIDE FOR RADIATION TX DELIV: ICD-10-PCS | Mod: 26,,, | Performed by: RADIOLOGY

## 2023-04-04 ENCOUNTER — DOCUMENTATION ONLY (OUTPATIENT)
Dept: RADIATION ONCOLOGY | Facility: CLINIC | Age: 78
End: 2023-04-04
Payer: MEDICARE

## 2023-04-04 ENCOUNTER — CLINICAL SUPPORT (OUTPATIENT)
Dept: REHABILITATION | Facility: HOSPITAL | Age: 78
End: 2023-04-04
Payer: MEDICARE

## 2023-04-04 DIAGNOSIS — M25.612 STIFFNESS OF LEFT SHOULDER JOINT: ICD-10-CM

## 2023-04-04 DIAGNOSIS — R29.898 WEAKNESS OF LEFT UPPER EXTREMITY: ICD-10-CM

## 2023-04-04 DIAGNOSIS — M25.611 STIFFNESS OF RIGHT SHOULDER JOINT: ICD-10-CM

## 2023-04-04 DIAGNOSIS — Z91.89 AT RISK FOR LYMPHEDEMA: ICD-10-CM

## 2023-04-04 DIAGNOSIS — R29.898 WEAKNESS OF RIGHT UPPER EXTREMITY: Primary | ICD-10-CM

## 2023-04-04 PROCEDURE — 77412 RADIATION TX DELIVERY LVL 3: CPT | Performed by: RADIOLOGY

## 2023-04-04 PROCEDURE — G6002 STEREOSCOPIC X-RAY GUIDANCE: HCPCS | Mod: 26,,, | Performed by: RADIOLOGY

## 2023-04-04 PROCEDURE — 97110 THERAPEUTIC EXERCISES: CPT

## 2023-04-04 PROCEDURE — 97530 THERAPEUTIC ACTIVITIES: CPT

## 2023-04-04 PROCEDURE — 97112 NEUROMUSCULAR REEDUCATION: CPT

## 2023-04-04 PROCEDURE — G6002 PR STEREOSCOPIC XRAY GUIDE FOR RADIATION TX DELIV: ICD-10-PCS | Mod: 26,,, | Performed by: RADIOLOGY

## 2023-04-04 PROCEDURE — 77417 THER RADIOLOGY PORT IMAGE(S): CPT | Performed by: RADIOLOGY

## 2023-04-04 PROCEDURE — 77387 GUIDANCE FOR RADJ TX DLVR: CPT | Mod: TC | Performed by: RADIOLOGY

## 2023-04-04 NOTE — PROGRESS NOTES
Physical Therapy Daily Treatment Note     Date: 4/4/2023   Name: Kee Ramirez  Clinic Number: [id number]    Therapy Diagnosis:   Encounter Diagnoses   Name Primary?    Weakness of right upper extremity Yes    Weakness of left upper extremity     Stiffness of left shoulder joint     Stiffness of right shoulder joint     At risk for lymphedema      Physician: Erika Valente MD    Physician Orders: PT Eval and Treat  Medical Diagnosis: C50.112,Z17.0 (ICD-10-CM) - Malignant neoplasm of central portion of left breast in female, estrogen receptor positive  Evaluation Date: 01/25/2023  Authorization Period Expiration: 12/31/2023  Plan of Care Certification Period: 03/28/2023 -  05/26/2023  Visit # / Visits Authorized: 12 / 19 (plus eval)  Insurance: MEDICARE  FOTO: 2/3    Time In: 11:06 AM  Time Out: 12:00 PM  Total Billable Time: 54 minutes    Precautions:  Standard, diabetes, cancer, and HTN      Subjective     Patient reports: finishing XRT next Wednesday (04/12/2023). Patient denies developing any skin changes or range of motion limitations since beginning XRT but still experiencing significant fatigue.     She was compliant with home exercise program.  Response to Previous Treatment: no adverse events    Pain Scale: Kee rates pain on a scale of 0/10 on VAS  Pain Location: N/A    Fatigue: 5/10  Functional Change: improved tolerance to overhead reaching    Treatment:   Chemotherapy: possible adjuvant oral chemo  Radiation: simulation scheduled 3/10/2023   Hormone Therapy: 6 months neoadjuvant estrogen therapy    Surgery Date: LEFT mastectomy and LEFT SLNB (2 lymph nodes removed) on 01/04/2023 per Dr. Valente.      Objective     Shoulder Range of Motion: 03/28/2023  Active ROM Right Left   Flexion 180 160   Abduction 180 180   Extension 70 70   IR/90deg 90 90   ER/90deg 90 65     Treatment     Kee received individual therapeutic exercises to improve postural  correction and alignment, stretching and soft tissue mobility, and strengthening for 30 minutes including the following:    UBE: Seat 5, Level 1 (fwd / bkwd)  2 min each    Seated Exercises:  - Pulleys (flex, abd)   2 min each    Mat Exercises:   - Supine wand flexion, 4#   1 set x 10 reps  - Open book stretch   10 sec x 10 reps  - PROM flex, abd, ER (L only) 1 set x 15 reps each      Kee received the following individual therapeutic activities to improve functional lifting, reaching performance for 10 minutes:    - Bicep curls, 4#   2 min  - Shelf lifts, 1# (vimal)   1 set x 10 reps  - Shoulder flexion   1 set x 10 reps  - Shoulder scaption   1 set x 10 reps      Kee received the following individual neuromuscular re-education exercises to isolate interscapular and shoulder stabilizing musculature for 16 minutes:    Seated Exercises:  - Shoulder horizontal abd (red)  2 min  - Bilateral shoulder ER (red)   2 min    Standing Exercises:  - Shoulder extension (red)   2 min   - Shoulder rows (red)    2 min      Home Exercise Program and Patient Education     Education Provided Regarding:  - Role of physical therapy in multi-disciplinary team  - Goals for physical therapy, progress towards goals   - Compliance with home exercise program    Written Home Exercises Provided: Patient instructed to cont prior HEP. Exercises were reviewed and Kee was able to demonstrate them prior to the end of the session. Kee demonstrated good  understanding of the education provided.     See EMR under Patient Instructions for exercises provided  eval, 02/14/2023, and 02/16/2023 .    Patient has no cultural, educational or language barriers to learning provided.    Assessment     Patient is responding well to physical therapy. Patient able to perform exercises without increase in symptoms prior to leaving the clinic. Improved strength demonstrated with increased resistance of biceps curls and supine AAROM wand flexion. Less right  shoulder abduction noted with shelf lifts today compared to last visit. Will reassess patient for possible discharge after completing XRT next week. Will continue to progress as tolerated.    Patient prognosis is Good. Patient will continue to benefit from skilled outpatient physical therapy to address the deficits listed in the problem list chart on initial evaluation, provide patient / family education and to maximize patient's level of independence in the home and community environment.     Anticipated barriers to physical therapy:   - Prolonged time between surgery and beginning physical therapy    Goals as Follows:  Short Term Goals: 4 Weeks  Patient will demonstrate 100% understanding of lymphedema risk reduction practices, including self-monitoring for lymphedema (met, 03/28/2023).  Patient will demonstrate independence with established home exercise program for improved strength, functional mobility, range of motion, posture, and endurance. (met, 03/28/2023).   Patient will increase LEFT shoulder FLEXION active range of motion to 125 degrees for improved independence with functional reaching, carrying, pushing, and pulling tasks (exceeded, 3/16/23).   Patient will increase LEFT shoulder ABDUCTION active range of motion to 110 degrees for improved independence with functional reaching, carrying, pushing, and pulling tasks (exceeded, 3/16/23).   Strength will be assessed and appropriate goals set (met).      Long Term Goals: 8 Weeks   Patient will be independent with updated home exercise program for improved functional mobility, posture, strength, and endurance (progressing, not met).  Patient will increase BILATERAL shoulder FLEXION active range of motion to 180 degrees for improved independence with functional reaching, carrying, pushing, and pulling tasks (progressing, not met).   Patient will increase LEFT shoulder ABDUCTION active range of motion to 180 degrees for improved independence with functional  reaching, carrying, pushing, and pulling tasks (met, 03/28/2023).   Patient will increase gross upper extremity musculature to 5/5 for improved independence with functional reaching, carrying, pushing, and pulling tasks (progressing, not met).   Patient will report decrease in overall worst pain to 2/10 at discharge for improved tolerance to functional reaching, carrying, pushing, and pulling activities of daily living (progressing, not met).  Patient will report compliance with walking program 5x/week for 10 minutes/day to improve overall cardiovascular function and decrease cancer-related fatigue at discharge (progressing, not met).      Plan     Outpatient physical therapy 2x week for 8 weeks to include the following: Gait Training, Manual Therapy, Neuromuscular Re-ed, Patient Education, Self Care, Therapeutic Activities, and Therapeutic Exercise.    Plan of Care Certification Period: 03/28/2023 -  05/26/2023    Therapist: Irene Avelar, PT  4/4/2023

## 2023-04-04 NOTE — PLAN OF CARE
Day 11 of outpatient radiation to the left chest wall. Hyperpigmentation noted, skin intact. Using Miaderm cream BID.

## 2023-04-05 PROCEDURE — G6002 PR STEREOSCOPIC XRAY GUIDE FOR RADIATION TX DELIV: ICD-10-PCS | Mod: 26,,, | Performed by: RADIOLOGY

## 2023-04-05 PROCEDURE — 77412 RADIATION TX DELIVERY LVL 3: CPT | Performed by: RADIOLOGY

## 2023-04-05 PROCEDURE — 77387 GUIDANCE FOR RADJ TX DLVR: CPT | Mod: TC | Performed by: RADIOLOGY

## 2023-04-05 PROCEDURE — G6002 STEREOSCOPIC X-RAY GUIDANCE: HCPCS | Mod: 26,,, | Performed by: RADIOLOGY

## 2023-04-06 PROCEDURE — 77387 GUIDANCE FOR RADJ TX DLVR: CPT | Mod: TC | Performed by: RADIOLOGY

## 2023-04-06 PROCEDURE — 77412 RADIATION TX DELIVERY LVL 3: CPT | Performed by: RADIOLOGY

## 2023-04-06 PROCEDURE — G6002 PR STEREOSCOPIC XRAY GUIDE FOR RADIATION TX DELIV: ICD-10-PCS | Mod: 26,,, | Performed by: RADIOLOGY

## 2023-04-06 PROCEDURE — G6002 STEREOSCOPIC X-RAY GUIDANCE: HCPCS | Mod: 26,,, | Performed by: RADIOLOGY

## 2023-04-10 PROCEDURE — G6002 PR STEREOSCOPIC XRAY GUIDE FOR RADIATION TX DELIV: ICD-10-PCS | Mod: 26,,, | Performed by: RADIOLOGY

## 2023-04-10 PROCEDURE — 77387 GUIDANCE FOR RADJ TX DLVR: CPT | Mod: TC | Performed by: RADIOLOGY

## 2023-04-10 PROCEDURE — 77412 RADIATION TX DELIVERY LVL 3: CPT | Performed by: RADIOLOGY

## 2023-04-10 PROCEDURE — 77336 RADIATION PHYSICS CONSULT: CPT | Performed by: RADIOLOGY

## 2023-04-10 PROCEDURE — G6002 STEREOSCOPIC X-RAY GUIDANCE: HCPCS | Mod: 26,,, | Performed by: RADIOLOGY

## 2023-04-11 PROCEDURE — 77387 GUIDANCE FOR RADJ TX DLVR: CPT | Mod: TC | Performed by: RADIOLOGY

## 2023-04-11 PROCEDURE — G6002 STEREOSCOPIC X-RAY GUIDANCE: HCPCS | Mod: 26,,, | Performed by: RADIOLOGY

## 2023-04-11 PROCEDURE — 77412 RADIATION TX DELIVERY LVL 3: CPT | Performed by: RADIOLOGY

## 2023-04-11 PROCEDURE — G6002 PR STEREOSCOPIC XRAY GUIDE FOR RADIATION TX DELIV: ICD-10-PCS | Mod: 26,,, | Performed by: RADIOLOGY

## 2023-04-12 PROCEDURE — 77412 RADIATION TX DELIVERY LVL 3: CPT | Performed by: RADIOLOGY

## 2023-04-12 PROCEDURE — G6002 PR STEREOSCOPIC XRAY GUIDE FOR RADIATION TX DELIV: ICD-10-PCS | Mod: 26,,, | Performed by: RADIOLOGY

## 2023-04-12 PROCEDURE — G6002 STEREOSCOPIC X-RAY GUIDANCE: HCPCS | Mod: 26,,, | Performed by: RADIOLOGY

## 2023-04-12 PROCEDURE — 77387 GUIDANCE FOR RADJ TX DLVR: CPT | Mod: TC | Performed by: RADIOLOGY

## 2023-04-13 PROCEDURE — 77321 SPECIAL TELETX PORT PLAN: CPT | Mod: TC | Performed by: RADIOLOGY

## 2023-04-13 PROCEDURE — 77321 SPECIAL TELETX PORT PLAN: CPT | Mod: 26,,, | Performed by: RADIOLOGY

## 2023-04-13 PROCEDURE — 77321 PR  TELETHER ISO-PORT PLAN: ICD-10-PCS | Mod: 26,,, | Performed by: RADIOLOGY

## 2023-04-17 PROCEDURE — 77412 RADIATION TX DELIVERY LVL 3: CPT | Performed by: RADIOLOGY

## 2023-04-18 PROCEDURE — 77412 RADIATION TX DELIVERY LVL 3: CPT | Performed by: RADIOLOGY

## 2023-04-19 PROCEDURE — 77412 RADIATION TX DELIVERY LVL 3: CPT | Performed by: RADIOLOGY

## 2023-04-20 PROCEDURE — 77336 RADIATION PHYSICS CONSULT: CPT | Performed by: RADIOLOGY

## 2023-04-20 PROCEDURE — 77412 RADIATION TX DELIVERY LVL 3: CPT | Performed by: RADIOLOGY

## 2023-04-24 ENCOUNTER — TELEPHONE (OUTPATIENT)
Dept: RADIATION ONCOLOGY | Facility: CLINIC | Age: 78
End: 2023-04-24
Payer: MEDICARE

## 2023-04-24 NOTE — TELEPHONE ENCOUNTER
----- Message from Monique Cramer sent at 4/24/2023  2:43 PM CDT -----  Regarding: call back  Pt called asking for you to give her a call back at [phone]

## 2023-04-24 NOTE — TELEPHONE ENCOUNTER
Return call to pt. Reports that she has an area where her boost was done that is open and oozing.S/p left chest wall radiation that completed 4/20/23. Pt to come into clinic tomorrow for nursing to perform skin check for possible wound care.

## 2023-04-25 ENCOUNTER — DOCUMENTATION ONLY (OUTPATIENT)
Dept: RADIATION ONCOLOGY | Facility: CLINIC | Age: 78
End: 2023-04-25
Payer: MEDICARE

## 2023-04-25 NOTE — PROGRESS NOTES
Pt came to clinic for skin care evaluation. Pt s/p left chest wall radiation completed 4/20. Has areas of moist desquamation. Chest wall soaked with half peroxide, half water and skin debridement performed. Mepilex dressings applied to open areas. Supplies provided to pt for home use with daily dressing changes. Pt instructed to call if any question or concerns.

## 2023-04-26 ENCOUNTER — TELEPHONE (OUTPATIENT)
Dept: REHABILITATION | Facility: HOSPITAL | Age: 78
End: 2023-04-26
Payer: MEDICARE

## 2023-04-26 ENCOUNTER — PATIENT MESSAGE (OUTPATIENT)
Dept: REHABILITATION | Facility: HOSPITAL | Age: 78
End: 2023-04-26
Payer: MEDICARE

## 2023-04-26 NOTE — TELEPHONE ENCOUNTER
Left voicemail on patient's cell phone and sent message through [facility] portal to reschedule physical therapy appointment(s).    No physical therapy charges posted on this date.      Irene Avelar PT, DPT  04/26/2023

## 2023-05-02 ENCOUNTER — DOCUMENTATION ONLY (OUTPATIENT)
Dept: RADIATION ONCOLOGY | Facility: CLINIC | Age: 78
End: 2023-05-02
Payer: MEDICARE

## 2023-05-02 NOTE — PROGRESS NOTES
Pt comes back in to clinic for wound care to left chest wall. Area of moist desquamation washed with wound cleanser and Mepilex dressing applied. Pt given Mepilex dressing to change daily at home. Pt to call Monday with progress report of how wound looks.

## 2023-05-04 ENCOUNTER — PATIENT MESSAGE (OUTPATIENT)
Dept: INTERNAL MEDICINE | Facility: CLINIC | Age: 78
End: 2023-05-04
Payer: MEDICARE

## 2023-05-04 DIAGNOSIS — I10 PRIMARY HYPERTENSION: ICD-10-CM

## 2023-05-04 RX ORDER — CLONIDINE HYDROCHLORIDE 0.1 MG/1
0.1 TABLET ORAL 3 TIMES DAILY
Qty: 90 TABLET | Refills: 11 | Status: SHIPPED | OUTPATIENT
Start: 2023-05-04

## 2023-06-02 ENCOUNTER — PATIENT MESSAGE (OUTPATIENT)
Dept: REHABILITATION | Facility: HOSPITAL | Age: 78
End: 2023-06-02
Payer: MEDICARE

## 2023-06-02 NOTE — PROGRESS NOTES
REFERRING PHYSICIAN: Erika Valente MD, Mitali Carlin MD    DIAGNOSIS: cT3 N0 M0 (ypT3N0) invasive lobular carcinoma of the left breast     Radiation Treatment Summary  Course: C1 Breast 2023  Treatment Site Energy Dose/Fx (Gy) #Fx Dose Correction (Gy) Total Dose (Gy) Start Date End Date Elapsed Days   Left chest wall scar 9E 2.5 4 / 4 0 10 4/17/2023 4/20/2023 3   Left chest wall 18X/6X 2.65 16 / 16 0 42.4 3/20/2023 4/12/2023 23     INTERVAL HISTORY:   Ms. Ramirez is a 77-year-old female who completed postmastectomy radiation to the left chest wall as above who returns for routine follow-up.    She was diagnosed with left breast cancer after she presented with a palpable mass in the central aspect of the left breast. Original workup was done at an outside facility and she recently transferred her care here. A mammogram March 2022 revealed a 21 x 9 x 20 mm irregularly-shaped mass in the upper central region the left breast. No lymphadenopathy was noted. Biopsy lesion on March 21, 2022 revealed grade 1, invasive lobular carcinoma, which was ER positive (5%), MT positive (15%), and HER2 negative, with Ki-67 index of 10%. An MRI of the bilateral breasts on May 3, 2022 revealed a 7.6 x 4.7 x 3.9 cm irregular mass with spiculated margins in the upper left breast. The abnormal enhancement extends to the nipple areolar complex. Again no axillary adenopathy is noted .she received 6 months of neoadjuvant anastrozole. A repeat mammogram on September 16, 2022 again demonstrated the known mass measures approximately 62 mm. A repeat MRI of the bilateral. She underwent left skin sparing mastectomy and sentinel node biopsy on January 4, 2023. Pathology revealed left breast with 51 mm, grade 1, invasive lobular carcinoma with all margins greater than 10 mm. 2/2 sentinel lymph nodes were negative for involvement. Tumor is present at the soft tissue underlying the skin but does not involve nipple epidermis. Her Oncotype returned at 13. To  "reduce the chance of recurrence, she received post mastectomy radiation to the left chest wall as above.  She is here today for routine follow-up.    She is currently on anastrozole.  She denies left chest wall pain, edema, erythema, or lymphedema.  She also denies fever, night sweats, or weight loss.  She had a repeat yearly mammogram of the right breast on March 09/2023 which was negative.    PHYSICAL EXAMINATION:  Vitals:    06/13/23 1317   BP: (!) 157/68   Pulse: 75   Resp: 18   Temp: 98.2 °F (36.8 °C)   Weight: 102.5 kg (226 lb)   Height: 5' 7" (1.702 m)   Body mass index is 35.4 kg/m².   GENERAL: Patient is alert and oriented, in no acute distress.  HEENT: Extraocular muscles are intact.  Oropharynx is clear without lesions.  There is no cervical or supraclavicular adenopathy palpated.    CHEST: Breath sounds clear bilaterally.  No rales.  No rhonchi.  Unlabored respirations.  CARDIOVASCULAR: Normal S1, S2.  Normal rate.  Regular rhythm.  BREAST EXAM:  Left chestwall with scar secondary to mastectomy.  Hyperpigmentation noted in the left chest wall secondary to radiation.. No abnormal masses palpated in the left chestwall or axilla. The right breast and right axilla are also without palpbable masses  ABDOMEN: Bowel sounds normal.  No tenderness.  No abdominal distention.  No hepatomegaly.  No splenomegaly.  EXTREMITIES: No clubbing, cyanosis, edema  NEUROLOGIC: Cranial nerves II through XII are grossly intact.  Sensation is intact.  Strength is 5 out of 5 in the upper and lower extremities bilaterally.    ASSESSMENT:   This is a 77-year-old female with cT3 N0 M0 (ypT3N0), grade 1, invasive lobular carcinoma of the left breast to received neoadjuvant anastrozole followed by left skin sparing mastectomy and sentinel node biopsy on January 4, 2023, followed by postmastectomy radiation, with pathology revealing residual grade 1 invasive lobular carcinoma measuring 51 mm, ER/CT positive, HER2 negative, Ki-67 index " 10%, oncotype 13.    PLAN:   Ms. Ramirez is recovering from the radiation without any unexpected side effects.  Skin care to the treated area was again reviewed with the patient.  She will continue anastrozole as planned.  Her mammogram of the right breast in March 2023 is negative for a suspicious lesions.  She will repeat that in 1 year.  I plan to see her back in approximately 6 months, unless symptoms warrant otherwise.

## 2023-06-13 ENCOUNTER — OFFICE VISIT (OUTPATIENT)
Dept: RADIATION ONCOLOGY | Facility: CLINIC | Age: 78
End: 2023-06-13
Payer: MEDICARE

## 2023-06-13 VITALS
HEART RATE: 75 BPM | RESPIRATION RATE: 18 BRPM | WEIGHT: 226 LBS | SYSTOLIC BLOOD PRESSURE: 157 MMHG | BODY MASS INDEX: 35.47 KG/M2 | HEIGHT: 67 IN | TEMPERATURE: 98 F | DIASTOLIC BLOOD PRESSURE: 68 MMHG

## 2023-06-13 DIAGNOSIS — Z17.0 MALIGNANT NEOPLASM OF CENTRAL PORTION OF LEFT BREAST IN FEMALE, ESTROGEN RECEPTOR POSITIVE: Primary | ICD-10-CM

## 2023-06-13 DIAGNOSIS — C50.112 MALIGNANT NEOPLASM OF CENTRAL PORTION OF LEFT BREAST IN FEMALE, ESTROGEN RECEPTOR POSITIVE: Primary | ICD-10-CM

## 2023-06-13 PROCEDURE — 99999 PR PBB SHADOW E&M-EST. PATIENT-LVL IV: CPT | Mod: PBBFAC,,, | Performed by: RADIOLOGY

## 2023-06-13 PROCEDURE — 99999 PR PBB SHADOW E&M-EST. PATIENT-LVL IV: ICD-10-PCS | Mod: PBBFAC,,, | Performed by: RADIOLOGY

## 2023-06-13 PROCEDURE — 99214 OFFICE O/P EST MOD 30 MIN: CPT | Mod: PBBFAC | Performed by: RADIOLOGY

## 2023-06-13 PROCEDURE — 99213 OFFICE O/P EST LOW 20 MIN: CPT | Mod: S$PBB,,, | Performed by: RADIOLOGY

## 2023-06-13 PROCEDURE — 99213 PR OFFICE/OUTPT VISIT, EST, LEVL III, 20-29 MIN: ICD-10-PCS | Mod: S$PBB,,, | Performed by: RADIOLOGY

## 2023-06-15 ENCOUNTER — PATIENT MESSAGE (OUTPATIENT)
Dept: RADIATION ONCOLOGY | Facility: CLINIC | Age: 78
End: 2023-06-15
Payer: MEDICARE

## 2023-06-23 ENCOUNTER — OFFICE VISIT (OUTPATIENT)
Dept: HEMATOLOGY/ONCOLOGY | Facility: CLINIC | Age: 78
End: 2023-06-23
Payer: MEDICARE

## 2023-06-23 VITALS
OXYGEN SATURATION: 97 % | BODY MASS INDEX: 36.13 KG/M2 | HEART RATE: 66 BPM | RESPIRATION RATE: 18 BRPM | WEIGHT: 230.19 LBS | SYSTOLIC BLOOD PRESSURE: 163 MMHG | TEMPERATURE: 98 F | HEIGHT: 67 IN | DIASTOLIC BLOOD PRESSURE: 72 MMHG

## 2023-06-23 DIAGNOSIS — Z17.0 MALIGNANT NEOPLASM OF CENTRAL PORTION OF LEFT BREAST IN FEMALE, ESTROGEN RECEPTOR POSITIVE: Primary | ICD-10-CM

## 2023-06-23 DIAGNOSIS — C50.912 LOBULAR CARCINOMA OF LEFT BREAST: ICD-10-CM

## 2023-06-23 DIAGNOSIS — K56.609 SBO (SMALL BOWEL OBSTRUCTION): ICD-10-CM

## 2023-06-23 DIAGNOSIS — C50.112 MALIGNANT NEOPLASM OF CENTRAL PORTION OF LEFT BREAST IN FEMALE, ESTROGEN RECEPTOR POSITIVE: Primary | ICD-10-CM

## 2023-06-23 DIAGNOSIS — Z79.811 USE OF ANASTROZOLE: ICD-10-CM

## 2023-06-23 DIAGNOSIS — R53.83 FATIGUE, UNSPECIFIED TYPE: ICD-10-CM

## 2023-06-23 PROCEDURE — 99214 PR OFFICE/OUTPT VISIT, EST, LEVL IV, 30-39 MIN: ICD-10-PCS | Mod: S$PBB,,, | Performed by: INTERNAL MEDICINE

## 2023-06-23 PROCEDURE — 99999 PR PBB SHADOW E&M-EST. PATIENT-LVL IV: CPT | Mod: PBBFAC,,, | Performed by: INTERNAL MEDICINE

## 2023-06-23 PROCEDURE — 99999 PR PBB SHADOW E&M-EST. PATIENT-LVL IV: ICD-10-PCS | Mod: PBBFAC,,, | Performed by: INTERNAL MEDICINE

## 2023-06-23 PROCEDURE — 99214 OFFICE O/P EST MOD 30 MIN: CPT | Mod: PBBFAC | Performed by: INTERNAL MEDICINE

## 2023-06-23 PROCEDURE — 99214 OFFICE O/P EST MOD 30 MIN: CPT | Mod: S$PBB,,, | Performed by: INTERNAL MEDICINE

## 2023-06-23 NOTE — PROGRESS NOTES
PROGRESS NOTE    Subjective:       Patient ID: Kee Ramirez is a 77 y.o. female.  MRN: [id number]  : 1945    Chief Complaint: ILC of the left breast     History of Present Illness:   Kee Ramirez is a 77 y.o. female who presents with  ILC of the left breast.       Reports yearly mammograms , normal in . In  screening mammogram showed left breast architectural distortion. Diagnostic mammogram in March showed a 21 x 9  X 2 0 mm left breast mass. US guided biopsy showed ILC, ER strongly positive, CA 15% positive, Her 2 rhina negative.   A breast MRI showed a 7.6 x 4.7 cm mass with spiculated margins.No abnormal lymph nodes were found.      No history of breast mass or biopsies. She has been seeing Dr. Ortiz and is transferring care to use due to proximity. See full oncology history below.   She has been referred for neoadjuvant chemotherapy. We met today as a follow up visit to further discuss neoadjuvant therapy.  At her visit two weeks ago, we discussed neoadjuvant chemotherapy vs neoadjuvant endocrine therapy.  She is interested in breast conservation and is not a candidate for upfront lumpectomy given tumor size and extension to the nipple areolar complex.  She met with Dr. Valente who agrees that she may not be a candidate for breast conservation even after neoadjuvant therapies.  Patient wants to try.     An oncotype was sent to determine if she was high risk and would benefit from chemotherapy.  We were notified there was not enough tissue specimen from the original biopsy for the oncotype.  After extensive discussion, we elected to start neoadjuvant anastrozole.     2022: Started anastrozole      She was admitted to the hospital from -, found to have SBO. Has NGT for decompression and improved with conservative measures.     She completed 6 months of neoadjuvant anastrozole and has had a left mastectomy without reconstruction  on 1/4/23.     Interim history:    Resumed anastrozole 2/22. Denies hot flashes or joint pains.     Started RT on 3/20, completed at the end of April. Has dry skin and some limited ROM.     Had a right mammogram on 3/9 and it was normal.     Oncology History:  As dcoumented by  and updated      03.21.2022 Left Breast, Core Needle Biopsies:   - Invasive lobular carcinoma, well-differentiated .   - Bloom-Damon score (5/9):        - Tubular Formation: 3/3        - Nuclear Pleomorphism: 1/3        - Mitotic Activity: 1/3.   - Background of lobular carinoma in-situ (LCIS), nuclear grade 1.   - No perineural or lymphovascular invasion is identified.   Estrogen receptor: Positive (strong intensity, >95% of tumor cell nuclei).   Progesterone receptor: Positive (strong intensity, 15% of tumor cell nuclei).   HER2 IHC: Negative.   Ki-67: 10%.  04.04.2022 [facility] TB -- Surgery FU w/Oncotype  04.06.2022  follow up  -Breast MRI for staging, discussed surgery options; pt would like BCT, will make definitive plan after MRI  05.03.2022 Breast MRI  -Irregular 7.6 cm enhancing spiculated mass in the upper LEFT breast, consistent with the patient's known biopsy-proven invasive lobular carcinoma. The enhancement does extend to the nipple-areolar complex.  -No suspicious abnormality in the RIGHT breast.   BI-RADS Category: Overall: 6 - known biopsy-proven malignancy  05.06.2022 Follow up with [facility]  -Plan for Axillary U/S to assess LAD, [facility]  [facility]. Due to size of mass/location and nature of lobular cancer, was not felt that BCT was optimal unless NA therapy was done 1st   -2 week follow up to discuss further   05.13.2022 PetCT  -Mildly hypermetabolic irregular soft tissue within the left breast in keeping with known lobular carcinoma. This mass is better appreciated on MRI of the breast.  -No definite evidence of metastatic disease.  Please note that FDG PET has has suboptimal sensitivity for  certain histologies such as lobular and tubular carcinomas.  05.18.2022 Initial [facility] eval    12/1/22  MRI     Impression:  Left  Interval decreased enhancement of left breast malignancy (biopsy proven ILC).  Residual abnormal enhancement still spans 8.1 cm AP x 2.5 cm transverse and extends to the left nipple areolar complex.     Right  There is no MR evidence of malignancy.     BI-RADS Category:   Overall: 6 - Known Biopsy-Proven Malignancy    Final Pathologic Diagnosis 1.  Left axillary sentinel lymph node #1, hot and blue; count 299, excisional   biopsy: 1 lymph node, negative for metastatic carcinoma (0/1).          Confirmed by negative immunohistochemical staining with cytokeratins   AE1/AE3, cam 5.2 and wide spectrum keratin with          satisfactory positive and negative controls.   2.  Left axillary sentinel lymph node #2, hot and blue; count 652, excisional   biopsy: 1 lymph node, negative for metastatic carcinoma (0/1).          Confirmed by negative immunohistochemical staining with cytokeratins   AE1/AE3, cam 5.2 and wide spectrum keratin with          satisfactory positive and negative controls.   3.  Left breast, skin sparing mastecomy: Invasive lobular carcinoma,   measuring at least 51 mm (at least 5.1 cm) in greatest dimension (slide   measurement of largest          dimension).          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          Note:  Biopsy clip is grossly identified and biopsy site changes are   identified microscopically within area of tumor.          Margins:           - All margins are greater than 10 mm (greater than 1 cm) from   invasive tumor and LCIS.          Nipple and skin are present and uninvolved by tumor.  Note:  Tumor is   present in the soft tissue underlying the nipple skin but          does not involve nipple epidermis.          Microcalcifications are present and associated with lobular carcinoma   in situ and invasive carcinoma.          Background breast  tissue shows fibrocystic changes including apocrine   metaplasia and stromal fibrosis.          See synoptic report in comment section for further details.   4.  Right port-a-cath, removal: Gross diganosis:  Port-a-cath.         ypT3:  Tumor greater than 50 mm in greatest dimension.   Regional lymph nodes:        y(sn)pN0:  No regional lymph node metastasis identified.   Distant metastasis:     Oncology History:  Oncology History   Malignant neoplasm of central portion of left female breast   3/21/2022 Initial Diagnosis    Malignant neoplasm of central portion of left female breast     3/21/2022 Biopsy    Left Breast, Core Needle Biopsies:   - Invasive lobular carcinoma, well-differentiated .   - Bloom-Damon score (5/9):        - Tubular Formation: 3/3        - Nuclear Pleomorphism: 1/3        - Mitotic Activity: 1/3.   - Background of lobular carinoma in-situ (LCIS), nuclear grade 1.   - No perineural or lymphovascular invasion is identified.        3/21/2022 Breast Tumor Markers    Estrogen: Positive  Progesterone: Positive  HER2: Negative     5/27/2022 - 5/27/2022 Chemotherapy    This was considered, but patient did not undergo chemotherapy.   Treatment Summary   Plan Name: OP BREAST DOSE-DENSE AC-T (DOXORUBICIN CYCLOPHOSPHAMIDE Q2W FOLLOWED BY PACLITAXEL WEEKLY)  Treatment Goal: Curative  Status: Inactive  Start Date:   End Date:   Provider: Mitali Carlin MD  Chemotherapy: DOXOrubicin chemo injection 130 mg, 60 mg/m2, Intravenous, Clinic/HOD 1 time, 0 of 4 cycles  cyclophosphamide 600 mg/m2 = 1,300 mg in sodium chloride 0.9% 250 mL chemo infusion, 600 mg/m2, Intravenous, Clinic/HOD 1 time, 0 of 4 cycles  PACLitaxeL (TAXOL) 80 mg/m2 = 174 mg in sodium chloride 0.9% 250 mL chemo infusion, 80 mg/m2, Intravenous, Clinic/HOD 1 time, 0 of 12 cycles     6/7/2022 Cancer Staged    Staging form: Breast, AJCC 8th Edition  - Clinical: Stage IIA (cT3, cN0, cM0, G1, ER+, NE+, HER2-)     6/7/2022 Notable Event     Oncotype performed initially to assess for benefit of NACT to optimize for surgery but not enough tissue. Proceeded with AI x 6 months.      6/7/2022 Genetic Testing    Qustodian Pradip: Negative                                           1/4/2023 Breast Surgery    Left Mastectomy with L SLNB.      1/24/2023 Tumor Conference    No definitive treatment response to neoadjuvant AI use on final surgical pathology. Oncotype will now be sent on the surgical specimen. Could include CDK46 inhibitor with her endocrine if Oncotype is low given some response to Anastrozole and large tumor size, but patient is node negative. May consider to simply change to different AI. Radiation Oncology would like to meet with patient to discuss XRT, but team does not feel strongly about her proceeding.        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    left    Midline low back pain without sciatica 07/31/2015    Thyroid disease     Venous stasis     bilateral legs      Past Surgical History:   Procedure Laterality Date    BREAST BIOPSY Left 03/21/2022    Core bx, + ILC    COLONOSCOPY      COLONOSCOPY N/A 12/05/2019    Procedure: COLONOSCOPY;  Surgeon: Luis Bogran-Reyes, MD;  Location: [facility];  Service: Endoscopy;  Laterality: N/A;    HYSTERECTOMY  12/01/2021    INJECTION FOR SENTINEL NODE IDENTIFICATION Left 1/4/2023    Procedure: INJECTION, FOR SENTINEL NODE IDENTIFICATION;  Surgeon: Erika Valente MD;  Location:  [facility] OR;  Service: General;  Laterality: Left;    INSERTION OF TUNNELED CENTRAL VENOUS CATHETER (CVC) WITH SUBCUTANEOUS PORT Right 05/24/2022    Procedure: DIHETOMZV-ZCOH-P-CATH;  Surgeon: Darien Bear MD;  Location: [facility];  Service: General;  Laterality: Right;    MASTECTOMY Left 1/4/2023    Procedure: MASTECTOMY;  Surgeon: Erika Valente MD;  Location: [facility];  Service: General;  Laterality: Left;    MEDIPORT REMOVAL Right 1/4/2023    Procedure: REMOVAL PORT;  Surgeon: Erika Valente MD;  Location: [facility];  Service: General;  Laterality: Right;    SENTINEL LYMPH NODE BIOPSY Left 1/4/2023    Procedure: BIOPSY, LYMPH NODE, SENTINEL;  Surgeon: Erika Valente MD;  Location: [facility];  Service: General;  Laterality: Left;    SHOULDER ARTHROSCOPY W/ ROTATOR CUFF REPAIR Right 2001    TUBAL LIGATION      VAGINAL HYSTERECTOMY N/A 01/11/2021    Procedure: HYSTERECTOMY, VAGINAL ;  Surgeon: Jessie Dacosta MD;  Location: [facility];  Service: OB/GYN;  Laterality: N/A;     Family History   Problem Relation Age of Onset    Diabetes Mother     Hypertension Mother     Arthritis Mother     COPD Father     Hypertension Sister     Diabetes Sister     Cancer Sister         PANCREATIC    Breast cancer Sister      Social History     Tobacco Use    Smoking status: Never    Smokeless tobacco: Never   Substance and Sexual Activity    Alcohol use: No     Alcohol/week: 0.0 standard drinks    Drug use: No    Sexual activity: Not Currently     Partners: Male     Birth control/protection: None, See Surgical Hx        ROS:   Review of Systems   Constitutional: Negative for fever, malaise/fatigue and weight loss.   HENT: no hearing loss, nosebleeds and sore throat.    Eyes: Negative for double vision and photophobia.   Respiratory: no hemoptysis, sputum production, shortness of breath and wheezing.    Cardiovascular: Negative for chest pain, palpitations, orthopnea and leg swelling.   Gastrointestinal: Negative for abdominal pain, blood in  "stool, constipation, diarrhea heartburn, nausea and vomiting.   Genitourinary: Negative for dysuria, hematuria and urgency.   Musculoskeletal: Negative for back pain, joint pain and myalgias.   Skin: Negative for itching and rash.   Neurological: Negative for dizziness, tingling, seizures, weakness and headaches.   Endo/Heme/Allergies: Negative for polydipsia. Does not bruise/bleed easily.   Psychiatric/Behavioral: Negative for depression and memory loss. The patient is not nervous/anxious and does not have insomnia.       Objective:     Vitals:    06/23/23 0935   BP: (!) 163/72   Pulse: 66   Resp: 18   Temp: 97.6 °F (36.4 °C)   TempSrc: Oral   SpO2: 97%   Weight: 104.4 kg (230 lb 2.6 oz)   Height: 5' 7" (1.702 m)   PainSc: 0-No pain           Wt Readings from Last 10 Encounters:   06/23/23 104.4 kg (230 lb 2.6 oz)   06/13/23 102.5 kg (226 lb)   03/22/23 99.8 kg (220 lb 0.3 oz)   03/15/23 99.9 kg (220 lb 3.8 oz)   03/09/23 98 kg (216 lb)   02/23/23 98.4 kg (217 lb)   02/22/23 98.6 kg (217 lb 6 oz)   02/20/23 94.3 kg (208 lb)   02/07/23 94.3 kg (208 lb)   01/30/23 94.3 kg (208 lb)       Physical Examination:   Physical Exam  Vitals and nursing note reviewed.   Constitutional:       General: She is not in acute distress.     Appearance: She is not diaphoretic.   HENT:      Head: Normocephalic.   Eyes:      General: No scleral icterus.     Conjunctiva/sclera: Conjunctivae normal.   Neck:      Thyroid: No thyromegaly.   Cardiovascular:      Rate and Rhythm: Normal rate and regular rhythm.      Heart sounds: Normal heart sounds. No murmur heard.  Pulmonary:      Effort: Pulmonary effort is normal. No respiratory distress.      Breath sounds: No stridor. No wheezing or rales.   Chest:      Chest wall: No tenderness.     Musculoskeletal:         General: No tenderness or deformity. Normal range of motion.      Cervical back: Neck supple.   Lymphadenopathy:      Cervical: No cervical adenopathy.   Skin:     General: Skin is " warm and dry.      Findings: No erythema or rash.      Comments:+ left mastectomy w/o reconstruction. Healed incision .   Neurological:      Mental Status: She is alert and oriented to person, place, and time.      Cranial Nerves: No cranial nerve deficit.      Coordination: Coordination normal.      Gait: Gait is intact.   Psychiatric:         Mood and Affect: Affect normal.         Cognition and Memory: Memory normal.         Judgment: Judgment normal.     Diagnostic Tests:  Significant Imaging: I have reviewed and interpreted all pertinent imaging results/findings.    Laboratory Data:  All pertinent labs have been reviewed.  Labs:   Lab Results   Component Value Date    WBC 5.62 12/22/2022    RBC 4.10 12/22/2022    HGB 11.9 (L) 12/22/2022    HCT 37.2 12/22/2022    MCV 91 12/22/2022     12/22/2022     (H) 12/22/2022     12/22/2022    K 4.7 12/22/2022    BUN 23 12/22/2022    CREATININE 0.8 12/22/2022    AST 13 12/22/2022    ALT 16 12/22/2022    BILITOT 0.7 12/22/2022       Assessment/Plan:   Malignant neoplasm of central portion of left breast in female, estrogen receptor positive  cT3N0, ER 95%, PR15%, Her 2 rhina negative, Grade 1, ki 67 10%     ypT3 yp (sn) N0   Oncotype low risk, 13     She was interested in breast conservation and received 6 months of neoadjuvant anastrozole.   Follow up MRI showed some response but persistent large area of enhancement with extension to the nipple areolar complex. Mastectomy was recommended.   Final pathology and TB recommendations were discussed. There was 5 cm of ILC, node negative, no significant treatment response, G1, Ki 67 stable at 10%.  Per guidelines, oncotype was sent, is 13, low risk, no benefit of chemotherapy.     She has completed adjuvant RT. Will refer back to PT as she has limited ROM.     At this time, continue anastrozole.I will see her back in 3 months.     DEXA normal at baseline. continue calcium and vitamin D. Repeat in June 2024      Primary Hypertension   Compliant with current medications, check at home.   Continue PMD follow up.     Fatigue   Instructed to stay active as tolerated.     ECOG SCORE    0 - Fully active-able to carry on all pre-disease performance without restriction       Discussion:   No follow-ups on file.    Plan was discussed with the patient at length, and she verbalized understanding. Kee was given an opportunity to ask questions that were answered to her satisfaction, and she was advised to call in the interval if any problems or questions arise.Electronically signed by Mitali Carlin MD      Med Onc Chart Routing      Follow up with physician 3 months. Refer to PT   Follow up with KAYLYN    Infusion scheduling note    Injection scheduling note    Labs    Imaging    Pharmacy appointment    Other referrals            Treatment Plan Information   OP ANASTROZOLE DAILY   Mitali Carlin MD   Upcoming Treatment Dates - OP ANASTROZOLE DAILY    6/24/2022       Antiemetics       Physician communication order       Take Home Chemotherapy       anastrozole (ARIMIDEX) 1 mg Tab    Therapy Plan Information  Flushes  heparin, porcine (PF) 100 unit/mL injection flush 500 Units  500 Units, Intravenous, Every visit  sodium chloride 0.9% flush 10 mL  10 mL, Intravenous, Every visit      Answers submitted by the patient for this visit:  Review of Systems Questionnaire (Submitted on 6/20/2023)  appetite change : No  unexpected weight change: No  mouth sores: No  visual disturbance: No  cough: No  shortness of breath: No  chest pain: No  abdominal pain: No  diarrhea: No  frequency: No  back pain: No  rash: No  headaches: No  adenopathy: No  nervous/ anxious: No

## 2023-07-06 ENCOUNTER — CLINICAL SUPPORT (OUTPATIENT)
Dept: REHABILITATION | Facility: HOSPITAL | Age: 78
End: 2023-07-06
Attending: INTERNAL MEDICINE
Payer: MEDICARE

## 2023-07-06 DIAGNOSIS — M25.612 DECREASED ROM OF LEFT SHOULDER: Primary | ICD-10-CM

## 2023-07-06 DIAGNOSIS — Z74.09 DECREASED FUNCTIONAL MOBILITY AND ENDURANCE: ICD-10-CM

## 2023-07-06 DIAGNOSIS — R29.3 POOR POSTURE: ICD-10-CM

## 2023-07-06 DIAGNOSIS — Z91.89 AT RISK FOR LYMPHEDEMA: ICD-10-CM

## 2023-07-06 DIAGNOSIS — Z17.0 MALIGNANT NEOPLASM OF CENTRAL PORTION OF LEFT BREAST IN FEMALE, ESTROGEN RECEPTOR POSITIVE: ICD-10-CM

## 2023-07-06 DIAGNOSIS — R53.1 DECREASED STRENGTH: ICD-10-CM

## 2023-07-06 DIAGNOSIS — C50.112 MALIGNANT NEOPLASM OF CENTRAL PORTION OF LEFT BREAST IN FEMALE, ESTROGEN RECEPTOR POSITIVE: ICD-10-CM

## 2023-07-06 PROCEDURE — 97161 PT EVAL LOW COMPLEX 20 MIN: CPT

## 2023-07-06 PROCEDURE — 97110 THERAPEUTIC EXERCISES: CPT

## 2023-07-06 NOTE — PATIENT INSTRUCTIONS
For the following two exercises, start with resting your hands on your forehead. As the stretch eases, you can progress to hands behind your head.                              ROM: Flexion - Wand (Supine)        Lie on back holding wand. Raise arms over head.   Repeat _10___ times per set. Do __1__ set per session. Do __1-2__ sessions per day.  Copyright © [employer]. All rights reserved.       [email or web address]    ROM: External Rotation - Wand (supine)    Lie on back holding wand with elbows bent to 90°.  Place a rolled-up hand towel under each of your upper arms. Rotate forearms over head as far as possible.   Repeat __10__ times per set. Do __1__ set per session. Do __1-2__ sessions per day.     [email or web address]     Copyright © [employer]. All rights reserved.              Sidelying Shoulder Abduction            Sidelying Shoulder Abduction    Lie on your right/left side with right/left arm on top. Keep your elbow straight. Lift your arm upward toward your head.  Perform _10___ times. Perform __1__set.   Perform _1-2___ times per day.

## 2023-07-06 NOTE — PLAN OF CARE
"[facility]  Physical Therapy Initial Evaluation    Name: Kee Ramirez  Clinic Number: [id number]    Therapy Diagnosis:   Encounter Diagnoses   Name Primary?    Malignant neoplasm of central portion of left breast in female, estrogen receptor positive     At risk for lymphedema     Poor posture     Decreased ROM of left shoulder Yes    Decreased functional mobility and endurance     Decreased strength         Physician: Mitali Carlin MD    Physician Orders: PT Eval and Treat - Cancer Therapy  Medical Diagnosis from Referral: Malignant neoplasm of central portion of left breast in female, estrogen receptor positive  Evaluation Date: 7/6/2023  Authorization Period Expiration: 6/22/24  Plan of Care Expiration: 9/1/23  Progress Note Due: 8/4/23  Visit # / Visits authorized: 1/ 1   FOTO: TBA    Precautions: Standard and cancer     Time In: 11:07 AM  Time Out: 11:53 AM  Total Appointment Time (timed & untimed codes): 46 minutes      History   History of Present Illness: Kee is a 77 y.o. female that presents to [facility] at the [facility] clinic s/p left breast surgery.  Pt is s/p L mastectomy and   Diagnosis: Malignant neoplasm of central portion of left breast in female, estrogen receptor positive  Chief complaint: She completed radiation in mid-April. She states that everything has healed, but she has a lot of skin tightness.   Denies pain. Pt has difficulty reaching overhead with her left arm- feels a lot of pulling with it. Feels a stretch and some limitation with reaching behind her head or for her low back.  Pt states she gets tired really fast. "My energy level isn't where it was before."  Surgical History:  Kee Ramirez  has a past surgical history that includes Tubal ligation; Shoulder arthroscopy w/ rotator cuff repair (Right, 2001); Colonoscopy; Colonoscopy (N/A, 12/05/2019); Vaginal hysterectomy (N/A, 01/11/2021); Insertion of tunneled " central venous catheter (CVC) with subcutaneous port (Right, 05/24/2022); Breast biopsy (Left, 03/21/2022); Hysterectomy (12/01/2021); Mastectomy (Left, 1/4/2023); [place] lymph node biopsy (Left, 1/4/2023); Injection for sentinel node identification (Left, 1/4/2023); and Mediport removal (Right, 1/4/2023).    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)  Prior Level of Function: independent  Current Level of Function: independent / mod independent  Exercise Routine: chair exercises at community center 3x/week  Hand Dominance: right      Patient's Goals: to improve her ROM and reduce tightness        Subjective   Pt states: Denies pain, just tightness/pulling  Pain: 0/10 on VAS currently.   Best: 0/10  Worst: 0/10   Pain location: N/A   "  Objective   Mental status :A+O x 3, pleasant, appropriate    Postural examination/scapula alignment: Rounded shoulder and Slouched posture    Skin Integrity:   Scar Location: L chest wall  Appearance: healed- scar mobility is limited/minimal at central 2-3inches of her scar  Signs of infection: No  Drainage: N/A  Color:N/A    Edema: None  Location: N/A    Axillary Web Syndrome/Cording:   Location: TBA  Degree of Cording: TBA (mild, moderate etc...)   Number of cords present: TBA    Sensation: to be assessed         Range of Motion:      Shoulder Range of Motion:   Active ROM Right Left   Flexion 160 135   Abduction 180 150   Extension 50 55   IR/90deg 90 90 (@55 of ABD)   ER/90deg 90 40          Strength: manual muscle test grades below   Upper Extremity Strength    (R) UE (L) UE   Shoulder Flexion: 5/5 5/5   Shoulder Abduction: 5/5 5/5   Shoulder IR 5/5 5/5   Shoulder ER 5/5 4/5   Elbow Flexion: 5/5 4/5   Elbow Extension: 5/5 5/5    32.8 lb 38.1lb         Baseline Measurements of BUE's for Early Detection of Lymphedema:   LANDMARK RIGHT UE LEFT UE DIFFERENCE   E + 8" 40 cm 40 cm 0 cm   E + 6" 39.5 cm 40 cm 0.5 cm   E + 4" 38 cm 38 cm 0 cm   E + 2" 35 cm 33 cm 2 cm   Elbow 29.5 cm 28 cm 1.5 cm   W+ 8" 29 cm 27 cm 2 cm   W +  6" 27 cm 25 cm 2 cm   W + 4" 23.5 cm 22 cm 1.5 cm   Wrist 18 cm 18 cm 0 cm   DPC 22 cm 21 cm 1 cm   IP Thumb 7.5 cm 7 cm 0.5 cm     Functional Mobility (Bed mobility, transfers)  Independent    ADL's:  Mod I to independent    Gait Assessment:   - AD used: none  - Assistance: independent  - Distance: community distances     Patient Education Provided   - role of therapy in multi - disciplinary team, goals for therapy  -exercise technique, plan of care, scheduling  -briefly reviewed lymphedema precautions  -postural awareness with use of towel roll    Pt has no cultural, educational or language barriers to learning provided.  Treatment and Instruction of Home Exercise Program      Total Time " Separate from Evaluation: 20 minutes    Kee received therapeutic exercises to develop ROM, flexibility, and posture for 20 minutes including:     Pt performs 5-10 reps of following exercises:  Butterflies with hands on head  LTR with hands on forehead  B shoulder flexion with 1# wand  B shoulder ER at 90 degrees with towel support, 1# wand  L shoulder ABD in side-lying  Scapular retractions   Rows with yellow resistance band        Written Home Exercises Provided: yes.  Exercises were reviewed and Kee was able to demonstrate them prior to the end of the session.  Kee demonstrated good  understanding of the education provided.     See EMR under Patient Instructions for exercises provided 7/6/2023.      Functional Limitations Reporting        Intake Outcome Measure for FOTO Shoulder Survey    Therapist reviewed FOTO scores for Kee Ramirez on 7/6/2023.   FOTO documents entered into EPIC - see Media section.    Intake Score: TBA             Assessment   This is a 77 y.o. female referred to outpatient physical therapy and presents with a medical diagnosis of left breast cancer and was seen today post-operatively to establish PT plan of care for impairments following surgery  and radiation including:  decreased strength, poor posture, decreased ROM, decreased endurance, impaired functional mobility, and is at risk for lymphedema.      Pt instructed in HEP this session and was able to perform all exercises given independently. Pt instructed to follow up with therapist if any concerns arise with program established. Pt will continue to benefit from skilled physical therapy to address the impairments stated in chart below, provide patient/family education and to maximize pt's level of independence in home and community environment     Pt prognosis is Excellent.    Anticipated barriers to physical therapy: none anticipated     Pt's spiritual, cultural and educational needs considered and pt agreeable to plan of care  and goals as stated below:       Medical Necessity is demonstrated by the following:  History  Co-morbidities and personal factors that may impact the plan of care [] LOW: no personal factors / co-morbidities  [] MODERATE: 1-2 personal factors / co-morbidities  [x] HIGH: 3+ personal factors / co-morbidities    Moderate / High Support Documentation:   Co-morbidities affecting plan of care: cancer, HTN, Diabetes    Personal Factors:   no deficits     Examination  Body Structures and Functions, activity limitations and participation restrictions that may impact the plan of care [] LOW: addressing 1-2 elements  [] MODERATE: 3+ elements  [x] HIGH: 4+ elements (please support below)    Moderate / High Support Documentation: ROM, strength, posture, at risk for lymphedema     Clinical Presentation [x] LOW: stable  [] MODERATE: Evolving  [] HIGH: Unstable     Decision Making/ Complexity Score: low       Goals: Pt agrees with goals set    Short Term goals: 4 weeks  1. Patient will demonstrate 100% understanding of lymphedema risk reduction practices to include self monitoring for lymphedema. (progressing, not met)  2. Patient will demonstrate independence with Home Exercise program established. (progressing, not met)  3. Pt will increase AROM/PROM in shoulder abduction ROM to >/=170 degrees on left to improve functional reach, carry, push, pull pain free. (progressing, not met)  4. Pt will increase AROM/PROM in shoulder flexion to >/=160 degrees on left to improve functional reach, carry, push, pull pain free.(progressing, not met)  5. Pt will increase strength to 5/5 in gross UE musculature to improve tolerance to all functional activities pain free. (progressing, not met)    Long Term Goals: 8 weeks   1.  Pt will increase AROM/PROM in shoulder flexion to 180 degrees bilaterally to improve functional reach, carry, push, pull pain free. (progressing, not met)  2. Pt will increase AROM/PROM in shoulder ER at 90 to 90 degrees  on the left to improve functional reach, carry, push, pull pain free. e. (progressing, not met)  3. Pt will demonstrate full/maximized tissue mobility to increase ROM and promote healthy tissue to be pain free at discharge. (progressing, not met)  4. Pt will increase AROM/PROM in shoulder abduction ROM to 180 degrees on left to improve functional reach, carry, push, pull pain free. (progressing, not met)  5. Patient will report compliance with walking program 5x week for 20-30 min each day to improve overall cardiovascular function and decrease cancer related fatigue at discharge. (progressing, not met)      Plan   Outpatient physical therapy 2x week for 8 weeks to include the following:   Manual Therapy, Neuromuscular Re-ed, Orthotic Management and Training, Patient Education, Self Care, Therapeutic Activities, and Therapeutic Exercise.    Plan of care Certification Period: 7/6/2023 to 9/1/23.    Therapist: Lauren Choudhury, PT  7/6/2023

## 2023-07-07 PROBLEM — R29.898 WEAKNESS OF RIGHT UPPER EXTREMITY: Status: RESOLVED | Noted: 2023-01-25 | Resolved: 2023-07-07

## 2023-07-07 PROBLEM — R53.1 DECREASED STRENGTH: Status: ACTIVE | Noted: 2023-07-07

## 2023-07-07 PROBLEM — R29.3 POOR POSTURE: Status: ACTIVE | Noted: 2023-07-07

## 2023-07-07 PROBLEM — Z74.09 DECREASED FUNCTIONAL MOBILITY AND ENDURANCE: Status: ACTIVE | Noted: 2023-07-07

## 2023-07-07 PROBLEM — M25.612 DECREASED ROM OF LEFT SHOULDER: Status: ACTIVE | Noted: 2023-07-07

## 2023-07-07 PROBLEM — Z91.89 AT RISK FOR LYMPHEDEMA: Status: ACTIVE | Noted: 2023-07-07

## 2023-07-07 PROBLEM — M25.612 STIFFNESS OF LEFT SHOULDER JOINT: Status: RESOLVED | Noted: 2023-01-25 | Resolved: 2023-07-07

## 2023-07-07 PROBLEM — Z91.89 AT RISK FOR LYMPHEDEMA: Status: RESOLVED | Noted: 2023-01-25 | Resolved: 2023-07-07

## 2023-07-07 PROBLEM — R29.898 WEAKNESS OF LEFT UPPER EXTREMITY: Status: RESOLVED | Noted: 2023-01-25 | Resolved: 2023-07-07

## 2023-07-07 PROBLEM — M25.611 STIFFNESS OF RIGHT SHOULDER JOINT: Status: RESOLVED | Noted: 2023-01-25 | Resolved: 2023-07-07

## 2023-07-10 NOTE — PROGRESS NOTES
[facility]   Physical Therapy Treatment Note      Name: Kee Ramirez  [facility] Number: [id number]    Therapy Diagnosis:   Encounter Diagnoses   Name Primary?    Poor posture Yes    Decreased ROM of left shoulder     Decreased functional mobility and endurance     Decreased strength     At risk for lymphedema      Physician: Mitali Carlin MD    Visit Date: 7/11/2023    Physician Orders: PT Eval and Treat - Cancer Therapy  Medical Diagnosis from Referral: Malignant neoplasm of central portion of left breast in female, estrogen receptor positive  Evaluation Date: 07/06/2023  Authorization Period Expiration: 12/31/2023  Plan of Care Expiration: 09/01/2023  Progress Note Due: 08/04/2023  Visit # / Visits Authorized: 1 / 20 (plus eval)  FOTO: TBA    Time In: 11:07 AM  Time Out: 12:00 PM  Total Billable Time: 53 minutes      Subjective     Patient reports: continued stiffness / tightness through left shoulder and chest wall, attributed to XRT. Patient has been gradually resuming range of motion exercises.     She was compliant with home exercise program.    Response to Previous Treatment: no adverse events  Functional Change: ongoing improvement     Pain: 0/10  Location: N/A     Fatigue: 8/10    Diagnosis: Stage IIA (cT3, cN0, cM0, G1, ER+, ND+, HER2-)    Treatment:   Chemotherapy: none  Radiation: completed April 2023  Endocrine Therapy: neoadjuvantly x6 months and is currently on adjuvant anastrozole     Surgery Date: LEFT mastectomy and LEFT SLNB (2x) per Dr. Valente on 01/04/2023.      Objective      Objective Measures updated at progress report unless specified.     Treatment     Kee received the treatments listed below:      Therapeutic exercises to increase cardiovascular endurance, flexibility, range of motion, and flexibility for 30 minutes:     Seated Exercises:  - Pulleys (flex, abd)    2 min each  - Physioball roll outs (3 ways)   3 min    Standing Exercises:  - Wall push ups    1  set x 10 reps    Mat Exercises:  - Butterfly stretch (hands on head)  1 min x 2 reps  - LTR with shoulder abduction stretch, vimal 10 sec x 10 reps  - AAROM wand flexion (1# bar)  1 set x 10 reps      Manual therapy techniques to decrease pain as well as to increase soft tissue mobility, joint mobility, mobility and pliability, and function for 15 minutes:     - Manual lateral chest wall stretch (left only)  - Manual axilla stretch (left only)  - Manual anterior attachment stretch (bilateral)      Neuromuscular re-education activities to improve kinesthetic sense, proprioception, postural awareness, and scapular stabilization for 8 minutes:      Standing Exercises:  - Shoulder rows (yellow)   3 set x 10 reps  - Shoulder extensions (yellow)  3 set x 10 reps      Patient Education and Home Exercises       Education Provided Regarding:   - Role of physical therapy in multi-disciplinary team  - Physical therapy plan of care, scheduling  - Physical therapy goals, progress towards goals  - Home exercise program, exercise technique  - Energy conservation techniques    Written Home Exercises Provided: yes. Exercises were reviewed and Kee was able to demonstrate them prior to the end of the session. Kee demonstrated good  understanding of the education provided. See EMR under Patient Instructions for exercises provided during therapy sessions    Assessment     Patient is responding well to physical therapy. Patient able to perform new exercises and exercise progressions without increase in symptoms prior to leaving the clinic. Improved flexibility with added LTR with shoulder abduction stretch as well as physioball roll out stretch. Improved triceps, pec strength demonstrated with wall push-ups. Appreciated continued tightness through anterior chest with attachment stretch (L > R). Will progress as tolerated.    Kee Is progressing well towards her goals.   Patient prognosis is Excellent.     Patient will continue to  benefit from skilled outpatient physical therapy to address the deficits listed in the problem list box on initial evaluation, provide patient / family education, and to maximize patient's level of independence in the home and community environment.     Patient's spiritual, cultural, and educational needs considered, and patient agreeable to plan of care and goals.     Anticipated barriers to physical therapy: none anticipated    GOALS:   Short Term Goals: 4 Weeks  Patient will demonstrate 100% understanding of lymphedema risk reduction practices, including self-monitoring for lymphedema (progressing, not met).  Patient will demonstrate independence with established home exercise program (progressing, not met).  Patient will increase LEFT shoulder ABDUCTION active range of motion to >170 degrees to improve functional reaching, carrying, pushing, and pulling pain-free (progressing, not met).    Patient will increase LEFT shoulder FLEXION active range of motion to >160 degrees to improve functional reaching, carrying, pushing, and pulling pain-free (progressing, not met).  Patient will increase strength to 5/5 in gross upper extremity musculature to improve tolerance to all functional activities pain-free (progressing, not met).       Long Term Goals: 8 Weeks   Patient will increase BILATERAL shoulder FLEXION active range of motion to 180 degrees to improve functional reaching, carrying, pushing, and pulling pain-free (progressing, not met).   Patient will increase LEFT shoulder EXTERNAL ROTATION (measured at 90 degrees abduction) to 90 degrees to improve functional reaching, carrying, pushing, and pulling pain-free (progressing, not met).    Patient will demonstrate full / maximized tissue mobility to increase range of motion and promote healthy tissue to be pain-free at discharge (progressing, not met).  Patient will increase LEFT shoulder ABDUCTION active range of motion to improve functional reaching, carrying,  pushing, and pulling pain-free (progressing, not met).    Patient will report compliance with walking program 5x/week for 20-30 minutes / day to improve overall cardiovascular function and decrease cancer-related fatigue at discharge (progressing, not met).      Plan     Plan of Care Certification: 07/06/2023 to 09/01/2023.    Outpatient physical therapy 2x week for 8 weeks to include the following: Manual Therapy, Neuromuscular Re-ed, Orthotic Management and Training, Patient Education, Self Care, Therapeutic Activities, and Therapeutic Exercise.    Irene Avelar, PT

## 2023-07-11 ENCOUNTER — CLINICAL SUPPORT (OUTPATIENT)
Dept: REHABILITATION | Facility: HOSPITAL | Age: 78
End: 2023-07-11
Payer: MEDICARE

## 2023-07-11 DIAGNOSIS — M25.612 DECREASED ROM OF LEFT SHOULDER: ICD-10-CM

## 2023-07-11 DIAGNOSIS — R53.1 DECREASED STRENGTH: ICD-10-CM

## 2023-07-11 DIAGNOSIS — R29.3 POOR POSTURE: Primary | ICD-10-CM

## 2023-07-11 DIAGNOSIS — Z74.09 DECREASED FUNCTIONAL MOBILITY AND ENDURANCE: ICD-10-CM

## 2023-07-11 DIAGNOSIS — Z91.89 AT RISK FOR LYMPHEDEMA: ICD-10-CM

## 2023-07-11 PROCEDURE — 97112 NEUROMUSCULAR REEDUCATION: CPT

## 2023-07-11 PROCEDURE — 97110 THERAPEUTIC EXERCISES: CPT

## 2023-07-11 PROCEDURE — 97140 MANUAL THERAPY 1/> REGIONS: CPT

## 2023-07-11 NOTE — PATIENT INSTRUCTIONS
To Stretch the RIGHT Side:  Rotate knees to your LEFT  Slowly lift your RIGHT arm up and out to the side (like a snow didier)  Hold for 10 seconds then return to the starting position     To Stretch the LEFT Side:  Rotate knees to your RIGHT  Slowly lift your LEFT arm up and out to the side (like a snow didier)  Hold for 10 seconds then return to the starting position

## 2023-07-12 NOTE — PROGRESS NOTES
[facility]   Physical Therapy Treatment Note      Name: Kee Ramirez  [facility] Number: [id number]    Therapy Diagnosis:   Encounter Diagnoses   Name Primary?    Poor posture Yes    Decreased ROM of left shoulder     Decreased functional mobility and endurance     Decreased strength     At risk for lymphedema      Physician: Mitali Carlin MD    Visit Date: 7/13/2023    Physician Orders: PT Eval and Treat - Cancer Therapy  Medical Diagnosis from Referral: Malignant neoplasm of central portion of left breast in female, estrogen receptor positive  Evaluation Date: 07/06/2023  Authorization Period Expiration: 12/31/2023  Plan of Care Expiration: 09/01/2023  Progress Note Due: 08/04/2023  Visit # / Visits Authorized: 2 / 20 (plus eval)  FOTO: TBA    Time In: 11:01 AM  Time Out: 11:55 AM  Total Billable Time: 54 minutes      Subjective     Patient reports: no new complaints. Patient notes gradually improving range of motion with continued performance of HEP.     She was compliant with home exercise program.    Response to Previous Treatment: slight muscle soreness  Functional Change: improved tolerance to reaching behind / over shoulder    Pain: 0/10  Location: N/A     Fatigue: 2/10    Diagnosis: Stage IIA (cT3, cN0, cM0, G1, ER+, HI+, HER2-)    Treatment:   Chemotherapy: none  Radiation: completed April 2023  Endocrine Therapy: neoadjuvantly x6 months and is currently on adjuvant anastrozole     Surgery Date: LEFT mastectomy and LEFT SLNB (2x) per Dr. Valente on 01/04/2023.      Objective      Objective Measures updated at progress report unless specified.     Treatment     Kee received the treatments listed below:      Therapeutic exercises to increase cardiovascular endurance, flexibility, range of motion, and flexibility for 20 minutes:     Seated Exercises:  - Pulleys (flex, abd)    2 min each  - Physioball roll outs (3 ways)   3 min    Standing Exercises:  - Wall push ups    1 set x 10  reps - held  - Doorway pec stretch    30 sec x 2 reps    Mat Exercises:  - Pec stretch with towel   2 min  - Butterfly stretch (hands on head)  1 min x 1 reps  - LTR with shoulder abduction stretch, forrest 10 sec x 10 rep - held  - AAROM wand flexion (1# bar)  1 set x 10 reps      Manual therapy techniques to decrease pain as well as to increase soft tissue mobility, joint mobility, mobility and pliability, and function for 15 minutes:     - Manual lateral chest wall stretch (left only)  - Manual axilla stretch (left only)  - Manual anterior attachment stretch (bilateral)      Neuromuscular re-education activities to improve kinesthetic sense, proprioception, postural awareness, and scapular stabilization for 10 minutes:      Standing Exercises:  - Shoulder rows (red)    2 set x 10 reps  - Shoulder extensions (red)   2 set x 10 reps    Mat Exercises:  - Forrest shoulder ER (red)   2 set x 10 reps      Therapeutic Activities to improve functional lifting tolerance for 10 minutes:    - Biceps curls (3#)    2 set x 10 reps  - Shelf lifts, forrest (1#)    1 set x 10 reps      Patient Education and Home Exercises       Education Provided Regarding:   - Role of physical therapy in multi-disciplinary team  - Physical therapy plan of care, scheduling  - Physical therapy goals, progress towards goals  - Home exercise program, exercise technique  - Energy conservation techniques    Written Home Exercises Provided: Patient instructed to cont prior HEP. Exercises were reviewed and Kee was able to demonstrate them prior to the end of the session. Kee demonstrated good  understanding of the education provided. See EMR under Patient Instructions for exercises provided during therapy sessions    Assessment     Patient is responding well to physical therapy. Patient able to perform new exercises and exercise progressions without increase in symptoms prior to leaving the clinic. Attempted bilateral shoulder ER with resistance band in  sitting position with increased difficulty performing exercise through full range; modified activity to supine on mat for additional scapular stability with improved exercise technique. Noted RUE fatigue with 1# shelf lifts, relieved with rest. Improved soft tissue mobility of left anterior / lateral chest wall with manual techniques; appreciated increased tissue resistance through L lateral breast. Will progress as tolerated.    Kee Is progressing well towards her goals.   Patient prognosis is Excellent.     Patient will continue to benefit from skilled outpatient physical therapy to address the deficits listed in the problem list box on initial evaluation, provide patient / family education, and to maximize patient's level of independence in the home and community environment.     Patient's spiritual, cultural, and educational needs considered, and patient agreeable to plan of care and goals.     Anticipated barriers to physical therapy: none anticipated    GOALS:   Short Term Goals: 4 Weeks  Patient will demonstrate 100% understanding of lymphedema risk reduction practices, including self-monitoring for lymphedema (progressing, not met).  Patient will demonstrate independence with established home exercise program (progressing, not met).  Patient will increase LEFT shoulder ABDUCTION active range of motion to >170 degrees to improve functional reaching, carrying, pushing, and pulling pain-free (progressing, not met).    Patient will increase LEFT shoulder FLEXION active range of motion to >160 degrees to improve functional reaching, carrying, pushing, and pulling pain-free (progressing, not met).  Patient will increase strength to 5/5 in gross upper extremity musculature to improve tolerance to all functional activities pain-free (progressing, not met).       Long Term Goals: 8 Weeks   Patient will increase BILATERAL shoulder FLEXION active range of motion to 180 degrees to improve functional reaching,  carrying, pushing, and pulling pain-free (progressing, not met).   Patient will increase LEFT shoulder EXTERNAL ROTATION (measured at 90 degrees abduction) to 90 degrees to improve functional reaching, carrying, pushing, and pulling pain-free (progressing, not met).    Patient will demonstrate full / maximized tissue mobility to increase range of motion and promote healthy tissue to be pain-free at discharge (progressing, not met).  Patient will increase LEFT shoulder ABDUCTION active range of motion to improve functional reaching, carrying, pushing, and pulling pain-free (progressing, not met).    Patient will report compliance with walking program 5x/week for 20-30 minutes / day to improve overall cardiovascular function and decrease cancer-related fatigue at discharge (progressing, not met).      Plan     Plan of Care Certification: 07/06/2023 to 09/01/2023.    Outpatient physical therapy 2x week for 8 weeks to include the following: Manual Therapy, Neuromuscular Re-ed, Orthotic Management and Training, Patient Education, Self Care, Therapeutic Activities, and Therapeutic Exercise.    Irene Avelar, PT

## 2023-07-13 ENCOUNTER — CLINICAL SUPPORT (OUTPATIENT)
Dept: REHABILITATION | Facility: HOSPITAL | Age: 78
End: 2023-07-13
Payer: MEDICARE

## 2023-07-13 DIAGNOSIS — R29.3 POOR POSTURE: Primary | ICD-10-CM

## 2023-07-13 DIAGNOSIS — Z74.09 DECREASED FUNCTIONAL MOBILITY AND ENDURANCE: ICD-10-CM

## 2023-07-13 DIAGNOSIS — R53.1 DECREASED STRENGTH: ICD-10-CM

## 2023-07-13 DIAGNOSIS — Z91.89 AT RISK FOR LYMPHEDEMA: ICD-10-CM

## 2023-07-13 DIAGNOSIS — M25.612 DECREASED ROM OF LEFT SHOULDER: ICD-10-CM

## 2023-07-13 PROCEDURE — 97530 THERAPEUTIC ACTIVITIES: CPT

## 2023-07-13 PROCEDURE — 97140 MANUAL THERAPY 1/> REGIONS: CPT

## 2023-07-13 PROCEDURE — 97110 THERAPEUTIC EXERCISES: CPT

## 2023-07-13 PROCEDURE — 97112 NEUROMUSCULAR REEDUCATION: CPT

## 2023-07-17 NOTE — PROGRESS NOTES
"[facility]   Physical Therapy Treatment Note      Name: Kee Ramirez  [facility] Number: [id number]    Therapy Diagnosis:   Encounter Diagnoses   Name Primary?    Poor posture Yes    Decreased ROM of left shoulder     Decreased functional mobility and endurance     Decreased strength     At risk for lymphedema      Physician: Mitali Carlin MD    Visit Date: 7/18/2023    Physician Orders: PT Eval and Treat - Cancer Therapy  Medical Diagnosis from Referral: Malignant neoplasm of central portion of left breast in female, estrogen receptor positive  Evaluation Date: 07/06/2023  Authorization Period Expiration: 12/31/2023  Plan of Care Expiration: 09/01/2023  Progress Note Due: 08/04/2023  Visit # / Visits Authorized: 3 / 20 (plus eval)  FOTO: TBA    Time In: 10:01 AM  Time Out: 10:56 AM  Total Billable Time: 54 minutes      Subjective     Patient reports: no new complaints. Shoulders loosening up with less difficulty reaching overhead.     She was compliant with home exercise program (stretches).     Response to Previous Treatment: "better than the first"  Functional Change: less difficulty reaching to top of cabinets    Pain: 0/10  Location: N/A    Fatigue: "pretty good"    Diagnosis: Stage IIA (cT3, cN0, cM0, G1, ER+, NH+, HER2-)    Treatment:   Chemotherapy: none  Radiation: completed April 2023  Endocrine Therapy: neoadjuvantly x6 months and is currently on adjuvant anastrozole     Surgery Date: LEFT mastectomy and LEFT SLNB (2x) per Dr. Valente on 01/04/2023.      Objective      Objective Measures updated at progress report unless specified.     Treatment     Kee received the treatments listed below:      Therapeutic exercises to increase cardiovascular endurance, flexibility, range of motion, and flexibility for 30 minutes:     UBE: Seat 3, Level 1 (fwd / bkwd)  2 min each    Seated Exercises:  - Pulleys (flex, abd)    2 min each  - Physioball roll outs (3 ways)   3 min    Standing " Exercises:  - Wall push ups    1 set x 10 reps - held  - Doorway pec stretch    30 sec x 2 reps    Mat Exercises:  - Butterfly stretch (hands on head)  1 min x 1 reps  - LTR with shoulder abduction stretch, forrest 10 sec x 10 reps  - Open book (LUE only)   10 sec x 10 reps      Manual therapy techniques to decrease pain as well as to increase soft tissue mobility, joint mobility, mobility and pliability, and function for 15 minutes:     - Manual lateral chest wall stretch (left only)  - Manual axilla stretch (left only)  - Manual anterior attachment stretch (bilateral)      Neuromuscular re-education activities to improve kinesthetic sense, proprioception, postural awareness, and scapular stabilization for 0 minutes:      Standing Exercises:  - Shoulder rows (red)    2 set x 10 reps  - Shoulder extensions (red)   2 set x 10 reps    Mat Exercises:  - Forrest shoulder ER (red)   2 set x 10 reps      Therapeutic activities to improve functional lifting tolerance for 10 minutes:    - Biceps curls (3#)    3 set x 10 reps  - Shelf lifts, forrest (1#)    1 set x 10 reps      Patient Education and Home Exercises       Education Provided Regarding:   - Role of physical therapy in multi-disciplinary team  - Physical therapy plan of care, scheduling  - Physical therapy goals, progress towards goals  - Home exercise program, exercise technique  - Energy conservation techniques    Written Home Exercises Provided: Patient instructed to cont prior HEP. Exercises were reviewed and Kee was able to demonstrate them prior to the end of the session. Kee demonstrated good  understanding of the education provided. See EMR under Patient Instructions for exercises provided during therapy sessions    Assessment     Patient is responding well to physical therapy. Patient able to perform new exercises and exercise progressions without increase in symptoms prior to leaving the clinic. Improved flexibility with added open book stretch. Noted  improvements in lateral chest and axilla soft tissue mobility; continued tightness through anterior chest. Less L shoulder abduction compensation noted with overhead shelf lifts. Will progress as tolerated.    Kee Is progressing well towards her goals.   Patient prognosis is Excellent.     Patient will continue to benefit from skilled outpatient physical therapy to address the deficits listed in the problem list box on initial evaluation, provide patient / family education, and to maximize patient's level of independence in the home and community environment.     Patient's spiritual, cultural, and educational needs considered, and patient agreeable to plan of care and goals.     Anticipated barriers to physical therapy: none anticipated    GOALS:   Short Term Goals: 4 Weeks  Patient will demonstrate 100% understanding of lymphedema risk reduction practices, including self-monitoring for lymphedema (progressing, not met).  Patient will demonstrate independence with established home exercise program (progressing, not met).  Patient will increase LEFT shoulder ABDUCTION active range of motion to >170 degrees to improve functional reaching, carrying, pushing, and pulling pain-free (progressing, not met).    Patient will increase LEFT shoulder FLEXION active range of motion to >160 degrees to improve functional reaching, carrying, pushing, and pulling pain-free (progressing, not met).  Patient will increase strength to 5/5 in gross upper extremity musculature to improve tolerance to all functional activities pain-free (progressing, not met).       Long Term Goals: 8 Weeks   Patient will increase BILATERAL shoulder FLEXION active range of motion to 180 degrees to improve functional reaching, carrying, pushing, and pulling pain-free (progressing, not met).   Patient will increase LEFT shoulder EXTERNAL ROTATION (measured at 90 degrees abduction) to 90 degrees to improve functional reaching, carrying, pushing, and pulling  pain-free (progressing, not met).    Patient will demonstrate full / maximized tissue mobility to increase range of motion and promote healthy tissue to be pain-free at discharge (progressing, not met).  Patient will increase LEFT shoulder ABDUCTION active range of motion to improve functional reaching, carrying, pushing, and pulling pain-free (progressing, not met).    Patient will report compliance with walking program 5x/week for 20-30 minutes / day to improve overall cardiovascular function and decrease cancer-related fatigue at discharge (progressing, not met).      Plan     Plan of Care Certification: 07/06/2023 to 09/01/2023.    Outpatient physical therapy 2x week for 8 weeks to include the following: Manual Therapy, Neuromuscular Re-ed, Orthotic Management and Training, Patient Education, Self Care, Therapeutic Activities, and Therapeutic Exercise.    Irene Avelar, PT

## 2023-07-18 ENCOUNTER — CLINICAL SUPPORT (OUTPATIENT)
Dept: REHABILITATION | Facility: HOSPITAL | Age: 78
End: 2023-07-18
Payer: MEDICARE

## 2023-07-18 DIAGNOSIS — M25.612 DECREASED ROM OF LEFT SHOULDER: ICD-10-CM

## 2023-07-18 DIAGNOSIS — Z91.89 AT RISK FOR LYMPHEDEMA: ICD-10-CM

## 2023-07-18 DIAGNOSIS — R29.3 POOR POSTURE: Primary | ICD-10-CM

## 2023-07-18 DIAGNOSIS — R53.1 DECREASED STRENGTH: ICD-10-CM

## 2023-07-18 DIAGNOSIS — Z74.09 DECREASED FUNCTIONAL MOBILITY AND ENDURANCE: ICD-10-CM

## 2023-07-18 PROCEDURE — 97530 THERAPEUTIC ACTIVITIES: CPT

## 2023-07-18 PROCEDURE — 97110 THERAPEUTIC EXERCISES: CPT

## 2023-07-18 PROCEDURE — 97140 MANUAL THERAPY 1/> REGIONS: CPT

## 2023-07-20 NOTE — PROGRESS NOTES
"[facility]   Physical Therapy Treatment Note      Name: Kee Ramirez  [facility] Number: [id number]    Therapy Diagnosis:   Encounter Diagnoses   Name Primary?    Poor posture Yes    Decreased ROM of left shoulder     Decreased functional mobility and endurance     Decreased strength     At risk for lymphedema      Physician: Mitali Carlin MD    Visit Date: 7/21/2023    Physician Orders: PT Eval and Treat - Cancer Therapy  Medical Diagnosis from Referral: Malignant neoplasm of central portion of left breast in female, estrogen receptor positive  Evaluation Date: 07/06/2023  Authorization Period Expiration: 12/31/2023  Plan of Care Expiration: 09/01/2023  Progress Note Due: 08/04/2023  Visit # / Visits Authorized: 4 / 20 (plus eval)  FOTO: TBA    Time In: 11:04 AM  Time Out: 11:57 AM  Total Billable Time: 53 minutes      Subjective     Patient reports: continued improvements in L chest / arm flexibility. Patient feels that exercises are "really helping to stretch" but are not painful.     She was compliant with home exercise program (stretches).     Response to Previous Treatment: required Tylenol for slight muscle soreness  Functional Change: improving tolerance to overhead reaching    Pain: 0/10  Location: N/A    Fatigue: 2/10    Diagnosis: Stage IIA (cT3, cN0, cM0, G1, ER+, MS+, HER2-)    Treatment:   Chemotherapy: none  Radiation: completed April 2023  Endocrine Therapy: neoadjuvantly x6 months and is currently on adjuvant anastrozole     Surgery Date: LEFT mastectomy and LEFT SLNB (2x) per Dr. Valente on 01/04/2023.      Objective      Objective Measures updated at progress report unless specified.     Treatment     Kee received the treatments listed below:      Therapeutic exercises to increase cardiovascular endurance, flexibility, range of motion, and flexibility for 25 minutes:     UBE: Seat 3, Level 1 (fwd / bkwd)  2 min each    Seated Exercises:  - Pulleys (flex, abd)    2 min " each  - Physioball roll outs (3 ways)   3 min    Standing Exercises:  - Wall push ups    1 set x 10 reps  - Doorway pec stretch    30 sec x 2 reps    Mat Exercises:  - Butterfly stretch (hands on head)  1 min x 1 reps  - LTR with shoulder abduction stretch, vimal 10 sec x 10 rep - held   - Open book (LUE only)   10 sec x 10 rep - held      Manual therapy techniques to decrease pain as well as to increase soft tissue mobility, joint mobility, mobility and pliability, and function for 12 minutes:     - Manual lateral chest wall stretch (left only)  - Manual axilla stretch (left only)  - Manual anterior attachment stretch (bilateral)      Neuromuscular re-education activities to improve kinesthetic sense, proprioception, postural awareness, and scapular stabilization for 12 minutes:      Standing Exercises:  - Shoulder rows (red)    3 set x 10 reps  - Shoulder extensions (red)   3 set x 10 reps  - Shoulder ER, vimal (red)   1 set x 10 reps  - Shoulder IR, vimal (red)    1 set x 10 reps      Therapeutic activities to improve functional lifting tolerance for 4 minutes:    - Biceps curls (4#)    2 set x 10 reps  - Shelf lifts, vimal (1#)    1 set x 10 reps      Patient Education and Home Exercises       Education Provided Regarding:   - Role of physical therapy in multi-disciplinary team  - Physical therapy plan of care, scheduling  - Physical therapy goals, progress towards goals  - Home exercise program, exercise technique  - Energy conservation techniques    Written Home Exercises Provided: yes. Exercises were reviewed and Kee was able to demonstrate them prior to the end of the session. Kee demonstrated good  understanding of the education provided. See EMR under Patient Instructions for exercises provided during therapy sessions    Assessment     Patient is responding well to physical therapy. Patient able to perform new exercises and exercise progressions without increase in symptoms prior to leaving the clinic.  Improved scapular stabilization demonstrated with increased repetitions of shoulder rows / extensions as well as with addition of shoulder IR / ER with therabands; verbal cues, demonstration required to avoid compensatory trunk rotation (good carryover). Improved lifting tolerance demonstrated with increased resistance of biceps curls.  Will progress as tolerated.    Kee Is progressing well towards her goals.   Patient prognosis is Excellent.     Patient will continue to benefit from skilled outpatient physical therapy to address the deficits listed in the problem list box on initial evaluation, provide patient / family education, and to maximize patient's level of independence in the home and community environment.     Patient's spiritual, cultural, and educational needs considered, and patient agreeable to plan of care and goals.     Anticipated barriers to physical therapy: none anticipated    GOALS:   Short Term Goals: 4 Weeks  Patient will demonstrate 100% understanding of lymphedema risk reduction practices, including self-monitoring for lymphedema (progressing, not met).  Patient will demonstrate independence with established home exercise program (progressing, not met).  Patient will increase LEFT shoulder ABDUCTION active range of motion to >170 degrees to improve functional reaching, carrying, pushing, and pulling pain-free (progressing, not met).    Patient will increase LEFT shoulder FLEXION active range of motion to >160 degrees to improve functional reaching, carrying, pushing, and pulling pain-free (progressing, not met).  Patient will increase strength to 5/5 in gross upper extremity musculature to improve tolerance to all functional activities pain-free (progressing, not met).       Long Term Goals: 8 Weeks   Patient will increase BILATERAL shoulder FLEXION active range of motion to 180 degrees to improve functional reaching, carrying, pushing, and pulling pain-free (progressing, not met).    Patient will increase LEFT shoulder EXTERNAL ROTATION (measured at 90 degrees abduction) to 90 degrees to improve functional reaching, carrying, pushing, and pulling pain-free (progressing, not met).    Patient will demonstrate full / maximized tissue mobility to increase range of motion and promote healthy tissue to be pain-free at discharge (progressing, not met).  Patient will increase LEFT shoulder ABDUCTION active range of motion to improve functional reaching, carrying, pushing, and pulling pain-free (progressing, not met).    Patient will report compliance with walking program 5x/week for 20-30 minutes / day to improve overall cardiovascular function and decrease cancer-related fatigue at discharge (progressing, not met).      Plan     Plan of Care Certification: 07/06/2023 to 09/01/2023.    Outpatient physical therapy 2x week for 8 weeks to include the following: Manual Therapy, Neuromuscular Re-ed, Orthotic Management and Training, Patient Education, Self Care, Therapeutic Activities, and Therapeutic Exercise.    Irene Avelar, PT

## 2023-07-21 ENCOUNTER — CLINICAL SUPPORT (OUTPATIENT)
Dept: REHABILITATION | Facility: HOSPITAL | Age: 78
End: 2023-07-21
Payer: MEDICARE

## 2023-07-21 DIAGNOSIS — R29.3 POOR POSTURE: Primary | ICD-10-CM

## 2023-07-21 DIAGNOSIS — Z74.09 DECREASED FUNCTIONAL MOBILITY AND ENDURANCE: ICD-10-CM

## 2023-07-21 DIAGNOSIS — M25.612 DECREASED ROM OF LEFT SHOULDER: ICD-10-CM

## 2023-07-21 DIAGNOSIS — Z91.89 AT RISK FOR LYMPHEDEMA: ICD-10-CM

## 2023-07-21 DIAGNOSIS — R53.1 DECREASED STRENGTH: ICD-10-CM

## 2023-07-21 PROCEDURE — 97110 THERAPEUTIC EXERCISES: CPT | Mod: KX

## 2023-07-21 PROCEDURE — 97140 MANUAL THERAPY 1/> REGIONS: CPT | Mod: KX

## 2023-07-21 PROCEDURE — 97112 NEUROMUSCULAR REEDUCATION: CPT | Mod: KX

## 2023-07-25 NOTE — PROGRESS NOTES
"[facility]   Physical Therapy Treatment Note      Name: Kee Ramirez  [facility] Number: [id number]    Therapy Diagnosis:   Encounter Diagnoses   Name Primary?    Poor posture Yes    Decreased ROM of left shoulder     Decreased functional mobility and endurance     Decreased strength     At risk for lymphedema      Physician: Mitali Carlin MD    Visit Date: 7/26/2023    Physician Orders: PT Eval and Treat - Cancer Therapy  Medical Diagnosis from Referral: Malignant neoplasm of central portion of left breast in female, estrogen receptor positive  Evaluation Date: 07/06/2023  Authorization Period Expiration: 12/31/2023  Plan of Care Expiration: 09/01/2023  Progress Note Due: 08/04/2023  Visit # / Visits Authorized: 5 / 20 (plus eval)  FOTO: TBA    Time In: 11:25 AM  Time Out: 12:22 PM  Total Billable Time: 57 minutes      Subjective     Patient reports: no new complaints. Patient "had a little bit of a workout" over the weekend with improving stiffness / tightness through LUE.     She was compliant with home exercise program.     Response to Previous Treatment: no adverse events  Functional Change: improved lifting tolerance    Pain: 1/10  Location: L shoulder / chest    Fatigue: mild    Diagnosis: Stage IIA (cT3, cN0, cM0, G1, ER+, NM+, HER2-)    Treatment:   Chemotherapy: none  Radiation: completed April 2023  Endocrine Therapy: neoadjuvantly x6 months and is currently on adjuvant anastrozole     Surgery Date: LEFT mastectomy and LEFT SLNB (2x) per Dr. aVlente on 01/04/2023.      Objective      Objective Measures updated at progress report unless specified.     Treatment     Kee received the treatments listed below:    Therapeutic exercises to increase cardiovascular endurance, flexibility, range of motion, and flexibility for 17 minutes:     UBE: Seat 3, Level 1 (fwd / bkwd)  2 min each    Seated Exercises:  - Pulleys (flex, abd)    2 min each - held  - Physioball roll outs (3 ways)   3 " min    Standing Exercises:  - Wall push ups    1 set x 10 reps - held  - Doorway pec stretch    30 sec x 2 reps - held    Mat Exercises:  - Butterfly stretch (hands on head)  1 min x 1 reps  - LTR with shoulder abduction stretch 10 sec x 10 reps  - Open book, LUE only (yellow)  10 sec x 10 reps      Manual therapy techniques to decrease pain as well as to increase soft tissue mobility, joint mobility, mobility and pliability, and function for 15 minutes:     - Manual lateral chest wall stretch (left only)  - Manual axilla stretch (left only)  - Manual anterior attachment stretch (bilateral)      Neuromuscular re-education activities to improve kinesthetic sense, proprioception, postural awareness, and scapular stabilization for 15 minutes:      Standing Exercises:  - Shoulder rows (red)    3 set x 10 reps  - Shoulder extensions (red)   3 set x 10 reps  - Shoulder ER, vimal (red)   2 set x 10 reps  - Shoulder IR, vimal (red)    2 set x 10 reps      Therapeutic activities to improve functional lifting tolerance for 10 minutes:    - Biceps curls (4#)    3 set x 10 reps  - Shelf lifts, vimal (1#)    1 set x 10 reps      Patient Education and Home Exercises       Education Provided Regarding:   - Role of physical therapy in multi-disciplinary team  - Physical therapy plan of care, scheduling  - Physical therapy goals, progress towards goals  - Home exercise program, exercise technique  - Energy conservation techniques    Written Home Exercises Provided: yes. Exercises were reviewed and Kee was able to demonstrate them prior to the end of the session. Kee demonstrated good  understanding of the education provided. See EMR under Patient Instructions for exercises provided during therapy sessions    Assessment     Patient is responding well to physical therapy. Patient able to perform new exercises and exercise progressions without increase in symptoms prior to leaving the clinic. Improved scapular stabilization with  increased repetitions of resisted shoulder external / internal rotation. Added yellow theraband during open book stretch for additional upper extremity strength; demonstration required for proper exercise technique (good carryover). Will progress as tolerated.    Kee Is progressing well towards her goals.   Patient prognosis is Excellent.     Patient will continue to benefit from skilled outpatient physical therapy to address the deficits listed in the problem list box on initial evaluation, provide patient / family education, and to maximize patient's level of independence in the home and community environment.     Patient's spiritual, cultural, and educational needs considered, and patient agreeable to plan of care and goals.     Anticipated barriers to physical therapy: none anticipated    GOALS:   Short Term Goals: 4 Weeks  Patient will demonstrate 100% understanding of lymphedema risk reduction practices, including self-monitoring for lymphedema (progressing, not met).  Patient will demonstrate independence with established home exercise program (progressing, not met).  Patient will increase LEFT shoulder ABDUCTION active range of motion to >170 degrees to improve functional reaching, carrying, pushing, and pulling pain-free (progressing, not met).    Patient will increase LEFT shoulder FLEXION active range of motion to >160 degrees to improve functional reaching, carrying, pushing, and pulling pain-free (progressing, not met).  Patient will increase strength to 5/5 in gross upper extremity musculature to improve tolerance to all functional activities pain-free (progressing, not met).       Long Term Goals: 8 Weeks   Patient will increase BILATERAL shoulder FLEXION active range of motion to 180 degrees to improve functional reaching, carrying, pushing, and pulling pain-free (progressing, not met).   Patient will increase LEFT shoulder EXTERNAL ROTATION (measured at 90 degrees abduction) to 90 degrees to  improve functional reaching, carrying, pushing, and pulling pain-free (progressing, not met).    Patient will demonstrate full / maximized tissue mobility to increase range of motion and promote healthy tissue to be pain-free at discharge (progressing, not met).  Patient will increase LEFT shoulder ABDUCTION active range of motion to improve functional reaching, carrying, pushing, and pulling pain-free (progressing, not met).    Patient will report compliance with walking program 5x/week for 20-30 minutes / day to improve overall cardiovascular function and decrease cancer-related fatigue at discharge (progressing, not met).      Plan     Plan of Care Certification: 07/06/2023 to 09/01/2023.    Outpatient physical therapy 2x week for 8 weeks to include the following: Manual Therapy, Neuromuscular Re-ed, Orthotic Management and Training, Patient Education, Self Care, Therapeutic Activities, and Therapeutic Exercise.    Irene Avelar, PT

## 2023-07-26 ENCOUNTER — OFFICE VISIT (OUTPATIENT)
Dept: OPTOMETRY | Facility: CLINIC | Age: 78
End: 2023-07-26
Payer: MEDICARE

## 2023-07-26 ENCOUNTER — CLINICAL SUPPORT (OUTPATIENT)
Dept: REHABILITATION | Facility: HOSPITAL | Age: 78
End: 2023-07-26
Attending: INTERNAL MEDICINE
Payer: MEDICARE

## 2023-07-26 DIAGNOSIS — Z91.89 AT RISK FOR LYMPHEDEMA: ICD-10-CM

## 2023-07-26 DIAGNOSIS — Z74.09 DECREASED FUNCTIONAL MOBILITY AND ENDURANCE: ICD-10-CM

## 2023-07-26 DIAGNOSIS — E11.69 TYPE 2 DIABETES MELLITUS WITH OTHER SPECIFIED COMPLICATION, WITHOUT LONG-TERM CURRENT USE OF INSULIN: Primary | ICD-10-CM

## 2023-07-26 DIAGNOSIS — Z83.511 FAMILY HISTORY OF GLAUCOMA IN SISTER: ICD-10-CM

## 2023-07-26 DIAGNOSIS — E11.9 TYPE 2 DIABETES MELLITUS WITHOUT OPHTHALMIC MANIFESTATIONS: ICD-10-CM

## 2023-07-26 DIAGNOSIS — H04.123 DRY EYE SYNDROME, BILATERAL: ICD-10-CM

## 2023-07-26 DIAGNOSIS — I10 PRIMARY HYPERTENSION: ICD-10-CM

## 2023-07-26 DIAGNOSIS — Z96.1 PSEUDOPHAKIA: ICD-10-CM

## 2023-07-26 DIAGNOSIS — R53.1 DECREASED STRENGTH: ICD-10-CM

## 2023-07-26 DIAGNOSIS — R29.3 POOR POSTURE: Primary | ICD-10-CM

## 2023-07-26 DIAGNOSIS — M25.612 DECREASED ROM OF LEFT SHOULDER: ICD-10-CM

## 2023-07-26 PROCEDURE — 97530 THERAPEUTIC ACTIVITIES: CPT | Mod: KX

## 2023-07-26 PROCEDURE — 97112 NEUROMUSCULAR REEDUCATION: CPT | Mod: KX

## 2023-07-26 PROCEDURE — 99999 PR PBB SHADOW E&M-EST. PATIENT-LVL III: ICD-10-PCS | Mod: PBBFAC,,, | Performed by: OPTOMETRIST

## 2023-07-26 PROCEDURE — 97110 THERAPEUTIC EXERCISES: CPT | Mod: KX

## 2023-07-26 PROCEDURE — 92250 COLOR FUNDUS PHOTOGRAPHY - OU - BOTH EYES: ICD-10-PCS | Mod: 26,S$PBB,, | Performed by: OPTOMETRIST

## 2023-07-26 PROCEDURE — 99204 PR OFFICE/OUTPT VISIT, NEW, LEVL IV, 45-59 MIN: ICD-10-PCS | Mod: S$PBB,,, | Performed by: OPTOMETRIST

## 2023-07-26 PROCEDURE — 92250 FUNDUS PHOTOGRAPHY W/I&R: CPT | Mod: PBBFAC | Performed by: OPTOMETRIST

## 2023-07-26 PROCEDURE — 99999 PR PBB SHADOW E&M-EST. PATIENT-LVL III: CPT | Mod: PBBFAC,,, | Performed by: OPTOMETRIST

## 2023-07-26 PROCEDURE — 99204 OFFICE O/P NEW MOD 45 MIN: CPT | Mod: S$PBB,,, | Performed by: OPTOMETRIST

## 2023-07-26 PROCEDURE — 97140 MANUAL THERAPY 1/> REGIONS: CPT | Mod: KX

## 2023-07-26 PROCEDURE — 99213 OFFICE O/P EST LOW 20 MIN: CPT | Mod: PBBFAC | Performed by: OPTOMETRIST

## 2023-07-26 RX ORDER — CYCLOSPORINE 0.5 MG/ML
1 EMULSION OPHTHALMIC 2 TIMES DAILY
Qty: 180 EACH | Refills: 3 | Status: SHIPPED | OUTPATIENT
Start: 2023-07-26 | End: 2024-03-25

## 2023-07-26 NOTE — PATIENT INSTRUCTIONS
Trunk Rotation Stretch:     To Stretch the RIGHT Side:  Rotate knees to the LEFT  Slowly raise your RIGHT arm up and out to the side, maintaining support on mat / bed  Hold for 10 seconds, then slowly lower RIGHT arm to starting position  Repeat 10 second holds x 10 repetitions     To Stretch the LEFT Side:   Rotate knees to the RIGHT  Slowly raise your LEFT arm up and out to the side, maintaining support on mat / bed  Hold for 10 seconds, then slowly lower LEFT arm to starting position  Repeat 10 second holds x 10 repetitions

## 2023-07-26 NOTE — PROGRESS NOTES
HPI    Pt here for routine exam    Pt states eyes have stayed about the same since last exam, have not gotten   worse     States eyes are commonly dry and she is taking lubricating drops for eye   dryness     No pain or discomfort    No flashers/floaters      Eye Meds: Lubricating drops   Last edited by Delvin Maloney on 7/26/2023  9:40 AM.            Assessment /Plan     For exam results, see Encounter Report.    Type 2 diabetes mellitus with other specified complication, without long-term current use of insulin  -  -     today: Color Fundus Photography - OU - Both Eyes  Type 2 diabetes mellitus without ophthalmic manifestations  Primary hypertension    Family history of glaucoma in sister   IOP WNL and healthy optic nerve appearance    Pseudophakia    Dry eye syndrome, bilateral  Start     cycloSPORINE (RESTASIS) 0.05 % ophthalmic emulsion; Place 1 drop into both eyes 2 (two) times daily.  Dispense: 180 each; Refill: 3      RTC 1 year, sooner PRN

## 2023-07-27 NOTE — PROGRESS NOTES
"[facility]   Physical Therapy Treatment Note      Name: Kee Ramirez  [facility] Number: [id number]    Therapy Diagnosis:   Encounter Diagnoses   Name Primary?    Poor posture Yes    Decreased ROM of left shoulder     Decreased functional mobility and endurance     Decreased strength     At risk for lymphedema      Physician: Mitali Carlin MD    Visit Date: 7/28/2023    Physician Orders: PT Eval and Treat - Cancer Therapy  Medical Diagnosis from Referral: Malignant neoplasm of central portion of left breast in female, estrogen receptor positive  Evaluation Date: 07/06/2023  Authorization Period Expiration: 12/31/2023  Plan of Care Expiration: 09/01/2023  Progress Note Due: 08/04/2023  Visit # / Visits Authorized: 6 / 20 (plus eval)  FOTO: TBA    Time In: 11:01 AM  Time Out: 11:56 AM  Total Billable Time: 55 minutes      Subjective     Patient reports: shoulders feeling "much better" since beginning therapy. Patient states, "I'm impressed by how much the skin has loosened up with the exercises!"    She was compliant with home exercise program.     Response to Previous Treatment: "felt good"  Functional Change: improved tolerance to overhead reaching    Pain: 0/10  Location: N/A    Fatigue: 1/10    Diagnosis: Stage IIA (cT3, cN0, cM0, G1, ER+, MN+, HER2-)    Treatment:   Chemotherapy: none  Radiation: completed April 2023  Endocrine Therapy: neoadjuvantly x6 months and is currently on adjuvant anastrozole     Surgery Date: LEFT mastectomy and LEFT SLNB (2x) per Dr. Valente on 01/04/2023.      Objective      Objective Measures updated at progress report unless specified.     Treatment     Kee received the treatments listed below:    Therapeutic exercises to increase cardiovascular endurance, flexibility, range of motion, and flexibility for 20 minutes:     UBE: Seat 3, Level 1 (fwd / bkwd)  2 min each    Seated Exercises:  - Pulleys (flex, abd)    2 min each  - Physioball roll outs (3 " ways)   3 min    Standing Exercises:  - Wall push ups    1 set x 10 reps  - Doorway pec stretch    30 sec x 2 reps - held    Mat Exercises:  - Butterfly stretch (hands on head)  1 min x 2 reps  - LTR c shoulder abduction stretch (LUE) 10 sec x 10 reps  - Open book, LUE only (yellow)  10 sec x 10 reps      Manual therapy techniques to decrease pain as well as to increase soft tissue mobility, joint mobility, mobility and pliability, and function for 20 minutes:     - Manual lateral chest wall stretch (left only)  - Manual axilla stretch (left only)  - Manual anterior attachment stretch (bilateral)      Neuromuscular re-education activities to improve kinesthetic sense, proprioception, postural awareness, and scapular stabilization for 13 minutes:      Standing Exercises:  - Shoulder rows (green)   2 set x 10 reps  - Shoulder extensions (green)  2 set x 10 reps  - Shoulder ER, vimal (red)   2 set x 10 reps - held  - Shoulder IR, vimal (red)    2 set x 10 reps - held      Therapeutic activities to improve functional lifting tolerance for 12 minutes:    - Biceps curls (5#)    2 set x 10 reps  - Shelf lifts, vimal (2#)    1 set x 10 reps      Patient Education and Home Exercises       Education Provided Regarding:   - Role of physical therapy in multi-disciplinary team  - Physical therapy plan of care, scheduling  - Physical therapy goals, progress towards goals  - Home exercise program, exercise technique  - Energy conservation techniques    Written Home Exercises Provided: Patient instructed to cont prior HEP. Exercises were reviewed and Kee was able to demonstrate them prior to the end of the session. Kee demonstrated good  understanding of the education provided. See EMR under Patient Instructions for exercises provided during therapy sessions    Assessment     Patient is responding well to physical therapy. Patient able to perform new exercises and exercise progressions without increase in symptoms prior to leaving  the clinic. Improved scapular stabilization with increased resistance of shoulder rows and extensions. Improved lifting tolerance demonstrated with increased resistance of shelf lifts. Improved range of motion noted during open book stretch. Will progress as tolerated.    Kee Is progressing well towards her goals.   Patient prognosis is Excellent.     Patient will continue to benefit from skilled outpatient physical therapy to address the deficits listed in the problem list box on initial evaluation, provide patient / family education, and to maximize patient's level of independence in the home and community environment.     Patient's spiritual, cultural, and educational needs considered, and patient agreeable to plan of care and goals.     Anticipated barriers to physical therapy: none anticipated    GOALS:   Short Term Goals: 4 Weeks  Patient will demonstrate 100% understanding of lymphedema risk reduction practices, including self-monitoring for lymphedema (progressing, not met).  Patient will demonstrate independence with established home exercise program (progressing, not met).  Patient will increase LEFT shoulder ABDUCTION active range of motion to >170 degrees to improve functional reaching, carrying, pushing, and pulling pain-free (progressing, not met).    Patient will increase LEFT shoulder FLEXION active range of motion to >160 degrees to improve functional reaching, carrying, pushing, and pulling pain-free (progressing, not met).  Patient will increase strength to 5/5 in gross upper extremity musculature to improve tolerance to all functional activities pain-free (progressing, not met).       Long Term Goals: 8 Weeks   Patient will increase BILATERAL shoulder FLEXION active range of motion to 180 degrees to improve functional reaching, carrying, pushing, and pulling pain-free (progressing, not met).   Patient will increase LEFT shoulder EXTERNAL ROTATION (measured at 90 degrees abduction) to 90 degrees  to improve functional reaching, carrying, pushing, and pulling pain-free (progressing, not met).    Patient will demonstrate full / maximized tissue mobility to increase range of motion and promote healthy tissue to be pain-free at discharge (progressing, not met).  Patient will increase LEFT shoulder ABDUCTION active range of motion to improve functional reaching, carrying, pushing, and pulling pain-free (progressing, not met).    Patient will report compliance with walking program 5x/week for 20-30 minutes / day to improve overall cardiovascular function and decrease cancer-related fatigue at discharge (progressing, not met).      Plan     Plan of Care Certification: 07/06/2023 to 09/01/2023.    Outpatient physical therapy 2x week for 8 weeks to include the following: Manual Therapy, Neuromuscular Re-ed, Orthotic Management and Training, Patient Education, Self Care, Therapeutic Activities, and Therapeutic Exercise.    Irene Avelar, PT

## 2023-07-28 ENCOUNTER — CLINICAL SUPPORT (OUTPATIENT)
Dept: REHABILITATION | Facility: HOSPITAL | Age: 78
End: 2023-07-28
Attending: INTERNAL MEDICINE
Payer: MEDICARE

## 2023-07-28 DIAGNOSIS — R29.3 POOR POSTURE: Primary | ICD-10-CM

## 2023-07-28 DIAGNOSIS — Z91.89 AT RISK FOR LYMPHEDEMA: ICD-10-CM

## 2023-07-28 DIAGNOSIS — Z74.09 DECREASED FUNCTIONAL MOBILITY AND ENDURANCE: ICD-10-CM

## 2023-07-28 DIAGNOSIS — R53.1 DECREASED STRENGTH: ICD-10-CM

## 2023-07-28 DIAGNOSIS — M25.612 DECREASED ROM OF LEFT SHOULDER: ICD-10-CM

## 2023-07-28 PROCEDURE — 97112 NEUROMUSCULAR REEDUCATION: CPT

## 2023-07-28 PROCEDURE — 97140 MANUAL THERAPY 1/> REGIONS: CPT

## 2023-07-28 PROCEDURE — 97530 THERAPEUTIC ACTIVITIES: CPT

## 2023-07-28 PROCEDURE — 97110 THERAPEUTIC EXERCISES: CPT

## 2023-08-03 ENCOUNTER — CLINICAL SUPPORT (OUTPATIENT)
Dept: REHABILITATION | Facility: HOSPITAL | Age: 78
End: 2023-08-03
Payer: MEDICARE

## 2023-08-03 DIAGNOSIS — Z74.09 DECREASED FUNCTIONAL MOBILITY AND ENDURANCE: ICD-10-CM

## 2023-08-03 DIAGNOSIS — R29.3 POOR POSTURE: Primary | ICD-10-CM

## 2023-08-03 DIAGNOSIS — M25.612 DECREASED ROM OF LEFT SHOULDER: ICD-10-CM

## 2023-08-03 DIAGNOSIS — R53.1 DECREASED STRENGTH: ICD-10-CM

## 2023-08-03 DIAGNOSIS — Z91.89 AT RISK FOR LYMPHEDEMA: ICD-10-CM

## 2023-08-03 PROCEDURE — 97140 MANUAL THERAPY 1/> REGIONS: CPT | Mod: KX

## 2023-08-03 PROCEDURE — 97750 PHYSICAL PERFORMANCE TEST: CPT | Mod: 59,KX

## 2023-08-03 PROCEDURE — 97110 THERAPEUTIC EXERCISES: CPT | Mod: KX

## 2023-08-03 PROCEDURE — 97112 NEUROMUSCULAR REEDUCATION: CPT | Mod: KX

## 2023-08-03 NOTE — PROGRESS NOTES
"[facility]   Physical Therapy Treatment Note      Name: Kee Ramirez  [facility] Number: [id number]    Therapy Diagnosis:   Encounter Diagnoses   Name Primary?    Poor posture Yes    Decreased ROM of left shoulder     Decreased functional mobility and endurance     Decreased strength     At risk for lymphedema      Physician: Mitali Carlin MD    Visit Date: 8/3/2023    Physician Orders: PT Eval and Treat - Cancer Therapy  Medical Diagnosis from Referral: Malignant neoplasm of central portion of left breast in female, estrogen receptor positive  Evaluation Date: 07/06/2023  Authorization Period Expiration: 12/31/2023  Plan of Care Expiration: 09/01/2023  Progress Note Due: 08/04/2023  Visit # / Visits Authorized: 7 / 20 (plus eval)  FOTO: 1/3    Time In: 10:00 AM  Time Out: 11:00 AM  Total Billable Time: 60 minutes      Subjective     Patient reports: no pain this morning, doing good.  She was compliant with home exercise program.   Response to Previous Treatment: "felt ok"  Functional Change: improved tolerance to overhead reaching    Pain: 0/10  Location: N/A    Fatigue: 1/10    Diagnosis: Stage IIA (cT3, cN0, cM0, G1, ER+, CA+, HER2-)    Treatment:   Chemotherapy: none  Radiation: completed April 2023  Endocrine Therapy: neoadjuvantly x6 months and is currently on adjuvant anastrozole     Surgery Date: LEFT mastectomy and LEFT SLNB (2x) per Dr. Valente on 01/04/2023.      Objective      Objective Measures updated at progress report unless specified.     Shoulder Range of Motion: 7/6/23  Active ROM Right Left   Flexion 160 135   Abduction 180 150   Extension 50 55   IR/90deg 90 90 (@55 of ABD)   ER/90deg 90 40       Shoulder Range of Motion: 8/3/23  Active ROM Right Left   Flexion 170 160   Abduction 180 180   Extension 65 65   IR/90deg 90 90   ER/90deg 90 75/90*    *after stretching    Strength: manual muscle test grades below   Upper Extremity Strength: 7/6/23    (R) UE (L) UE   Shoulder " Flexion: 5/5 5/5   Shoulder Abduction: 5/5 5/5   Shoulder IR 5/5 5/5   Shoulder ER 5/5 4/5   Elbow Flexion: 5/5 4/5   Elbow Extension: 5/5 5/5    32.8 lb 38.1 lb       Upper Extremity Strength: 8/3/23    (R) UE (L) UE   Shoulder Flexion: 5/5 5/5   Shoulder Abduction: 5/5 5/5   Shoulder IR 5/5 5/5   Shoulder ER 5/5 4+/5   Elbow Flexion: 5/5 5/5   Elbow Extension: 5/5 5/5    45.1 lb 37.4 lb        Intake Outcome Measure for FOTO Shoulder Survey     Therapist reviewed FOTO scores for Kee Ramirez on 8/3/2023.   FOTO documents entered into [employer] - see Media section.     Intake Score: 96%  Treatment     Kee received the treatments listed below:    Physical performance test with report for 20 minutes:  See objective section for measurements.    Therapeutic exercises to increase cardiovascular endurance, flexibility, range of motion, and flexibility for 15 minutes:     UBE: Seat 3, Level 1 (fwd / bkwd)  2 min each - held    Seated Exercises:  - Pulleys (flex, abd)    2 min each  - Physioball roll outs (3 ways)   3 min    Standing Exercises:  - Wall push ups    1 set x 10 reps  - Doorway pec stretch    30 sec x 2 reps - held    Mat Exercises:  - Butterfly stretch (hands behind head) 1 min x 1 rep  - LTR c shoulder abduction stretch (LUE) 10 sec x 10 reps- held  - Open book, LUE only (yellow)  10 sec x 10 reps      Manual therapy techniques to decrease pain as well as to increase soft tissue mobility, joint mobility, mobility and pliability, and function for 15 minutes:     - Manual lateral chest wall stretch (left only)  - Manual axilla stretch (left only)  - Manual anterior attachment stretch (bilateral)      Neuromuscular re-education activities to improve kinesthetic sense, proprioception, postural awareness, and scapular stabilization for 10 minutes:      Standing Exercises:  - Shoulder rows (green)   2 set x 10 reps  - Shoulder extensions (green)  2 set x 10 reps  - Shoulder ER, vimal (red)   2 set x 10 reps -  held  - Shoulder IR, vimal (red)    2 set x 10 reps - held      Therapeutic activities to improve functional lifting tolerance for 2 minutes:    - Biceps curls (5#)    2 set x 10 reps  - Shelf lifts, vimal (2#)    1 set x 10 reps      Patient Education and Home Exercises       Education Provided Regarding:   - Role of physical therapy in multi-disciplinary team  - Physical therapy plan of care, scheduling  - Physical therapy goals, progress towards goals  - Home exercise program, exercise technique  - Energy conservation techniques    Written Home Exercises Provided: Patient instructed to cont prior HEP. Exercises were reviewed and Kee was able to demonstrate them prior to the end of the session. Kee demonstrated good  understanding of the education provided. See EMR under Patient Instructions for exercises provided during therapy sessions    Assessment     Patient is responding well to physical therapy. She was able to display an increase in L shoulder flexion of 25 degrees, L shoulder abduction of 30 degrees, and 35 degrees in L external rotation. Her B UE strength has improved to 5/5 except for L external rotation is 4+/5. She has met some of her goals as noted below. She did not perform all of her usual exercises due to time constraints with ROM and strength testing. She was able to perform all of today's exercises with no increase in symptoms prior to leaving the clinic. Will progress as tolerated.    Kee Is progressing well towards her goals.   Patient prognosis is Excellent.     Patient will continue to benefit from skilled outpatient physical therapy to address the deficits listed in the problem list box on initial evaluation, provide patient / family education, and to maximize patient's level of independence in the home and community environment.     Patient's spiritual, cultural, and educational needs considered, and patient agreeable to plan of care and goals.     Anticipated barriers to physical  therapy: none anticipated    GOALS:   Short Term Goals: 4 Weeks  Patient will demonstrate 100% understanding of lymphedema risk reduction practices, including self-monitoring for lymphedema (progressing, not met).  Patient will demonstrate independence with established home exercise program (progressing, not met).  Patient will increase LEFT shoulder ABDUCTION active range of motion to >170 degrees to improve functional reaching, carrying, pushing, and pulling pain-free (met, 8/3/23).    Patient will increase LEFT shoulder FLEXION active range of motion to >160 degrees to improve functional reaching, carrying, pushing, and pulling pain-free (met, 8/3/23).  Patient will increase strength to 5/5 in gross upper extremity musculature to improve tolerance to all functional activities pain-free (progressing, not met).       Long Term Goals: 8 Weeks   Patient will increase BILATERAL shoulder FLEXION active range of motion to 180 degrees to improve functional reaching, carrying, pushing, and pulling pain-free (progressing, not met).   Patient will increase LEFT shoulder EXTERNAL ROTATION (measured at 90 degrees abduction) to 90 degrees to improve functional reaching, carrying, pushing, and pulling pain-free (progressing, not met).    Patient will demonstrate full / maximized tissue mobility to increase range of motion and promote healthy tissue to be pain-free at discharge (progressing, not met).  Patient will increase LEFT shoulder ABDUCTION active range of motion to improve functional reaching, carrying, pushing, and pulling pain-free (progressing, not met).    Patient will report compliance with walking program 5x/week for 20-30 minutes / day to improve overall cardiovascular function and decrease cancer-related fatigue at discharge (progressing, not met).      Plan     Plan of Care Certification: 07/06/2023 to 09/01/2023.    Outpatient physical therapy 2x week for 8 weeks to include the following: Manual Therapy,  Neuromuscular Re-ed, Orthotic Management and Training, Patient Education, Self Care, Therapeutic Activities, and Therapeutic Exercise.    Marley Matos, PT

## 2023-08-17 ENCOUNTER — CLINICAL SUPPORT (OUTPATIENT)
Dept: REHABILITATION | Facility: HOSPITAL | Age: 78
End: 2023-08-17
Payer: MEDICARE

## 2023-08-17 DIAGNOSIS — Z91.89 AT RISK FOR LYMPHEDEMA: ICD-10-CM

## 2023-08-17 DIAGNOSIS — Z74.09 DECREASED FUNCTIONAL MOBILITY AND ENDURANCE: ICD-10-CM

## 2023-08-17 DIAGNOSIS — M25.612 DECREASED ROM OF LEFT SHOULDER: ICD-10-CM

## 2023-08-17 DIAGNOSIS — R53.1 DECREASED STRENGTH: ICD-10-CM

## 2023-08-17 DIAGNOSIS — R29.3 POOR POSTURE: Primary | ICD-10-CM

## 2023-08-17 PROCEDURE — 97110 THERAPEUTIC EXERCISES: CPT

## 2023-08-17 PROCEDURE — 97530 THERAPEUTIC ACTIVITIES: CPT

## 2023-08-17 PROCEDURE — 97140 MANUAL THERAPY 1/> REGIONS: CPT

## 2023-08-17 NOTE — PROGRESS NOTES
[facility]   Physical Therapy Treatment Note      Name: Kee Ramirez  [facility] Number: [id number]    Therapy Diagnosis:   Encounter Diagnoses   Name Primary?    Poor posture Yes    Decreased ROM of left shoulder     Decreased functional mobility and endurance     Decreased strength     At risk for lymphedema      Physician: Mitali Carlin MD    Visit Date: 2023    Physician Orders: PT Eval and Treat - Cancer Therapy  Medical Diagnosis from Referral: Malignant neoplasm of central portion of left breast in female, estrogen receptor positive  Evaluation Date: 2023  Authorization Period Expiration: 2023  Plan of Care Expiration: 2023  Progress Note Due: 2023  Visit # / Visits Authorized:  (plus eval)  FOTO: 1/3    Time In: 10:40 AM (late arrival)  Time Out: 11:25 AM  Total Billable Time: 45 minutes      Subjective     Patient reports: continued difficulty reaching to lift items above heading using LUE. Mild tightness through L shoulder / axilla region, but no new complaints.      She was compliant with home exercise program.     Response to Previous Treatment: no adverse events  Functional Change: improving range of motion for overhead reaching    Pain: 0/10  Location: N/A    Fatigue: mild (from attending  visitation earlier this AM)    Diagnosis: Stage IIA (cT3, cN0, cM0, G1, ER+, [place]+, HER2-)    Treatment:   Chemotherapy: none  Radiation: completed 2023  Endocrine Therapy: neoadjuvantly x6 months and is currently on adjuvant anastrozole     Surgery Date: LEFT mastectomy and LEFT SLNB (2x) per Dr. Valente on 2023      Objective      Objective Measures updated at progress report unless specified.     Treatment     Kee received the treatments listed below:    Therapeutic exercises to increase cardiovascular endurance, flexibility, range of motion, and flexibility for 13 minutes:     Seated Exercises:  - Pulleys (flex, abd)    2 min  each    Standing Exercises:  - Wall push ups    1 set x 10 reps - held    Mat Exercises:  - PROM L shoulder IR / ER    1 set x 10 reps each      Manual therapy techniques to decrease pain as well as to increase soft tissue mobility, joint mobility, mobility and pliability, and function for 20 minutes:     - Manual lateral chest wall stretch (left only)  - Manual axilla stretch (left only)  - Soft tissue mobilization L upper trapezius      Neuromuscular re-education activities to improve kinesthetic sense, proprioception, postural awareness, and scapular stabilization for 0 minutes:      Standing Exercises:  - Shoulder rows (green)   2 set x 10 reps - held  - Shoulder extensions (green)  2 set x 10 reps - held  - Shoulder ER, vimal (red)   2 set x 10 reps - held  - Shoulder IR, vimal (red)    2 set x 10 reps - held      Therapeutic activities to improve functional lifting tolerance for 12 minutes:    - Biceps curls (5#)    3 set x 10 reps  - Shelf lifts, vimal (2#)    2 set x 10 reps      Patient Education and Home Exercises       Education Provided Regarding:   - Role of physical therapy in multi-disciplinary team  - Physical therapy plan of care, scheduling  - Physical therapy goals, progress towards goals  - Home exercise program, exercise technique  - Energy conservation techniques    Written Home Exercises Provided: Patient instructed to cont prior HEP. Exercises were reviewed and Kee was able to demonstrate them prior to the end of the session. Kee demonstrated good  understanding of the education provided. See EMR under Patient Instructions for exercises provided during therapy sessions    Assessment     Patient is responding well to physical therapy. Patient able to perform new exercises and exercise progressions without increase in symptoms prior to leaving the clinic. Improved lifting tolerance demonstrated with increased repetitions of biceps curls and overhead shelf lifts. Noted fatigue and slight shoulder  abduction compensation during LUE overhead shelf lifts using 2# hand weight, relieved with rest. Improved soft tissue mobilization of L anterior / lateral chest as well as upper trapezius with manual techniques. Appreciated additional joint stiffness into L shoulder external rotation versus internal rotation (improved with passive range of motion techniques). Will progress as tolerated.    Kee Is progressing well towards her goals.   Patient prognosis is Excellent.     Patient will continue to benefit from skilled outpatient physical therapy to address the deficits listed in the problem list box on initial evaluation, provide patient / family education, and to maximize patient's level of independence in the home and community environment.     Patient's spiritual, cultural, and educational needs considered, and patient agreeable to plan of care and goals.     Anticipated barriers to physical therapy: none anticipated    GOALS:   Short Term Goals: 4 Weeks  Patient will demonstrate 100% understanding of lymphedema risk reduction practices, including self-monitoring for lymphedema (progressing, not met).  Patient will demonstrate independence with established home exercise program (progressing, not met).  Patient will increase LEFT shoulder ABDUCTION active range of motion to >170 degrees to improve functional reaching, carrying, pushing, and pulling pain-free (met, 8/3/23).    Patient will increase LEFT shoulder FLEXION active range of motion to >160 degrees to improve functional reaching, carrying, pushing, and pulling pain-free (met, 8/3/23).  Patient will increase strength to 5/5 in gross upper extremity musculature to improve tolerance to all functional activities pain-free (progressing, not met).       Long Term Goals: 8 Weeks   Patient will increase BILATERAL shoulder FLEXION active range of motion to 180 degrees to improve functional reaching, carrying, pushing, and pulling pain-free (progressing, not met).    Patient will increase LEFT shoulder EXTERNAL ROTATION (measured at 90 degrees abduction) to 90 degrees to improve functional reaching, carrying, pushing, and pulling pain-free (progressing, not met).    Patient will demonstrate full / maximized tissue mobility to increase range of motion and promote healthy tissue to be pain-free at discharge (progressing, not met).  Patient will increase LEFT shoulder ABDUCTION active range of motion to improve functional reaching, carrying, pushing, and pulling pain-free (progressing, not met).    Patient will report compliance with walking program 5x/week for 20-30 minutes / day to improve overall cardiovascular function and decrease cancer-related fatigue at discharge (progressing, not met).      Plan     Plan of Care Certification: 07/06/2023 to 09/01/2023.    Outpatient physical therapy 2x week for 8 weeks to include the following: Manual Therapy, Neuromuscular Re-ed, Orthotic Management and Training, Patient Education, Self Care, Therapeutic Activities, and Therapeutic Exercise.    Irene Avelar, PT

## 2023-08-22 NOTE — PROGRESS NOTES
[facility]   Physical Therapy Treatment Note      Name: Kee Ramirez  [facility] Number: [id number]    Therapy Diagnosis:   Encounter Diagnoses   Name Primary?    Poor posture Yes    Decreased ROM of left shoulder     Decreased functional mobility and endurance     Decreased strength     At risk for lymphedema      Physician: Mitali Carlin MD    Visit Date: 8/24/2023    Physician Orders: PT Eval and Treat - Cancer Therapy  Medical Diagnosis from Referral: Malignant neoplasm of central portion of left breast in female, estrogen receptor positive  Evaluation Date: 07/06/2023  Authorization Period Expiration: 12/31/2023  Plan of Care Expiration: 09/01/2023  Progress Note Due: 09/03/2023  Visit # / Visits Authorized: 9 / 20 (plus eval)  FOTO: 1/3    Time In: 11:23 AM (late arrival)  Time Out: 12:01 PM  Total Billable Time: 38 minutes      Subjective     Patient reports: no new complaints. Continued tightness through anterior chest, improved with stretching and soft tissue mobilization from therapist.     She was compliant with home exercise program.     Response to Previous Treatment: no adverse events  Functional Change: improving overhead reaching tolerance    Pain: 0/10  Location: N/A    Fatigue: mild    Diagnosis: Stage IIA (cT3, cN0, cM0, G1, ER+, [place]+, HER2-)    Treatment:   Chemotherapy: none  Radiation: completed April 2023  Endocrine Therapy: neoadjuvantly x6 months and is currently on adjuvant anastrozole     Surgery Date: LEFT mastectomy and LEFT SLNB (2x) per Dr. Valente on 01/04/2023      Objective      Objective Measures updated at progress report unless specified.     Treatment     Kee received the treatments listed below:    Therapeutic exercises to increase cardiovascular endurance, flexibility, range of motion, and flexibility for 15 minutes:     UBE: Seat 4, Level 1 (fwd, bkwd)  2 min each    Seated Exercises:  - Pulleys (flex, abd)    2 min each    Standing Exercises:  -  Wall push ups    2 set x 10 reps    Mat Exercises:  - PROM L shoulder IR / ER    1 set x 10 reps each      Manual therapy techniques to decrease pain as well as to increase soft tissue mobility, joint mobility, mobility and pliability, and function for 23 minutes:     - Manual lateral chest wall stretch (left only)  - Manual axilla stretch (left only)  - Manual anterior chest wall stretch (left only)      Neuromuscular re-education activities to improve kinesthetic sense, proprioception, postural awareness, and scapular stabilization for 0 minutes:      Standing Exercises:  - Shoulder rows (green)   2 set x 10 reps - held  - Shoulder extensions (green)  2 set x 10 reps - held  - Shoulder ER, vimal (red)   2 set x 10 reps - held  - Shoulder IR, vimal (red)    2 set x 10 reps - held      Therapeutic activities to improve functional lifting tolerance for 0 minutes:    - Biceps curls (5#)    3 set x 10 reps - held  - Shelf lifts, vimal (2#)    2 set x 10 reps - held      Patient Education and Home Exercises       Education Provided Regarding:   - Role of physical therapy in multi-disciplinary team  - Physical therapy plan of care, scheduling  - Physical therapy goals, progress towards goals  - Home exercise program, exercise technique  - Energy conservation techniques    Written Home Exercises Provided: Patient instructed to cont prior HEP. Exercises were reviewed and Kee was able to demonstrate them prior to the end of the session. Kee demonstrated good  understanding of the education provided. See EMR under Patient Instructions for exercises provided during therapy sessions    Assessment     Patient is responding well to physical therapy. Patient unable to perform all exercises secondary to time constraints. Improved BUE strength demonstrated with increased repetitions of wall push-ups. Improved soft tissue mobility of left anterior chest following manual techniques. Resume full exercise regimen next visit per patient's  tolerance and progress as tolerated.    Kee Is progressing well towards her goals.   Patient prognosis is Excellent.     Patient will continue to benefit from skilled outpatient physical therapy to address the deficits listed in the problem list box on initial evaluation, provide patient / family education, and to maximize patient's level of independence in the home and community environment.     Patient's spiritual, cultural, and educational needs considered, and patient agreeable to plan of care and goals.     Anticipated barriers to physical therapy: none anticipated    GOALS:   Short Term Goals: 4 Weeks  Patient will demonstrate 100% understanding of lymphedema risk reduction practices, including self-monitoring for lymphedema (progressing, not met).  Patient will demonstrate independence with established home exercise program (progressing, not met).  Patient will increase LEFT shoulder ABDUCTION active range of motion to >170 degrees to improve functional reaching, carrying, pushing, and pulling pain-free (met, 8/3/23).    Patient will increase LEFT shoulder FLEXION active range of motion to >160 degrees to improve functional reaching, carrying, pushing, and pulling pain-free (met, 8/3/23).  Patient will increase strength to 5/5 in gross upper extremity musculature to improve tolerance to all functional activities pain-free (progressing, not met).       Long Term Goals: 8 Weeks   Patient will increase BILATERAL shoulder FLEXION active range of motion to 180 degrees to improve functional reaching, carrying, pushing, and pulling pain-free (progressing, not met).   Patient will increase LEFT shoulder EXTERNAL ROTATION (measured at 90 degrees abduction) to 90 degrees to improve functional reaching, carrying, pushing, and pulling pain-free (progressing, not met).    Patient will demonstrate full / maximized tissue mobility to increase range of motion and promote healthy tissue to be pain-free at discharge  (progressing, not met).  Patient will increase LEFT shoulder ABDUCTION active range of motion to improve functional reaching, carrying, pushing, and pulling pain-free (progressing, not met).    Patient will report compliance with walking program 5x/week for 20-30 minutes / day to improve overall cardiovascular function and decrease cancer-related fatigue at discharge (progressing, not met).      Plan     Plan of Care Certification: 07/06/2023 to 09/01/2023.    Outpatient physical therapy 2x week for 8 weeks to include the following: Manual Therapy, Neuromuscular Re-ed, Orthotic Management and Training, Patient Education, Self Care, Therapeutic Activities, and Therapeutic Exercise.    Irene Avelar, PT

## 2023-08-24 ENCOUNTER — CLINICAL SUPPORT (OUTPATIENT)
Dept: REHABILITATION | Facility: HOSPITAL | Age: 78
End: 2023-08-24
Payer: MEDICARE

## 2023-08-24 DIAGNOSIS — M25.612 DECREASED ROM OF LEFT SHOULDER: ICD-10-CM

## 2023-08-24 DIAGNOSIS — R53.1 DECREASED STRENGTH: ICD-10-CM

## 2023-08-24 DIAGNOSIS — Z91.89 AT RISK FOR LYMPHEDEMA: ICD-10-CM

## 2023-08-24 DIAGNOSIS — Z74.09 DECREASED FUNCTIONAL MOBILITY AND ENDURANCE: ICD-10-CM

## 2023-08-24 DIAGNOSIS — R29.3 POOR POSTURE: Primary | ICD-10-CM

## 2023-08-24 PROCEDURE — 97140 MANUAL THERAPY 1/> REGIONS: CPT

## 2023-08-24 PROCEDURE — 97110 THERAPEUTIC EXERCISES: CPT

## 2023-08-30 NOTE — PROGRESS NOTES
"[facility]   Physical Therapy Treatment Note      Name: Kee Ramirez  [facility] Number: [id number]    Therapy Diagnosis:   Encounter Diagnoses   Name Primary?    Poor posture Yes    Decreased ROM of left shoulder     Decreased functional mobility and endurance     Decreased strength     At risk for lymphedema      Physician: Mitali Carlin MD    Visit Date: 8/31/2023    Physician Orders: PT Eval and Treat - Cancer Therapy  Medical Diagnosis from Referral: Malignant neoplasm of central portion of left breast in female, estrogen receptor positive  Evaluation Date: 07/06/2023  Authorization Period Expiration: 12/31/2023  Plan of Care Expiration: 09/01/2023  Progress Note Due: 09/03/2023  Visit # / Visits Authorized: 10 / 20 (plus eval)  FOTO: 3/3    Time In: 11:16 AM  Time Out: 12:00 PM  Total Billable Time: 44 minutes      Subjective     Patient reports: feeling "99.9% improved" since beginning physical therapy. Patient able to resume activities of daily living without limitations and denies any residual skin irritation following XRT.     She was compliant with home exercise program.     Response to Previous Treatment: no adverse events  Functional Change: improved overhead reaching tolerance    Pain: 0/10  Location: N/A    Fatigue: mild    Diagnosis: Stage IIA (cT3, cN0, cM0, G1, ER+, [place]+, HER2-)    Treatment:   Chemotherapy: none  Radiation: completed April 2023  Endocrine Therapy: neoadjuvantly x6 months and is currently on adjuvant anastrozole     Surgery Date: LEFT mastectomy and LEFT SLNB (2x) per Dr. Valente on 01/04/2023      Objective      Objective Measures updated at progress report unless specified.    Shoulder Range of Motion: 08/31/2023  Active ROM Left Right   Flexion 155 175   Abduction 160 180   Extension 50 50   IR/90deg 90 90   ER/90deg 90 65      Upper Extremity Strength: 08/31/2023    (R) UE (L) UE   Shoulder Flexion: 5/5 5/5   Shoulder Abduction: 5/5 5/5   Shoulder IR " "5/5 5/5   Shoulder ER 5/5 4/5   Elbow Flexion: 5/5 4/5   Elbow Extension: 5/5 5/5    44.5  lbs 38.3 lbs     BUE Circumferential Measurements: 08/31/2023    LANDMARK LEFT UE RIGHT UE DIFFERENCE   E + 8" 42.5 cm 42 cm 0.5 cm   E + 6" 39 cm 38 cm 1 cm   E + 4" 37 cm 36 cm 1 cm   E + 2" 32.5 cm 32 cm 0.5 cm   Elbow 27 cm 27.5 cm 0.5 cm   W+ 8" 26 cm 27 cm 1 cm   W +  6" 24 cm 24 cm 0 cm   W + 4" 20.5 cm 20.5 cm 0 cm   Wrist 18 cm 18 cm 0 cm   DPC 20 cm 20.5 cm 0.5 cm   IP Thumb 6.5 cm 7 cm 0.5 cm       Limitation / Restriction for FOTO Shoulder Survey    Therapist reviewed FOTO scores for Kee Ramirez on 8/31/2023.   FOTO documents entered into [employer] - see Media section.    Limitation Score: 5%         Treatment     Kee received the treatments listed below:    Physical performance testing as part of discharge reassessment for 44 minutes:    - FOTO survey reassessment  - Bilateral upper extremity active range of motion measurements  - Bilateral upper extremity manual muscle testing  - Bilateral  dynamometer strength testing  - Bilateral upper extremity circumferential measurements    Increased time required to complete FOTO survey secondary to ongoing Internet connectivity issues.       Therapeutic exercises to increase cardiovascular endurance, flexibility, range of motion, and flexibility for 0 minutes:     UBE: Seat 4, Level 1 (fwd, bkwd)  2 min each - held    Seated Exercises:  - Pulleys (flex, abd)    2 min each - held    Standing Exercises:  - Wall push ups    2 set x 10 reps - held    Mat Exercises:  - PROM L shoulder IR / ER    1 set x 10 reps - held      Manual therapy techniques to decrease pain as well as to increase soft tissue mobility, joint mobility, mobility and pliability, and function for 0 minutes:     - Manual lateral chest wall stretch (left only)  - Manual axilla stretch (left only)  - Manual anterior chest wall stretch (left only)      Neuromuscular re-education activities to improve " kinesthetic sense, proprioception, postural awareness, and scapular stabilization for 0 minutes:      Standing Exercises:  - Shoulder rows (green)   2 set x 10 reps - held  - Shoulder extensions (green)  2 set x 10 reps - held  - Shoulder ER, vimal (red)   2 set x 10 reps - held  - Shoulder IR, vimal (red)    2 set x 10 reps - held      Therapeutic activities to improve functional lifting tolerance for 0 minutes:    - Biceps curls (5#)    3 set x 10 reps - held  - Shelf lifts, vimal (2#)    2 set x 10 reps - held      Patient Education and Home Exercises       Education Provided Regarding:   - Role of physical therapy in multi-disciplinary team  - Physical therapy plan of care, scheduling  - Physical therapy goals, progress towards goals  - Home exercise program, exercise technique  - Energy conservation techniques    Written Home Exercises Provided: Patient instructed to cont prior HEP. Exercises were reviewed and Kee was able to demonstrate them prior to the end of the session. Kee demonstrated good  understanding of the education provided. See EMR under Patient Instructions for exercises provided during therapy sessions    Assessment     Patient is responding well to physical therapy. Patient verbalized understanding, agreement to continue performing stretching home exercise program to avoid soft tissue limitations as scar tissue continues to develop (up to 1-1.5 years following surgery). Patient demonstrates the following improvements in objective testing since previous reassessment (08/03/2023):    - 20 degree improvement in LEFT shoulder flexion  - 10 degree improvement in LEFT shoulder abduction  - 25 degree improvement in LEFT shoulder external rotation  -15 degree improvement in RIGHT shoulder flexion  - 6.4 degree improvement in LEFT  dynamometer  strength  - 5.5 pound increase in RIGHT  dynamometer  strength    No evidence of lymphedema per today's circumferential measurements. Educated  patient on scar tissue formation and recommended patient continue performing stretching / flexibility home exercise program to avoid delayed onset joint, soft tissue stiffness. Also advised patient to notify therapist and/or MD if requiring additional physical therapy services upon discharge. Patient verbalized understanding, agreement to all discussed.     Kee Is progressing well towards her goals.   Patient prognosis is Excellent.     Patient's spiritual, cultural, and educational needs considered, and patient agreeable to plan of care and goals.     Anticipated barriers to physical therapy: none anticipated    GOALS:   Short Term Goals: 4 Weeks  Patient will demonstrate 100% understanding of lymphedema risk reduction practices, including self-monitoring for lymphedema (met, 08/31/2023).  Patient will demonstrate independence with established home exercise program (met, 08/31/2023).  Patient will increase LEFT shoulder ABDUCTION active range of motion to >170 degrees to improve functional reaching, carrying, pushing, and pulling pain-free (progressing, not met).    Patient will increase LEFT shoulder FLEXION active range of motion to >160 degrees to improve functional reaching, carrying, pushing, and pulling pain-free (progressing, not met).  Patient will increase strength to 5/5 in gross upper extremity musculature to improve tolerance to all functional activities pain-free (met, 08/03/2023).       Long Term Goals: 8 Weeks   Patient will increase BILATERAL shoulder FLEXION active range of motion to 180 degrees to improve functional reaching, carrying, pushing, and pulling pain-free (progressing, not met).   Patient will increase LEFT shoulder EXTERNAL ROTATION (measured at 90 degrees abduction) to 90 degrees to improve functional reaching, carrying, pushing, and pulling pain-free (progressing, not met).    Patient will demonstrate full / maximized tissue mobility to increase range of motion and promote healthy  tissue to be pain-free at discharge (met, 08/31/2023).  Patient will increase LEFT shoulder ABDUCTION active range of motion to improve functional reaching, carrying, pushing, and pulling pain-free (progressing, not met).    Patient will report compliance with walking program 5x/week for 20-30 minutes / day to improve overall cardiovascular function and decrease cancer-related fatigue at discharge (progressing, not met).      Plan     Plan of Care Certification: 07/06/2023 to 09/01/2023.    Patient to discharge from physical therapy on this date and to continue with prescribed home exercise program. Patient to notify therapist and/or MD if requiring additional physical therapy services. Patient aware and understanding of discharge plan.     Irene Avelar, PT

## 2023-08-31 ENCOUNTER — CLINICAL SUPPORT (OUTPATIENT)
Dept: REHABILITATION | Facility: HOSPITAL | Age: 78
End: 2023-08-31
Payer: MEDICARE

## 2023-08-31 DIAGNOSIS — R29.3 POOR POSTURE: Primary | ICD-10-CM

## 2023-08-31 DIAGNOSIS — Z91.89 AT RISK FOR LYMPHEDEMA: ICD-10-CM

## 2023-08-31 DIAGNOSIS — R53.1 DECREASED STRENGTH: ICD-10-CM

## 2023-08-31 DIAGNOSIS — M25.612 DECREASED ROM OF LEFT SHOULDER: ICD-10-CM

## 2023-08-31 DIAGNOSIS — Z74.09 DECREASED FUNCTIONAL MOBILITY AND ENDURANCE: ICD-10-CM

## 2023-08-31 PROCEDURE — 97535 SELF CARE MNGMENT TRAINING: CPT

## 2023-08-31 NOTE — PLAN OF CARE
OUTPATIENT PHYSICAL THERAPY DISCHARGE SUMMARY     Name: Kee Ramirez  [facility] Number: [id number]    Diagnosis:   Encounter Diagnoses   Name Primary?    Poor posture Yes    Decreased ROM of left shoulder     Decreased functional mobility and endurance     Decreased strength     At risk for lymphedema      Physician: Dr. 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 Patient expected to restore full active range of motion with continued performance of prescribed home exercise program.    Short-Term Goals: 4 Weeks  Patient will increase LEFT shoulder ABDUCTION active range of motion to >170 degrees to improve functional reaching, carrying, pushing, and pulling pain-free (progressing, not met).  Patient will increase LEFT shoulder FLEXION active range of motion to >160 degrees to improve functional reaching, carrying, pushing, and pulling pain-free (progressing, not met).    Long-Term Goals: 8 Weeks  Patient will increase BILATERAL shoulder FLEXION active range of motion to 180 degrees to improve functional reaching, carrying, pushing, and pulling pain-free (progressing, not met).   Patient will increase LEFT shoulder EXTERNAL ROTATION (measured at 90 degrees abduction) to 90 degrees to improve functional reaching,  carrying, pushing, and pulling pain-free (progressing, not met).   Patient will increase LEFT shoulder ABDUCTION active range of motion to improve functional reaching, carrying, pushing, and pulling pain-free (progressing, not met).    Patient will report compliance with walking program 5x/week for 20-30 minutes / day to improve overall cardiovascular function and decrease cancer-related fatigue at discharge (progressing, not met).     Discharge Reason: Patient has maximized benefit from physical therapy at this time    PLAN     Patient to discharge from physical therapy on this date and to continue with prescribed home exercise program. Patient to notify therapist and/or MD if requiring additional physical therapy services. Patient aware and understanding of discharge plan.     Irene Avelar, PT  08/31/2023

## 2023-09-15 ENCOUNTER — OFFICE VISIT (OUTPATIENT)
Dept: HEMATOLOGY/ONCOLOGY | Facility: CLINIC | Age: 78
End: 2023-09-15
Payer: MEDICARE

## 2023-09-15 VITALS
WEIGHT: 231.94 LBS | BODY MASS INDEX: 36.4 KG/M2 | TEMPERATURE: 98 F | RESPIRATION RATE: 17 BRPM | OXYGEN SATURATION: 99 % | HEIGHT: 67 IN | SYSTOLIC BLOOD PRESSURE: 171 MMHG | HEART RATE: 71 BPM | DIASTOLIC BLOOD PRESSURE: 73 MMHG

## 2023-09-15 DIAGNOSIS — Z17.0 MALIGNANT NEOPLASM OF CENTRAL PORTION OF LEFT BREAST IN FEMALE, ESTROGEN RECEPTOR POSITIVE: ICD-10-CM

## 2023-09-15 DIAGNOSIS — C50.112 MALIGNANT NEOPLASM OF CENTRAL PORTION OF LEFT BREAST IN FEMALE, ESTROGEN RECEPTOR POSITIVE: ICD-10-CM

## 2023-09-15 DIAGNOSIS — C50.912 LOBULAR CARCINOMA OF LEFT BREAST: Primary | ICD-10-CM

## 2023-09-15 DIAGNOSIS — Z79.811 USE OF ANASTROZOLE: ICD-10-CM

## 2023-09-15 PROCEDURE — 99214 OFFICE O/P EST MOD 30 MIN: CPT | Mod: PBBFAC | Performed by: INTERNAL MEDICINE

## 2023-09-15 PROCEDURE — 99999 PR PBB SHADOW E&M-EST. PATIENT-LVL IV: ICD-10-PCS | Mod: PBBFAC,,, | Performed by: INTERNAL MEDICINE

## 2023-09-15 PROCEDURE — 99999 PR PBB SHADOW E&M-EST. PATIENT-LVL IV: CPT | Mod: PBBFAC,,, | Performed by: INTERNAL MEDICINE

## 2023-09-15 PROCEDURE — 99214 PR OFFICE/OUTPT VISIT, EST, LEVL IV, 30-39 MIN: ICD-10-PCS | Mod: S$PBB,,, | Performed by: INTERNAL MEDICINE

## 2023-09-15 PROCEDURE — 99214 OFFICE O/P EST MOD 30 MIN: CPT | Mod: S$PBB,,, | Performed by: INTERNAL MEDICINE

## 2023-09-15 NOTE — PROGRESS NOTES
PROGRESS NOTE    Subjective:       Patient ID: Kee Ramirez is a 78 y.o. female.  MRN: [id number]  : 1945    Chief Complaint: ILC of the left breast     History of Present Illness:   Kee Ramirez is a 78 y.o. female who presents with  ILC of the left breast.       Reports yearly mammograms , normal in . In  screening mammogram showed left breast architectural distortion. Diagnostic mammogram in March showed a 21 x 9  X 2 0 mm left breast mass. US guided biopsy showed ILC, ER strongly positive, NJ 15% positive, Her 2 rhina negative.   A breast MRI showed a 7.6 x 4.7 cm mass with spiculated margins.No abnormal lymph nodes were found.      No history of breast mass or biopsies. She has been seeing Dr. Ortiz and is transferring care to use due to proximity. See full oncology history below.   She has been referred for neoadjuvant chemotherapy. We met today as a follow up visit to further discuss neoadjuvant therapy.  At her visit two weeks ago, we discussed neoadjuvant chemotherapy vs neoadjuvant endocrine therapy.  She is interested in breast conservation and is not a candidate for upfront lumpectomy given tumor size and extension to the nipple areolar complex.  She met with Dr. Valente who agrees that she may not be a candidate for breast conservation even after neoadjuvant therapies.  Patient wants to try.     An oncotype was sent to determine if she was high risk and would benefit from chemotherapy.  We were notified there was not enough tissue specimen from the original biopsy for the oncotype.  After extensive discussion, we elected to start neoadjuvant anastrozole.     2022: Started anastrozole      She was admitted to the hospital from -, found to have SBO. Has NGT for decompression and improved with conservative measures.     She completed 6 months of neoadjuvant anastrozole and has had a left mastectomy without reconstruction  on 1/4/23.     Started RT on 3/20, completed at the end of April. Has dry skin and some limited ROM.     Interim history:    Resumed anastrozole 2/22. Denies hot flashes but has noticed some back pain over the past 2-3 weeks.      Feels more tired since RT. Has also noticed a low back pain during the day, in the morning, Tylenol helps.     Has completed PT after RT and ROM has improved significantly.     Had a right mammogram on 3/9 and it was normal.     Noted to be hypertensive today, was normal at home earlier today.       Oncology History:  As dcoumented by  and updated      03.21.2022 Left Breast, Core Needle Biopsies:   - Invasive lobular carcinoma, well-differentiated .   - Bloom-Damon score (5/9):        - Tubular Formation: 3/3        - Nuclear Pleomorphism: 1/3        - Mitotic Activity: 1/3.   - Background of lobular carinoma in-situ (LCIS), nuclear grade 1.   - No perineural or lymphovascular invasion is identified.   Estrogen receptor: Positive (strong intensity, >95% of tumor cell nuclei).   Progesterone receptor: Positive (strong intensity, 15% of tumor cell nuclei).   HER2 IHC: Negative.   Ki-67: 10%.  04.04.2022 [facility] TB -- Surgery FU w/Oncotype  04.06.2022  follow up  -Breast MRI for staging, discussed surgery options; pt would like BCT, will make definitive plan after MRI  05.03.2022 Breast MRI  -Irregular 7.6 cm enhancing spiculated mass in the upper LEFT breast, consistent with the patient's known biopsy-proven invasive lobular carcinoma. The enhancement does extend to the nipple-areolar complex.  -No suspicious abnormality in the RIGHT breast.   BI-RADS Category: Overall: 6 - known biopsy-proven malignancy  05.06.2022 Follow up with [facility]  -Plan for Axillary U/S to assess LAD, PetCT  -HemOnc referral for NA therapy. Due to size of mass/location and nature of lobular cancer, was not felt that BCT was optimal unless NA therapy was done 1st   -2 week follow up to discuss further    05.13.2022 PetCT  -Mildly hypermetabolic irregular soft tissue within the left breast in keeping with known lobular carcinoma. This mass is better appreciated on MRI of the breast.  -No definite evidence of metastatic disease.  Please note that FDG PET has has suboptimal sensitivity for certain histologies such as lobular and tubular carcinomas.  05.18.2022 Initial [facility] eval    12/1/22  MRI     Impression:  Left  Interval decreased enhancement of left breast malignancy (biopsy proven ILC).  Residual abnormal enhancement still spans 8.1 cm AP x 2.5 cm transverse and extends to the left nipple areolar complex.     Right  There is no MR evidence of malignancy.     BI-RADS Category:   Overall: 6 - Known Biopsy-Proven Malignancy    Final Pathologic Diagnosis 1.  Left axillary sentinel lymph node #1, hot and blue; count 299, excisional   biopsy: 1 lymph node, negative for metastatic carcinoma (0/1).          Confirmed by negative immunohistochemical staining with cytokeratins   AE1/AE3, cam 5.2 and wide spectrum keratin with          satisfactory positive and negative controls.   2.  Left axillary sentinel lymph node #2, hot and blue; count 652, excisional   biopsy: 1 lymph node, negative for metastatic carcinoma (0/1).          Confirmed by negative immunohistochemical staining with cytokeratins   AE1/AE3, cam 5.2 and wide spectrum keratin with          satisfactory positive and negative controls.   3.  Left breast, skin sparing mastecomy: Invasive lobular carcinoma,   measuring at least 51 mm (at least 5.1 cm) in greatest dimension (slide   measurement of largest          dimension).          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          Note:  Biopsy clip is grossly identified and biopsy site changes are   identified microscopically within area of tumor.          Margins:           - All margins are greater than 10 mm (greater than 1 cm) from   invasive tumor and LCIS.          Nipple and skin are  present and uninvolved by tumor.  Note:  Tumor is   present in the soft tissue underlying the nipple skin but          does not involve nipple epidermis.          Microcalcifications are present and associated with lobular carcinoma   in situ and invasive carcinoma.          Background breast tissue shows fibrocystic changes including apocrine   metaplasia and stromal fibrosis.          See synoptic report in comment section for further details.   4.  Right port-a-cath, removal: Gross diganosis:  Port-a-cath.         ypT3:  Tumor greater than 50 mm in greatest dimension.   Regional lymph nodes:        y(sn)pN0:  No regional lymph node metastasis identified.   Distant metastasis:     Oncology History:  Oncology History   Malignant neoplasm of central portion of left female breast   3/21/2022 Initial Diagnosis    Malignant neoplasm of central portion of left female breast     3/21/2022 Biopsy    Left Breast, Core Needle Biopsies:   - Invasive lobular carcinoma, well-differentiated .   - Bloom-Damon score (5/9):        - Tubular Formation: 3/3        - Nuclear Pleomorphism: 1/3        - Mitotic Activity: 1/3.   - Background of lobular carinoma in-situ (LCIS), nuclear grade 1.   - No perineural or lymphovascular invasion is identified.        3/21/2022 Breast Tumor Markers    Estrogen: Positive  Progesterone: Positive  HER2: Negative     5/27/2022 - 5/27/2022 Chemotherapy    This was considered, but patient did not undergo chemotherapy.   Treatment Summary   Plan Name: OP BREAST DOSE-DENSE AC-T (DOXORUBICIN CYCLOPHOSPHAMIDE Q2W FOLLOWED BY PACLITAXEL WEEKLY)  Treatment Goal: Curative  Status: Inactive  Start Date:   End Date:   Provider: Mitali Carlin MD  Chemotherapy: DOXOrubicin chemo injection 130 mg, 60 mg/m2, Intravenous, Clinic/HOD 1 time, 0 of 4 cycles  cyclophosphamide 600 mg/m2 = 1,300 mg in sodium chloride 0.9% 250 mL chemo infusion, 600 mg/m2, Intravenous, Clinic/HOD 1 time, 0 of 4 cycles  PACLitaxeL  (TAXOL) 80 mg/m2 = 174 mg in sodium chloride 0.9% 250 mL chemo infusion, 80 mg/m2, Intravenous, Clinic/HOD 1 time, 0 of 12 cycles     6/7/2022 Cancer Staged    Staging form: Breast, AJCC 8th Edition  - Clinical: Stage IIA (cT3, cN0, cM0, G1, ER+, PA+, HER2-)     6/7/2022 Notable Event    Oncotype performed initially to assess for benefit of NACT to optimize for surgery but not enough tissue. Proceeded with AI x 6 months.      6/7/2022 Genetic Testing    [facility]: Negative                                           1/4/2023 Breast Surgery    Left Mastectomy with L SLNB.      1/24/2023 Tumor Conference    No definitive treatment response to neoadjuvant AI use on final surgical pathology. Oncotype will now be sent on the surgical specimen. Could include CDK46 inhibitor with her endocrine if Oncotype is low given some response to Anastrozole and large tumor size, but patient is node negative. May consider to simply change to different AI. Radiation Oncology would like to meet with patient to discuss XRT, but team does not feel strongly about her proceeding.        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    left    Midline low back pain without sciatica 07/31/2015    Thyroid disease     Venous stasis     bilateral legs      Past Surgical History:   Procedure Laterality Date    BREAST BIOPSY Left 03/21/2022    Core bx, + ILC    COLONOSCOPY      COLONOSCOPY N/A 12/05/2019    Procedure:  COLONOSCOPY;  Surgeon: Luis Bogran-Reyes, MD;  Location: [facility];  Service: Endoscopy;  Laterality: N/A;    HYSTERECTOMY  12/01/2021    INJECTION FOR SENTINEL NODE IDENTIFICATION Left 1/4/2023    Procedure: INJECTION, FOR SENTINEL NODE IDENTIFICATION;  Surgeon: Erika Valente MD;  Location: [facility];  Service: General;  Laterality: Left;    INSERTION OF TUNNELED CENTRAL VENOUS CATHETER (CVC) WITH SUBCUTANEOUS PORT Right 05/24/2022    Procedure: OBCAXKSGS-DMQI-X-CATH;  Surgeon: Darien Bear MD;  Location: [facility];  Service: General;  Laterality: Right;    MASTECTOMY Left 1/4/2023    Procedure: MASTECTOMY;  Surgeon: Erika Valente MD;  Location: [facility];  Service: General;  Laterality: Left;    MEDIPORT REMOVAL Right 1/4/2023    Procedure: REMOVAL PORT;  Surgeon: Erika Valente MD;  Location: [facility];  Service: General;  Laterality: Right;    SENTINEL LYMPH NODE BIOPSY Left 1/4/2023    Procedure: BIOPSY, LYMPH NODE, SENTINEL;  Surgeon: Erika Valente MD;  Location: [facility];  Service: General;  Laterality: Left;    SHOULDER ARTHROSCOPY W/ ROTATOR CUFF REPAIR Right 2001    TUBAL LIGATION      VAGINAL HYSTERECTOMY N/A 01/11/2021    Procedure: HYSTERECTOMY, VAGINAL ;  Surgeon: Jessie Dacosta MD;  Location: [facility];  Service: OB/GYN;  Laterality: N/A;     Family History   Problem Relation Age of Onset    Diabetes Mother     Hypertension Mother     Arthritis Mother     COPD Father     Hypertension Sister     Diabetes Sister     Cancer Sister         PANCREATIC    Breast cancer Sister      Social History     Tobacco Use    Smoking status: Never    Smokeless tobacco: Never   Substance and Sexual Activity    Alcohol use: No     Alcohol/week: 0.0 standard drinks of alcohol    Drug use: No    Sexual activity: Not Currently     Partners: Male     Birth control/protection: None, See Surgical Hx        ROS:   Review of Systems   Constitutional: Negative for fever, malaise/fatigue and weight loss.   HENT: no hearing loss,  "nosebleeds and sore throat.    Eyes: Negative for double vision and photophobia.   Respiratory: no hemoptysis, sputum production, shortness of breath and wheezing.    Cardiovascular: Negative for chest pain, palpitations, orthopnea and leg swelling.   Gastrointestinal: Negative for abdominal pain, blood in stool, constipation, diarrhea heartburn, nausea and vomiting.   Genitourinary: Negative for dysuria, hematuria and urgency.   Musculoskeletal: Negative for back pain, joint pain and myalgias.   Skin: Negative for itching and rash.   Neurological: Negative for dizziness, tingling, seizures, weakness and headaches.   Endo/Heme/Allergies: Negative for polydipsia. Does not bruise/bleed easily.   Psychiatric/Behavioral: Negative for depression and memory loss. The patient is not nervous/anxious and does not have insomnia.       Objective:     Vitals:    09/15/23 1302   BP: (!) 171/73   Pulse: 71   Resp: 17   Temp: 98 °F (36.7 °C)   TempSrc: Oral   SpO2: 99%   Weight: 105.2 kg (231 lb 14.8 oz)   Height: 5' 7" (1.702 m)   PainSc: 0-No pain           Wt Readings from Last 10 Encounters:   09/15/23 105.2 kg (231 lb 14.8 oz)   06/23/23 104.4 kg (230 lb 2.6 oz)   06/13/23 102.5 kg (226 lb)   03/22/23 99.8 kg (220 lb 0.3 oz)   03/15/23 99.9 kg (220 lb 3.8 oz)   03/09/23 98 kg (216 lb)   02/23/23 98.4 kg (217 lb)   02/22/23 98.6 kg (217 lb 6 oz)   02/20/23 94.3 kg (208 lb)   02/07/23 94.3 kg (208 lb)       Physical Examination:   Physical Exam  Vitals and nursing note reviewed.   Constitutional:       General: She is not in acute distress.     Appearance: She is not diaphoretic.   HENT:      Head: Normocephalic.   Eyes:      General: No scleral icterus.     Conjunctiva/sclera: Conjunctivae normal.   Neck:      Thyroid: No thyromegaly.   Cardiovascular:      Rate and Rhythm: Normal rate and regular rhythm.      Heart sounds: Normal heart sounds. No murmur heard.  Pulmonary:      Effort: Pulmonary effort is normal. No " respiratory distress.      Breath sounds: No stridor. No wheezing or rales.   Chest:      Chest wall: No tenderness.     Musculoskeletal:         General: No tenderness or deformity. Normal range of motion.      Cervical back: Neck supple.   Lymphadenopathy:      Cervical: No cervical adenopathy.   Skin:     General: Skin is warm and dry.      Findings: No erythema or rash.      Comments:+ left mastectomy w/o reconstruction. Healed incision .   Neurological:      Mental Status: She is alert and oriented to person, place, and time.      Cranial Nerves: No cranial nerve deficit.      Coordination: Coordination normal.      Gait: Gait is intact.   Psychiatric:         Mood and Affect: Affect normal.         Cognition and Memory: Memory normal.         Judgment: Judgment normal.     Diagnostic Tests:  Significant Imaging: I have reviewed and interpreted all pertinent imaging results/findings.    Laboratory Data:  All pertinent labs have been reviewed.  Labs:   Lab Results   Component Value Date    WBC 5.62 12/22/2022    RBC 4.10 12/22/2022    HGB 11.9 (L) 12/22/2022    HCT 37.2 12/22/2022    MCV 91 12/22/2022     12/22/2022     (H) 12/22/2022     12/22/2022    K 4.7 12/22/2022    BUN 23 12/22/2022    CREATININE 0.8 12/22/2022    AST 13 12/22/2022    ALT 16 12/22/2022    BILITOT 0.7 12/22/2022       Assessment/Plan:   Malignant neoplasm of central portion of left breast in female, estrogen receptor positive  cT3N0, ER 95%, PR15%, Her 2 rhina negative, Grade 1, ki 67 10%     ypT3 yp (sn) N0   Oncotype low risk, 13     She was interested in breast conservation and received 6 months of neoadjuvant anastrozole.   Follow up MRI showed some response but persistent large area of enhancement with extension to the nipple areolar complex. Mastectomy was recommended.   Final pathology and TB recommendations were discussed. There was 5 cm of ILC, node negative, no significant treatment response, G1, Ki 67 stable at  10%.  Per guidelines, oncotype was sent, is 13, low risk, no benefit of chemotherapy.     She has completed adjuvant RT. ROM improving with PT.     At this time, continue anastrozole.I will see her back in 4 months.     DEXA normal at baseline. continue calcium and vitamin D. Repeat in June 2024     Primary Hypertension   Compliant with current medications.  Continue PMD follow up.       ECOG SCORE    1 - Restricted in strenuous activity-ambulatory and able to carry out work of a light nature       Discussion:   No follow-ups on file.    Plan was discussed with the patient at length, and she verbalized understanding. Kee was given an opportunity to ask questions that were answered to her satisfaction, and she was advised to call in the interval if any problems or questions arise.Electronically signed by Mitali Carlin MD      Med Onc Chart Routing      Follow up with physician 4 months. Jan 2024 with same day appt   Follow up with KAYLYN    Infusion scheduling note    Injection scheduling note    Labs CBC and CMP   Scheduling:  Preferred lab:  Lab interval:     Imaging    Pharmacy appointment    Other referrals              Treatment Plan Information   OP ANASTROZOLE DAILY   Mitali Carlin MD   Upcoming Treatment Dates - OP ANASTROZOLE DAILY    6/24/2022       Antiemetics       Physician communication order       Take Home Chemotherapy       anastrozole (ARIMIDEX) 1 mg Tab    Therapy Plan Information  Flushes  heparin, porcine (PF) 100 unit/mL injection flush 500 Units  500 Units, Intravenous, Every visit  sodium chloride 0.9% flush 10 mL  10 mL, Intravenous, Every visit          Answers submitted by the patient for this visit:  Review of Systems Questionnaire (Submitted on 9/8/2023)  appetite change : No  unexpected weight change: No  mouth sores: No  visual disturbance: No  cough: No  shortness of breath: No  chest pain: No  abdominal pain: No  diarrhea: No  frequency: Yes  back pain: Yes  rash: No  headaches:  No  adenopathy: No  nervous/ anxious: No

## 2023-10-21 ENCOUNTER — IMMUNIZATION (OUTPATIENT)
Dept: INTERNAL MEDICINE | Facility: CLINIC | Age: 78
End: 2023-10-21
Payer: MEDICARE

## 2023-10-21 PROCEDURE — 99999PBSHW FLU VACCINE - QUADRIVALENT - ADJUVANTED: ICD-10-PCS | Mod: PBBFAC,,,

## 2023-10-21 PROCEDURE — 99999PBSHW FLU VACCINE - QUADRIVALENT - ADJUVANTED: Mod: PBBFAC,,,

## 2023-10-21 PROCEDURE — G0008 ADMIN INFLUENZA VIRUS VAC: HCPCS | Mod: PBBFAC

## 2023-12-05 ENCOUNTER — PATIENT MESSAGE (OUTPATIENT)
Dept: INTERNAL MEDICINE | Facility: CLINIC | Age: 78
End: 2023-12-05
Payer: MEDICARE

## 2023-12-05 DIAGNOSIS — I10 PRIMARY HYPERTENSION: ICD-10-CM

## 2023-12-05 DIAGNOSIS — E07.9 THYROID DISEASE: ICD-10-CM

## 2023-12-05 DIAGNOSIS — E78.2 MIXED HYPERLIPIDEMIA: ICD-10-CM

## 2023-12-05 RX ORDER — LEVOTHYROXINE SODIUM 112 UG/1
112 TABLET ORAL
Qty: 90 TABLET | Refills: 3 | Status: SHIPPED | OUTPATIENT
Start: 2023-12-05

## 2023-12-11 RX ORDER — LOVASTATIN 20 MG/1
40 TABLET ORAL NIGHTLY
Qty: 180 TABLET | Refills: 3 | Status: SHIPPED | OUTPATIENT
Start: 2023-12-11 | End: 2023-12-19 | Stop reason: SDUPTHER

## 2023-12-11 RX ORDER — AMLODIPINE BESYLATE 10 MG/1
10 TABLET ORAL DAILY
Qty: 90 TABLET | Refills: 3 | Status: SHIPPED | OUTPATIENT
Start: 2023-12-11 | End: 2023-12-12 | Stop reason: SDUPTHER

## 2023-12-12 DIAGNOSIS — I10 PRIMARY HYPERTENSION: ICD-10-CM

## 2023-12-12 RX ORDER — AMLODIPINE BESYLATE 10 MG/1
10 TABLET ORAL DAILY
Qty: 90 TABLET | Refills: 3 | Status: SHIPPED | OUTPATIENT
Start: 2023-12-12

## 2023-12-12 RX ORDER — AMLODIPINE BESYLATE 10 MG/1
10 TABLET ORAL DAILY
Qty: 90 TABLET | Refills: 3 | Status: CANCELLED | OUTPATIENT
Start: 2023-12-12

## 2023-12-12 NOTE — PROGRESS NOTES
REFERRING PHYSICIAN: Erika Valente MD, Mitali Carlin MD     DIAGNOSIS: cT3 N0 M0 (ypT3N0) invasive lobular carcinoma of the left breast      Radiation Treatment Summary  Course: C1 Breast 2023  Treatment Site Energy Dose/Fx (Gy) #Fx Dose Correction (Gy) Total Dose (Gy) Start Date End Date Elapsed Days   Left chest wall scar 9E 2.5 4 / 4 0 10 4/17/2023 4/20/2023 3   Left chest wall 18X/6X 2.65 16 / 16 0 42.4 3/20/2023 4/12/2023 23      INTERVAL HISTORY:   Ms. Ramirez is a 78-year-old female who completed postmastectomy radiation to the left chest wall as above who returns for routine follow-up.     She was diagnosed with left breast cancer after she presented with a palpable mass in the central aspect of the left breast. Original workup was done at an outside facility and she recently transferred her care here. A mammogram March 2022 revealed a 21 x 9 x 20 mm irregularly-shaped mass in the upper central region the left breast. No lymphadenopathy was noted. Biopsy lesion on March 21, 2022 revealed grade 1, invasive lobular carcinoma, which was ER positive (5%), VA positive (15%), and HER2 negative, with Ki-67 index of 10%. An MRI of the bilateral breasts on May 3, 2022 revealed a 7.6 x 4.7 x 3.9 cm irregular mass with spiculated margins in the upper left breast. The abnormal enhancement extends to the nipple areolar complex. Again no axillary adenopathy is noted .she received 6 months of neoadjuvant anastrozole. A repeat mammogram on September 16, 2022 again demonstrated the known mass measures approximately 62 mm. A repeat MRI of the bilateral. She underwent left skin sparing mastectomy and sentinel node biopsy on January 4, 2023. Pathology revealed left breast with 51 mm, grade 1, invasive lobular carcinoma with all margins greater than 10 mm. 2/2 sentinel lymph nodes were negative for involvement. Tumor is present at the soft tissue underlying the skin but does not involve nipple epidermis. Her Oncotype returned at 13.  To reduce the chance of recurrence, she received post mastectomy radiation to the left chest wall as above.  She is here today for routine follow-up.     She is currently on anastrozole.  She denies left chest wall pain, edema, erythema, or lymphedema.  She also denies fever, night sweats, or weight loss.  She had a repeat yearly mammogram of the right breast on March 09/2023 which was negative.     PHYSICAL EXAMINATION:  Body mass index is 35.4 kg/m².   GENERAL: Patient is alert and oriented, in no acute distress.  HEENT: Extraocular muscles are intact.  Oropharynx is clear without lesions.  There is no cervical or supraclavicular adenopathy palpated.    CHEST: Breath sounds clear bilaterally.  No rales.  No rhonchi.  Unlabored respirations.  CARDIOVASCULAR: Normal S1, S2.  Normal rate.  Regular rhythm.  BREAST EXAM:  Left chestwall with scar secondary to mastectomy.  Hyperpigmentation noted in the left chest wall secondary to radiation.. No abnormal masses palpated in the left chestwall or axilla. The right breast and right axilla are also without palpbable masses  ABDOMEN: Bowel sounds normal.  No tenderness.  No abdominal distention.  No hepatomegaly.  No splenomegaly.  EXTREMITIES: No clubbing, cyanosis, edema  NEUROLOGIC: Cranial nerves II through XII are grossly intact.  Sensation is intact.  Strength is 5 out of 5 in the upper and lower extremities bilaterally.     ASSESSMENT:   This is a 78-year-old female with cT3 N0 M0 (ypT3N0), grade 1, invasive lobular carcinoma of the left breast to received neoadjuvant anastrozole followed by left skin sparing mastectomy and sentinel node biopsy on January 4, 2023, followed by postmastectomy radiation, with pathology revealing residual grade 1 invasive lobular carcinoma measuring 51 mm, ER/NV positive, HER2 negative, Ki-67 index 10%, oncotype 13.     PLAN:   Ms. Ramirez is doing well from the stand point of left breast cancer. Skin care to the treated area was again  reviewed with the patient.  She will continue anastrozole as planned.  Her mammogram of the right breast in March 2023 is negative for a suspicious lesions.  She will repeat that in 1 year.  She will continue follow-up with Dr. Carlin and return to see me as needed, unless symptoms warrant otherwise.

## 2023-12-14 ENCOUNTER — OFFICE VISIT (OUTPATIENT)
Dept: RADIATION ONCOLOGY | Facility: CLINIC | Age: 78
End: 2023-12-14
Payer: MEDICARE

## 2023-12-14 VITALS
DIASTOLIC BLOOD PRESSURE: 84 MMHG | SYSTOLIC BLOOD PRESSURE: 194 MMHG | WEIGHT: 239.88 LBS | TEMPERATURE: 98 F | BODY MASS INDEX: 37.57 KG/M2 | OXYGEN SATURATION: 98 % | HEART RATE: 78 BPM

## 2023-12-14 DIAGNOSIS — Z17.0 MALIGNANT NEOPLASM OF CENTRAL PORTION OF LEFT BREAST IN FEMALE, ESTROGEN RECEPTOR POSITIVE: Primary | ICD-10-CM

## 2023-12-14 DIAGNOSIS — C50.112 MALIGNANT NEOPLASM OF CENTRAL PORTION OF LEFT BREAST IN FEMALE, ESTROGEN RECEPTOR POSITIVE: Primary | ICD-10-CM

## 2023-12-14 PROCEDURE — 99999 PR PBB SHADOW E&M-EST. PATIENT-LVL III: ICD-10-PCS | Mod: PBBFAC,,, | Performed by: RADIOLOGY

## 2023-12-14 PROCEDURE — 99213 OFFICE O/P EST LOW 20 MIN: CPT | Mod: S$PBB,,, | Performed by: RADIOLOGY

## 2023-12-14 PROCEDURE — 99213 OFFICE O/P EST LOW 20 MIN: CPT | Mod: PBBFAC | Performed by: RADIOLOGY

## 2023-12-14 PROCEDURE — 99999 PR PBB SHADOW E&M-EST. PATIENT-LVL III: CPT | Mod: PBBFAC,,, | Performed by: RADIOLOGY

## 2023-12-14 PROCEDURE — 99213 PR OFFICE/OUTPT VISIT, EST, LEVL III, 20-29 MIN: ICD-10-PCS | Mod: S$PBB,,, | Performed by: RADIOLOGY

## 2023-12-19 RX ORDER — LOVASTATIN 20 MG/1
40 TABLET ORAL NIGHTLY
Qty: 180 TABLET | Refills: 3 | Status: SHIPPED | OUTPATIENT
Start: 2023-12-19

## 2023-12-21 ENCOUNTER — PATIENT MESSAGE (OUTPATIENT)
Dept: INTERNAL MEDICINE | Facility: CLINIC | Age: 78
End: 2023-12-21
Payer: MEDICARE

## 2023-12-21 DIAGNOSIS — E11.9 TYPE 2 DIABETES MELLITUS WITHOUT COMPLICATION, WITHOUT LONG-TERM CURRENT USE OF INSULIN: ICD-10-CM

## 2023-12-21 DIAGNOSIS — I10 PRIMARY HYPERTENSION: ICD-10-CM

## 2023-12-21 RX ORDER — LOSARTAN POTASSIUM 100 MG/1
100 TABLET ORAL DAILY
Qty: 90 TABLET | Refills: 3 | Status: SHIPPED | OUTPATIENT
Start: 2023-12-21 | End: 2024-12-20

## 2023-12-21 RX ORDER — METFORMIN HYDROCHLORIDE 500 MG/1
500 TABLET ORAL
Qty: 90 TABLET | Refills: 3 | Status: SHIPPED | OUTPATIENT
Start: 2023-12-21 | End: 2024-03-26 | Stop reason: DRUGHIGH

## 2024-01-09 ENCOUNTER — OFFICE VISIT (OUTPATIENT)
Dept: URGENT CARE | Facility: CLINIC | Age: 79
End: 2024-01-09
Payer: MEDICARE

## 2024-01-09 VITALS
BODY MASS INDEX: 37.65 KG/M2 | TEMPERATURE: 98 F | DIASTOLIC BLOOD PRESSURE: 74 MMHG | SYSTOLIC BLOOD PRESSURE: 180 MMHG | OXYGEN SATURATION: 98 % | WEIGHT: 239.88 LBS | RESPIRATION RATE: 16 BRPM | HEART RATE: 69 BPM | HEIGHT: 67 IN

## 2024-01-09 DIAGNOSIS — R05.9 COUGH, UNSPECIFIED TYPE: ICD-10-CM

## 2024-01-09 DIAGNOSIS — J40 BRONCHITIS: Primary | ICD-10-CM

## 2024-01-09 LAB
CTP QC/QA: YES
CTP QC/QA: YES
POC MOLECULAR INFLUENZA A AGN: NEGATIVE
POC MOLECULAR INFLUENZA B AGN: NEGATIVE
SARS-COV-2 AG RESP QL IA.RAPID: NEGATIVE

## 2024-01-09 PROCEDURE — 99214 OFFICE O/P EST MOD 30 MIN: CPT | Mod: 25,S$GLB,, | Performed by: FAMILY MEDICINE

## 2024-01-09 PROCEDURE — 87502 INFLUENZA DNA AMP PROBE: CPT | Mod: QW,S$GLB,, | Performed by: FAMILY MEDICINE

## 2024-01-09 PROCEDURE — 94640 AIRWAY INHALATION TREATMENT: CPT | Mod: S$GLB,,, | Performed by: FAMILY MEDICINE

## 2024-01-09 PROCEDURE — 87811 SARS-COV-2 COVID19 W/OPTIC: CPT | Mod: QW,S$GLB,, | Performed by: FAMILY MEDICINE

## 2024-01-09 RX ORDER — IPRATROPIUM BROMIDE 0.5 MG/2.5ML
0.5 SOLUTION RESPIRATORY (INHALATION)
Status: COMPLETED | OUTPATIENT
Start: 2024-01-09 | End: 2024-01-09

## 2024-01-09 RX ORDER — BENZONATATE 100 MG/1
CAPSULE ORAL
Qty: 30 CAPSULE | Refills: 1 | Status: SHIPPED | OUTPATIENT
Start: 2024-01-09

## 2024-01-09 RX ORDER — PREDNISONE 20 MG/1
40 TABLET ORAL DAILY
Qty: 10 TABLET | Refills: 0 | Status: SHIPPED | OUTPATIENT
Start: 2024-01-09 | End: 2024-01-14

## 2024-01-09 RX ORDER — AMOXICILLIN AND CLAVULANATE POTASSIUM 875; 125 MG/1; MG/1
1 TABLET, FILM COATED ORAL 2 TIMES DAILY
Qty: 14 TABLET | Refills: 0 | Status: SHIPPED | OUTPATIENT
Start: 2024-01-09 | End: 2024-01-16

## 2024-01-09 RX ORDER — ALBUTEROL SULFATE 0.83 MG/ML
2.5 SOLUTION RESPIRATORY (INHALATION)
Status: COMPLETED | OUTPATIENT
Start: 2024-01-09 | End: 2024-01-09

## 2024-01-09 RX ADMIN — IPRATROPIUM BROMIDE 0.5 MG: 0.5 SOLUTION RESPIRATORY (INHALATION) at 06:01

## 2024-01-09 RX ADMIN — ALBUTEROL SULFATE 2.5 MG: 0.83 SOLUTION RESPIRATORY (INHALATION) at 06:01

## 2024-01-09 NOTE — PROGRESS NOTES
"Subjective:      Patient ID: Kee Ramirez is a 78 y.o. female.    Vitals:  height is 5' 7" (1.702 m) and weight is 108.8 kg (239 lb 13.8 oz). Her oral temperature is 98.3 °F (36.8 °C). Her blood pressure is 180/74 (abnormal) and her pulse is 69. Her respiration is 16 and oxygen saturation is 98%.     Chief Complaint: Cough    [age] female with history of breast cancer, treated about 1 year ago.  Also with history of hypertension and diabetes.  She reports that about 2 weeks ago she had fever and aches, was not seen during that time, and generally improved except now with cough that is not improving.  Also wheezing.  Cough is Productive of yellow phlegm.  Also with sinus pressure and yellow nasal drainage.  No further fever.  Denies history of asthma or reactive airways.    We discussed she likely had influenza a couple of weeks ago and now with some lingering cough and reactive airways and sinusitis.          Cough  This is a new problem. The current episode started 1 to 4 weeks ago. The problem has been waxing and waning. The problem occurs every few minutes. The cough is Productive of sputum. Treatments tried: tylenol cold and flu. The treatment provided no relief. Her past medical history is significant for pneumonia. There is no history of asthma, bronchiectasis, bronchitis, COPD, emphysema or environmental allergies.       Respiratory:  Positive for cough.    Allergic/Immunologic: Negative for environmental allergies.      Objective:     Physical Exam   Constitutional: She is oriented to person, place, and time. She appears well-developed.  Non-toxic appearance. No distress.      Comments:Appears fatigued.  No distress     HENT:   Head: Normocephalic and atraumatic.   Ears:   Right Ear: External ear normal.   Left Ear: External ear normal.      Comments: TMs with serous changes, no erythema  Mouth/Throat: No oropharyngeal exudate or posterior oropharyngeal erythema.   Pulmonary/Chest: Effort normal. " No stridor. No respiratory distress.         Comments: Initial exam with expiratory wheezing noted.  This improved after nebulizer treatment, although still some soft bibasilar end expiratory wheezing.  No rales.    Neurological: She is alert and oriented to person, place, and time.   Skin: Skin is not diaphoretic.   Psychiatric: Her behavior is normal. Thought content normal.   Nursing note and vitals reviewed.    Results for orders placed or performed in visit on 01/09/24   POCT Influenza A/B MOLECULAR   Result Value Ref Range    POC Molecular Influenza A Ag Negative Negative, Not Reported    POC Molecular Influenza B Ag Negative Negative, Not Reported     Acceptable Yes    SARS Coronavirus 2 Antigen, POCT Manual Read   Result Value Ref Range    SARS Coronavirus 2 Antigen Negative Negative     Acceptable Yes       Assessment:     1. Bronchitis    2. Cough, unspecified type        Plan:       Bronchitis  -     albuterol nebulizer solution 2.5 mg  -     ipratropium 0.02 % nebulizer solution 0.5 mg  -     amoxicillin-clavulanate 875-125mg (AUGMENTIN) 875-125 mg per tablet; Take 1 tablet by mouth 2 (two) times daily. for 7 days  Dispense: 14 tablet; Refill: 0  -     predniSONE (DELTASONE) 20 MG tablet; Take 2 tablets (40 mg total) by mouth once daily. for 5 days  Dispense: 10 tablet; Refill: 0    Cough, unspecified type  -     POCT Influenza A/B MOLECULAR  -     SARS Coronavirus 2 Antigen, POCT Manual Read  -     benzonatate (TESSALON PERLES) 100 MG capsule; 1-2 capsules every 8 hours as needed for cough  Dispense: 30 capsule; Refill: 1    Make sure that you follow up with your primary care doctor in the next 2-5 days if needed .  Go to or return to Urgent Care if signs or symptoms change and certainly if you have worsening and/or severe symptoms go to the nearest emergency department for further evaluation.

## 2024-01-31 ENCOUNTER — OFFICE VISIT (OUTPATIENT)
Dept: HEMATOLOGY/ONCOLOGY | Facility: CLINIC | Age: 79
End: 2024-01-31
Payer: MEDICARE

## 2024-01-31 VITALS
HEART RATE: 69 BPM | SYSTOLIC BLOOD PRESSURE: 187 MMHG | RESPIRATION RATE: 17 BRPM | TEMPERATURE: 97 F | HEIGHT: 60 IN | DIASTOLIC BLOOD PRESSURE: 84 MMHG | BODY MASS INDEX: 45.4 KG/M2 | OXYGEN SATURATION: 98 % | WEIGHT: 231.25 LBS

## 2024-01-31 DIAGNOSIS — Z17.0 MALIGNANT NEOPLASM OF CENTRAL PORTION OF LEFT BREAST IN FEMALE, ESTROGEN RECEPTOR POSITIVE: ICD-10-CM

## 2024-01-31 DIAGNOSIS — Z12.31 ENCOUNTER FOR SCREENING MAMMOGRAM FOR MALIGNANT NEOPLASM OF BREAST: ICD-10-CM

## 2024-01-31 DIAGNOSIS — Z79.811 USE OF ANASTROZOLE: ICD-10-CM

## 2024-01-31 DIAGNOSIS — K56.609 SBO (SMALL BOWEL OBSTRUCTION): ICD-10-CM

## 2024-01-31 DIAGNOSIS — C50.112 MALIGNANT NEOPLASM OF CENTRAL PORTION OF LEFT BREAST IN FEMALE, ESTROGEN RECEPTOR POSITIVE: ICD-10-CM

## 2024-01-31 DIAGNOSIS — C50.912 LOBULAR CARCINOMA OF LEFT BREAST: Primary | ICD-10-CM

## 2024-01-31 PROCEDURE — 3072F LOW RISK FOR RETINOPATHY: CPT | Mod: CPTII,S$GLB,, | Performed by: INTERNAL MEDICINE

## 2024-01-31 PROCEDURE — 1126F AMNT PAIN NOTED NONE PRSNT: CPT | Mod: CPTII,S$GLB,, | Performed by: INTERNAL MEDICINE

## 2024-01-31 PROCEDURE — 99215 OFFICE O/P EST HI 40 MIN: CPT | Mod: S$GLB,,, | Performed by: INTERNAL MEDICINE

## 2024-01-31 PROCEDURE — 99999 PR PBB SHADOW E&M-EST. PATIENT-LVL IV: CPT | Mod: PBBFAC,,, | Performed by: INTERNAL MEDICINE

## 2024-01-31 PROCEDURE — 1160F RVW MEDS BY RX/DR IN RCRD: CPT | Mod: CPTII,S$GLB,, | Performed by: INTERNAL MEDICINE

## 2024-01-31 PROCEDURE — 3079F DIAST BP 80-89 MM HG: CPT | Mod: CPTII,S$GLB,, | Performed by: INTERNAL MEDICINE

## 2024-01-31 PROCEDURE — 1159F MED LIST DOCD IN RCRD: CPT | Mod: CPTII,S$GLB,, | Performed by: INTERNAL MEDICINE

## 2024-01-31 PROCEDURE — 3288F FALL RISK ASSESSMENT DOCD: CPT | Mod: CPTII,S$GLB,, | Performed by: INTERNAL MEDICINE

## 2024-01-31 PROCEDURE — 1101F PT FALLS ASSESS-DOCD LE1/YR: CPT | Mod: CPTII,S$GLB,, | Performed by: INTERNAL MEDICINE

## 2024-01-31 PROCEDURE — 3077F SYST BP >= 140 MM HG: CPT | Mod: CPTII,S$GLB,, | Performed by: INTERNAL MEDICINE

## 2024-01-31 NOTE — PROGRESS NOTES
PROGRESS NOTE    Subjective:       Patient ID: Kee Ramirez is a 78 y.o. female.  MRN: [id number]  : 1945    Chief Complaint: ILC of the left breast     History of Present Illness:   Kee Ramirez is a 78 y.o. female who presents with  ILC of the left breast.       Reports yearly mammograms , normal in . In  screening mammogram showed left breast architectural distortion. Diagnostic mammogram in March showed a 21 x 9  X 2 0 mm left breast mass. US guided biopsy showed ILC, ER strongly positive, MA 15% positive, Her 2 rhina negative.   A breast MRI showed a 7.6 x 4.7 cm mass with spiculated margins.No abnormal lymph nodes were found.      No history of breast mass or biopsies. She has been seeing Dr. Ortiz and is transferring care to use due to proximity. See full oncology history below.   She has been referred for neoadjuvant chemotherapy. We met today as a follow up visit to further discuss neoadjuvant therapy.  At her visit two weeks ago, we discussed neoadjuvant chemotherapy vs neoadjuvant endocrine therapy.  She is interested in breast conservation and is not a candidate for upfront lumpectomy given tumor size and extension to the nipple areolar complex.  She met with Dr. Valente who agrees that she may not be a candidate for breast conservation even after neoadjuvant therapies.  Patient wants to try.     An oncotype was sent to determine if she was high risk and would benefit from chemotherapy.  We were notified there was not enough tissue specimen from the original biopsy for the oncotype.  After extensive discussion, we elected to start neoadjuvant anastrozole.     2022: Started anastrozole      She was admitted to the hospital from -, found to have SBO. Has NGT for decompression and improved with conservative measures.     She completed 6 months of neoadjuvant anastrozole and has had a left mastectomy without reconstruction  on 1/4/23.     Started RT on 3/20, completed at the end of April. Has dry skin and some limited ROM.     Interim history:    Resumed anastrozole 2/22/23. Denies hot flashes or joint pains.   Fatigue has improved, completed PT that helped.     Had bronchitis early in January, antwon to urgent care, treated with steroids, abx, improved now.     Noted to be hypertensive today, takes it at home where it is better. Has started seeing a new PCP.        Oncology History:  As dcoumented by  and updated      03.21.2022 Left Breast, Core Needle Biopsies:   - Invasive lobular carcinoma, well-differentiated .   - Bloom-Damon score (5/9):        - Tubular Formation: 3/3        - Nuclear Pleomorphism: 1/3        - Mitotic Activity: 1/3.   - Background of lobular carinoma in-situ (LCIS), nuclear grade 1.   - No perineural or lymphovascular invasion is identified.   Estrogen receptor: Positive (strong intensity, >95% of tumor cell nuclei).   Progesterone receptor: Positive (strong intensity, 15% of tumor cell nuclei).   HER2 IHC: Negative.   Ki-67: 10%.  04.04.2022 [facility] TB -- Surgery FU w/Oncotype  04.06.2022 [facility] follow up  -Breast MRI for staging, discussed surgery options; pt would like BCT, will make definitive plan after MRI  05.03.2022 Breast MRI  -Irregular 7.6 cm enhancing spiculated mass in the upper LEFT breast, consistent with the patient's known biopsy-proven invasive lobular carcinoma. The enhancement does extend to the nipple-areolar complex.  -No suspicious abnormality in the RIGHT breast.   BI-RADS Category: Overall: 6 - known biopsy-proven malignancy  05.06.2022 Follow up with [facility]  -Plan for Axillary U/S to assess LAD, PetCT  -HemOnc referral for NA therapy. Due to size of mass/location and nature of lobular cancer, was not felt that BCT was optimal unless NA therapy was done 1st   -2 week follow up to discuss further   05.13.2022 PetCT  -Mildly hypermetabolic irregular soft tissue within the left breast in  keeping with known lobular carcinoma. This mass is better appreciated on MRI of the breast.  -No definite evidence of metastatic disease.  Please note that FDG PET has has suboptimal sensitivity for certain histologies such as lobular and tubular carcinomas.  05.18.2022 Initial [facility] eval    12/1/22  MRI     Impression:  Left  Interval decreased enhancement of left breast malignancy (biopsy proven ILC).  Residual abnormal enhancement still spans 8.1 cm AP x 2.5 cm transverse and extends to the left nipple areolar complex.     Right  There is no MR evidence of malignancy.     BI-RADS Category:   Overall: 6 - Known Biopsy-Proven Malignancy    Final Pathologic Diagnosis 1.  Left axillary sentinel lymph node #1, hot and blue; count 299, excisional   biopsy: 1 lymph node, negative for metastatic carcinoma (0/1).          Confirmed by negative immunohistochemical staining with cytokeratins   AE1/AE3, cam 5.2 and wide spectrum keratin with          satisfactory positive and negative controls.   2.  Left axillary sentinel lymph node #2, hot and blue; count 652, excisional   biopsy: 1 lymph node, negative for metastatic carcinoma (0/1).          Confirmed by negative immunohistochemical staining with cytokeratins   AE1/AE3, cam 5.2 and wide spectrum keratin with          satisfactory positive and negative controls.   3.  Left breast, skin sparing mastecomy: Invasive lobular carcinoma,   measuring at least 51 mm (at least 5.1 cm) in greatest dimension (slide   measurement of largest          dimension).          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          Note:  Biopsy clip is grossly identified and biopsy site changes are   identified microscopically within area of tumor.          Margins:           - All margins are greater than 10 mm (greater than 1 cm) from   invasive tumor and LCIS.          Nipple and skin are present and uninvolved by tumor.  Note:  Tumor is   present in the soft tissue underlying the  nipple skin but          does not involve nipple epidermis.          Microcalcifications are present and associated with lobular carcinoma   in situ and invasive carcinoma.          Background breast tissue shows fibrocystic changes including apocrine   metaplasia and stromal fibrosis.          See synoptic report in comment section for further details.   4.  Right port-a-cath, removal: Gross diganosis:  Port-a-cath.         ypT3:  Tumor greater than 50 mm in greatest dimension.   Regional lymph nodes:        y(sn)pN0:  No regional lymph node metastasis identified.   Distant metastasis:     Oncology History:  Oncology History   Malignant neoplasm of central portion of left female breast   3/21/2022 Initial Diagnosis    Malignant neoplasm of central portion of left female breast     3/21/2022 Biopsy    Left Breast, Core Needle Biopsies:   - Invasive lobular carcinoma, well-differentiated .   - Bloom-Damon score (5/9):        - Tubular Formation: 3/3        - Nuclear Pleomorphism: 1/3        - Mitotic Activity: 1/3.   - Background of lobular carinoma in-situ (LCIS), nuclear grade 1.   - No perineural or lymphovascular invasion is identified.        3/21/2022 Breast Tumor Markers    Estrogen: Positive  Progesterone: Positive  HER2: Negative     5/27/2022 - 5/27/2022 Chemotherapy    This was considered, but patient did not undergo chemotherapy.   Treatment Summary   Plan Name: OP BREAST DOSE-DENSE AC-T (DOXORUBICIN CYCLOPHOSPHAMIDE Q2W FOLLOWED BY PACLITAXEL WEEKLY)  Treatment Goal: Curative  Status: Inactive  Start Date:   End Date:   Provider: Mitali Carlin MD  Chemotherapy: DOXOrubicin chemo injection 130 mg, 60 mg/m2, Intravenous, Clinic/HOD 1 time, 0 of 4 cycles  cyclophosphamide 600 mg/m2 = 1,300 mg in sodium chloride 0.9% 250 mL chemo infusion, 600 mg/m2, Intravenous, Clinic/HOD 1 time, 0 of 4 cycles  PACLitaxeL (TAXOL) 80 mg/m2 = 174 mg in sodium chloride 0.9% 250 mL chemo infusion, 80 mg/m2,  Intravenous, Clinic/HOD 1 time, 0 of 12 cycles     6/7/2022 Cancer Staged    Staging form: Breast, AJCC 8th Edition  - Clinical: Stage IIA (cT3, cN0, cM0, G1, ER+, MT+, HER2-)     6/7/2022 Notable Event    Oncotype performed initially to assess for benefit of NACT to optimize for surgery but not enough tissue. Proceeded with AI x 6 months.      6/7/2022 Genetic Testing    Sky[employer]: Negative                                           1/4/2023 Breast Surgery    Left Mastectomy with L SLNB.      1/24/2023 Tumor Conference    No definitive treatment response to neoadjuvant AI use on final surgical pathology. Oncotype will now be sent on the surgical specimen. Could include CDK46 inhibitor with her endocrine if Oncotype is low given some response to Anastrozole and large tumor size, but patient is node negative. May consider to simply change to different AI. Radiation Oncology would like to meet with patient to discuss XRT, but team does not feel strongly about her proceeding.        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    left    Midline low back pain without sciatica 07/31/2015    Thyroid disease     Venous stasis     bilateral legs      Past Surgical History:   Procedure Laterality Date    BREAST BIOPSY Left 03/21/2022    Core bx, + ILC    COLONOSCOPY      COLONOSCOPY N/A 12/05/2019    Procedure: COLONOSCOPY;  Surgeon: Luis Bogran-Reyes, MD;  Location: [facility];  Service:  Endoscopy;  Laterality: N/A;    HYSTERECTOMY  12/01/2021    INJECTION FOR SENTINEL NODE IDENTIFICATION Left 1/4/2023    Procedure: INJECTION, FOR SENTINEL NODE IDENTIFICATION;  Surgeon: Erkia Valente MD;  Location[facility];  Service: General;  Laterality: Left;    INSERTION OF TUNNELED CENTRAL VENOUS CATHETER (CVC) WITH SUBCUTANEOUS PORT Right 05/24/2022    Procedure: [id number];  Surgeon: Darien Bear MD;  Location: [facility];  Service: General;  Laterality: Right;    MASTECTOMY Left 1/4/2023    Procedure: MASTECTOMY;  Surgeon: Erika Valente MD;  Location: [facility];  Service: General;  Laterality: Left;    MEDIPORT REMOVAL Right 1/4/2023    Procedure: REMOVAL PORT;  Surgeon: Erika Valente MD;  Location: [facility];  Service: General;  Laterality: Right;    SENTINEL LYMPH NODE BIOPSY Left 1/4/2023    Procedure: BIOPSY, LYMPH NODE, SENTINEL;  Surgeon: Erika Valente MD;  Location: [facility];  Service: General;  Laterality: Left;    SHOULDER ARTHROSCOPY W/ ROTATOR CUFF REPAIR Right 2001    TUBAL LIGATION      VAGINAL HYSTERECTOMY N/A 01/11/2021    Procedure: HYSTERECTOMY, VAGINAL ;  Surgeon: Jessie Dacosta MD;  Location: [facility];  Service: OB/GYN;  Laterality: N/A;     Family History   Problem Relation Age of Onset    Diabetes Mother     Hypertension Mother     Arthritis Mother     COPD Father     Hypertension Sister     Diabetes Sister     Cancer Sister         PANCREATIC    Breast cancer Sister      Social History     Tobacco Use    Smoking status: Never     Passive exposure: Never    Smokeless tobacco: Never   Substance and Sexual Activity    Alcohol use: No     Alcohol/week: 0.0 standard drinks of alcohol    Drug use: No    Sexual activity: Not Currently     Partners: Male     Birth control/protection: None, See Surgical Hx        ROS:   Review of Systems   Constitutional: Negative for fever, malaise/fatigue and weight loss.   HENT: no hearing loss, nosebleeds and sore throat.    Eyes: Negative for  double vision and photophobia.   Respiratory: no hemoptysis, sputum production, shortness of breath and wheezing.    Cardiovascular: Negative for chest pain, palpitations, orthopnea and leg swelling.   Gastrointestinal: Negative for abdominal pain, blood in stool, constipation, diarrhea heartburn, nausea and vomiting.   Genitourinary: Negative for dysuria, hematuria and urgency.   Musculoskeletal: Negative for back pain, joint pain and myalgias.   Skin: Negative for itching and rash.   Neurological: Negative for dizziness, tingling, seizures, weakness and headaches.   Endo/Heme/Allergies: Negative for polydipsia. Does not bruise/bleed easily.   Psychiatric/Behavioral: Negative for depression and memory loss. The patient is not nervous/anxious and does not have insomnia.       Objective:     Vitals:    01/31/24 1018   BP: (!) 187/84   Pulse: 69   Resp: 17   Temp: 97 °F (36.1 °C)   TempSrc: Oral   SpO2: 98%   Weight: 104.9 kg (231 lb 4.2 oz)   Height: 5' (1.524 m)   PainSc: 0-No pain           Wt Readings from Last 10 Encounters:   01/31/24 104.9 kg (231 lb 4.2 oz)   01/09/24 108.8 kg (239 lb 13.8 oz)   12/14/23 108.8 kg (239 lb 13.8 oz)   09/15/23 105.2 kg (231 lb 14.8 oz)   06/23/23 104.4 kg (230 lb 2.6 oz)   06/13/23 102.5 kg (226 lb)   03/22/23 99.8 kg (220 lb 0.3 oz)   03/15/23 99.9 kg (220 lb 3.8 oz)   03/09/23 98 kg (216 lb)   02/23/23 98.4 kg (217 lb)       Physical Examination:   Physical Exam  Vitals and nursing note reviewed.   Constitutional:       General: She is not in acute distress.     Appearance: She is not diaphoretic.   HENT:      Head: Normocephalic.   Eyes:      General: No scleral icterus.     Conjunctiva/sclera: Conjunctivae normal.   Neck:      Thyroid: No thyromegaly.   Cardiovascular:      Rate and Rhythm: Normal rate and regular rhythm.      Heart sounds: Normal heart sounds. No murmur heard.  Pulmonary:      Effort: Pulmonary effort is normal. No respiratory distress.      Breath sounds:  No stridor. No wheezing or rales.   Chest:      Chest wall: No tenderness. + stable left mastectomy w/o reconstruction. Right breast without mass or axillary adenopathy     Musculoskeletal:         General: No tenderness or deformity. Normal range of motion.      Cervical back: Neck supple.   Lymphadenopathy:      Cervical: No cervical adenopathy.   Skin:     General: Skin is warm and dry.      Findings: No erythema or rash.      Comments:+ left mastectomy w/o reconstruction. Healed incision .   Neurological:      Mental Status: She is alert and oriented to person, place, and time.      Cranial Nerves: No cranial nerve deficit.      Coordination: Coordination normal.      Gait: Gait is intact.   Psychiatric:         Mood and Affect: Affect normal.         Cognition and Memory: Memory normal.         Judgment: Judgment normal.     Diagnostic Tests:  Significant Imaging: I have reviewed and interpreted all pertinent imaging results/findings.    Laboratory Data:  All pertinent labs have been reviewed.  Labs:   Lab Results   Component Value Date    WBC 5.62 12/22/2022    RBC 4.10 12/22/2022    HGB 11.9 (L) 12/22/2022    HCT 37.2 12/22/2022    MCV 91 12/22/2022     12/22/2022     (H) 12/22/2022     12/22/2022    K 4.7 12/22/2022    BUN 23 12/22/2022    CREATININE 0.8 12/22/2022    AST 13 12/22/2022    ALT 16 12/22/2022    BILITOT 0.7 12/22/2022       Assessment/Plan:   Malignant neoplasm of central portion of left breast in female, estrogen receptor positive  cT3N0, ER 95%, PR15%, Her 2 rhina negative, Grade 1, ki 67 10%     ypT3 yp (sn) N0   Oncotype low risk, 13     She was interested in breast conservation and received 6 months of neoadjuvant anastrozole.   Follow up MRI showed some response but persistent large area of enhancement with extension to the nipple areolar complex. Mastectomy was recommended.   Final pathology and TB recommendations were discussed. There was 5 cm of ILC, node negative, no  significant treatment response, G1, Ki 67 stable at 10%.  Per guidelines, oncotype was sent, is 13, low risk, no benefit of chemotherapy.     She has completed adjuvant RT.     At this time, continue anastrozole.I will see her back in 4 months. Due for mammogram in March, ordered today.     DEXA normal at baseline. continue calcium and vitamin D. Repeat in June 2024     Primary Hypertension   Compliant with current medications.  Continue PMD follow up.       ECOG SCORE    0 - Fully active-able to carry on all pre-disease performance without restriction       Discussion:   No follow-ups on file.    Plan was discussed with the patient at length, and she verbalized understanding. Kee was given an opportunity to ask questions that were answered to her satisfaction, and she was advised to call in the interval if any problems or questions arise.Electronically signed by Mitali Carlin MD      Med Onc Chart Routing      Follow up with physician 4 months. Breast med onc   Follow up with KAYLYN    Infusion scheduling note    Injection scheduling note    Labs    Imaging Mammogram   right after 3/10/24   Pharmacy appointment    Other referrals                  Treatment Plan Information   OP ANASTROZOLE DAILY   Mitali Carlin MD   Upcoming Treatment Dates - OP ANASTROZOLE DAILY    6/24/2022       Antiemetics       Physician communication order       Take Home Chemotherapy       anastrozole (ARIMIDEX) 1 mg Tab    Therapy Plan Information  Flushes  heparin, porcine (PF) 100 unit/mL injection flush 500 Units  500 Units, Intravenous, Every visit  sodium chloride 0.9% flush 10 mL  10 mL, Intravenous, Every visit      MDM includes  :    - Acute or chronic illness or injury that poses a threat to life or bodily function  - Independent review and explanation of 3+ results from unique tests  - Discussion of management and ordering 3+ unique tests  - Extensive discussion of treatment and management  - Prescription drug management  -  Drug therapy requiring intensive monitoring for toxicity

## 2024-03-15 ENCOUNTER — HOSPITAL ENCOUNTER (OUTPATIENT)
Dept: RADIOLOGY | Facility: HOSPITAL | Age: 79
Discharge: HOME | End: 2024-03-15
Attending: INTERNAL MEDICINE
Payer: MEDICARE

## 2024-03-15 VITALS — HEIGHT: 67 IN | BODY MASS INDEX: 35 KG/M2 | WEIGHT: 223 LBS

## 2024-03-15 DIAGNOSIS — Z12.31 ENCOUNTER FOR SCREENING MAMMOGRAM FOR MALIGNANT NEOPLASM OF BREAST: ICD-10-CM

## 2024-03-15 DIAGNOSIS — C50.912 LOBULAR CARCINOMA OF LEFT BREAST: ICD-10-CM

## 2024-03-15 PROCEDURE — 77063 BREAST TOMOSYNTHESIS BI: CPT | Mod: TC,52

## 2024-03-15 PROCEDURE — 77063 BREAST TOMOSYNTHESIS BI: CPT | Mod: 26,52,, | Performed by: RADIOLOGY

## 2024-03-15 PROCEDURE — 77067 SCR MAMMO BI INCL CAD: CPT | Mod: 26,52,, | Performed by: RADIOLOGY

## 2024-03-25 ENCOUNTER — OFFICE VISIT (OUTPATIENT)
Dept: PRIMARY CARE CLINIC | Facility: CLINIC | Age: 79
End: 2024-03-25
Payer: MEDICARE

## 2024-03-25 ENCOUNTER — LAB VISIT (OUTPATIENT)
Dept: PRIMARY CARE CLINIC | Facility: CLINIC | Age: 79
End: 2024-03-25
Payer: MEDICARE

## 2024-03-25 VITALS
RESPIRATION RATE: 16 BRPM | HEART RATE: 65 BPM | DIASTOLIC BLOOD PRESSURE: 68 MMHG | TEMPERATURE: 97 F | BODY MASS INDEX: 36.43 KG/M2 | OXYGEN SATURATION: 100 % | WEIGHT: 232.13 LBS | SYSTOLIC BLOOD PRESSURE: 153 MMHG | HEIGHT: 67 IN

## 2024-03-25 DIAGNOSIS — I1A.0 RESISTANT HYPERTENSION: ICD-10-CM

## 2024-03-25 DIAGNOSIS — E11.69 TYPE 2 DIABETES MELLITUS WITH OTHER SPECIFIED COMPLICATION, WITHOUT LONG-TERM CURRENT USE OF INSULIN: ICD-10-CM

## 2024-03-25 DIAGNOSIS — E11.9 TYPE 2 DIABETES MELLITUS WITHOUT COMPLICATION, WITHOUT LONG-TERM CURRENT USE OF INSULIN: ICD-10-CM

## 2024-03-25 DIAGNOSIS — E07.9 THYROID DISEASE: ICD-10-CM

## 2024-03-25 DIAGNOSIS — E11.59 HYPERTENSION ASSOCIATED WITH DIABETES: ICD-10-CM

## 2024-03-25 DIAGNOSIS — D64.9 MILD ANEMIA: ICD-10-CM

## 2024-03-25 DIAGNOSIS — L30.9 DERMATITIS: Primary | ICD-10-CM

## 2024-03-25 DIAGNOSIS — I15.2 HYPERTENSION ASSOCIATED WITH DIABETES: ICD-10-CM

## 2024-03-25 DIAGNOSIS — I70.0 AORTIC ATHEROSCLEROSIS: ICD-10-CM

## 2024-03-25 DIAGNOSIS — E66.01 CLASS 2 SEVERE OBESITY DUE TO EXCESS CALORIES WITH SERIOUS COMORBIDITY AND BODY MASS INDEX (BMI) OF 36.0 TO 36.9 IN ADULT: ICD-10-CM

## 2024-03-25 LAB
ALBUMIN SERPL BCP-MCNC: 3.8 G/DL (ref 3.5–5.2)
ALP SERPL-CCNC: 63 U/L (ref 55–135)
ALT SERPL W/O P-5'-P-CCNC: 13 U/L (ref 10–44)
ANION GAP SERPL CALC-SCNC: 10 MMOL/L (ref 8–16)
AST SERPL-CCNC: 15 U/L (ref 10–40)
BASOPHILS # BLD AUTO: 0.03 K/UL (ref 0–0.2)
BASOPHILS NFR BLD: 0.6 % (ref 0–1.9)
BILIRUB SERPL-MCNC: 0.5 MG/DL (ref 0.1–1)
BUN SERPL-MCNC: 19 MG/DL (ref 8–23)
CALCIUM SERPL-MCNC: 10.8 MG/DL (ref 8.7–10.5)
CHLORIDE SERPL-SCNC: 104 MMOL/L (ref 95–110)
CHOLEST SERPL-MCNC: 211 MG/DL (ref 120–199)
CHOLEST/HDLC SERPL: 3.3 {RATIO} (ref 2–5)
CO2 SERPL-SCNC: 27 MMOL/L (ref 23–29)
CREAT SERPL-MCNC: 0.8 MG/DL (ref 0.5–1.4)
DIFFERENTIAL METHOD BLD: ABNORMAL
EOSINOPHIL # BLD AUTO: 0.2 K/UL (ref 0–0.5)
EOSINOPHIL NFR BLD: 3.9 % (ref 0–8)
ERYTHROCYTE [DISTWIDTH] IN BLOOD BY AUTOMATED COUNT: 12.9 % (ref 11.5–14.5)
EST. GFR  (NO RACE VARIABLE): >60 ML/MIN/1.73 M^2
ESTIMATED AVG GLUCOSE: 140 MG/DL (ref 68–131)
GLUCOSE SERPL-MCNC: 100 MG/DL (ref 70–110)
HBA1C MFR BLD: 6.5 % (ref 4–5.6)
HCT VFR BLD AUTO: 36.9 % (ref 37–48.5)
HDLC SERPL-MCNC: 64 MG/DL (ref 40–75)
HDLC SERPL: 30.3 % (ref 20–50)
HGB BLD-MCNC: 11.5 G/DL (ref 12–16)
IMM GRANULOCYTES # BLD AUTO: 0.02 K/UL (ref 0–0.04)
IMM GRANULOCYTES NFR BLD AUTO: 0.4 % (ref 0–0.5)
LDLC SERPL CALC-MCNC: 130.8 MG/DL (ref 63–159)
LYMPHOCYTES # BLD AUTO: 1.2 K/UL (ref 1–4.8)
LYMPHOCYTES NFR BLD: 21.7 % (ref 18–48)
MCH RBC QN AUTO: 29.3 PG (ref 27–31)
MCHC RBC AUTO-ENTMCNC: 31.2 G/DL (ref 32–36)
MCV RBC AUTO: 94 FL (ref 82–98)
MONOCYTES # BLD AUTO: 0.7 K/UL (ref 0.3–1)
MONOCYTES NFR BLD: 12.2 % (ref 4–15)
NEUTROPHILS # BLD AUTO: 3.3 K/UL (ref 1.8–7.7)
NEUTROPHILS NFR BLD: 61.2 % (ref 38–73)
NONHDLC SERPL-MCNC: 147 MG/DL
NRBC BLD-RTO: 0 /100 WBC
PLATELET # BLD AUTO: 306 K/UL (ref 150–450)
PMV BLD AUTO: 10.6 FL (ref 9.2–12.9)
POTASSIUM SERPL-SCNC: 4.5 MMOL/L (ref 3.5–5.1)
PROT SERPL-MCNC: 7.7 G/DL (ref 6–8.4)
RBC # BLD AUTO: 3.92 M/UL (ref 4–5.4)
SODIUM SERPL-SCNC: 141 MMOL/L (ref 136–145)
TRIGL SERPL-MCNC: 81 MG/DL (ref 30–150)
TSH SERPL DL<=0.005 MIU/L-ACNC: 0.84 UIU/ML (ref 0.4–4)
WBC # BLD AUTO: 5.43 K/UL (ref 3.9–12.7)

## 2024-03-25 PROCEDURE — 99214 OFFICE O/P EST MOD 30 MIN: CPT | Mod: S$GLB,,, | Performed by: STUDENT IN AN ORGANIZED HEALTH CARE EDUCATION/TRAINING PROGRAM

## 2024-03-25 PROCEDURE — 83036 HEMOGLOBIN GLYCOSYLATED A1C: CPT | Performed by: STUDENT IN AN ORGANIZED HEALTH CARE EDUCATION/TRAINING PROGRAM

## 2024-03-25 PROCEDURE — 85025 COMPLETE CBC W/AUTO DIFF WBC: CPT | Performed by: STUDENT IN AN ORGANIZED HEALTH CARE EDUCATION/TRAINING PROGRAM

## 2024-03-25 PROCEDURE — 1159F MED LIST DOCD IN RCRD: CPT | Mod: CPTII,S$GLB,, | Performed by: STUDENT IN AN ORGANIZED HEALTH CARE EDUCATION/TRAINING PROGRAM

## 2024-03-25 PROCEDURE — 84443 ASSAY THYROID STIM HORMONE: CPT | Performed by: STUDENT IN AN ORGANIZED HEALTH CARE EDUCATION/TRAINING PROGRAM

## 2024-03-25 PROCEDURE — 99999 PR PBB SHADOW E&M-EST. PATIENT-LVL IV: CPT | Mod: PBBFAC,,, | Performed by: STUDENT IN AN ORGANIZED HEALTH CARE EDUCATION/TRAINING PROGRAM

## 2024-03-25 PROCEDURE — 1101F PT FALLS ASSESS-DOCD LE1/YR: CPT | Mod: CPTII,S$GLB,, | Performed by: STUDENT IN AN ORGANIZED HEALTH CARE EDUCATION/TRAINING PROGRAM

## 2024-03-25 PROCEDURE — 1126F AMNT PAIN NOTED NONE PRSNT: CPT | Mod: CPTII,S$GLB,, | Performed by: STUDENT IN AN ORGANIZED HEALTH CARE EDUCATION/TRAINING PROGRAM

## 2024-03-25 PROCEDURE — 3288F FALL RISK ASSESSMENT DOCD: CPT | Mod: CPTII,S$GLB,, | Performed by: STUDENT IN AN ORGANIZED HEALTH CARE EDUCATION/TRAINING PROGRAM

## 2024-03-25 PROCEDURE — 3077F SYST BP >= 140 MM HG: CPT | Mod: CPTII,S$GLB,, | Performed by: STUDENT IN AN ORGANIZED HEALTH CARE EDUCATION/TRAINING PROGRAM

## 2024-03-25 PROCEDURE — 80053 COMPREHEN METABOLIC PANEL: CPT | Performed by: STUDENT IN AN ORGANIZED HEALTH CARE EDUCATION/TRAINING PROGRAM

## 2024-03-25 PROCEDURE — 80061 LIPID PANEL: CPT | Performed by: STUDENT IN AN ORGANIZED HEALTH CARE EDUCATION/TRAINING PROGRAM

## 2024-03-25 PROCEDURE — 3072F LOW RISK FOR RETINOPATHY: CPT | Mod: CPTII,S$GLB,, | Performed by: STUDENT IN AN ORGANIZED HEALTH CARE EDUCATION/TRAINING PROGRAM

## 2024-03-25 PROCEDURE — 3078F DIAST BP <80 MM HG: CPT | Mod: CPTII,S$GLB,, | Performed by: STUDENT IN AN ORGANIZED HEALTH CARE EDUCATION/TRAINING PROGRAM

## 2024-03-25 RX ORDER — HYDRALAZINE HYDROCHLORIDE 25 MG/1
25 TABLET, FILM COATED ORAL EVERY 12 HOURS
Qty: 60 TABLET | Refills: 2 | Status: SHIPPED | OUTPATIENT
Start: 2024-03-25 | End: 2024-04-24 | Stop reason: SDUPTHER

## 2024-03-25 RX ORDER — TRIAMCINOLONE ACETONIDE 5 MG/G
OINTMENT TOPICAL 2 TIMES DAILY PRN
Qty: 60 G | Refills: 3 | Status: SHIPPED | OUTPATIENT
Start: 2024-03-25

## 2024-03-25 NOTE — PROGRESS NOTES
03/25/2024    Kee Ramirez  [id number]    Chief Complaint   Patient presents with    [facility]       HPI    This pt is new to me and presents to est care. Pmh sig for or below     Chronic rash   Presents on both ankles L>R  Hx of triamcinolone use, but rash returns. Does itch. Has never seen dermatology    Breast Ca  Follows with oncology. Doing well    Hypothyroidism: due for TSH. Synthroid 112 mcq    HTN: amlodipine 10 mg, clonidine 0.1 TID,  losartan 100 mg; checks bp at home and above goal usually  Hx of HCTZ but stopped 2/2 dehydration  Exercise: follows at a community center and does exercise 4x weekly  Diet: eats a lot of air fried chicken; does try to watch sodium    Negative 10 point ROS outside of HPI    Social History     Socioeconomic History    Marital status:     Years of education: college   Tobacco Use    Smoking status: Never     Passive exposure: Never    Smokeless tobacco: Never   Substance and Sexual Activity    Alcohol use: No     Alcohol/week: 0.0 standard drinks of alcohol    Drug use: No    Sexual activity: Not Currently     Partners: Male     Birth control/protection: None, See Surgical Hx     Social Determinants of Health     Financial Resource Strain: Low Risk  (12/11/2023)    Overall Financial Resource Strain (CARDIA)     Difficulty of Paying Living Expenses: Not hard at all   Food Insecurity: No Food Insecurity (12/11/2023)    Hunger Vital Sign     Worried About Running Out of Food in the Last Year: Never true     Ran Out of Food in the Last Year: Never true   Transportation Needs: No Transportation Needs (12/11/2023)    PRAPARE - Transportation     Lack of Transportation (Medical): No     Lack of Transportation (Non-Medical): No   Physical Activity: Inactive (12/11/2023)    Exercise Vital Sign     Days of Exercise per Week: 0 days     Minutes of Exercise per Session: 0 min   Stress: No Stress Concern Present (12/11/2023)    [place] [employer] -  Occupational Stress Questionnaire     Feeling of Stress : Not at all   Social Connections: Unknown (12/11/2023)    Social Connection and Isolation Panel [NHANES]     Frequency of Communication with Friends and Family: More than three times a week     Frequency of Social Gatherings with Friends and Family: More than three times a week     Active Member of Clubs or Organizations: Yes     Attends Club or Organization Meetings: Patient declined     Marital Status:    Housing Stability: High Risk (12/11/2023)    Housing Stability Vital Sign     Unable to Pay for Housing in the Last Year: Yes     Number of Places Lived in the Last Year: 1     Unstable Housing in the Last Year: No           Current Outpatient Medications:     amLODIPine (NORVASC) 10 MG tablet, Take 1 tablet (10 mg total) by mouth once daily., Disp: 90 tablet, Rfl: 3    aspirin (ECOTRIN) 81 MG EC tablet, Take 81 mg by mouth once daily., Disp: , Rfl:     cloNIDine (CATAPRES) 0.1 MG tablet, Take 1 tablet (0.1 mg total) by mouth 3 (three) times daily., Disp: 90 tablet, Rfl: 11    ibuprofen (ADVIL,MOTRIN) 800 MG tablet, Take 1 tablet (800 mg total) by mouth 3 (three) times daily., Disp: 90 tablet, Rfl: 3    levothyroxine (SYNTHROID) 112 MCG tablet, Take 1 tablet (112 mcg total) by mouth before breakfast., Disp: 90 tablet, Rfl: 3    losartan (COZAAR) 100 MG tablet, Take 1 tablet (100 mg total) by mouth once daily., Disp: 90 tablet, Rfl: 3    lovastatin (MEVACOR) 20 MG tablet, Take 2 tablets (40 mg total) by mouth every evening., Disp: 180 tablet, Rfl: 3    metFORMIN (GLUCOPHAGE) 500 MG tablet, Take 1 tablet (500 mg total) by mouth daily with breakfast., Disp: 90 tablet, Rfl: 3    methylcellulose (ARTIFICIAL TEARS) 1 % ophthalmic solution, Place 1 drop into both eyes as needed., Disp: , Rfl:     multivitamin (THERAGRAN) per tablet, Take 1 tablet by mouth once daily., Disp: , Rfl:     rOPINIRole (REQUIP) 1 MG tablet, Take 1 tablet (1 mg total) by mouth  every evening., Disp: 90 tablet, Rfl: 3    gabapentin (NEURONTIN) 300 MG capsule, Take 1 capsule (300 mg total) by mouth 3 (three) times daily., Disp: 90 capsule, Rfl: 5    hydrALAZINE (APRESOLINE) 25 MG tablet, Take 1 tablet (25 mg total) by mouth every 12 (twelve) hours., Disp: 60 tablet, Rfl: 2    triamcinolone (KENALOG) 0.5 % ointment, Apply topically 2 (two) times daily as needed (rash)., Disp: 60 g, Rfl: 3      Physical Exam  Vitals:    03/25/24 1353   BP: (!) 153/68   Pulse:    Resp:    Temp:        Physical Exam      Gen: well appearing, NAD  Resp: non labored breathing, no crackles, no wheezes, CTAB  CV: RRR no murmur, gallops, rubs, no LE edema  Abd: soft nontender BS present no organomegaly    1. Dermatitis  - Ambulatory referral/consult to Dermatology; Future  - triamcinolone (KENALOG) 0.5 % ointment; Apply topically 2 (two) times daily as needed (rash).  Dispense: 60 g; Refill: 3    2. Aortic atherosclerosis  LDL above goal  On statin    3. Type 2 diabetes mellitus with other specified complication, without long-term current use of insulin  Last A1c < 6  - Lipid Panel; Future  - Hemoglobin A1C; Future    4. Class 2 severe obesity due to excess calories with serious comorbidity and body mass index (BMI) of 36.0 to 36.9 in adult  Discussed lifestyle modifications    5. Hypertension associated with diabetes  Uncontrolled   - Comprehensive Metabolic Panel; Future  - hydrALAZINE (APRESOLINE) 25 MG tablet; Take 1 tablet (25 mg total) by mouth every 12 (twelve) hours.  Dispense: 60 tablet; Refill: 2    6. Resistant hypertension  - START hydrALAZINE (APRESOLINE) 25 MG tablet; Take 1 tablet (25 mg total) by mouth every 12 (twelve) hours.  Dispense: 60 tablet; Refill: 2  RTC in 2 weeks for bp check    7. Mild anemia  Hgb stable one year ago  - CBC Auto Differential; Future    8. hypothyroidism  - TSH; Future  Last tsh wnl  Continue current regimen          RTC in 3 months for routine care    Arabella Helms  MD  Family Medicine

## 2024-03-26 ENCOUNTER — TELEPHONE (OUTPATIENT)
Dept: PRIMARY CARE CLINIC | Facility: CLINIC | Age: 79
End: 2024-03-26
Payer: MEDICARE

## 2024-03-26 ENCOUNTER — PATIENT MESSAGE (OUTPATIENT)
Dept: PRIMARY CARE CLINIC | Facility: CLINIC | Age: 79
End: 2024-03-26
Payer: MEDICARE

## 2024-03-26 RX ORDER — METFORMIN HYDROCHLORIDE 750 MG/1
750 TABLET, EXTENDED RELEASE ORAL
Qty: 90 TABLET | Refills: 3 | Status: SHIPPED | OUTPATIENT
Start: 2024-03-26 | End: 2025-03-26

## 2024-03-26 NOTE — TELEPHONE ENCOUNTER
----- Message from Arabella Helms MD sent at 3/26/2024  8:16 AM CDT -----  Please let pt know that her calcium was mildly elevated and I would like to repeat this in 4 weeks.      Her hgba1c is higher than it has been in the last 2 years. I am increasing her metformin dose, but she can still take it once daily. We will repeat her A1c in 3 months.     Otherwise, her labs were ok.

## 2024-04-03 ENCOUNTER — PATIENT MESSAGE (OUTPATIENT)
Dept: ADMINISTRATIVE | Facility: HOSPITAL | Age: 79
End: 2024-04-03
Payer: MEDICARE

## 2024-04-09 ENCOUNTER — CLINICAL SUPPORT (OUTPATIENT)
Dept: PRIMARY CARE CLINIC | Facility: CLINIC | Age: 79
End: 2024-04-09
Payer: MEDICARE

## 2024-04-09 VITALS — DIASTOLIC BLOOD PRESSURE: 71 MMHG | SYSTOLIC BLOOD PRESSURE: 153 MMHG | HEART RATE: 72 BPM

## 2024-04-09 DIAGNOSIS — I1A.0 RESISTANT HYPERTENSION: ICD-10-CM

## 2024-04-09 DIAGNOSIS — I10 PRIMARY HYPERTENSION: Primary | ICD-10-CM

## 2024-04-09 DIAGNOSIS — E11.59 HYPERTENSION ASSOCIATED WITH DIABETES: ICD-10-CM

## 2024-04-09 DIAGNOSIS — I15.2 HYPERTENSION ASSOCIATED WITH DIABETES: ICD-10-CM

## 2024-04-09 PROCEDURE — 99999 PR PBB SHADOW E&M-EST. PATIENT-LVL II: CPT | Mod: PBBFAC,,,

## 2024-04-10 NOTE — PROGRESS NOTES
Appointment scheduled as per physician's recommendationKee Ramirez 78 y.o. female is here today for Blood Pressure check.   History of HTN yes.    Review of patient's allergies indicates:   Allergen Reactions    Naproxen sodium Itching     Creatinine   Date Value Ref Range Status   03/25/2024 0.8 0.5 - 1.4 mg/dL Final     Sodium   Date Value Ref Range Status   03/25/2024 141 136 - 145 mmol/L Final     Potassium   Date Value Ref Range Status   03/25/2024 4.5 3.5 - 5.1 mmol/L Final   ]  Patient verifies taking blood pressure medications on a regular basis at the same time of the day.     Current Outpatient Medications:     amLODIPine (NORVASC) 10 MG tablet, Take 1 tablet (10 mg total) by mouth once daily., Disp: 90 tablet, Rfl: 3    aspirin (ECOTRIN) 81 MG EC tablet, Take 81 mg by mouth once daily., Disp: , Rfl:     cloNIDine (CATAPRES) 0.1 MG tablet, Take 1 tablet (0.1 mg total) by mouth 3 (three) times daily., Disp: 90 tablet, Rfl: 11    gabapentin (NEURONTIN) 300 MG capsule, Take 1 capsule (300 mg total) by mouth 3 (three) times daily., Disp: 90 capsule, Rfl: 5    hydrALAZINE (APRESOLINE) 25 MG tablet, Take 1 tablet (25 mg total) by mouth every 12 (twelve) hours., Disp: 60 tablet, Rfl: 2    ibuprofen (ADVIL,MOTRIN) 800 MG tablet, Take 1 tablet (800 mg total) by mouth 3 (three) times daily., Disp: 90 tablet, Rfl: 3    levothyroxine (SYNTHROID) 112 MCG tablet, Take 1 tablet (112 mcg total) by mouth before breakfast., Disp: 90 tablet, Rfl: 3    losartan (COZAAR) 100 MG tablet, Take 1 tablet (100 mg total) by mouth once daily., Disp: 90 tablet, Rfl: 3    lovastatin (MEVACOR) 20 MG tablet, Take 2 tablets (40 mg total) by mouth every evening., Disp: 180 tablet, Rfl: 3    metFORMIN (GLUCOPHAGE-XR) 750 MG ER 24hr tablet, Take 1 tablet (750 mg total) by mouth daily with breakfast., Disp: 90 tablet, Rfl: 3    methylcellulose (ARTIFICIAL TEARS) 1 % ophthalmic solution, Place 1 drop into both eyes as needed., Disp: , Rfl:      multivitamin (THERAGRAN) per tablet, Take 1 tablet by mouth once daily., Disp: , Rfl:     rOPINIRole (REQUIP) 1 MG tablet, Take 1 tablet (1 mg total) by mouth every evening., Disp: 90 tablet, Rfl: 3    triamcinolone (KENALOG) 0.5 % ointment, Apply topically 2 (two) times daily as needed (rash)., Disp: 60 g, Rfl: 3  Does patient have record of home blood pressure readings no..   Last dose of blood pressure medication was taken at 9:00 am.  Patient is asymptomatic.   Complains of none.    Vitals:    04/09/24 1405 04/09/24 1421   BP: (!) 156/76 (!) 153/71   BP Location: Left arm Left arm   Patient Position: Sitting Sitting   BP Method: Large (Automatic) Large (Manual)   Pulse: 76 72         Dr. Helms informed of nurse visit.

## 2024-04-24 ENCOUNTER — TELEPHONE (OUTPATIENT)
Dept: PRIMARY CARE CLINIC | Facility: CLINIC | Age: 79
End: 2024-04-24

## 2024-04-24 ENCOUNTER — PATIENT MESSAGE (OUTPATIENT)
Dept: PRIMARY CARE CLINIC | Facility: CLINIC | Age: 79
End: 2024-04-24
Payer: MEDICARE

## 2024-04-24 RX ORDER — HYDRALAZINE HYDROCHLORIDE 25 MG/1
25 TABLET, FILM COATED ORAL EVERY 8 HOURS
Qty: 90 TABLET | Refills: 3 | Status: SHIPPED | OUTPATIENT
Start: 2024-04-24 | End: 2025-04-24

## 2024-04-24 NOTE — TELEPHONE ENCOUNTER
----- Message from Arabella Helms MD sent at 4/24/2024  1:27 PM CDT -----  Regarding: blood pressure  After review of patient's blood pressure check, I have sent in a new prescription for Hydralazine 25 mg to take three times daily instead of twice daily.

## 2024-04-30 DIAGNOSIS — Z00.00 ENCOUNTER FOR MEDICARE ANNUAL WELLNESS EXAM: ICD-10-CM

## 2024-05-03 DIAGNOSIS — I10 PRIMARY HYPERTENSION: ICD-10-CM

## 2024-05-03 RX ORDER — CLONIDINE HYDROCHLORIDE 0.1 MG/1
0.1 TABLET ORAL 3 TIMES DAILY
Qty: 90 TABLET | Refills: 11 | Status: SHIPPED | OUTPATIENT
Start: 2024-05-03

## 2024-05-03 NOTE — TELEPHONE ENCOUNTER
No care due was identified.  [facility] Embedded Care Due Messages. Reference number: [id number].   5/03/2024 9:01:35 AM CDT

## 2024-06-14 ENCOUNTER — PATIENT OUTREACH (OUTPATIENT)
Dept: ADMINISTRATIVE | Facility: HOSPITAL | Age: 79
End: 2024-06-14
Payer: MEDICARE

## 2024-06-14 DIAGNOSIS — E11.69 TYPE 2 DIABETES MELLITUS WITH OTHER SPECIFIED COMPLICATION, WITHOUT LONG-TERM CURRENT USE OF INSULIN: Primary | ICD-10-CM

## 2024-06-14 NOTE — LETTER
June 14, 2024    Kee Ramirez  [address] [address]             Dear Kee    In addition to helping you feel better when you are sick, we are interested in preventing illness and injury in the first place.  Screening tests and routine follow up tests when you have certain medical problems are very essential in helping you stay as healthy as possible. In the spirit of maintaining your health, our system indicates that you are due for the following:    Health Maintenance Due   Topic Date Due    Shingles Vaccine (1 of 2) Never done    RSV Vaccine (Age 60+ and Pregnant patients) (1 - 1-dose 60+ series) Never done    Diabetes Urine Screening  11/26/2014    COVID-19 Vaccine (4 - 2023-24 season) 12/21/2023    Eye Exam  07/26/2024        Please be prepared to discuss these recommended tests at your next visit.  If you haven't had a visit within the past one year please  call [phone] to schedule or request an appointment.    Sincerely,       Your Health Care Team     
decreased ROM/impaired balance/decreased strength

## 2024-06-14 NOTE — PROGRESS NOTES
Health Maintenance Due   Topic Date Due    Shingles Vaccine (1 of 2) Never done    RSV Vaccine (Age 60+ and Pregnant patients) (1 - 1-dose 60+ series) Never done    Diabetes Urine Screening  11/26/2014    COVID-19 Vaccine (4 - 2023-24 season) 12/21/2023    Eye Exam  07/26/2024    LVM to contact the office to schedule B/P , mailed reminder.

## 2024-06-19 DIAGNOSIS — I1A.0 RESISTANT HYPERTENSION: ICD-10-CM

## 2024-06-19 DIAGNOSIS — I15.2 HYPERTENSION ASSOCIATED WITH DIABETES: ICD-10-CM

## 2024-06-19 DIAGNOSIS — E11.59 HYPERTENSION ASSOCIATED WITH DIABETES: ICD-10-CM

## 2024-06-19 NOTE — TELEPHONE ENCOUNTER
No care due was identified.  [facility] Embedded Care Due Messages. Reference number: [id number].   6/19/2024 9:38:06 AM CDT

## 2024-06-19 NOTE — PROGRESS NOTES
PROGRESS NOTE    Subjective:       Patient ID: Kee Ramirez is a 78 y.o. female.  MRN: [id number]  : 1945    Chief Complaint: ILC of the left breast     History of Present Illness:   Kee Ramirez is a 78 y.o. female who presents with  ILC of the left breast.       Per Previous note: Reports yearly mammograms, normal in . In  screening mammogram showed left breast architectural distortion. Diagnostic mammogram in March showed a 21 x 9  X 2 0 mm left breast mass. US guided biopsy showed ILC, ER strongly positive, ME 15% positive, Her 2 rhina negative.   A breast MRI showed a 7.6 x 4.7 cm mass with spiculated margins.No abnormal lymph nodes were found.      No history of breast mass or biopsies. She has been seeing Dr. Ortiz and is transferring care to use due to proximity. See full oncology history below.   She has been referred for neoadjuvant chemotherapy. We met today as a follow up visit to further discuss neoadjuvant therapy.  At her visit two weeks ago, we discussed neoadjuvant chemotherapy vs neoadjuvant endocrine therapy.  She is interested in breast conservation and is not a candidate for upfront lumpectomy given tumor size and extension to the nipple areolar complex.  She met with Dr. Valente who agrees that she may not be a candidate for breast conservation even after neoadjuvant therapies.  Patient wants to try.     An oncotype was sent to determine if she was high risk and would benefit from chemotherapy.  We were notified there was not enough tissue specimen from the original biopsy for the oncotype.  After extensive discussion, we elected to start neoadjuvant anastrozole.     2022: Started anastrozole      She was admitted to the hospital from -, found to have SBO. Has NGT for decompression and improved with conservative measures.     She completed 6 months of neoadjuvant anastrozole and has had a left mastectomy  without reconstruction on 1/4/23.     Started RT on 3/20, completed at the end of April. Has dry skin and some limited ROM.     Interim history:  Resumed anastrozole 2/22/23.  Overall she is doing well.  She notes that where her port was feels a tugging sensation, not painful.   Appetite and bowel movements are good. Denies hot flashes and arthralgias, minus arthritis in the knee.   Denies fever and chills.  Energy level is variable.         Oncology History:  As dcoumented by  and updated      03.21.2022 Left Breast, Core Needle Biopsies:   - Invasive lobular carcinoma, well-differentiated .   - Bloom-Damon score (5/9):        - Tubular Formation: 3/3        - Nuclear Pleomorphism: 1/3        - Mitotic Activity: 1/3.   - Background of lobular carinoma in-situ (LCIS), nuclear grade 1.   - No perineural or lymphovascular invasion is identified.   Estrogen receptor: Positive (strong intensity, >95% of tumor cell nuclei).   Progesterone receptor: Positive (strong intensity, 15% of tumor cell nuclei).   HER2 IHC: Negative.   Ki-67: 10%.  04.04.2022 [facility] TB -- Surgery FU w/Oncotype  04.06.2022 [facility] follow up  -Breast MRI for staging, discussed surgery options; pt would like BCT, will make definitive plan after MRI  05.03.2022 Breast MRI  -Irregular 7.6 cm enhancing spiculated mass in the upper LEFT breast, consistent with the patient's known biopsy-proven invasive lobular carcinoma. The enhancement does extend to the nipple-areolar complex.  -No suspicious abnormality in the RIGHT breast.   BI-RADS Category: Overall: 6 - known biopsy-proven malignancy  05.06.2022 Follow up with [facility]  -Plan for Axillary U/S to assess LAD, PetCT  -HemOnc referral for NA therapy. Due to size of mass/location and nature of lobular cancer, was not felt that BCT was optimal unless NA therapy was done 1st   -2 week follow up to discuss further   05.13.2022 PetCT  -Mildly hypermetabolic irregular soft tissue within the left breast in  keeping with known lobular carcinoma. This mass is better appreciated on MRI of the breast.  -No definite evidence of metastatic disease.  Please note that FDG PET has has suboptimal sensitivity for certain histologies such as lobular and tubular carcinomas.  05.18.2022 Initial [facility] eval    12/1/22  MRI     Impression:  Left  Interval decreased enhancement of left breast malignancy (biopsy proven ILC).  Residual abnormal enhancement still spans 8.1 cm AP x 2.5 cm transverse and extends to the left nipple areolar complex.     Right  There is no MR evidence of malignancy.     BI-RADS Category:   Overall: 6 - Known Biopsy-Proven Malignancy    Final Pathologic Diagnosis 1.  Left axillary sentinel lymph node #1, hot and blue; count 299, excisional   biopsy: 1 lymph node, negative for metastatic carcinoma (0/1).          Confirmed by negative immunohistochemical staining with cytokeratins   AE1/AE3, cam 5.2 and wide spectrum keratin with          satisfactory positive and negative controls.   2.  Left axillary sentinel lymph node #2, hot and blue; count 652, excisional   biopsy: 1 lymph node, negative for metastatic carcinoma (0/1).          Confirmed by negative immunohistochemical staining with cytokeratins   AE1/AE3, cam 5.2 and wide spectrum keratin with          satisfactory positive and negative controls.   3.  Left breast, skin sparing mastecomy: Invasive lobular carcinoma,   measuring at least 51 mm (at least 5.1 cm) in greatest dimension (slide   measurement of largest          dimension).          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          Note:  Biopsy clip is grossly identified and biopsy site changes are   identified microscopically within area of tumor.          Margins:           - All margins are greater than 10 mm (greater than 1 cm) from   invasive tumor and LCIS.          Nipple and skin are present and uninvolved by tumor.  Note:  Tumor is   present in the soft tissue underlying the  nipple skin but          does not involve nipple epidermis.          Microcalcifications are present and associated with lobular carcinoma   in situ and invasive carcinoma.          Background breast tissue shows fibrocystic changes including apocrine   metaplasia and stromal fibrosis.          See synoptic report in comment section for further details.   4.  Right port-a-cath, removal: Gross diganosis:  Port-a-cath.         ypT3:  Tumor greater than 50 mm in greatest dimension.   Regional lymph nodes:        y(sn)pN0:  No regional lymph node metastasis identified.   Distant metastasis:     Oncology History:  Oncology History   Malignant neoplasm of central portion of left female breast   3/21/2022 Initial Diagnosis    Malignant neoplasm of central portion of left female breast     3/21/2022 Biopsy    Left Breast, Core Needle Biopsies:   - Invasive lobular carcinoma, well-differentiated .   - Bloom-Damon score (5/9):        - Tubular Formation: 3/3        - Nuclear Pleomorphism: 1/3        - Mitotic Activity: 1/3.   - Background of lobular carinoma in-situ (LCIS), nuclear grade 1.   - No perineural or lymphovascular invasion is identified.        3/21/2022 Breast Tumor Markers    Estrogen: Positive  Progesterone: Positive  HER2: Negative     5/27/2022 - 5/27/2022 Chemotherapy    This was considered, but patient did not undergo chemotherapy.   Treatment Summary   Plan Name: OP BREAST DOSE-DENSE AC-T (DOXORUBICIN CYCLOPHOSPHAMIDE Q2W FOLLOWED BY PACLITAXEL WEEKLY)  Treatment Goal: Curative  Status: Inactive  Start Date:   End Date:   Provider: Mitali Carlin MD  Chemotherapy: DOXOrubicin chemo injection 130 mg, 60 mg/m2, Intravenous, Clinic/HOD 1 time, 0 of 4 cycles  cyclophosphamide 600 mg/m2 = 1,300 mg in sodium chloride 0.9% 250 mL chemo infusion, 600 mg/m2, Intravenous, Clinic/HOD 1 time, 0 of 4 cycles  PACLitaxeL (TAXOL) 80 mg/m2 = 174 mg in sodium chloride 0.9% 250 mL chemo infusion, 80 mg/m2,  Intravenous, Clinic/HOD 1 time, 0 of 12 cycles     6/7/2022 Cancer Staged    Staging form: Breast, AJCC 8th Edition  - Clinical: Stage IIA (cT3, cN0, cM0, G1, ER+, AL+, HER2-)     6/7/2022 Notable Event    Oncotype performed initially to assess for benefit of NACT to optimize for surgery but not enough tissue. Proceeded with AI x 6 months.      6/7/2022 Genetic Testing    Gotta'go Personal Care Device: Negative                                           1/4/2023 Breast Surgery    Left Mastectomy with L SLNB.      1/24/2023 Tumor Conference    No definitive treatment response to neoadjuvant AI use on final surgical pathology. Oncotype will now be sent on the surgical specimen. Could include CDK46 inhibitor with her endocrine if Oncotype is low given some response to Anastrozole and large tumor size, but patient is node negative. May consider to simply change to different AI. Radiation Oncology would like to meet with patient to discuss XRT, but team does not feel strongly about her proceeding.        3/20/2023 - 4/20/2023 Radiation Therapy    Treatment Summary  Course: C1 Breast 2023  Treatment Site Energy Dose/Fx (Gy) #Fx Dose Correction (Gy) Total Dose (Gy) Start Date End Date Elapsed Days   Left chest wall scar 9E 2.5 4 / 4 0 10 4/17/2023 4/20/2023 3   Left chest wall 18X/6X 2.65 16 / 16 0 42.4 3/20/2023 4/12/2023 23            History:  Past Medical History:   Diagnosis Date    Bowel obstruction     Breast cancer 05/01/2022    left    Cancer     COVID-19 07/2022    Diabetes mellitus, type 2     Hyperlipidemia     Hypertension     Lobular carcinoma in situ 05/01/2022    left    Midline low back pain without sciatica 07/31/2015    Thyroid disease     Venous stasis     bilateral legs      Past Surgical History:   Procedure Laterality Date    BREAST BIOPSY Left 03/21/2022    Core bx, + ILC    COLONOSCOPY      COLONOSCOPY N/A 12/05/2019    Procedure: COLONOSCOPY;  Surgeon: Luis Bogran-Reyes, MD;  Location: [facility];  Service:  Endoscopy;  Laterality: N/A;    HYSTERECTOMY  12/01/2021    INJECTION FOR SENTINEL NODE IDENTIFICATION Left 01/04/2023    Procedure: INJECTION, FOR SENTINEL NODE IDENTIFICATION;  Surgeon: Erika Valente MD;  Location: [facility];  Service: General;  Laterality: Left;    INSERTION OF TUNNELED CENTRAL VENOUS CATHETER (CVC) WITH SUBCUTANEOUS PORT Right 05/24/2022    Procedure: [id number]-CATH;  Surgeon: Darien Bear MD;  Location: [facility];  Service: General;  Laterality: Right;    MASTECTOMY Left 01/04/2023    Procedure: MASTECTOMY;  Surgeon: Erika Valente MD;  Location: [facility];  Service: General;  Laterality: Left;    MASTECTOMY  1/4/2023    MEDIPORT REMOVAL Right 01/04/2023    Procedure: REMOVAL PORT;  Surgeon: Erika Valente MD;  Location: [facility];  Service: General;  Laterality: Right;    SENTINEL LYMPH NODE BIOPSY Left 01/04/2023    Procedure: BIOPSY, LYMPH NODE, SENTINEL;  Surgeon: Erika Valente MD;  Location: [facility];  Service: General;  Laterality: Left;    SHOULDER ARTHROSCOPY W/ ROTATOR CUFF REPAIR Right 2001    TUBAL LIGATION      VAGINAL HYSTERECTOMY N/A 01/11/2021    Procedure: HYSTERECTOMY, VAGINAL ;  Surgeon: Jessie Dacotsa MD;  Location: [facility];  Service: OB/GYN;  Laterality: N/A;     Family History   Problem Relation Name Age of Onset    Diabetes Mother      Hypertension Mother      Arthritis Mother      COPD Father      Hypertension Sister      Diabetes Sister      Cancer Sister          PANCREATIC    Breast cancer Sister Bertha Borden      Social History     Tobacco Use    Smoking status: Never     Passive exposure: Never    Smokeless tobacco: Never   Substance and Sexual Activity    Alcohol use: No     Alcohol/week: 0.0 standard drinks of alcohol    Drug use: No    Sexual activity: Not Currently     Partners: Male     Birth control/protection: None, See Surgical Hx        ROS:   Review of Systems   Constitutional: Negative for fever and weight loss. Variable fatigue.   HENT: no hearing  "loss, nosebleeds and sore throat.    Eyes: Negative for double vision and photophobia.   Respiratory: no hemoptysis, sputum production, shortness of breath and wheezing.    Cardiovascular: Negative for chest pain, palpitations, orthopnea and leg swelling.   Gastrointestinal: Negative for abdominal pain, blood in stool, constipation, diarrhea heartburn, nausea and vomiting.   Genitourinary: Negative for dysuria, hematuria and urgency.   Musculoskeletal: Negative for back pain, joint pain and myalgias.   Skin: Negative for itching and rash.   Neurological: Negative for dizziness, tingling, seizures, weakness and headaches.   Endo/Heme/Allergies: Negative for polydipsia. Does not bruise/bleed easily.   Psychiatric/Behavioral: Negative for depression and memory loss. The patient is not nervous/anxious and does not have insomnia.       Objective:     Vitals:    06/26/24 0750   BP: (!) 155/70   Pulse: 65   Temp: 98.2 °F (36.8 °C)   TempSrc: Oral   SpO2: 96%   Weight: 106.8 kg (235 lb 7.2 oz)   Height: 5' 7.2" (1.707 m)   PainSc: 0-No pain       Wt Readings from Last 10 Encounters:   03/25/24 105.3 kg (232 lb 2.3 oz)   03/15/24 101.2 kg (223 lb)   01/31/24 104.9 kg (231 lb 4.2 oz)   01/09/24 108.8 kg (239 lb 13.8 oz)   12/14/23 108.8 kg (239 lb 13.8 oz)   09/15/23 105.2 kg (231 lb 14.8 oz)   06/23/23 104.4 kg (230 lb 2.6 oz)   06/13/23 102.5 kg (226 lb)   03/22/23 99.8 kg (220 lb 0.3 oz)   03/15/23 99.9 kg (220 lb 3.8 oz)       Physical Examination:   Physical Exam  Vitals and nursing note reviewed.   Constitutional:       General: She is not in acute distress.     Appearance: She is not diaphoretic.   HENT:      Head: Normocephalic.   Eyes:      General: No scleral icterus.     Conjunctiva/sclera: Conjunctivae normal.     Cardiovascular:      Rate and Rhythm: Normal rate and regular rhythm.      Heart sounds: Normal heart sounds. No murmur heard.  Pulmonary:      Effort: Pulmonary effort is normal. No respiratory " distress.      Breath sounds: No stridor. No wheezing or rales.   Chest:      Chest wall: No tenderness. + stable left mastectomy w/o reconstruction. No masses or tenderness noted Right breast without mass or axillary adenopathy     Musculoskeletal:         General: No tenderness or deformity. Normal range of motion.      Cervical back: Neck supple.   Lymphadenopathy:      Cervical: No cervical adenopathy.   Skin:     General: Skin is warm and dry.      Findings: No erythema or rash.      Comments:+ left mastectomy w/o reconstruction. Healed incision .   Neurological:      Mental Status: She is alert and oriented to person, place, and time.      Coordination: Coordination normal.      Gait: Gait is intact.   Psychiatric:         Mood and Affect: Affect normal.         Cognition and Memory: Memory normal.         Judgment: Judgment normal.     Diagnostic Tests:  Significant Imaging: I have reviewed and interpreted all pertinent imaging results/findings.    Laboratory Data:  All pertinent labs have been reviewed.  Labs:   Lab Results   Component Value Date    WBC 5.43 03/25/2024    RBC 3.92 (L) 03/25/2024    HGB 11.5 (L) 03/25/2024    HCT 36.9 (L) 03/25/2024    MCV 94 03/25/2024     03/25/2024     03/25/2024     03/25/2024    K 4.5 03/25/2024    BUN 19 03/25/2024    CREATININE 0.8 03/25/2024    AST 15 03/25/2024    ALT 13 03/25/2024    BILITOT 0.5 03/25/2024       Assessment/Plan:   Malignant neoplasm of central portion of left breast in female, estrogen receptor positive  cT3N0, ER 95%, PR15%, Her 2 rhina negative, Grade 1, ki 67 10%     ypT3 yp (sn) N0   Oncotype low risk, 13     She was interested in breast conservation and received 6 months of neoadjuvant anastrozole.   Follow up MRI showed some response but persistent large area of enhancement with extension to the nipple areolar complex. Mastectomy was recommended.   Final pathology and TB recommendations were discussed. There was 5 cm of ILC,  node negative, no significant treatment response, G1, Ki 67 stable at 10%.  Per guidelines, oncotype was sent, is 13, low risk, no benefit of chemotherapy.     She has completed adjuvant RT.     At this time, continue anastrozole.I will see her back in 4 months. Mammogram negative from March, repeat in 1 year.    DEXA normal at baseline. continue calcium and vitamin D. Repeat in June 2024 - ordered today    Primary Hypertension   Compliant with current medications.  Continue PMD follow up.         Discussion:   No follow-ups on file.    Plan was discussed with the patient at length, and she verbalized understanding. Kee was given an opportunity to ask questions that were answered to her satisfaction, and she was advised to call in the interval if any problems or questions arise.    Return to clinic in 4 months with KAYLYN appointment.     Patient is in agreement with the proposed treatment plan. All questions were answered to the patient's satisfaction. Patient knows to call clinic for any new or worsening symptoms and if anything is needed before the next clinic visit.          Katia Goldstein, FNP-C  Hematology & Medical Oncology   [address] [place] [id number]  ph. [phone]  Fax. [phone]    Collaborating physician, Dr. Abraham.    Approximately 10 minutes were spent face-to-face with the patient.  Approximately 20 minutes in total were spent on this encounter, which includes face-to-face time and non-face-to-face time preparing to see the patient (e.g., review of tests), obtaining and/or reviewing separately obtained history, documenting clinical information in the electronic or other health record, independently interpreting results (not separately reported) and communicating results to the patient/family/caregiver, or care coordination (not separately reported).         Med Onc Chart Routing      Follow up with physician    Follow up with KAYLYN 4 months.   Infusion scheduling note     Injection scheduling note    Labs    Imaging DXA scan   due now   Pharmacy appointment    Other referrals                  Treatment Plan Information   OP ANASTROZOLE DAILY   Mitali Carlin MD   Upcoming Treatment Dates - OP ANASTROZOLE DAILY    6/24/2022       Antiemetics       Physician communication order       Take Home Chemotherapy       anastrozole (ARIMIDEX) 1 mg Tab    Therapy Plan Information  Flushes  heparin, porcine (PF) 100 unit/mL injection flush 500 Units  500 Units, Intravenous, Every visit  sodium chloride 0.9% flush 10 mL  10 mL, Intravenous, Every visit      MDM includes  :    - Acute or chronic illness or injury that poses a threat to life or bodily function  - Independent review and explanation of 3+ results from unique tests  - Discussion of management and ordering 3+ unique tests  - Extensive discussion of treatment and management  - Prescription drug management  - Drug therapy requiring intensive monitoring for toxicity

## 2024-06-20 NOTE — TELEPHONE ENCOUNTER
Called patient advised appointment for BP checked is scheduled as requested 6/27/2024 9:00 am. Patient verbalized understanding.

## 2024-06-20 NOTE — TELEPHONE ENCOUNTER
----- Message from Erika Estrella sent at 6/20/2024  9:48 AM CDT -----  Contact: [phone] Patient  Pt is calling in regards to Pat Man MA calling her to set up a nurse visit for her BP check. Pt stated she can do next week on 06/27/2024 if it is good for the nurse. Please call and advise. Thank you.

## 2024-06-21 RX ORDER — HYDRALAZINE HYDROCHLORIDE 25 MG/1
25 TABLET, FILM COATED ORAL EVERY 8 HOURS
Qty: 270 TABLET | Refills: 3 | Status: SHIPPED | OUTPATIENT
Start: 2024-06-21 | End: 2025-06-21

## 2024-06-21 RX ORDER — HYDRALAZINE HYDROCHLORIDE 25 MG/1
25 TABLET, FILM COATED ORAL EVERY 12 HOURS
Qty: 60 TABLET | Refills: 0 | OUTPATIENT
Start: 2024-06-21

## 2024-06-26 ENCOUNTER — OFFICE VISIT (OUTPATIENT)
Dept: HEMATOLOGY/ONCOLOGY | Facility: CLINIC | Age: 79
End: 2024-06-26
Payer: MEDICARE

## 2024-06-26 ENCOUNTER — LAB VISIT (OUTPATIENT)
Dept: LAB | Facility: HOSPITAL | Age: 79
End: 2024-06-26
Attending: STUDENT IN AN ORGANIZED HEALTH CARE EDUCATION/TRAINING PROGRAM
Payer: MEDICARE

## 2024-06-26 VITALS
DIASTOLIC BLOOD PRESSURE: 70 MMHG | BODY MASS INDEX: 36.95 KG/M2 | WEIGHT: 235.44 LBS | TEMPERATURE: 98 F | OXYGEN SATURATION: 96 % | SYSTOLIC BLOOD PRESSURE: 155 MMHG | HEIGHT: 67 IN | HEART RATE: 65 BPM

## 2024-06-26 DIAGNOSIS — E66.01 CLASS 2 SEVERE OBESITY DUE TO EXCESS CALORIES WITH SERIOUS COMORBIDITY AND BODY MASS INDEX (BMI) OF 36.0 TO 36.9 IN ADULT: ICD-10-CM

## 2024-06-26 DIAGNOSIS — E11.69 TYPE 2 DIABETES MELLITUS WITH OTHER SPECIFIED COMPLICATION, WITHOUT LONG-TERM CURRENT USE OF INSULIN: ICD-10-CM

## 2024-06-26 DIAGNOSIS — Z79.811 LONG TERM (CURRENT) USE OF AROMATASE INHIBITORS: ICD-10-CM

## 2024-06-26 DIAGNOSIS — Z17.0 MALIGNANT NEOPLASM OF CENTRAL PORTION OF LEFT BREAST IN FEMALE, ESTROGEN RECEPTOR POSITIVE: Primary | ICD-10-CM

## 2024-06-26 DIAGNOSIS — C50.112 MALIGNANT NEOPLASM OF CENTRAL PORTION OF LEFT BREAST IN FEMALE, ESTROGEN RECEPTOR POSITIVE: Primary | ICD-10-CM

## 2024-06-26 LAB
ALBUMIN/CREAT UR: 22.1 UG/MG (ref 0–30)
CREAT UR-MCNC: 145 MG/DL (ref 15–325)
MICROALBUMIN UR DL<=1MG/L-MCNC: 32 UG/ML

## 2024-06-26 PROCEDURE — 82570 ASSAY OF URINE CREATININE: CPT | Performed by: STUDENT IN AN ORGANIZED HEALTH CARE EDUCATION/TRAINING PROGRAM

## 2024-06-26 PROCEDURE — 99999 PR PBB SHADOW E&M-EST. PATIENT-LVL IV: CPT | Mod: PBBFAC,,, | Performed by: NURSE PRACTITIONER

## 2024-06-26 PROCEDURE — 82043 UR ALBUMIN QUANTITATIVE: CPT | Performed by: STUDENT IN AN ORGANIZED HEALTH CARE EDUCATION/TRAINING PROGRAM

## 2024-06-26 RX ORDER — ANASTROZOLE 1 MG/1
1 TABLET ORAL DAILY
Qty: 90 TABLET | Refills: 3 | Status: SHIPPED | OUTPATIENT
Start: 2024-06-26 | End: 2025-06-26

## 2024-06-27 ENCOUNTER — CLINICAL SUPPORT (OUTPATIENT)
Dept: PRIMARY CARE CLINIC | Facility: CLINIC | Age: 79
End: 2024-06-27
Payer: MEDICARE

## 2024-06-27 VITALS — DIASTOLIC BLOOD PRESSURE: 84 MMHG | SYSTOLIC BLOOD PRESSURE: 122 MMHG | HEART RATE: 78 BPM

## 2024-06-27 DIAGNOSIS — I10 PRIMARY HYPERTENSION: Primary | ICD-10-CM

## 2024-06-27 DIAGNOSIS — G89.29 CHRONIC BILATERAL LOW BACK PAIN WITH LEFT-SIDED SCIATICA: ICD-10-CM

## 2024-06-27 DIAGNOSIS — M54.42 CHRONIC BILATERAL LOW BACK PAIN WITH LEFT-SIDED SCIATICA: ICD-10-CM

## 2024-06-27 PROCEDURE — 99999 PR PBB SHADOW E&M-EST. PATIENT-LVL I: CPT | Mod: PBBFAC,,,

## 2024-06-27 NOTE — TELEPHONE ENCOUNTER
Care Due:                  Date            Visit Type   Department     Provider  --------------------------------------------------------------------------------                                             [facility] PRIMARY  Last Visit: 03-      [facility]           Arabella Helms  Next Visit: None Scheduled  None         None Found                                                            Last  Test          Frequency    Reason                     Performed    Due Date  --------------------------------------------------------------------------------    HBA1C.......  6 months...  metFORMIN................  03- 09-    [facility] Embedded Care Due Messages. Reference number: [id number].   6/27/2024 10:06:07 AM CDT

## 2024-06-27 NOTE — PROGRESS NOTES
Kee Ramirez 78 y.o. female is here today for Blood Pressure check.   History of HTN yes.    Review of patient's allergies indicates:   Allergen Reactions    Naproxen sodium Itching     Creatinine   Date Value Ref Range Status   03/25/2024 0.8 0.5 - 1.4 mg/dL Final     Sodium   Date Value Ref Range Status   03/25/2024 141 136 - 145 mmol/L Final     Potassium   Date Value Ref Range Status   03/25/2024 4.5 3.5 - 5.1 mmol/L Final   ]  Patient verifies taking blood pressure medications on a regular basis at the same time of the day.     Current Outpatient Medications:     amLODIPine (NORVASC) 10 MG tablet, Take 1 tablet (10 mg total) by mouth once daily., Disp: 90 tablet, Rfl: 3    anastrozole (ARIMIDEX) 1 mg Tab, Take 1 tablet (1 mg total) by mouth once daily., Disp: 90 tablet, Rfl: 3    aspirin (ECOTRIN) 81 MG EC tablet, Take 81 mg by mouth once daily., Disp: , Rfl:     cloNIDine (CATAPRES) 0.1 MG tablet, Take 1 tablet (0.1 mg total) by mouth 3 (three) times daily., Disp: 90 tablet, Rfl: 11    gabapentin (NEURONTIN) 300 MG capsule, Take 1 capsule (300 mg total) by mouth 3 (three) times daily., Disp: 90 capsule, Rfl: 5    hydrALAZINE (APRESOLINE) 25 MG tablet, Take 1 tablet (25 mg total) by mouth every 8 (eight) hours., Disp: 270 tablet, Rfl: 3    ibuprofen (ADVIL,MOTRIN) 800 MG tablet, Take 1 tablet (800 mg total) by mouth 3 (three) times daily., Disp: 90 tablet, Rfl: 3    levothyroxine (SYNTHROID) 112 MCG tablet, Take 1 tablet (112 mcg total) by mouth before breakfast., Disp: 90 tablet, Rfl: 3    losartan (COZAAR) 100 MG tablet, Take 1 tablet (100 mg total) by mouth once daily., Disp: 90 tablet, Rfl: 3    lovastatin (MEVACOR) 20 MG tablet, Take 2 tablets (40 mg total) by mouth every evening., Disp: 180 tablet, Rfl: 3    metFORMIN (GLUCOPHAGE-XR) 750 MG ER 24hr tablet, Take 1 tablet (750 mg total) by mouth daily with breakfast., Disp: 90 tablet, Rfl: 3    methylcellulose (ARTIFICIAL TEARS) 1 % ophthalmic solution,  Place 1 drop into both eyes as needed., Disp: , Rfl:     multivitamin (THERAGRAN) per tablet, Take 1 tablet by mouth once daily., Disp: , Rfl:     rOPINIRole (REQUIP) 1 MG tablet, Take 1 tablet (1 mg total) by mouth every evening., Disp: 90 tablet, Rfl: 3    triamcinolone (KENALOG) 0.5 % ointment, Apply topically 2 (two) times daily as needed (rash)., Disp: 60 g, Rfl: 3  Does patient have record of home blood pressure readings no.   Last dose of blood pressure medication was taken at this morning..  Patient is asymptomatic.   Complains of none.    Vitals:    06/27/24 0909   BP: 122/84   BP Location: Right arm   Patient Position: Sitting   BP Method: Large (Manual)   Pulse: 78         Dr. Helms informed of nurse visit.

## 2024-06-28 DIAGNOSIS — G89.29 CHRONIC BILATERAL LOW BACK PAIN WITH LEFT-SIDED SCIATICA: ICD-10-CM

## 2024-06-28 DIAGNOSIS — M54.42 CHRONIC BILATERAL LOW BACK PAIN WITH LEFT-SIDED SCIATICA: ICD-10-CM

## 2024-06-28 RX ORDER — IBUPROFEN 800 MG/1
800 TABLET ORAL 3 TIMES DAILY
Qty: 90 TABLET | Refills: 3 | Status: SHIPPED | OUTPATIENT
Start: 2024-06-28

## 2024-06-28 RX ORDER — IBUPROFEN 800 MG/1
800 TABLET ORAL 3 TIMES DAILY
Qty: 90 TABLET | Refills: 3 | Status: SHIPPED | OUTPATIENT
Start: 2024-06-28 | End: 2024-06-28 | Stop reason: SDUPTHER

## 2024-07-09 ENCOUNTER — PATIENT MESSAGE (OUTPATIENT)
Dept: ADMINISTRATIVE | Facility: HOSPITAL | Age: 79
End: 2024-07-09
Payer: MEDICARE

## 2024-07-17 ENCOUNTER — HOSPITAL ENCOUNTER (OUTPATIENT)
Dept: RADIOLOGY | Facility: OTHER | Age: 79
Discharge: HOME | End: 2024-07-17
Attending: NURSE PRACTITIONER
Payer: MEDICARE

## 2024-07-17 DIAGNOSIS — Z79.811 LONG TERM (CURRENT) USE OF AROMATASE INHIBITORS: ICD-10-CM

## 2024-07-17 PROCEDURE — 77080 DXA BONE DENSITY AXIAL: CPT | Mod: TC

## 2024-07-17 PROCEDURE — 77080 DXA BONE DENSITY AXIAL: CPT | Mod: 26,,, | Performed by: RADIOLOGY

## 2024-07-18 DIAGNOSIS — L30.9 DERMATITIS: ICD-10-CM

## 2024-07-18 RX ORDER — TRIAMCINOLONE ACETONIDE 5 MG/G
OINTMENT TOPICAL
Qty: 60 G | Refills: 0 | Status: SHIPPED | OUTPATIENT
Start: 2024-07-18

## 2024-07-18 NOTE — TELEPHONE ENCOUNTER
No care due was identified.  [facility] Embedded Care Due Messages. Reference number: [id number].   7/18/2024 9:35:12 AM CDT

## 2024-07-18 NOTE — TELEPHONE ENCOUNTER
Refill Decision Note   Kee Ramirez  is requesting a refill authorization.  Brief Assessment and Rationale for Refill:  Approve     Medication Therapy Plan:         Comments:     Note composed:4:52 PM 07/18/2024

## 2024-07-21 ENCOUNTER — OFFICE VISIT (OUTPATIENT)
Dept: URGENT CARE | Facility: CLINIC | Age: 79
End: 2024-07-21
Payer: MEDICARE

## 2024-07-21 VITALS
TEMPERATURE: 98 F | WEIGHT: 230 LBS | SYSTOLIC BLOOD PRESSURE: 179 MMHG | HEART RATE: 71 BPM | RESPIRATION RATE: 18 BRPM | HEIGHT: 67 IN | OXYGEN SATURATION: 97 % | DIASTOLIC BLOOD PRESSURE: 76 MMHG | BODY MASS INDEX: 36.1 KG/M2

## 2024-07-21 DIAGNOSIS — B02.9 HERPES ZOSTER WITHOUT COMPLICATION: Primary | ICD-10-CM

## 2024-07-21 DIAGNOSIS — I10 ELEVATED BLOOD PRESSURE READING IN OFFICE WITH DIAGNOSIS OF HYPERTENSION: ICD-10-CM

## 2024-07-21 DIAGNOSIS — M54.9 BACK PAIN, UNSPECIFIED BACK LOCATION, UNSPECIFIED BACK PAIN LATERALITY, UNSPECIFIED CHRONICITY: ICD-10-CM

## 2024-07-21 LAB
BILIRUBIN, UA POC OHS: NEGATIVE
BLOOD, UA POC OHS: ABNORMAL
CLARITY, UA POC OHS: CLEAR
COLOR, UA POC OHS: YELLOW
GLUCOSE, UA POC OHS: NEGATIVE
KETONES, UA POC OHS: NEGATIVE
LEUKOCYTES, UA POC OHS: ABNORMAL
NITRITE, UA POC OHS: NEGATIVE
PH, UA POC OHS: 5.5
PROTEIN, UA POC OHS: NEGATIVE
SPECIFIC GRAVITY, UA POC OHS: 1.02
UROBILINOGEN, UA POC OHS: 0.2

## 2024-07-21 PROCEDURE — 81003 URINALYSIS AUTO W/O SCOPE: CPT | Mod: QW,S$GLB,, | Performed by: NURSE PRACTITIONER

## 2024-07-21 PROCEDURE — 99213 OFFICE O/P EST LOW 20 MIN: CPT | Mod: S$GLB,,, | Performed by: NURSE PRACTITIONER

## 2024-07-21 PROCEDURE — 87086 URINE CULTURE/COLONY COUNT: CPT | Performed by: NURSE PRACTITIONER

## 2024-07-21 RX ORDER — VALACYCLOVIR HYDROCHLORIDE 1 G/1
1000 TABLET, FILM COATED ORAL 3 TIMES DAILY
Qty: 21 TABLET | Refills: 0 | Status: SHIPPED | OUTPATIENT
Start: 2024-07-21 | End: 2024-07-28

## 2024-07-21 RX ORDER — GABAPENTIN 100 MG/1
100 CAPSULE ORAL 3 TIMES DAILY
Qty: 15 CAPSULE | Refills: 0 | Status: SHIPPED | OUTPATIENT
Start: 2024-07-21

## 2024-07-21 RX ORDER — CEPHALEXIN 500 MG/1
500 CAPSULE ORAL EVERY 12 HOURS
Qty: 10 CAPSULE | Refills: 0 | Status: SHIPPED | OUTPATIENT
Start: 2024-07-21 | End: 2024-07-26

## 2024-07-21 NOTE — PROGRESS NOTES
"Subjective:      Patient ID: Kee Ramirez is a 78 y.o. female.    Vitals:  height is 5' 7" (1.702 m) and weight is 104.3 kg (230 lb). Her temperature is 98.2 °F (36.8 °C). Her blood pressure is 179/76 (abnormal) and her pulse is 71. Her respiration is 18 and oxygen saturation is 97%.     Chief Complaint: Breast Pain    Patient has had 3 days pain of left side/back pain. She states the pain is sharp along her previous  scare line.She has also not taken her blood pressure medication or anything for this pain.  Provider note below:  This is a 78 y.o. female who presents today with a chief complaint of pain on her left-sided back that started 3 days ago, patient reports pain got worse last night, denies flank pain denies urinary frequency, urgency, dysuria or hematuria, patient reports pain feels sharp and radiates to her left breast scar from previous breast surgery, denies any rash, denies any tingling sensation to rash, denies any burning sensation to rash, denies fever, body aches or chills, denies cough, wheezing or shortness of breath, denies nausea, vomiting, diarrhea or abdominal pain, denies chest pain or dizziness positional lightheadedness, denies sore throat or trouble swallowing, denies loss of taste or smell, or any other symptoms       Back Pain  This is a new problem. The current episode started in the past 7 days. The problem has been gradually worsening since onset. The pain is at a severity of 8/10. The pain is moderate. The pain is Worse during the night. The symptoms are aggravated by sitting.       Musculoskeletal:  Positive for back pain.      Objective:     Physical Exam   Constitutional: She is oriented to person, place, and time. She appears well-developed. She is cooperative. No distress.   HENT:   Head: Normocephalic and atraumatic.   Nose: Nose normal.   Mouth/Throat: Oropharynx is clear and moist and mucous membranes are normal.   Eyes: Conjunctivae and lids are normal.   Neck: Trachea " normal and phonation normal. Neck supple.   Cardiovascular: Normal rate, regular rhythm, normal heart sounds and normal pulses.   Pulmonary/Chest: Effort normal and breath sounds normal.   Abdominal: Normal appearance and bowel sounds are normal. She exhibits no mass. Soft. There is no left CVA tenderness and no right CVA tenderness.   Musculoskeletal:         General: No deformity.   Neurological: She is alert and oriented to person, place, and time. She has normal strength and normal reflexes. No sensory deficit.   Skin: Skin is warm, dry, intact, not diaphoretic and rash (questionable likely developing vesicles to left sided back with erythema, ttp, no swelling noted).   Psychiatric: Her speech is normal and behavior is normal. Judgment and thought content normal.   Nursing note and vitals reviewed.           Results for orders placed or performed in visit on 07/21/24   POCT Urinalysis(Instrument)   Result Value Ref Range    Color, POC UA Yellow Yellow, Straw, Colorless    Clarity, POC UA Clear Clear    Glucose, POC UA Negative Negative    Bilirubin, POC UA Negative Negative    Ketones, POC UA Negative Negative    Spec Grav POC UA 1.020 1.005 - 1.030    Blood, POC UA Trace-intact (A) Negative    pH, POC UA 5.5 5.0 - 8.0    Protein, POC UA Negative Negative    Urobilinogen, POC UA 0.2 <=1.0    Nitrite, POC UA Negative Negative    WBC, POC UA Trace (A) Negative         Patient in no acute distress.  Discussed results/diagnosis/plan in depth with patient in clinic. Strict precautions given to patient to monitor for worsening signs and symptoms. Advised to follow up with primary.All questions answered. Strict ER precautions given. If your symptoms worsens or fail to improve you should go to the Emergency Room. Discharge and follow-up instructions given verbally/printed. Discharge and follow-up instructions discussed with the patient who expressed understanding and willingness to comply with my recommendations.Patient  voiced understanding and in agreement with current treatment plan.     Please be advised this text was dictated with [employer] software and may contain errors due to translation.   Assessment:     1. Herpes zoster without complication    2. Back pain, unspecified back location, unspecified back pain laterality, unspecified chronicity    3. Elevated blood pressure reading in office with diagnosis of hypertension        Plan:       Herpes zoster without complication  -     valACYclovir (VALTREX) 1000 MG tablet; Take 1 tablet (1,000 mg total) by mouth 3 (three) times daily. for 7 days  Dispense: 21 tablet; Refill: 0  -     gabapentin (NEURONTIN) 100 MG capsule; Take 1 capsule (100 mg total) by mouth 3 (three) times daily.  Dispense: 15 capsule; Refill: 0    Back pain, unspecified back location, unspecified back pain laterality, unspecified chronicity  -     POCT Urinalysis(Instrument)  -     Urine culture    Elevated blood pressure reading in office with diagnosis of hypertension    Other orders  -     cephALEXin (KEFLEX) 500 MG capsule; Take 1 capsule (500 mg total) by mouth every 12 (twelve) hours. for 5 days  Dispense: 10 capsule; Refill: 0          Medical Decision Making:   History:   Old Medical Records: I decided to obtain old medical records.  Old Records Summarized: records from clinic visits and records from previous admission(s).  Differential Diagnosis:   Differential diagnoses included but not limited to radicular low back pain, lumbar spinal stenosis, UTI, pyelonephritis, shingles, dermatitis   Clinical Tests:   Lab Tests: Reviewed  Urgent Care Management:  Patient in no acute distress.  Elevated BP noticed in clinic.  Patient reported she did not took any of her BP medications this morning.  No chest pain or shortness of breath.  On exam, patient is nontoxic appearing and afebrile.  Lungs CTA.  Physical examination and patient presenting symptoms consistent with likely developing shingles.  Will cover her  with Valtrex.  I discussed the pain medication with patient, however patient with history of small-bowel obstruction will deferred treatment with narcotics instead will prescribe her the gabapentin that she used to take previously for sciatica pain and tolerated.  No CVA tenderness.  Urinalysis as above.  Urinalysis with trace WBC as well as blood.  Will cover her with Keflex for possible bladder infection, however no UTI symptoms.  Will send off urine culture for further evaluation.  Red flags discussed with patient in detail.  Detailed education provided about side effects and recommendations.  Medication prescribed and over-the-counter medication discussed with patient at length.  Proper hydration advised.  I reiterated the importance of further evaluation if no improvement symptoms and follow-up with primary. Patient voiced understanding and in agreement with current treatment plan.             Patient Instructions   Please be aware your blood pressure was slightly elevated today -  Make sure to take your blood pressure medicines, eat a low salt diet and recheck your blood pressure to make sure it is not getting too elevated ( greater than 160/100).   Advised pt to take bp again when well - if still elevated f/u with pcp.      PLEASE READ YOUR DISCHARGE INSTRUCTIONS ENTIRELY AS IT CONTAINS IMPORTANT INFORMATION.    Take the valtrex (valcyclovir) to completion.     Take the pain medication (tramadol) only if needed for severe pain. Take a half of a pill first and see how it affects you then take a whole if needed.   Do not drive for 4 hours after taking this medication.     Avoid touching the rash as it can spread. Do not touch your eyes.   If you get a lesion by your eye please be seen by a doctor quickly as this can cause blindness.       Please return or see your primary care doctor if you develop new or worsening symptoms.     Please arrange follow up with your primary medical clinic as soon as possible. You  must understand that you've received an Urgent Care treatment only and that you may be released before all of your medical problems are known or treated. You, the patient, will arrange for follow up as instructed. If your symptoms worsen or fail to improve you should go to the Emergency Room.  WE CANNOT RULE OUT ALL POSSIBLE CAUSES OF YOUR SYMPTOMS IN THE URGENT CARE SETTING PLEASE GO TO THE ER IF YOU FEELS YOUR CONDITION IS WORSENING OR YOU WOULD LIKE EMERGENT EVALUATION.

## 2024-07-21 NOTE — PATIENT INSTRUCTIONS
Please be aware your blood pressure was slightly elevated today -  Make sure to take your blood pressure medicines, eat a low salt diet and recheck your blood pressure to make sure it is not getting too elevated ( greater than 160/100).   Advised pt to take bp again when well - if still elevated f/u with pcp.      PLEASE READ YOUR DISCHARGE INSTRUCTIONS ENTIRELY AS IT CONTAINS IMPORTANT INFORMATION.    Take the valtrex (valcyclovir) to completion.     Take the pain medication (tramadol) only if needed for severe pain. Take a half of a pill first and see how it affects you then take a whole if needed.   Do not drive for 4 hours after taking this medication.     Avoid touching the rash as it can spread. Do not touch your eyes.   If you get a lesion by your eye please be seen by a doctor quickly as this can cause blindness.       Please return or see your primary care doctor if you develop new or worsening symptoms.     Please arrange follow up with your primary medical clinic as soon as possible. You must understand that you've received an Urgent Care treatment only and that you may be released before all of your medical problems are known or treated. You, the patient, will arrange for follow up as instructed. If your symptoms worsen or fail to improve you should go to the Emergency Room.  WE CANNOT RULE OUT ALL POSSIBLE CAUSES OF YOUR SYMPTOMS IN THE URGENT CARE SETTING PLEASE GO TO THE ER IF YOU FEELS YOUR CONDITION IS WORSENING OR YOU WOULD LIKE EMERGENT EVALUATION.

## 2024-07-22 ENCOUNTER — PATIENT MESSAGE (OUTPATIENT)
Dept: PRIMARY CARE CLINIC | Facility: CLINIC | Age: 79
End: 2024-07-22
Payer: MEDICARE

## 2024-07-23 LAB — BACTERIA UR CULT: NORMAL

## 2024-08-04 ENCOUNTER — OFFICE VISIT (OUTPATIENT)
Dept: URGENT CARE | Facility: CLINIC | Age: 79
End: 2024-08-04
Payer: MEDICARE

## 2024-08-04 VITALS
SYSTOLIC BLOOD PRESSURE: 170 MMHG | OXYGEN SATURATION: 98 % | BODY MASS INDEX: 36.09 KG/M2 | RESPIRATION RATE: 16 BRPM | DIASTOLIC BLOOD PRESSURE: 73 MMHG | HEIGHT: 67 IN | TEMPERATURE: 98 F | WEIGHT: 229.94 LBS | HEART RATE: 69 BPM

## 2024-08-04 DIAGNOSIS — B02.9 HERPES ZOSTER WITHOUT COMPLICATION: ICD-10-CM

## 2024-08-04 PROCEDURE — 99213 OFFICE O/P EST LOW 20 MIN: CPT | Mod: ,,, | Performed by: NURSE PRACTITIONER

## 2024-08-04 RX ORDER — GABAPENTIN 100 MG/1
100 CAPSULE ORAL 3 TIMES DAILY
Qty: 30 CAPSULE | Refills: 0 | Status: SHIPPED | OUTPATIENT
Start: 2024-08-04

## 2024-08-04 RX ORDER — VALACYCLOVIR HYDROCHLORIDE 1 G/1
1000 TABLET, FILM COATED ORAL 3 TIMES DAILY
Qty: 21 TABLET | Refills: 0 | Status: SHIPPED | OUTPATIENT
Start: 2024-08-04 | End: 2024-08-11

## 2024-08-07 ENCOUNTER — PATIENT MESSAGE (OUTPATIENT)
Dept: PRIMARY CARE CLINIC | Facility: CLINIC | Age: 79
End: 2024-08-07
Payer: MEDICARE

## 2024-08-10 ENCOUNTER — HOSPITAL ENCOUNTER (EMERGENCY)
Facility: HOSPITAL | Age: 79
Discharge: HOME | End: 2024-08-10
Attending: EMERGENCY MEDICINE
Payer: MEDICARE

## 2024-08-10 VITALS
WEIGHT: 229.25 LBS | BODY MASS INDEX: 35.98 KG/M2 | HEART RATE: 66 BPM | TEMPERATURE: 98 F | SYSTOLIC BLOOD PRESSURE: 179 MMHG | DIASTOLIC BLOOD PRESSURE: 84 MMHG | RESPIRATION RATE: 19 BRPM | HEIGHT: 67 IN | OXYGEN SATURATION: 99 %

## 2024-08-10 DIAGNOSIS — I10 HYPERTENSION, UNSPECIFIED TYPE: ICD-10-CM

## 2024-08-10 DIAGNOSIS — R21 RASH: ICD-10-CM

## 2024-08-10 DIAGNOSIS — B02.8 HERPES ZOSTER WITH COMPLICATION: Primary | ICD-10-CM

## 2024-08-10 PROCEDURE — 99284 EMERGENCY DEPT VISIT MOD MDM: CPT

## 2024-08-10 PROCEDURE — 25000003 PHARM REV CODE 250

## 2024-08-10 RX ORDER — ACETAMINOPHEN 500 MG
500 TABLET ORAL EVERY 6 HOURS PRN
Qty: 30 TABLET | Refills: 0 | Status: SHIPPED | OUTPATIENT
Start: 2024-08-10

## 2024-08-10 RX ORDER — OXYCODONE HYDROCHLORIDE 5 MG/1
5 TABLET ORAL EVERY 4 HOURS PRN
Qty: 11 TABLET | Refills: 0 | Status: SHIPPED | OUTPATIENT
Start: 2024-08-10

## 2024-08-10 RX ORDER — OXYCODONE HYDROCHLORIDE 5 MG/1
5 TABLET ORAL
Status: COMPLETED | OUTPATIENT
Start: 2024-08-10 | End: 2024-08-10

## 2024-08-10 RX ORDER — ACETAMINOPHEN 500 MG
1000 TABLET ORAL
Status: COMPLETED | OUTPATIENT
Start: 2024-08-10 | End: 2024-08-10

## 2024-08-10 RX ORDER — GABAPENTIN 100 MG/1
100 CAPSULE ORAL 3 TIMES DAILY
Qty: 90 CAPSULE | Refills: 11 | Status: SHIPPED | OUTPATIENT
Start: 2024-08-10 | End: 2025-08-10

## 2024-08-10 RX ORDER — ACETAMINOPHEN AND CODEINE PHOSPHATE 300; 30 MG/1; MG/1
1 TABLET ORAL EVERY 6 HOURS PRN
Qty: 12 TABLET | Refills: 0 | Status: CANCELLED | OUTPATIENT
Start: 2024-08-10 | End: 2024-08-20

## 2024-08-10 RX ORDER — GABAPENTIN 300 MG/1
300 CAPSULE ORAL 3 TIMES DAILY
Status: DISCONTINUED | OUTPATIENT
Start: 2024-08-10 | End: 2024-08-10 | Stop reason: HOSPADM

## 2024-08-10 RX ORDER — IBUPROFEN 400 MG/1
400 TABLET ORAL EVERY 6 HOURS PRN
Qty: 20 TABLET | Refills: 0 | Status: SHIPPED | OUTPATIENT
Start: 2024-08-10

## 2024-08-10 RX ADMIN — OXYCODONE 5 MG: 5 TABLET ORAL at 12:08

## 2024-08-10 RX ADMIN — ACETAMINOPHEN 1000 MG: 500 TABLET ORAL at 12:08

## 2024-08-10 NOTE — ED PROVIDER NOTES
Encounter Date: 8/10/2024       History     Chief Complaint   Patient presents with    Rash     Seen and evaluated at  for CC and dx'd with shingles. Patient denies changes in appearance of rash; however, endorses severe pain to site      Patient is a 79-year-old female presents today for a rash on her left back.  Past medical history breast cancer, left mastectomy, type 2 diabetes, hyperlipidemia, hypertension, chickenpox, and shingles as a child.  Two weeks ago she was seen at an urgent care for a rash that spanned across the left upper side of her back.  She was there diagnosed with shingles and given acyclovir and gabapentin and discharged.  She endorses being compliant with both her acyclovir and gabapentin throughout the past 2 weeks. Today her and her son endorses that the rash is gone but the pain and itchiness symptoms persist.  The pain got so bad today that she decided to come in.  She describes the rash as itchy burning stripes.  Denies any other symptoms today.  Denies chest pain, shortness of breath, cough, runny nose, sore throat, or any other respiratory symptoms.  Denies any abdominal pain or trouble using the restroom.  Denies any sick contacts.        Review of patient's allergies indicates:   Allergen Reactions    Naproxen sodium Itching     Past Medical History:   Diagnosis Date    Bowel obstruction     Breast cancer 05/01/2022    left    Cancer     COVID-19 07/2022    Diabetes mellitus, type 2     Hyperlipidemia     Hypertension     Lobular carcinoma in situ 05/01/2022    left    Midline low back pain without sciatica 07/31/2015    Thyroid disease     Venous stasis     bilateral legs     Past Surgical History:   Procedure Laterality Date    BREAST BIOPSY Left 03/21/2022    Core bx, + ILC    COLONOSCOPY      COLONOSCOPY N/A 12/05/2019    Procedure: COLONOSCOPY;  Surgeon: Luis Bogran-Reyes, MD;  Location: [facility];  Service: Endoscopy;  Laterality: N/A;    HYSTERECTOMY  12/01/2021    INJECTION  FOR SENTINEL NODE IDENTIFICATION Left 01/04/2023    Procedure: INJECTION, FOR SENTINEL NODE IDENTIFICATION;  Surgeon: Erika Valente MD;  Location: [facility];  Service: General;  Laterality: Left;    INSERTION OF TUNNELED CENTRAL VENOUS CATHETER (CVC) WITH SUBCUTANEOUS PORT Right 05/24/2022    Procedure: [id number];  Surgeon: Darien Bear MD;  Location: [facility];  Service: General;  Laterality: Right;    MASTECTOMY Left 01/04/2023    Procedure: MASTECTOMY;  Surgeon: Erika Valente MD;  Location: [facility] OR;  Service: General;  Laterality: Left;    MASTECTOMY  1/4/2023    MEDIPORT REMOVAL Right 01/04/2023    Procedure: REMOVAL PORT;  Surgeon: Erika Valente MD;  Location: [facility];  Service: General;  Laterality: Right;    SENTINEL LYMPH NODE BIOPSY Left 01/04/2023    Procedure: BIOPSY, LYMPH NODE, SENTINEL;  Surgeon: Erika Valente MD;  Location: [facility];  Service: General;  Laterality: Left;    SHOULDER ARTHROSCOPY W/ ROTATOR CUFF REPAIR Right 2001    TUBAL LIGATION      VAGINAL HYSTERECTOMY N/A 01/11/2021    Procedure: HYSTERECTOMY, VAGINAL ;  Surgeon: Jessie Dacosta MD;  Location: [facility];  Service: OB/GYN;  Laterality: N/A;     Family History   Problem Relation Name Age of Onset    Diabetes Mother      Hypertension Mother      Arthritis Mother      COPD Father      Hypertension Sister      Diabetes Sister      Cancer Sister          PANCREATIC    Breast cancer Sister Bertha Borden      Social History     Tobacco Use    Smoking status: Never     Passive exposure: Never    Smokeless tobacco: Never   Substance Use Topics    Alcohol use: No     Alcohol/week: 0.0 standard drinks of alcohol    Drug use: No     Review of Systems    Physical Exam     Initial Vitals [08/10/24 1104]   BP Pulse Resp Temp SpO2   (!) 204/91 69 19 98.3 °F (36.8 °C) 99 %      MAP       --         Vitals:    08/10/24 1217 08/10/24 1318 08/10/24 1319 08/10/24 1324   BP:   (!) 179/84    BP Location:       Pulse:    66   Resp: 16  19     Temp:  98.3 °F (36.8 °C)     TempSrc:  Oral     SpO2:       Weight:       Height:           Physical Exam    Nursing note and vitals reviewed.  Constitutional: She appears well-developed and well-nourished. She is not diaphoretic. No distress.   HENT:   Head: Normocephalic and atraumatic.   Eyes: EOM are normal. Pupils are equal, round, and reactive to light. Left eye exhibits no discharge. No scleral icterus.   Cardiovascular:  Normal rate and regular rhythm.     Exam reveals no gallop and no friction rub.       No murmur heard.  Pulmonary/Chest: No respiratory distress. She has no wheezes. She has no rales. She exhibits no tenderness.   Abdominal: Abdomen is soft. Bowel sounds are normal. She exhibits no distension. There is no abdominal tenderness.   Musculoskeletal:         General: No tenderness or edema.     Neurological: She is alert and oriented to person, place, and time. She has normal strength and normal reflexes.   Skin: Skin is warm and dry. No abscess noted. No erythema. No pallor.   +hyperesthesia  +left mid thoracic mid-back scarring from a rash, +left-upper back scarring from rash  No active rash  No vesicles  No erythema     Psychiatric: She has a normal mood and affect.         ED Course   Procedures  Labs Reviewed - No data to display       Imaging Results    None          Medications   acetaminophen tablet 1,000 mg (1,000 mg Oral Given 8/10/24 1218)   oxyCODONE immediate release tablet 5 mg (5 mg Oral Given 8/10/24 1217)     Medical Decision Making  This is a 79-year-old female who presents today with for a rash.  Past medical history of shingles diagnosed 2 weeks ago at an outpatient clinic.  She was given gabapentin 100 mg 3 times daily, and acyclovir at the time.  These 2 drugs together have not resolved her pain.  Today she comes in for that pain.  At this time the rash that she wants had has resolved no vesicles, no erythema, no rash that is active anywhere.  She has hyperesthesia and  scarring to the left mid back and upper back area. Does not endorse any other symptoms today.  Endorses extreme pain in the area where the rash was.  Has been compliant with medications thus far.  Our treatment will consist of multimodal pain control alternating between Tylenol and ibuprofen every 3 hours.  In addition to that we gave her instructions to subsequently increase her dose of gabapentin by 100 mg x 3 daily until she gets relief of symptoms. This dose can be increased until she gets relief, but does should not be increased if she is experiencing grogginess or tiredness from the medication.  In addition to this she is being sent home on home oxycodone, advised to only take this medication if experiencing severe symptoms.    Risk  OTC drugs.  Prescription drug management.              Attending Attestation:   Physician Attestation Statement for Resident:  As the supervising MD   Physician Attestation Statement: I have personally seen and examined this patient.   I agree with the above history.  -:   As the supervising MD I agree with the above PE.     As the supervising MD I agree with the above treatment, course, plan, and disposition.                Attending ED Notes:   STAFF ATTENDING PHYSICIAN NOTE:  I have individually/jointly evaluated Kee Ramirez and discussed their ED management with the resident physician. I have also reviewed their notes, assessments, and procedures documented.  I was present during all critical portions of any procedure(s) performed on Kee Ramirez, who p/w SI/Sx of zoster neuralgia. HTN likely 2/2 pain with no end organ damage warranting emergent IV BP reduction. Discussed outpt mngmt with pt/fam.   Future Appointments  9/4/2024   3:40 PM    [facility]         [facility]        Yue Marquez  10/24/2024 7:30 AM    Katia Goldstein NP    [facility]         Abdiel Gillis  10/31/2024 10:45 AM   Rhiannon Lopes MD       [facility] DERM           Abdiel Gillis      ____________________  Christopher English MD, [facility]  Emergency Medicine Staff                               Clinical Impression:  Final diagnoses:  [R21] Rash  [B02.8] Herpes zoster with complication (Primary)  [I10] Hypertension, unspecified type          ED Disposition Condition    Discharge Stable          ED Prescriptions       Medication Sig Dispense Start Date End Date Auth. Provider    gabapentin (NEURONTIN) 100 MG capsule Take 1 capsule (100 mg total) by mouth 3 (three) times daily. 90 capsule 8/10/2024 8/10/2025 Marty English MD    ibuprofen (ADVIL,MOTRIN) 400 MG tablet Take 1 tablet (400 mg total) by mouth every 6 (six) hours as needed. 20 tablet 8/10/2024 -- Marty English MD    acetaminophen (TYLENOL) 500 MG tablet Take 1 tablet (500 mg total) by mouth every 6 (six) hours as needed for Pain. 30 tablet 8/10/2024 -- Marty English MD    oxyCODONE (ROXICODONE) 5 MG immediate release tablet Take 1 tablet (5 mg total) by mouth every 4 (four) hours as needed for Pain. Patient not taking:  Reported on 8/15/2024 11 tablet 8/10/2024 -- Marty English MD          Follow-up Information       Follow up With Specialties Details Why Contact Mari Gillis - Emergency Dept Emergency Medicine  If symptoms worsen [id number]  [facility] [id number]  [phone]             Antoni Yates MD  Resident  08/10/24 1445       Marty English MD  08/19/24 1955

## 2024-08-10 NOTE — DISCHARGE INSTRUCTIONS
Please alternate between Tylenol and Motrin for pain relief.  You can also simultaneously take gabapentin for pain relief.  If the dosing of gabapentin does not work for example 200 mg 3 times per day is not helping the pain.  You may increase the dose to 300 mg 3 times a day, if you are not getting drowsy from the medication. Take the Percocet only as needed.

## 2024-08-10 NOTE — ED TRIAGE NOTES
Kee Ramirez, a 79 y.o. female presents to the ED w/ complaint of rash. Pt was recently diagnosed with shingles and is having pain on the left side that wraps around to the back.    Triage note:  Chief Complaint   Patient presents with    Rash     Seen and evaluated at  for [facility] and dx'd with shingles. 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

## 2024-08-15 ENCOUNTER — CLINICAL SUPPORT (OUTPATIENT)
Dept: PRIMARY CARE CLINIC | Facility: CLINIC | Age: 79
End: 2024-08-15
Attending: STUDENT IN AN ORGANIZED HEALTH CARE EDUCATION/TRAINING PROGRAM
Payer: MEDICARE

## 2024-08-15 ENCOUNTER — OFFICE VISIT (OUTPATIENT)
Dept: PRIMARY CARE CLINIC | Facility: CLINIC | Age: 79
End: 2024-08-15
Payer: MEDICARE

## 2024-08-15 VITALS
HEART RATE: 64 BPM | BODY MASS INDEX: 37.15 KG/M2 | DIASTOLIC BLOOD PRESSURE: 71 MMHG | HEIGHT: 67 IN | WEIGHT: 236.69 LBS | OXYGEN SATURATION: 99 % | TEMPERATURE: 98 F | SYSTOLIC BLOOD PRESSURE: 138 MMHG

## 2024-08-15 DIAGNOSIS — E11.69 TYPE 2 DIABETES MELLITUS WITH OTHER SPECIFIED COMPLICATION, WITHOUT LONG-TERM CURRENT USE OF INSULIN: ICD-10-CM

## 2024-08-15 DIAGNOSIS — E78.2 MIXED HYPERLIPIDEMIA: ICD-10-CM

## 2024-08-15 DIAGNOSIS — E07.9 THYROID DISEASE: ICD-10-CM

## 2024-08-15 DIAGNOSIS — I10 PRIMARY HYPERTENSION: ICD-10-CM

## 2024-08-15 DIAGNOSIS — E11.69 TYPE 2 DIABETES MELLITUS WITH OTHER SPECIFIED COMPLICATION, WITHOUT LONG-TERM CURRENT USE OF INSULIN: Primary | ICD-10-CM

## 2024-08-15 DIAGNOSIS — F51.01 PRIMARY INSOMNIA: ICD-10-CM

## 2024-08-15 PROCEDURE — 3078F DIAST BP <80 MM HG: CPT | Mod: CPTII,S$GLB,, | Performed by: STUDENT IN AN ORGANIZED HEALTH CARE EDUCATION/TRAINING PROGRAM

## 2024-08-15 PROCEDURE — 1101F PT FALLS ASSESS-DOCD LE1/YR: CPT | Mod: CPTII,S$GLB,, | Performed by: STUDENT IN AN ORGANIZED HEALTH CARE EDUCATION/TRAINING PROGRAM

## 2024-08-15 PROCEDURE — 3288F FALL RISK ASSESSMENT DOCD: CPT | Mod: CPTII,S$GLB,, | Performed by: STUDENT IN AN ORGANIZED HEALTH CARE EDUCATION/TRAINING PROGRAM

## 2024-08-15 PROCEDURE — 3075F SYST BP GE 130 - 139MM HG: CPT | Mod: CPTII,S$GLB,, | Performed by: STUDENT IN AN ORGANIZED HEALTH CARE EDUCATION/TRAINING PROGRAM

## 2024-08-15 PROCEDURE — 99999 PR PBB SHADOW E&M-EST. PATIENT-LVL IV: CPT | Mod: PBBFAC,,, | Performed by: STUDENT IN AN ORGANIZED HEALTH CARE EDUCATION/TRAINING PROGRAM

## 2024-08-15 PROCEDURE — 3072F LOW RISK FOR RETINOPATHY: CPT | Mod: CPTII,S$GLB,, | Performed by: STUDENT IN AN ORGANIZED HEALTH CARE EDUCATION/TRAINING PROGRAM

## 2024-08-15 PROCEDURE — 99214 OFFICE O/P EST MOD 30 MIN: CPT | Mod: S$GLB,,, | Performed by: STUDENT IN AN ORGANIZED HEALTH CARE EDUCATION/TRAINING PROGRAM

## 2024-08-15 PROCEDURE — 1126F AMNT PAIN NOTED NONE PRSNT: CPT | Mod: CPTII,S$GLB,, | Performed by: STUDENT IN AN ORGANIZED HEALTH CARE EDUCATION/TRAINING PROGRAM

## 2024-08-15 PROCEDURE — 1159F MED LIST DOCD IN RCRD: CPT | Mod: CPTII,S$GLB,, | Performed by: STUDENT IN AN ORGANIZED HEALTH CARE EDUCATION/TRAINING PROGRAM

## 2024-08-15 RX ORDER — LEVOTHYROXINE SODIUM 112 UG/1
112 TABLET ORAL
Qty: 90 TABLET | Refills: 3 | Status: SHIPPED | OUTPATIENT
Start: 2024-08-15

## 2024-08-15 RX ORDER — LOSARTAN POTASSIUM 100 MG/1
100 TABLET ORAL DAILY
Qty: 90 TABLET | Refills: 3 | Status: SHIPPED | OUTPATIENT
Start: 2024-08-15 | End: 2025-08-15

## 2024-08-15 RX ORDER — LOVASTATIN 20 MG/1
40 TABLET ORAL NIGHTLY
Qty: 180 TABLET | Refills: 3 | Status: SHIPPED | OUTPATIENT
Start: 2024-08-15

## 2024-08-15 RX ORDER — METFORMIN HYDROCHLORIDE 500 MG/1
500 TABLET ORAL
COMMUNITY
Start: 2024-06-03

## 2024-08-15 RX ORDER — AMLODIPINE BESYLATE 10 MG/1
10 TABLET ORAL DAILY
Qty: 90 TABLET | Refills: 3 | Status: SHIPPED | OUTPATIENT
Start: 2024-08-15

## 2024-08-15 NOTE — PROGRESS NOTES
Subjective     Patient ID: Kee Ramirez is a 79 y.o. female.    Chief Complaint: Herpes Zoster (Recently went to ED. Pt currently doing rotation of gabapentin, IBU, and Tylenol. Aches in back but no pain. /Got flu and shingles vaccine from [facility] on 08/13/2024)    80 y/o female with PMHx of shingles, T2DM, HTN, HLD, Hypothyroidism, hx of breast cancer s/p L mastectomy 2023 presents for shingles rash and pain.     Shingles  Was seen in the ED 5 days ago. D/c with gabapentin, tylenol, ibuprofen and oxycodone.   Currently with residual ache that goes from flank to mid back  Rash has resolved    TYPE II DIABETES  Last A1c 6.5  Taking metformin  Diet: eating a lot of fruit and using air fryer. No exercise  Sleep: wakes intermittently through the night      Denies hx of sleep apnea or cpap mahcine. Is having daytime sleepiness    HTN  Elevated today. Checks bp at home, but not since having shingles  Taking amlodipine, clonidine, hydralazine, losartan    HLD  Taking lovastatin    Hypothriodism: needs refill of synthroid       Review of Systems       Objective       GEN: NAD, AAox3, well nourished  HEENT: NCAT, EOMI, PEERL, no scleral injection, TM normal, moist mucous membranes, oropharynx clear, no erythema, no exudates  NECK: full rom, no cervical lymphadenopathy, no thyroidmegally  CV: RRR, no m/r/g, trace LE edema  LUNGS: CTAB, non-labored breathing, no wheezes, no crackles  ABD: soft, non-distended, no rebound/guarding, no organomegaly  EXT: n c/c/e, warm, 5/5 UE and LE strength  NEURO: CNII-XII intact no focal deficit  PSYCH: nl affect, no hallucinations, nl speech  Skin: intact, no rashes/lesions/erythema       Assessment and Plan     1. Type 2 diabetes mellitus with other specified complication, without long-term current use of insulin  Last A1c 6.5 from 5.9.   -continue metformin 750 mg   - Diabetic Eye Screening Photo; Future  - HEMOGLOBIN A1C; Future    2. Primary hypertension  Well controlled; continue  current regimen  - Comprehensive Metabolic Panel; Future  - amLODIPine (NORVASC) 10 MG tablet; Take 1 tablet (10 mg total) by mouth once daily.  Dispense: 90 tablet; Refill: 3  - losartan (COZAAR) 100 MG tablet; Take 1 tablet (100 mg total) by mouth once daily.  Dispense: 90 tablet; Refill: 3  - Home Sleep Study; Future    3. Hypothyroidism  last tsh wnl   - levothyroxine (SYNTHROID) 112 MCG tablet; Take 1 tablet (112 mcg total) by mouth before breakfast.  Dispense: 90 tablet; Refill: 3    4. Mixed hyperlipidemia    - lovastatin (MEVACOR) 20 MG tablet; Take 2 tablets (40 mg total) by mouth every evening.  Dispense: 180 tablet; Refill: 3    5. Primary insomnia  - Home Sleep Study; Future        RTC in 6 months for routine care         No follow-ups on file.

## 2024-08-16 ENCOUNTER — LAB VISIT (OUTPATIENT)
Dept: LAB | Facility: HOSPITAL | Age: 79
End: 2024-08-16
Attending: STUDENT IN AN ORGANIZED HEALTH CARE EDUCATION/TRAINING PROGRAM
Payer: MEDICARE

## 2024-08-16 DIAGNOSIS — I10 PRIMARY HYPERTENSION: ICD-10-CM

## 2024-08-16 DIAGNOSIS — E11.69 TYPE 2 DIABETES MELLITUS WITH OTHER SPECIFIED COMPLICATION, WITHOUT LONG-TERM CURRENT USE OF INSULIN: ICD-10-CM

## 2024-08-16 LAB
ALBUMIN SERPL BCP-MCNC: 3.6 G/DL (ref 3.5–5.2)
ALP SERPL-CCNC: 52 U/L (ref 55–135)
ALT SERPL W/O P-5'-P-CCNC: 11 U/L (ref 10–44)
ANION GAP SERPL CALC-SCNC: 12 MMOL/L (ref 8–16)
AST SERPL-CCNC: 14 U/L (ref 10–40)
BILIRUB SERPL-MCNC: 0.4 MG/DL (ref 0.1–1)
BUN SERPL-MCNC: 30 MG/DL (ref 8–23)
CALCIUM SERPL-MCNC: 9.4 MG/DL (ref 8.7–10.5)
CHLORIDE SERPL-SCNC: 107 MMOL/L (ref 95–110)
CO2 SERPL-SCNC: 21 MMOL/L (ref 23–29)
CREAT SERPL-MCNC: 0.9 MG/DL (ref 0.5–1.4)
EST. GFR  (NO RACE VARIABLE): >60 ML/MIN/1.73 M^2
ESTIMATED AVG GLUCOSE: 134 MG/DL (ref 68–131)
GLUCOSE SERPL-MCNC: 118 MG/DL (ref 70–110)
HBA1C MFR BLD: 6.3 % (ref 4–5.6)
POTASSIUM SERPL-SCNC: 4.2 MMOL/L (ref 3.5–5.1)
PROT SERPL-MCNC: 7.3 G/DL (ref 6–8.4)
SODIUM SERPL-SCNC: 140 MMOL/L (ref 136–145)

## 2024-08-16 PROCEDURE — 36415 COLL VENOUS BLD VENIPUNCTURE: CPT | Mod: PN | Performed by: STUDENT IN AN ORGANIZED HEALTH CARE EDUCATION/TRAINING PROGRAM

## 2024-08-16 PROCEDURE — 83036 HEMOGLOBIN GLYCOSYLATED A1C: CPT | Performed by: STUDENT IN AN ORGANIZED HEALTH CARE EDUCATION/TRAINING PROGRAM

## 2024-08-16 PROCEDURE — 80053 COMPREHEN METABOLIC PANEL: CPT | Performed by: STUDENT IN AN ORGANIZED HEALTH CARE EDUCATION/TRAINING PROGRAM

## 2024-08-16 NOTE — PROGRESS NOTES
Kee Ramirez is a 79 y.o. female here for a diabetic eye screening with non-dilated fundus photos per Dr. Helms.    Patient cooperative?: Yes  Small pupils?: No  Last eye exam: 07/26/2023    For exam results, see Encounter Report.

## 2024-08-20 ENCOUNTER — TELEPHONE (OUTPATIENT)
Dept: PRIMARY CARE CLINIC | Facility: CLINIC | Age: 79
End: 2024-08-20
Payer: MEDICARE

## 2024-08-20 NOTE — TELEPHONE ENCOUNTER
----- Message from Arabella Helms MD sent at 8/20/2024  8:12 AM CDT -----  Please let pt know that her diabetes remains well controlled. We will repeat labs in 6 months.

## 2024-09-16 ENCOUNTER — TELEPHONE (OUTPATIENT)
Dept: SLEEP MEDICINE | Facility: OTHER | Age: 79
End: 2024-09-16
Payer: MEDICARE

## 2024-09-17 ENCOUNTER — TELEPHONE (OUTPATIENT)
Dept: SLEEP MEDICINE | Facility: OTHER | Age: 79
End: 2024-09-17
Payer: MEDICARE

## 2024-09-25 PROBLEM — I25.10 CORONARY ARTERY CALCIFICATION: Status: ACTIVE | Noted: 2024-09-25

## 2024-09-25 PROBLEM — I25.84 CORONARY ARTERY CALCIFICATION: Status: ACTIVE | Noted: 2024-09-25

## 2024-09-26 ENCOUNTER — TELEPHONE (OUTPATIENT)
Dept: SLEEP MEDICINE | Facility: OTHER | Age: 79
End: 2024-09-26
Payer: MEDICARE

## 2024-10-03 ENCOUNTER — PATIENT MESSAGE (OUTPATIENT)
Dept: ADMINISTRATIVE | Facility: CLINIC | Age: 79
End: 2024-10-03
Payer: MEDICARE

## 2024-10-04 ENCOUNTER — TELEPHONE (OUTPATIENT)
Dept: ADMINISTRATIVE | Facility: CLINIC | Age: 79
End: 2024-10-04
Payer: MEDICARE

## 2024-10-07 ENCOUNTER — OFFICE VISIT (OUTPATIENT)
Dept: INTERNAL MEDICINE | Facility: CLINIC | Age: 79
End: 2024-10-07
Payer: MEDICARE

## 2024-10-07 VITALS — BODY MASS INDEX: 37.51 KG/M2 | HEIGHT: 67 IN | WEIGHT: 239 LBS

## 2024-10-07 DIAGNOSIS — E78.5 HYPERLIPIDEMIA ASSOCIATED WITH TYPE 2 DIABETES MELLITUS: ICD-10-CM

## 2024-10-07 DIAGNOSIS — E07.9 THYROID DISEASE: ICD-10-CM

## 2024-10-07 DIAGNOSIS — I27.9 PULMONARY HEART DISEASE: ICD-10-CM

## 2024-10-07 DIAGNOSIS — C50.112 MALIGNANT NEOPLASM OF CENTRAL PORTION OF LEFT BREAST IN FEMALE, ESTROGEN RECEPTOR POSITIVE: ICD-10-CM

## 2024-10-07 DIAGNOSIS — I10 PRIMARY HYPERTENSION: ICD-10-CM

## 2024-10-07 DIAGNOSIS — Z00.00 ENCOUNTER FOR MEDICARE ANNUAL WELLNESS EXAM: Primary | ICD-10-CM

## 2024-10-07 DIAGNOSIS — I25.10 CORONARY ARTERY CALCIFICATION: ICD-10-CM

## 2024-10-07 DIAGNOSIS — I70.0 CALCIFICATION OF AORTA: ICD-10-CM

## 2024-10-07 DIAGNOSIS — E11.69 HYPERLIPIDEMIA ASSOCIATED WITH TYPE 2 DIABETES MELLITUS: ICD-10-CM

## 2024-10-07 DIAGNOSIS — Z17.0 MALIGNANT NEOPLASM OF CENTRAL PORTION OF LEFT BREAST IN FEMALE, ESTROGEN RECEPTOR POSITIVE: ICD-10-CM

## 2024-10-07 DIAGNOSIS — E66.01 SEVERE OBESITY (BMI 35.0-39.9) WITH COMORBIDITY: ICD-10-CM

## 2024-10-07 PROCEDURE — 1126F AMNT PAIN NOTED NONE PRSNT: CPT | Mod: CPTII,95,, | Performed by: NURSE PRACTITIONER

## 2024-10-07 PROCEDURE — 1158F ADVNC CARE PLAN TLK DOCD: CPT | Mod: CPTII,95,, | Performed by: NURSE PRACTITIONER

## 2024-10-07 PROCEDURE — 1159F MED LIST DOCD IN RCRD: CPT | Mod: CPTII,95,, | Performed by: NURSE PRACTITIONER

## 2024-10-07 PROCEDURE — 1101F PT FALLS ASSESS-DOCD LE1/YR: CPT | Mod: CPTII,95,, | Performed by: NURSE PRACTITIONER

## 2024-10-07 PROCEDURE — G0439 PPPS, SUBSEQ VISIT: HCPCS | Mod: 95,,, | Performed by: NURSE PRACTITIONER

## 2024-10-07 PROCEDURE — 3288F FALL RISK ASSESSMENT DOCD: CPT | Mod: CPTII,95,, | Performed by: NURSE PRACTITIONER

## 2024-10-07 PROCEDURE — 1170F FXNL STATUS ASSESSED: CPT | Mod: CPTII,95,, | Performed by: NURSE PRACTITIONER

## 2024-10-07 PROCEDURE — 1160F RVW MEDS BY RX/DR IN RCRD: CPT | Mod: CPTII,95,, | Performed by: NURSE PRACTITIONER

## 2024-10-07 NOTE — PROGRESS NOTES
"The patient location is: [place]  The chief complaint leading to consultation is: annual wellness visit    Visit type: audiovisual    Face to Face time with patient: 23 mins  30 (approx) minutes of total time spent on the encounter, which includes face to face time and non-face to face time preparing to see the patient (eg, review of tests), Obtaining and/or reviewing separately obtained history, Documenting clinical information in the electronic or other health record, Independently interpreting results (not separately reported) and communicating results to the patient/family/caregiver, or Care coordination (not separately reported).         Each patient to whom he or she provides medical services by telemedicine is:  (1) informed of the relationship between the physician and patient and the respective role of any other health care provider with respect to management of the patient; and (2) notified that he or she may decline to receive medical services by telemedicine and may withdraw from such care at any time.    Notes:       Kee Ramirez presented for a  Medicare AWV and comprehensive Health Risk Assessment today. The following components were reviewed and updated:    Medical history  Family History  Social history  Allergies and Current Medications  Health Risk Assessment  Health Maintenance  Care Team         ** See Completed Assessments for Annual Wellness Visit within the encounter summary.**         The following assessments were completed:  Living Situation  CAGE  Depression Screening  Fall Risk Assessment (MACH 10)  Hearing Assessment(HHI)  Cognitive Function Screening  Nutrition Screening  ADL Screening  PAQ Screening    Opioid documentation:      Patient does not have a current opioid prescription.        Vitals:    10/07/24 1034   Weight: 108.4 kg (239 lb)   Height: 5' 7" (1.702 m)     Body mass index is 37.43 kg/m².  Physical Exam  Vitals and nursing note reviewed.   Constitutional:       " Appearance: She is well-developed.   HENT:      Head: Normocephalic.   Pulmonary:      Effort: Pulmonary effort is normal. No respiratory distress.   Neurological:      Mental Status: She is alert and oriented to person, place, and time.      Motor: No abnormal muscle tone.   Psychiatric:         Speech: Speech normal.         Behavior: Behavior normal.               Diagnoses and health risks identified today and associated recommendations/orders:    1. Encounter for Medicare annual wellness exam  - Ambulatory Referral/Consult to Enhanced Annual Wellness Visit (eAWV)    Discussed receiving covid vaccine at pharmacy.     Has urine leakage ever interrupted your daily activites or sleep? No  Do you think you could use some help to better manage urine leakage?No     2. Malignant neoplasm of central portion of left breast in female, estrogen receptor positive  Arimidex  Continue current treatment plan as previously prescribed with your  oncologist.     3. Calcification of aorta  Cxr 11/21  Continue current treatment plan as previously prescribed with your  pcp    4. Hyperlipidemia associated with type 2 diabetes mellitus  A1c 6.3  Lipid-not at goal  Continue current treatment plan as previously prescribed with your  pcp     5. Pulmonary heart disease  Echo 6/22  Advised to follow up with PCP for further evaluation and recommendations. Patient expressed understanding.      6. Severe obesity (BMI 35.0-39.9) with comorbidity  Encouraged healthy diet and exercise as tolerated to help bring BMI into normal range.    Continue current treatment plan as previously prescribed with your  pcp     7. Primary hypertension  Continue current treatment plan as previously prescribed with your  pcp           8. hypothyroidism  Stable and controlled. Continue current treatment plan as previously prescribed with your PCP.      9. Coronary artery calcification  Ct 11/21  Discussed diagnosis and risk reduction.   Advised to follow up with  PCP for further recommendations. Patient expressed understanding.         Provided Kee with a 5-10 year written screening schedule and personal prevention plan. Recommendations were developed using the USPSTF age appropriate recommendations. Education, counseling, and referrals were provided as needed. After Visit Summary  , available on [facility], which includes a list of additional screenings\tests needed.    Follow up in about 1 year (around 10/7/2025) for awv.    Lavinia Buitrago, NP  I offered to discuss advanced care planning, including how to pick a person who would make decisions for you if you were unable to make them for yourself, called a health care power of , and what kind of decisions you might make such as use of life sustaining treatments such as ventilators and tube feeding when faced with a life limiting illness recorded on a living will that they will need to know. (How you want to be cared for as you near the end of your natural life)     X Patient is interested in learning more about how to make advanced directives.  I provided them paperwork and offered to discuss this with them (mailed).

## 2024-10-07 NOTE — PATIENT INSTRUCTIONS
Counseling and Referral of Other Preventative  (Italic type indicates deductible and co-insurance are waived)    Patient Name: Kee Ramirez  Today's Date: 10/7/2024    Health Maintenance       Date Due Completion Date    COVID-19 Vaccine (4 - 2024-25 season) 09/01/2024 10/26/2023    Shingles Vaccine (2 of 2) 10/08/2024 8/13/2024    Hemoglobin A1c 02/16/2025 8/16/2024    Lipid Panel 03/25/2025 3/25/2024    Diabetes Urine Screening 06/26/2025 6/26/2024    Eye Exam 08/21/2025 8/21/2024    Override on 7/26/2023: Done    TETANUS VACCINE 10/28/2026 10/28/2016        No orders of the defined types were placed in this encounter.    The following information is provided to all patients.  This information is to help you find resources for any of the problems found today that may be affecting your health:                  Living healthy guide: www.[facility].[place].[facility]      Understanding Diabetes: www.diabetes.org      Eating healthy: www.cdc.gov/healthyweight      [facility] home safety checklist: www.cdc.gov/steadi/patient.html      Agency on Aging: www.goea.[place].gov      Alcoholics anonymous (AA): www.aa.org      Physical Activity: www.tanvir.nih.gov/av4rwci      Tobacco use: www.quitwithusla.org

## 2024-10-10 ENCOUNTER — TELEPHONE (OUTPATIENT)
Dept: HEMATOLOGY/ONCOLOGY | Facility: CLINIC | Age: 79
End: 2024-10-10
Payer: MEDICARE

## 2024-10-10 NOTE — TELEPHONE ENCOUNTER
----- Message from Randy sent at 10/10/2024 10:08 AM CDT -----  Regarding: Returning a Missed Call  Contact: [phone]  Returning a Missed Call     Caller: Kee Ramirez        Returning call to:  Marti Cook     Caller can be reached @:  [phone]     Nature of the call: Returning call about rescheduling her appt.

## 2024-10-10 NOTE — TELEPHONE ENCOUNTER
Contacted patient to reschedule appointment with Angelita Montalvo NP currently on 10/24  Awaiting for patient call back to reschedule appointment for another day.

## 2024-10-10 NOTE — TELEPHONE ENCOUNTER
Patient returned my missed call.  Informed patient there is no need to reschedule her appointment at this time the matter has been arranged. Patient verbalized understanding and provided words of gratitude.     No further questions or concerns noted during call.

## 2024-10-27 ENCOUNTER — HOSPITAL ENCOUNTER (EMERGENCY)
Facility: HOSPITAL | Age: 79
Discharge: HOME | End: 2024-10-27
Attending: EMERGENCY MEDICINE
Payer: MEDICARE

## 2024-10-27 VITALS
TEMPERATURE: 98 F | DIASTOLIC BLOOD PRESSURE: 73 MMHG | OXYGEN SATURATION: 99 % | WEIGHT: 239 LBS | BODY MASS INDEX: 37.51 KG/M2 | HEIGHT: 67 IN | SYSTOLIC BLOOD PRESSURE: 170 MMHG | HEART RATE: 63 BPM | RESPIRATION RATE: 16 BRPM

## 2024-10-27 DIAGNOSIS — M25.571 RIGHT ANKLE PAIN: ICD-10-CM

## 2024-10-27 DIAGNOSIS — M79.671 RIGHT FOOT PAIN: ICD-10-CM

## 2024-10-27 DIAGNOSIS — S93.401A SPRAIN OF RIGHT ANKLE, UNSPECIFIED LIGAMENT, INITIAL ENCOUNTER: ICD-10-CM

## 2024-10-27 DIAGNOSIS — S99.919A ANKLE INJURY: Primary | ICD-10-CM

## 2024-10-27 PROCEDURE — 25000003 PHARM REV CODE 250: Performed by: PHYSICIAN ASSISTANT

## 2024-10-27 PROCEDURE — 25000003 PHARM REV CODE 250: Performed by: EMERGENCY MEDICINE

## 2024-10-27 PROCEDURE — 99283 EMERGENCY DEPT VISIT LOW MDM: CPT | Mod: 25

## 2024-10-27 RX ORDER — HYDROCODONE BITARTRATE AND ACETAMINOPHEN 5; 325 MG/1; MG/1
1 TABLET ORAL EVERY 4 HOURS PRN
Qty: 12 TABLET | Refills: 0 | Status: SHIPPED | OUTPATIENT
Start: 2024-10-27

## 2024-10-27 RX ORDER — ACETAMINOPHEN 325 MG/1
650 TABLET ORAL
Status: COMPLETED | OUTPATIENT
Start: 2024-10-27 | End: 2024-10-27

## 2024-10-27 RX ORDER — HYDROCODONE BITARTRATE AND ACETAMINOPHEN 5; 325 MG/1; MG/1
1 TABLET ORAL
Status: COMPLETED | OUTPATIENT
Start: 2024-10-27 | End: 2024-10-27

## 2024-10-27 RX ADMIN — HYDROCODONE BITARTRATE AND ACETAMINOPHEN 1 TABLET: 5; 325 TABLET ORAL at 11:10

## 2024-10-27 RX ADMIN — ACETAMINOPHEN 650 MG: 325 TABLET ORAL at 10:10

## 2024-10-30 ENCOUNTER — TELEPHONE (OUTPATIENT)
Dept: DERMATOLOGY | Facility: CLINIC | Age: 79
End: 2024-10-30
Payer: MEDICARE

## 2025-01-07 ENCOUNTER — TELEPHONE (OUTPATIENT)
Dept: HEMATOLOGY/ONCOLOGY | Facility: CLINIC | Age: 80
End: 2025-01-07
Payer: MEDICARE

## 2025-01-07 NOTE — PROGRESS NOTES
PROGRESS NOTE    Subjective:       Patient ID: Kee Ramirez is a 79 y.o. female.  MRN: [id number]  : 1945    Chief Complaint: ILC of the left breast     History of Present Illness:   Kee Ramirez is a 79 y.o. female who presents with  ILC of the left breast.       Per Previous note: Reports yearly mammograms, normal in . In  screening mammogram showed left breast architectural distortion. Diagnostic mammogram in March showed a 21 x 9  X 2 0 mm left breast mass. US guided biopsy showed ILC, ER strongly positive, NJ 15% positive, Her 2 rhina negative.   A breast MRI showed a 7.6 x 4.7 cm mass with spiculated margins.No abnormal lymph nodes were found.      No history of breast mass or biopsies. She has been seeing Dr. Ortiz and is transferring care to use due to proximity. See full oncology history below.   She has been referred for neoadjuvant chemotherapy. We met today as a follow up visit to further discuss neoadjuvant therapy.  At her visit two weeks ago, we discussed neoadjuvant chemotherapy vs neoadjuvant endocrine therapy.  She is interested in breast conservation and is not a candidate for upfront lumpectomy given tumor size and extension to the nipple areolar complex.  She met with Dr. Valente who agrees that she may not be a candidate for breast conservation even after neoadjuvant therapies.  Patient wants to try.     An oncotype was sent to determine if she was high risk and would benefit from chemotherapy.  We were notified there was not enough tissue specimen from the original biopsy for the oncotype.  After extensive discussion, we elected to start neoadjuvant anastrozole.     2022: Started anastrozole      She was admitted to the hospital from -, found to have SBO. Has NGT for decompression and improved with conservative measures.     She completed 6 months of neoadjuvant anastrozole and has had a left mastectomy  without reconstruction on 1/4/23.     Started RT on 3/20, completed at the end of April. Has dry skin and some limited ROM.     Interim history:  Resumed anastrozole 2/22/23.  Overall she is doing well.   Tugging sensation in her port, she has been massaging the area and this resolved with the massaging.   Appetite and bowel movements are good. Denies hot flashes and arthralgias, minus arthritis in the knee - notes this is related to age. Treats with tylenol and ibuprofen.    Denies fever and chills. Denies coughing and shortness of breath.   Energy level is variable, getting better. Sleeps well.    PCP added a HTN medication, which has helped slightly.         Previously  noted by Dr. Abraham: Oncology History:  As documented by  and updated      03.21.2022 Left Breast, Core Needle Biopsies:   - Invasive lobular carcinoma, well-differentiated .   - Bloom-Damon score (5/9):        - Tubular Formation: 3/3        - Nuclear Pleomorphism: 1/3        - Mitotic Activity: 1/3.   - Background of lobular carinoma in-situ (LCIS), nuclear grade 1.   - No perineural or lymphovascular invasion is identified.   Estrogen receptor: Positive (strong intensity, >95% of tumor cell nuclei).   Progesterone receptor: Positive (strong intensity, 15% of tumor cell nuclei).   HER2 IHC: Negative.   Ki-67: 10%.  04.04.2022 [facility] [place] [facility] -- Surgery FU w/Oncotype  04.06.2022  follow up  -Breast MRI for staging, discussed surgery options; pt would like BCT, will make definitive plan after MRI  05.03.2022 Breast MRI  -Irregular 7.6 cm enhancing spiculated mass in the upper LEFT breast, consistent with the patient's known biopsy-proven invasive lobular carcinoma. The enhancement does extend to the nipple-areolar complex.  -No suspicious abnormality in the RIGHT breast.   BI-RADS Category: Overall: 6 - known biopsy-proven malignancy  05.06.2022 Follow up with GS  -Plan for Axillary U/S to assess LAD, PetCT  -[facility] referral for NA  therapy. Due to size of mass/location and nature of lobular cancer, was not felt that BCT was optimal unless NA therapy was done 1st   -2 week follow up to discuss further   05.13.2022 PetCT  -Mildly hypermetabolic irregular soft tissue within the left breast in keeping with known lobular carcinoma. This mass is better appreciated on MRI of the breast.  -No definite evidence of metastatic disease.  Please note that FDG PET has has suboptimal sensitivity for certain histologies such as lobular and tubular carcinomas.  05.18.2022 Initial [facility] eval    12/1/22  MRI     Impression:  Left  Interval decreased enhancement of left breast malignancy (biopsy proven ILC).  Residual abnormal enhancement still spans 8.1 cm AP x 2.5 cm transverse and extends to the left nipple areolar complex.     Right  There is no MR evidence of malignancy.     BI-RADS Category:   Overall: 6 - Known Biopsy-Proven Malignancy    Final Pathologic Diagnosis 1.  Left axillary sentinel lymph node #1, hot and blue; count 299, excisional   biopsy: 1 lymph node, negative for metastatic carcinoma (0/1).          Confirmed by negative immunohistochemical staining with cytokeratins   AE1/AE3, cam 5.2 and wide spectrum keratin with          satisfactory positive and negative controls.   2.  Left axillary sentinel lymph node #2, hot and blue; count 652, excisional   biopsy: 1 lymph node, negative for metastatic carcinoma (0/1).          Confirmed by negative immunohistochemical staining with cytokeratins   AE1/AE3, cam 5.2 and wide spectrum keratin with          satisfactory positive and negative controls.   3.  Left breast, skin sparing mastecomy: Invasive lobular carcinoma,   measuring at least 51 mm (at least 5.1 cm) in greatest dimension (slide   measurement of largest          dimension).          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          Note:  Biopsy clip is grossly identified and biopsy site changes are   identified microscopically  within area of tumor.          Margins:           - All margins are greater than 10 mm (greater than 1 cm) from   invasive tumor and LCIS.          Nipple and skin are present and uninvolved by tumor.  Note:  Tumor is   present in the soft tissue underlying the nipple skin but          does not involve nipple epidermis.          Microcalcifications are present and associated with lobular carcinoma   in situ and invasive carcinoma.          Background breast tissue shows fibrocystic changes including apocrine   metaplasia and stromal fibrosis.          See synoptic report in comment section for further details.   4.  Right port-a-cath, removal: Gross diganosis:  Port-a-cath.         ypT3:  Tumor greater than 50 mm in greatest dimension.   Regional lymph nodes:        y(sn)pN0:  No regional lymph node metastasis identified.   Distant metastasis:     Oncology History:  Oncology History   Malignant neoplasm of central portion of left female breast   3/21/2022 Initial Diagnosis    Malignant neoplasm of central portion of left female breast     3/21/2022 Biopsy    Left Breast, Core Needle Biopsies:   - Invasive lobular carcinoma, well-differentiated .   - Bloom-Damon score (5/9):        - Tubular Formation: 3/3        - Nuclear Pleomorphism: 1/3        - Mitotic Activity: 1/3.   - Background of lobular carinoma in-situ (LCIS), nuclear grade 1.   - No perineural or lymphovascular invasion is identified.        3/21/2022 Breast Tumor Markers    Estrogen: Positive  Progesterone: Positive  HER2: Negative     5/27/2022 - 5/27/2022 Chemotherapy    This was considered, but patient did not undergo chemotherapy.   Treatment Summary   Plan Name: OP BREAST DOSE-DENSE AC-T (DOXORUBICIN CYCLOPHOSPHAMIDE Q2W FOLLOWED BY PACLITAXEL WEEKLY)  Treatment Goal: Curative  Status: Inactive  Start Date:   End Date:   Provider: Mitali Carlin MD  Chemotherapy: DOXOrubicin chemo injection 130 mg, 60 mg/m2, Intravenous, Clinic/HOD 1 time, 0  of 4 cycles  cyclophosphamide 600 mg/m2 = 1,300 mg in sodium chloride 0.9% 250 mL chemo infusion, 600 mg/m2, Intravenous, Clinic/HOD 1 time, 0 of 4 cycles  PACLitaxeL (TAXOL) 80 mg/m2 = 174 mg in sodium chloride 0.9% 250 mL chemo infusion, 80 mg/m2, Intravenous, Clinic/HOD 1 time, 0 of 12 cycles     6/7/2022 Cancer Staged    Staging form: Breast, AJCC 8th Edition  - Clinical: Stage IIA (cT3, cN0, cM0, G1, ER+, NJ+, HER2-)     6/7/2022 Notable Event    Oncotype performed initially to assess for benefit of NACT to optimize for surgery but not enough tissue. Proceeded with AI x 6 months.      6/7/2022 Genetic Testing    L8 SmartLight: Negative                                           1/4/2023 Breast Surgery    Left Mastectomy with L SLNB.      1/24/2023 Tumor Conference    No definitive treatment response to neoadjuvant AI use on final surgical pathology. Oncotype will now be sent on the surgical specimen. Could include CDK46 inhibitor with her endocrine if Oncotype is low given some response to Anastrozole and large tumor size, but patient is node negative. May consider to simply change to different AI. Radiation Oncology would like to meet with patient to discuss XRT, but team does not feel strongly about her proceeding.        3/20/2023 - 4/20/2023 Radiation Therapy    Treatment Summary  Course: C1 Breast 2023  Treatment Site Energy Dose/Fx (Gy) #Fx Dose Correction (Gy) Total Dose (Gy) Start Date End Date Elapsed Days   Left chest wall scar 9E 2.5 4 / 4 0 10 4/17/2023 4/20/2023 3   Left chest wall 18X/6X 2.65 16 / 16 0 42.4 3/20/2023 4/12/2023 23            History:  Past Medical History:   Diagnosis Date    Bowel obstruction     Breast cancer 05/01/2022    left    Cancer     COVID-19 07/2022    Diabetes mellitus, type 2     Hyperlipidemia     Hypertension     Lobular carcinoma in situ 05/01/2022    left    Midline low back pain without sciatica 07/31/2015    Thyroid disease     Venous stasis     bilateral legs       Past Surgical History:   Procedure Laterality Date    BREAST BIOPSY Left 03/21/2022    Core bx, + ILC    CATARACT EXTRACTION      COLONOSCOPY      COLONOSCOPY N/A 12/05/2019    Procedure: COLONOSCOPY;  Surgeon: Luis Bogran-Reyes, MD;  Location: [facility];  Service: Endoscopy;  Laterality: N/A;    HYSTERECTOMY  12/01/2021    INJECTION FOR SENTINEL NODE IDENTIFICATION Left 01/04/2023    Procedure: INJECTION, FOR SENTINEL NODE IDENTIFICATION;  Surgeon: Erika Valente MD;  Location: [facility];  Service: General;  Laterality: Left;    INSERTION OF TUNNELED CENTRAL VENOUS CATHETER (CVC) WITH SUBCUTANEOUS PORT Right 05/24/2022    Procedure: [id number]-S-CATH;  Surgeon: Darien Bear MD;  Location: [facility];  Service: General;  Laterality: Right;    MASTECTOMY Left 01/04/2023    Procedure: MASTECTOMY;  Surgeon: Erika Valente MD;  Location: [facility];  Service: General;  Laterality: Left;    MASTECTOMY  1/4/2023    MEDIPORT REMOVAL Right 01/04/2023    Procedure: REMOVAL PORT;  Surgeon: Erika Valente MD;  Location: [facility];  Service: General;  Laterality: Right;    SENTINEL LYMPH NODE BIOPSY Left 01/04/2023    Procedure: BIOPSY, LYMPH NODE, SENTINEL;  Surgeon: Erika Valente MD;  Location: [facility];  Service: General;  Laterality: Left;    SHOULDER ARTHROSCOPY W/ ROTATOR CUFF REPAIR Right 2001    TUBAL LIGATION      VAGINAL HYSTERECTOMY N/A 01/11/2021    Procedure: HYSTERECTOMY, VAGINAL ;  Surgeon: Jessie Dacosta MD;  Location: [facility];  Service: OB/GYN;  Laterality: N/A;     Family History   Problem Relation Name Age of Onset    Diabetes Mother      Hypertension Mother      Arthritis Mother      COPD Father      Hypertension Sister      Diabetes Sister      Cancer Sister          PANCREATIC    Breast cancer Sister Bertha Borden      Social History     Tobacco Use    Smoking status: Never     Passive exposure: Never    Smokeless tobacco: Never   Substance and Sexual Activity    Alcohol use: No     Alcohol/week: 0.0  standard drinks of alcohol    Drug use: No    Sexual activity: Not Currently     Partners: Male     Birth control/protection: None, See Surgical Hx        ROS:   Review of Systems   Constitutional: Negative for fever and weight loss. Variable fatigue.   HENT: no hearing loss, nosebleeds and sore throat.    Eyes: Negative for double vision and photophobia.   Respiratory: no hemoptysis, sputum production, shortness of breath and wheezing.    Cardiovascular: Negative for chest pain, palpitations, orthopnea and leg swelling.   Gastrointestinal: Negative for abdominal pain, blood in stool, constipation, diarrhea heartburn, nausea and vomiting.   Genitourinary: Negative for dysuria, hematuria and urgency.   Musculoskeletal: Negative for back pain, joint pain and myalgias.   Skin: Negative for itching and rash.   Neurological: Negative for dizziness, tingling, seizures, weakness and headaches.   Endo/Heme/Allergies: Negative for polydipsia. Does not bruise/bleed easily.   Psychiatric/Behavioral: Negative for depression and memory loss. The patient is not nervous/anxious and does not have insomnia.       Objective:     There were no vitals filed for this visit.      Wt Readings from Last 10 Encounters:   10/27/24 108.4 kg (239 lb)   10/07/24 108.4 kg (239 lb)   08/15/24 107.3 kg (236 lb 10.6 oz)   08/10/24 104 kg (229 lb 4.5 oz)   08/04/24 104.3 kg (229 lb 15 oz)   07/21/24 104.3 kg (230 lb)   06/26/24 106.8 kg (235 lb 7.2 oz)   03/25/24 105.3 kg (232 lb 2.3 oz)   03/15/24 101.2 kg (223 lb)   01/31/24 104.9 kg (231 lb 4.2 oz)       Physical Examination:   Physical Exam  Vitals and nursing note reviewed.   Constitutional:       General: She is not in acute distress.     Appearance: She is not diaphoretic.   HENT:      Head: Normocephalic.   Eyes:      General: No scleral icterus.     Conjunctiva/sclera: Conjunctivae normal.       Neurological:      Mental Status: She is alert and oriented to person, place, and time.       Coordination: Coordination normal.       Psychiatric:         Mood and Affect: Affect normal.         Cognition and Memory: Memory normal.         Judgment: Judgment normal.     Limited due to virtual visit    Diagnostic Tests:  Significant Imaging: I have reviewed and interpreted all pertinent imaging results/findings.    Laboratory Data:  All pertinent labs have been reviewed.  Labs:   Lab Results   Component Value Date    WBC 5.43 03/25/2024    RBC 3.92 (L) 03/25/2024    HGB 11.5 (L) 03/25/2024    HCT 36.9 (L) 03/25/2024    MCV 94 03/25/2024     03/25/2024     (H) 08/16/2024     08/16/2024    K 4.2 08/16/2024    BUN 30 (H) 08/16/2024    CREATININE 0.9 08/16/2024    AST 14 08/16/2024    ALT 11 08/16/2024    BILITOT 0.4 08/16/2024       Assessment/Plan:   Malignant neoplasm of central portion of left breast in female, estrogen receptor positive  cT3N0, ER 95%, PR15%, Her 2 rhina negative, Grade 1, ki 67 10%     ypT3 yp (sn) N0   Oncotype low risk, 13     She was interested in breast conservation and received 6 months of neoadjuvant anastrozole.   Follow up MRI showed some response but persistent large area of enhancement with extension to the nipple areolar complex. Mastectomy was recommended.   Final pathology and TB recommendations were discussed. There was 5 cm of ILC, node negative, no significant treatment response, G1, Ki 67 stable at 10%.  Per guidelines, oncotype was sent, is 13, low risk, no benefit of chemotherapy.     She has completed adjuvant RT.     At this time, continue anastrozole. Mammogram negative from March, repeat in 1 year.    DEXA normal at baseline. continue calcium and vitamin D. Repeat in June 2026    Primary Hypertension   Compliant with current medications.  Continue PMD follow up.       Discussion:   No follow-ups on file.    Plan was discussed with the patient at length, and she verbalized understanding. Kee was given an opportunity to ask questions that were  answered to her satisfaction, and she was advised to call in the interval if any problems or questions arise.    Return to clinic in 6 months with KAYLYN appointment.     Patient is in agreement with the proposed treatment plan. All questions were answered to the patient's satisfaction. Patient knows to call clinic for any new or worsening symptoms and if anything is needed before the next clinic visit.          Katia Goldstein, KRISS-C  Hematology & Medical Oncology   [facility] [place] [address]  ph. [phone]  Fax. [phone]    Collaborating physician, Dr. Abraham.    Approximately 10 minutes were spent face-to-face with the patient.  Approximately 20 minutes in total were spent on this encounter, which includes face-to-face time and non-face-to-face time preparing to see the patient (e.g., review of tests), obtaining and/or reviewing separately obtained history, documenting clinical information in the electronic or other health record, independently interpreting results (not separately reported) and communicating results to the patient/family/caregiver, or care coordination (not separately reported).         Med Onc Chart Routing      Follow up with physician    Follow up with KAYLYN 6 months. likes the 7:30 appointment   Infusion scheduling note    Injection scheduling note    Labs    Imaging Mammogram   due in March   Pharmacy appointment    Other referrals                  Treatment Plan Information   OP ANASTROZOLE DAILY Katia Goldstein, ABRAN   Associated diagnosis: Malignant neoplasm of central portion of left female breast Stage IIA cT3, cN0, cM0, G1, ER+, IN+, HER2- noted on 5/6/2022   Line of treatment: Neoadjuvant  Treatment Goal: Curative     Upcoming Treatment Dates - OP ANASTROZOLE DAILY    6/24/2022       Antiemetics       Physician communication order       Take Home Chemotherapy       anastrozole (ARIMIDEX) 1 mg Tab      MDM includes  :    - Acute or chronic illness or injury that  poses a threat to life or bodily function  - Independent review and explanation of 3+ results from unique tests  - Discussion of management and ordering 3+ unique tests  - Extensive discussion of treatment and management  - Prescription drug management  - Drug therapy requiring intensive monitoring for toxicity       The patient location is: her home   The chief complaint leading to consultation is: breast cancer    Visit type: audiovisual    Face to Face time with patient: 6  10 minutes of total time spent on the encounter, which includes face to face time and non-face to face time preparing to see the patient (eg, review of tests), Obtaining and/or reviewing separately obtained history, Documenting clinical information in the electronic or other health record, Independently interpreting results (not separately reported) and communicating results to the patient/family/caregiver, or Care coordination (not separately reported).         Each patient to whom he or she provides medical services by telemedicine is:  (1) informed of the relationship between the physician and patient and the respective role of any other health care provider with respect to management of the patient; and (2) notified that he or she may decline to receive medical services by telemedicine and may withdraw from such care at any time.    Notes: see above

## 2025-01-07 NOTE — TELEPHONE ENCOUNTER
----- Message from Adelaida sent at 1/7/2025  8:11 AM CST -----  Regarding: Reschedule Existing Appointment  Contact: pt @ [phone] (home)  Reschedule Existing Appointment     Current appt date: 1/8/25     Type of appt : MARGE     Physician: Angelita     Reason for rescheduling: transportation     Caller: Kee Ramirez     Contact Preference: [phone] (home)

## 2025-01-08 ENCOUNTER — OFFICE VISIT (OUTPATIENT)
Dept: HEMATOLOGY/ONCOLOGY | Facility: CLINIC | Age: 80
End: 2025-01-08
Payer: MEDICARE

## 2025-01-08 VITALS — HEIGHT: 67 IN | WEIGHT: 238 LBS | BODY MASS INDEX: 37.35 KG/M2

## 2025-01-08 DIAGNOSIS — C50.112 MALIGNANT NEOPLASM OF CENTRAL PORTION OF LEFT BREAST IN FEMALE, ESTROGEN RECEPTOR POSITIVE: Primary | ICD-10-CM

## 2025-01-08 DIAGNOSIS — E66.812 CLASS 2 SEVERE OBESITY DUE TO EXCESS CALORIES WITH SERIOUS COMORBIDITY AND BODY MASS INDEX (BMI) OF 36.0 TO 36.9 IN ADULT: ICD-10-CM

## 2025-01-08 DIAGNOSIS — E66.01 CLASS 2 SEVERE OBESITY DUE TO EXCESS CALORIES WITH SERIOUS COMORBIDITY AND BODY MASS INDEX (BMI) OF 36.0 TO 36.9 IN ADULT: ICD-10-CM

## 2025-01-08 DIAGNOSIS — Z12.31 ENCOUNTER FOR SCREENING MAMMOGRAM FOR MALIGNANT NEOPLASM OF BREAST: ICD-10-CM

## 2025-01-08 DIAGNOSIS — E07.9 THYROID DISEASE: ICD-10-CM

## 2025-01-08 DIAGNOSIS — Z17.0 MALIGNANT NEOPLASM OF CENTRAL PORTION OF LEFT BREAST IN FEMALE, ESTROGEN RECEPTOR POSITIVE: Primary | ICD-10-CM

## 2025-01-08 DIAGNOSIS — E11.69 TYPE 2 DIABETES MELLITUS WITH OTHER SPECIFIED COMPLICATION, WITHOUT LONG-TERM CURRENT USE OF INSULIN: ICD-10-CM

## 2025-01-08 DIAGNOSIS — C50.912 LOBULAR CARCINOMA OF LEFT BREAST: ICD-10-CM

## 2025-01-08 DIAGNOSIS — I10 PRIMARY HYPERTENSION: ICD-10-CM

## 2025-01-08 DIAGNOSIS — E78.2 MIXED HYPERLIPIDEMIA: ICD-10-CM

## 2025-01-30 DIAGNOSIS — Z00.00 ENCOUNTER FOR MEDICARE ANNUAL WELLNESS EXAM: ICD-10-CM

## 2025-03-12 ENCOUNTER — LAB VISIT (OUTPATIENT)
Dept: PRIMARY CARE CLINIC | Facility: CLINIC | Age: 80
End: 2025-03-12
Payer: MEDICARE

## 2025-03-12 ENCOUNTER — OFFICE VISIT (OUTPATIENT)
Dept: PRIMARY CARE CLINIC | Facility: CLINIC | Age: 80
End: 2025-03-12
Payer: MEDICARE

## 2025-03-12 VITALS
OXYGEN SATURATION: 97 % | HEART RATE: 65 BPM | SYSTOLIC BLOOD PRESSURE: 170 MMHG | HEIGHT: 67 IN | WEIGHT: 239.5 LBS | DIASTOLIC BLOOD PRESSURE: 72 MMHG | BODY MASS INDEX: 37.59 KG/M2

## 2025-03-12 DIAGNOSIS — E11.69 TYPE 2 DIABETES MELLITUS WITH OTHER SPECIFIED COMPLICATION, WITHOUT LONG-TERM CURRENT USE OF INSULIN: ICD-10-CM

## 2025-03-12 DIAGNOSIS — N30.00 ACUTE CYSTITIS WITHOUT HEMATURIA: ICD-10-CM

## 2025-03-12 DIAGNOSIS — E07.9 THYROID DISEASE: ICD-10-CM

## 2025-03-12 DIAGNOSIS — R39.15 URINARY URGENCY: ICD-10-CM

## 2025-03-12 DIAGNOSIS — I1A.0 RESISTANT HYPERTENSION: Primary | ICD-10-CM

## 2025-03-12 DIAGNOSIS — I27.9 PULMONARY HEART DISEASE: ICD-10-CM

## 2025-03-12 DIAGNOSIS — E78.2 MIXED HYPERLIPIDEMIA: ICD-10-CM

## 2025-03-12 DIAGNOSIS — I1A.0 RESISTANT HYPERTENSION: ICD-10-CM

## 2025-03-12 LAB
ANION GAP SERPL CALC-SCNC: 12 MMOL/L (ref 8–16)
BILIRUB SERPL-MCNC: ABNORMAL MG/DL
BLOOD, POC UA: ABNORMAL
BUN SERPL-MCNC: 23 MG/DL (ref 8–23)
CALCIUM SERPL-MCNC: 9.8 MG/DL (ref 8.7–10.5)
CHLORIDE SERPL-SCNC: 103 MMOL/L (ref 95–110)
CHOLEST SERPL-MCNC: 195 MG/DL (ref 120–199)
CHOLEST/HDLC SERPL: 2.8 {RATIO} (ref 2–5)
CO2 SERPL-SCNC: 25 MMOL/L (ref 23–29)
CREAT SERPL-MCNC: 0.8 MG/DL (ref 0.5–1.4)
EST. GFR  (NO RACE VARIABLE): >60 ML/MIN/1.73 M^2
ESTIMATED AVG GLUCOSE: 143 MG/DL (ref 68–131)
GLUCOSE SERPL-MCNC: 134 MG/DL (ref 70–110)
GLUCOSE UR QL STRIP: ABNORMAL
HBA1C MFR BLD: 6.6 % (ref 4–5.6)
HDLC SERPL-MCNC: 69 MG/DL (ref 40–75)
HDLC SERPL: 35.4 % (ref 20–50)
KETONES UR QL STRIP: ABNORMAL
LDLC SERPL CALC-MCNC: 112.2 MG/DL (ref 63–159)
LEUKOCYTE ESTERASE URINE, POC: ABNORMAL
NITRITE, POC UA: ABNORMAL
NONHDLC SERPL-MCNC: 126 MG/DL
PH, POC UA: 7.5
POTASSIUM SERPL-SCNC: 4.2 MMOL/L (ref 3.5–5.1)
PROTEIN, POC: 30
SODIUM SERPL-SCNC: 140 MMOL/L (ref 136–145)
SPECIFIC GRAVITY, POC UA: 1.02
T4 FREE SERPL-MCNC: 1.23 NG/DL (ref 0.71–1.51)
TRIGL SERPL-MCNC: 69 MG/DL (ref 30–150)
TSH SERPL DL<=0.005 MIU/L-ACNC: 5 UIU/ML (ref 0.4–4)
UROBILINOGEN, POC UA: 1

## 2025-03-12 PROCEDURE — 99214 OFFICE O/P EST MOD 30 MIN: CPT | Mod: S$GLB,,, | Performed by: STUDENT IN AN ORGANIZED HEALTH CARE EDUCATION/TRAINING PROGRAM

## 2025-03-12 PROCEDURE — 3078F DIAST BP <80 MM HG: CPT | Mod: CPTII,S$GLB,, | Performed by: STUDENT IN AN ORGANIZED HEALTH CARE EDUCATION/TRAINING PROGRAM

## 2025-03-12 PROCEDURE — 3077F SYST BP >= 140 MM HG: CPT | Mod: CPTII,S$GLB,, | Performed by: STUDENT IN AN ORGANIZED HEALTH CARE EDUCATION/TRAINING PROGRAM

## 2025-03-12 PROCEDURE — 3288F FALL RISK ASSESSMENT DOCD: CPT | Mod: CPTII,S$GLB,, | Performed by: STUDENT IN AN ORGANIZED HEALTH CARE EDUCATION/TRAINING PROGRAM

## 2025-03-12 PROCEDURE — 1101F PT FALLS ASSESS-DOCD LE1/YR: CPT | Mod: CPTII,S$GLB,, | Performed by: STUDENT IN AN ORGANIZED HEALTH CARE EDUCATION/TRAINING PROGRAM

## 2025-03-12 PROCEDURE — 83036 HEMOGLOBIN GLYCOSYLATED A1C: CPT | Performed by: STUDENT IN AN ORGANIZED HEALTH CARE EDUCATION/TRAINING PROGRAM

## 2025-03-12 PROCEDURE — 1126F AMNT PAIN NOTED NONE PRSNT: CPT | Mod: CPTII,S$GLB,, | Performed by: STUDENT IN AN ORGANIZED HEALTH CARE EDUCATION/TRAINING PROGRAM

## 2025-03-12 PROCEDURE — 84439 ASSAY OF FREE THYROXINE: CPT | Performed by: STUDENT IN AN ORGANIZED HEALTH CARE EDUCATION/TRAINING PROGRAM

## 2025-03-12 PROCEDURE — 80061 LIPID PANEL: CPT | Performed by: STUDENT IN AN ORGANIZED HEALTH CARE EDUCATION/TRAINING PROGRAM

## 2025-03-12 PROCEDURE — 99999 PR PBB SHADOW E&M-EST. PATIENT-LVL V: CPT | Mod: PBBFAC,,, | Performed by: STUDENT IN AN ORGANIZED HEALTH CARE EDUCATION/TRAINING PROGRAM

## 2025-03-12 PROCEDURE — 1159F MED LIST DOCD IN RCRD: CPT | Mod: CPTII,S$GLB,, | Performed by: STUDENT IN AN ORGANIZED HEALTH CARE EDUCATION/TRAINING PROGRAM

## 2025-03-12 PROCEDURE — 80048 BASIC METABOLIC PNL TOTAL CA: CPT | Performed by: STUDENT IN AN ORGANIZED HEALTH CARE EDUCATION/TRAINING PROGRAM

## 2025-03-12 PROCEDURE — 81003 URINALYSIS AUTO W/O SCOPE: CPT | Mod: QW,S$GLB,, | Performed by: STUDENT IN AN ORGANIZED HEALTH CARE EDUCATION/TRAINING PROGRAM

## 2025-03-12 PROCEDURE — 84443 ASSAY THYROID STIM HORMONE: CPT | Performed by: STUDENT IN AN ORGANIZED HEALTH CARE EDUCATION/TRAINING PROGRAM

## 2025-03-12 PROCEDURE — 1160F RVW MEDS BY RX/DR IN RCRD: CPT | Mod: CPTII,S$GLB,, | Performed by: STUDENT IN AN ORGANIZED HEALTH CARE EDUCATION/TRAINING PROGRAM

## 2025-03-12 RX ORDER — NITROFURANTOIN 25; 75 MG/1; MG/1
100 CAPSULE ORAL 2 TIMES DAILY
Qty: 10 CAPSULE | Refills: 0 | Status: SHIPPED | OUTPATIENT
Start: 2025-03-12 | End: 2025-03-17

## 2025-03-12 RX ORDER — LOVASTATIN 20 MG/1
40 TABLET ORAL NIGHTLY
Qty: 180 TABLET | Refills: 3 | Status: SHIPPED | OUTPATIENT
Start: 2025-03-12

## 2025-03-12 NOTE — PROGRESS NOTES
03/12/2025    Kee Ramirez  [id number]    Chief Complaint   Patient presents with    Follow-up       HPI    History of Present Illness    CHIEF COMPLAINT:  Kee presents today for routine check-up    CURRENT SYMPTOMS:  She reports intermittent low back pain for approximately one month. She experiences frequent urination and blood pressure fluctuations. She has developed knee arthritis with associated swelling, finding relief with use of an elliptical machine.    RECENT MEDICAL EVENTS:  She developed shingles since last visit and was prescribed pain medication for management.    MEDICAL HISTORY:  She has chronic hypertension and chronic obesity. She has a history of breast cancer and is currently under surveillance. Since her last visit in August, she completed an annual wellness visit and followed up virtually with oncology. She is scheduled for mammogram this month.    CURRENT MEDICATIONS:  She takes multiple medications for blood pressure management including Losartan 100mg, Hydralazine 25mg every 8 hours, Clonidine 3 times daily, and Amlodipine 10mg. Other medications include Lovastatin 40mg, Gabapentin as needed, and thyroid medication.      ROS:  General: -fever, -chills, -fatigue, -weight gain, -weight loss  Eyes: -vision changes, -redness, -discharge  ENT: -ear pain, -nasal congestion, -sore throat  Cardiovascular: -chest pain, -palpitations, -lower extremity edema  Respiratory: -cough, -shortness of breath  Gastrointestinal: -abdominal pain, -nausea, -vomiting, -diarrhea, -constipation, -blood in stool  Genitourinary: -dysuria, -hematuria, -frequency  Musculoskeletal: -joint pain, -muscle pain, +back pain, +joint swelling  Skin: +rash, -lesion  Neurological: -headache, -dizziness, -numbness, -tingling  Psychiatric: -anxiety, -depression, -sleep difficulty               Negative 10 point ROS outside of HPI    Social History     Socioeconomic History    Marital status:     Years of education: college    Tobacco Use    Smoking status: Never     Passive exposure: Never    Smokeless tobacco: Never   Substance and Sexual Activity    Alcohol use: No     Alcohol/week: 0.0 standard drinks of alcohol    Drug use: No    Sexual activity: Not Currently     Partners: Male     Birth control/protection: None, See Surgical Hx     Social Drivers of Health     Financial Resource Strain: Low Risk  (1/7/2025)    Overall Financial Resource Strain (CARDIA)     Difficulty of Paying Living Expenses: Not very hard   Food Insecurity: No Food Insecurity (1/7/2025)    Hunger Vital Sign     Worried About Running Out of Food in the Last Year: Never true     Ran Out of Food in the Last Year: Never true   Transportation Needs: No Transportation Needs (10/7/2024)    PRAPARE - Transportation     Lack of Transportation (Medical): No     Lack of Transportation (Non-Medical): No   Physical Activity: Unknown (1/7/2025)    Exercise Vital Sign     Days of Exercise per Week: Patient declined     Minutes of Exercise per Session: 20 min   Stress: No Stress Concern Present (1/7/2025)    [employer] - Occupational Stress Questionnaire     Feeling of Stress : Not at all   Housing Stability: Unknown (1/7/2025)    Housing Stability Vital Sign     Unable to Pay for Housing in the Last Year: No     Homeless in the Last Year: No         Current Medications[1]      Physical Exam  Vitals:    03/12/25 0855   BP: (!) 170/72   Pulse:        Physical Exam      Gen: well appearing, NAD  Resp: non labored breathing, no crackles, no wheezes, CTAB  CV: RRR no murmur, gallops, rubs, no LE edema  Abd: soft nontender BS present no organomegaly      Problem List Items Addressed This Visit       hypothyroidism    HTN (hypertension) - Primary    HLD (hyperlipidemia)    Type 2 diabetes mellitus with other specified complication, without long-term current use of insulin    Pulmonary heart disease     Other Visit Diagnoses         Urinary urgency           Acute cystitis without hematuria                1. Resistant hypertension  Uncontrolled. Increase hydralazine 50 mg TID and continue other home regimen.   - Lipid Panel; Future  - Basic Metabolic Panel; Future  - 2 week bp check   2. Pulmonary heart disease  On echo in 2022. PA at 51  Follows with cards    3. Type 2 diabetes mellitus with other specified complication, without long-term current use of insulin  Last A1c 6.3  - Hemoglobin A1C; Future  -continue metformin    4. Mixed hyperlipidemia  - lovastatin (MEVACOR) 20 MG tablet; Take 2 tablets (40 mg total) by mouth every evening.  Dispense: 180 tablet; Refill: 3    5. hypothyroidism  - TSH; Future  - T4, Free; Future    6. Urinary urgency  - POCT URINALYSIS    7. Acute cystitis without hematuria  - nitrofurantoin, macrocrystal-monohydrate, (MACROBID) 100 MG capsule; Take 1 capsule (100 mg total) by mouth 2 (two) times daily. for 5 days  Dispense: 10 capsule; Refill: 0        RTC in 2 weeks bp check    Arabella Helms MD  Family Medicine           [1]   Current Outpatient Medications:     acetaminophen (TYLENOL) 500 MG tablet, Take 1 tablet (500 mg total) by mouth every 6 (six) hours as needed for Pain., Disp: 30 tablet, Rfl: 0    amLODIPine (NORVASC) 10 MG tablet, Take 1 tablet (10 mg total) by mouth once daily., Disp: 90 tablet, Rfl: 3    anastrozole (ARIMIDEX) 1 mg Tab, Take 1 tablet (1 mg total) by mouth once daily., Disp: 90 tablet, Rfl: 3    aspirin (ECOTRIN) 81 MG EC tablet, Take 81 mg by mouth once daily., Disp: , Rfl:     cloNIDine (CATAPRES) 0.1 MG tablet, Take 1 tablet (0.1 mg total) by mouth 3 (three) times daily., Disp: 90 tablet, Rfl: 11    gabapentin (NEURONTIN) 100 MG capsule, Take 1 capsule (100 mg total) by mouth 3 (three) times daily., Disp: 30 capsule, Rfl: 0    gabapentin (NEURONTIN) 100 MG capsule, Take 1 capsule (100 mg total) by mouth 3 (three) times daily., Disp: 90 capsule, Rfl: 11    hydrALAZINE (APRESOLINE) 25 MG tablet, Take 1  tablet (25 mg total) by mouth every 8 (eight) hours., Disp: 270 tablet, Rfl: 3    HYDROcodone-acetaminophen (NORCO) 5-325 mg per tablet, Take 1 tablet by mouth every 4 (four) hours as needed for Pain., Disp: 12 tablet, Rfl: 0    ibuprofen (ADVIL,MOTRIN) 400 MG tablet, Take 1 tablet (400 mg total) by mouth every 6 (six) hours as needed., Disp: 20 tablet, Rfl: 0    ibuprofen (ADVIL,MOTRIN) 800 MG tablet, Take 1 tablet (800 mg total) by mouth 3 (three) times daily., Disp: 90 tablet, Rfl: 3    levothyroxine (SYNTHROID) 112 MCG tablet, Take 1 tablet (112 mcg total) by mouth before breakfast., Disp: 90 tablet, Rfl: 3    losartan (COZAAR) 100 MG tablet, Take 1 tablet (100 mg total) by mouth once daily., Disp: 90 tablet, Rfl: 3    metFORMIN (GLUCOPHAGE) 500 MG tablet, Take 500 mg by mouth., Disp: , Rfl:     metFORMIN (GLUCOPHAGE-XR) 750 MG ER 24hr tablet, Take 1 tablet (750 mg total) by mouth daily with breakfast., Disp: 90 tablet, Rfl: 3    methylcellulose (ARTIFICIAL TEARS) 1 % ophthalmic solution, Place 1 drop into both eyes as needed., Disp: , Rfl:     multivitamin (THERAGRAN) per tablet, Take 1 tablet by mouth once daily., Disp: , Rfl:     oxyCODONE (ROXICODONE) 5 MG immediate release tablet, Take 1 tablet (5 mg total) by mouth every 4 (four) hours as needed for Pain., Disp: 11 tablet, Rfl: 0    rOPINIRole (REQUIP) 1 MG tablet, Take 1 tablet (1 mg total) by mouth every evening., Disp: 90 tablet, Rfl: 3    triamcinolone (KENALOG) 0.5 % ointment, APPLY  OINTMENT TOPICALLY TWICE DAILY AS NEEDED FOR  RASH, Disp: 60 g, Rfl: 0    lovastatin (MEVACOR) 20 MG tablet, Take 2 tablets (40 mg total) by mouth every evening., Disp: 180 tablet, Rfl: 3    nitrofurantoin, macrocrystal-monohydrate, (MACROBID) 100 MG capsule, Take 1 capsule (100 mg total) by mouth 2 (two) times daily. for 5 days, Disp: 10 capsule, Rfl: 0    valACYclovir (VALTREX) 1000 MG tablet, Take 1 tablet (1,000 mg total) by mouth 3 (three) times daily. for 7 days,  Disp: 21 tablet, Rfl: 0

## 2025-03-13 ENCOUNTER — RESULTS FOLLOW-UP (OUTPATIENT)
Dept: PRIMARY CARE CLINIC | Facility: CLINIC | Age: 80
End: 2025-03-13

## 2025-03-13 ENCOUNTER — PATIENT MESSAGE (OUTPATIENT)
Dept: PRIMARY CARE CLINIC | Facility: CLINIC | Age: 80
End: 2025-03-13
Payer: MEDICARE

## 2025-03-13 ENCOUNTER — TELEPHONE (OUTPATIENT)
Dept: PRIMARY CARE CLINIC | Facility: CLINIC | Age: 80
End: 2025-03-13
Payer: MEDICARE

## 2025-03-13 NOTE — TELEPHONE ENCOUNTER
----- Message from Arabella Helms MD sent at 3/13/2025  7:49 AM CDT -----  Please let patient know that her diabetes has gone up slightly when compared to 6 months ago.  She needs to make sure that she is not indulging in sugary beverages, rice, bread, pasta, or other   starches.  Otherwise her labs were non concerning and can be repeated in 6 months.  ----- Message -----  From: [facility] Interface  Sent: 3/12/2025   5:45 PM CDT  To: Arabella Helms MD

## 2025-03-17 ENCOUNTER — PATIENT MESSAGE (OUTPATIENT)
Dept: ADMINISTRATIVE | Facility: HOSPITAL | Age: 80
End: 2025-03-17
Payer: MEDICARE

## 2025-03-26 ENCOUNTER — TELEPHONE (OUTPATIENT)
Dept: PRIMARY CARE CLINIC | Facility: CLINIC | Age: 80
End: 2025-03-26
Payer: MEDICARE

## 2025-03-26 NOTE — TELEPHONE ENCOUNTER
----- Message from Vira sent at 3/26/2025  8:14 AM CDT -----  Contact: [phone]  [email or web address] Patient is calling for Medical Advice regarding:pt is calling to reschedule nurse visit scheduled for 3/26/25Patient wants a call back or thru [facility]:Brendon back Please call pt and advise

## 2025-04-07 ENCOUNTER — PATIENT MESSAGE (OUTPATIENT)
Dept: ADMINISTRATIVE | Facility: HOSPITAL | Age: 80
End: 2025-04-07
Payer: MEDICARE

## 2025-05-04 DIAGNOSIS — I10 PRIMARY HYPERTENSION: ICD-10-CM

## 2025-05-04 NOTE — TELEPHONE ENCOUNTER
Care Due:                  Date            Visit Type   Department     Provider  --------------------------------------------------------------------------------                                MYCHART                              FOLLOWUP/OF  [facility] PRIMARY  Last Visit: 03-      FICE VISIT   CARE           Arabella Helms  Next Visit: None Scheduled  None         None Found                                                            Last  Test          Frequency    Reason                     Performed    Due Date  --------------------------------------------------------------------------------    CBC.........  12 months..  hydrALAZINE, ibuprofen...  03- 03-    [facility] Embedded Care Due Messages. Reference number: [id number].   5/04/2025 8:00:56 AM CDT

## 2025-05-05 ENCOUNTER — PATIENT MESSAGE (OUTPATIENT)
Dept: ADMINISTRATIVE | Facility: HOSPITAL | Age: 80
End: 2025-05-05
Payer: MEDICARE

## 2025-05-06 RX ORDER — CLONIDINE HYDROCHLORIDE 0.1 MG/1
0.1 TABLET ORAL 3 TIMES DAILY
Qty: 90 TABLET | Refills: 3 | Status: SHIPPED | OUTPATIENT
Start: 2025-05-06

## 2025-06-02 ENCOUNTER — PATIENT MESSAGE (OUTPATIENT)
Dept: ADMINISTRATIVE | Facility: HOSPITAL | Age: 80
End: 2025-06-02
Payer: MEDICARE

## 2025-06-13 ENCOUNTER — PATIENT MESSAGE (OUTPATIENT)
Dept: PRIMARY CARE CLINIC | Facility: CLINIC | Age: 80
End: 2025-06-13
Payer: MEDICARE

## 2025-06-13 ENCOUNTER — OFFICE VISIT (OUTPATIENT)
Dept: PRIMARY CARE CLINIC | Facility: CLINIC | Age: 80
End: 2025-06-13
Payer: MEDICARE

## 2025-06-13 VITALS
WEIGHT: 229.81 LBS | BODY MASS INDEX: 36.07 KG/M2 | OXYGEN SATURATION: 100 % | HEIGHT: 67 IN | TEMPERATURE: 98 F | DIASTOLIC BLOOD PRESSURE: 71 MMHG | HEART RATE: 67 BPM | SYSTOLIC BLOOD PRESSURE: 164 MMHG

## 2025-06-13 DIAGNOSIS — E11.69 TYPE 2 DIABETES MELLITUS WITH OTHER SPECIFIED COMPLICATION, WITHOUT LONG-TERM CURRENT USE OF INSULIN: ICD-10-CM

## 2025-06-13 DIAGNOSIS — I1A.0 RESISTANT HYPERTENSION: Primary | ICD-10-CM

## 2025-06-13 DIAGNOSIS — R30.0 DYSURIA: ICD-10-CM

## 2025-06-13 DIAGNOSIS — Z76.0 MEDICATION REFILL: ICD-10-CM

## 2025-06-13 LAB
BILIRUB SERPL-MCNC: NEGATIVE MG/DL
BLOOD, POC UA: NEGATIVE
GLUCOSE UR QL STRIP: NEGATIVE
KETONES UR QL STRIP: NEGATIVE
LEUKOCYTE ESTERASE URINE, POC: ABNORMAL
NITRITE, POC UA: NEGATIVE
PH, POC UA: 6
PROTEIN, POC: ABNORMAL
SPECIFIC GRAVITY, POC UA: 1.02
UROBILINOGEN, POC UA: ABNORMAL

## 2025-06-13 PROCEDURE — 87186 SC STD MICRODIL/AGAR DIL: CPT

## 2025-06-13 PROCEDURE — 1101F PT FALLS ASSESS-DOCD LE1/YR: CPT | Mod: CPTII,S$GLB,,

## 2025-06-13 PROCEDURE — 99999 PR PBB SHADOW E&M-EST. PATIENT-LVL V: CPT | Mod: PBBFAC,,,

## 2025-06-13 PROCEDURE — 3077F SYST BP >= 140 MM HG: CPT | Mod: CPTII,S$GLB,,

## 2025-06-13 PROCEDURE — 3288F FALL RISK ASSESSMENT DOCD: CPT | Mod: CPTII,S$GLB,,

## 2025-06-13 PROCEDURE — 3078F DIAST BP <80 MM HG: CPT | Mod: CPTII,S$GLB,,

## 2025-06-13 PROCEDURE — 1126F AMNT PAIN NOTED NONE PRSNT: CPT | Mod: CPTII,S$GLB,,

## 2025-06-13 PROCEDURE — 99214 OFFICE O/P EST MOD 30 MIN: CPT | Mod: S$GLB,,,

## 2025-06-13 PROCEDURE — 81003 URINALYSIS AUTO W/O SCOPE: CPT | Mod: QW,S$GLB,,

## 2025-06-13 RX ORDER — METFORMIN HYDROCHLORIDE 750 MG/1
750 TABLET, EXTENDED RELEASE ORAL
COMMUNITY
End: 2025-06-13 | Stop reason: SDUPTHER

## 2025-06-13 RX ORDER — METFORMIN HYDROCHLORIDE 750 MG/1
750 TABLET, EXTENDED RELEASE ORAL
Qty: 90 TABLET | Refills: 3 | Status: SHIPPED | OUTPATIENT
Start: 2025-06-13

## 2025-06-13 RX ORDER — METFORMIN HYDROCHLORIDE 500 MG/1
500 TABLET ORAL
Status: CANCELLED | OUTPATIENT
Start: 2025-06-13

## 2025-06-13 NOTE — PROGRESS NOTES
"Subjective     Patient ID: Kee Ramirez is a 79 y.o. female.    Chief Complaint: Follow-up (Pt reports she still has symptoms of a UTI.)      HPI  Review of Systems       Objective   Vitals:    06/13/25 0822   BP: (!) 164/71   BP Location: Left arm   Patient Position: Sitting   Pulse: 67   Temp: 98.1 °F (36.7 °C)   TempSrc: Oral   SpO2: 100%   Weight: 104.2 kg (229 lb 13.3 oz)   Height: 5' 7" (1.702 m)       Physical Exam       Assessment and Plan     1. Resistant hypertension    2. Type 2 diabetes mellitus with other specified complication, without long-term current use of insulin  -     metFORMIN (GLUCOPHAGE-XR) 750 MG ER 24hr tablet; Take 1 tablet (750 mg total) by mouth daily with breakfast.  Dispense: 90 tablet; Refill: 3    3. Dysuria  -     POCT URINALYSIS  -     Urine Culture High Risk ($$)    4. Medication refill  -     metFORMIN (GLUCOPHAGE-XR) 750 MG ER 24hr tablet; Take 1 tablet (750 mg total) by mouth daily with breakfast.  Dispense: 90 tablet; Refill: 3        ***         Follow up in about 2 weeks (around 6/27/2025) for Hypertension.       I spent a total of *** minutes on the day of the visit.This includes face to face time and non-face to face time preparing to see the patient (eg, review of tests), obtaining and/or reviewing separately obtained history, documenting clinical information in the electronic or other health record, independently interpreting results and communicating results to the patient/family/caregiver, or care coordinator.     Lori A. Stephens Sandifer, FNP-C    " -depression, -sleep difficulty  Female Genitourinary: +urgency           Past Medical History:   Diagnosis Date    Bowel obstruction     Breast cancer 05/01/2022    left    Cancer     COVID-19 07/2022    Diabetes mellitus, type 2     Hyperlipidemia     Hypertension     Lobular carcinoma in situ 05/01/2022    left    Midline low back pain without sciatica 07/31/2015    Thyroid disease     Venous stasis     bilateral legs     Past Surgical History:   Procedure Laterality Date    BREAST BIOPSY Left 03/21/2022    Core bx, + ILC    CATARACT EXTRACTION      COLONOSCOPY      COLONOSCOPY N/A 12/05/2019    Procedure: COLONOSCOPY;  Surgeon: Luis Bogran-Reyes, MD;  Location: [facility];  Service: Endoscopy;  Laterality: N/A;    HYSTERECTOMY  12/01/2021    INJECTION FOR SENTINEL NODE IDENTIFICATION Left 01/04/2023    Procedure: INJECTION, FOR SENTINEL NODE IDENTIFICATION;  Surgeon: Erika Valente MD;  Location: [facility];  Service: General;  Laterality: Left;    INSERTION OF TUNNELED CENTRAL VENOUS CATHETER (CVC) WITH SUBCUTANEOUS PORT Right 05/24/2022    Procedure: [id number];  Surgeon: Darien Bear MD;  Location: [facility];  Service: General;  Laterality: Right;    MASTECTOMY Left 01/04/2023    Procedure: MASTECTOMY;  Surgeon: Erika Valente MD;  Location: [facility];  Service: General;  Laterality: Left;    MASTECTOMY  1/4/2023    MEDIPORT REMOVAL Right 01/04/2023    Procedure: REMOVAL PORT;  Surgeon: Erika Valente MD;  Location: [facility];  Service: General;  Laterality: Right;    SENTINEL LYMPH NODE BIOPSY Left 01/04/2023    Procedure: BIOPSY, LYMPH NODE, SENTINEL;  Surgeon: Erika Valente MD;  Location: [facility];  Service: General;  Laterality: Left;    SHOULDER ARTHROSCOPY W/ ROTATOR CUFF REPAIR Right 2001    TUBAL LIGATION      VAGINAL HYSTERECTOMY N/A 01/11/2021    Procedure: HYSTERECTOMY, VAGINAL ;  Surgeon: Jessie Dacosta MD;  Location: [facility];  Service: OB/GYN;  Laterality: N/A;     Social History[1]  Review  "of patient's allergies indicates:   Allergen Reactions    Naproxen sodium Itching     Problem List[2]       Objective   Vitals:    06/13/25 0822   BP: (!) 164/71   BP Location: Left arm   Patient Position: Sitting   Pulse: 67   Temp: 98.1 °F (36.7 °C)   TempSrc: Oral   SpO2: 100%   Weight: 104.2 kg (229 lb 13.3 oz)   Height: 5' 7" (1.702 m)       Physical Exam  Constitutional:       General: She is not in acute distress.     Appearance: Normal appearance.   HENT:      Head: Normocephalic and atraumatic.   Eyes:      Extraocular Movements: Extraocular movements intact.      Conjunctiva/sclera: Conjunctivae normal.      Pupils: Pupils are equal, round, and reactive to light.   Cardiovascular:      Rate and Rhythm: Normal rate and regular rhythm.      Pulses: Normal pulses.      Heart sounds: Normal heart sounds.   Pulmonary:      Effort: Pulmonary effort is normal. No respiratory distress.      Breath sounds: Normal breath sounds.   Abdominal:      General: Bowel sounds are normal.      Palpations: Abdomen is soft.      Tenderness: There is no guarding.   Musculoskeletal:         General: Normal range of motion.      Cervical back: Normal range of motion and neck supple.   Skin:     General: Skin is warm and dry.      Capillary Refill: Capillary refill takes less than 2 seconds.   Neurological:      Mental Status: She is alert and oriented to person, place, and time.   Psychiatric:         Mood and Affect: Mood normal.         Behavior: Behavior normal.            Assessment and Plan     1. Resistant hypertension  Measured current blood pressure at 164/71, indicating hypertension.  - Patient reports home BP fluctuations, sometimes reaching 150-160/60-70 mmHg, with lowest systolic reading of 140 and lowest diastolic in the 60s.  - Reviewed and continued current antihypertensive regimen: amlodipine in the morning, clonidine 3 times daily, hydralazine 50 mg 3 times daily, and losartan at night.  - Instructed patient to " monitor blood pressure twice daily (morning and evening).  - Scheduled follow-up visit in 2 weeks to reassess blood pressure control.    2. Type 2 diabetes mellitus with other specified complication, without long-term current use of insulin  -     metFORMIN (GLUCOPHAGE-XR) 750 MG ER 24hr tablet; Take 1 tablet (750 mg total) by mouth daily with breakfast.  Dispense: 90 tablet; Refill: 3  Evaluated recent lab results, including HbA1c of 6.6%, indicating slight increase from previous 6.3%.  - Blood glucose readings of 123, 119, and 98 are within acceptable range.    3. Dysuria  -     POCT URINALYSIS  -     Urine Culture High Risk ($$)  Noted recurring UTI symptoms despite previous antibiotic treatment in March.  - Documented patient's symptoms including fluttering sensation after urination and urgency.  - Ordered urinalysis and urine culture to investigate potential recurrent UTI.    4. Medication refill  -     metFORMIN (GLUCOPHAGE-XR) 750 MG ER 24hr tablet; Take 1 tablet (750 mg total) by mouth daily with breakfast.  Dispense: 90 tablet; Refill: 3        Medications Ordered Prior to Encounter[3]       Follow up in about 2 weeks (around 6/27/2025) for Hypertension: bring blood pressure log.    Lori A. Stephens Sandifer, FNP-C    This note was generated with the assistance of ambient listening technology. Verbal consent was obtained by the patient for the recording of patient appointment to facilitate this note. I attest to having reviewed and edited the generated note for accuracy, though some syntax or spelling errors may persist. Please contact the author of this note for any clarification.           [1]   Social History  Socioeconomic History    Marital status:     Years of education: college   Tobacco Use    Smoking status: Never     Passive exposure: Never    Smokeless tobacco: Never   Substance and Sexual Activity    Alcohol use: No     Alcohol/week: 0.0 standard drinks of alcohol    Drug use: No     Sexual activity: Not Currently     Partners: Male     Birth control/protection: None, See Surgical Hx     Social Drivers of Health     Financial Resource Strain: Low Risk  (1/7/2025)    Overall Financial Resource Strain (CARDIA)     Difficulty of Paying Living Expenses: Not very hard   Food Insecurity: No Food Insecurity (1/7/2025)    Hunger Vital Sign     Worried About Running Out of Food in the Last Year: Never true     Ran Out of Food in the Last Year: Never true   Transportation Needs: No Transportation Needs (10/7/2024)    PRAPARE - Transportation     Lack of Transportation (Medical): No     Lack of Transportation (Non-Medical): No   Physical Activity: Unknown (1/7/2025)    Exercise Vital Sign     Days of Exercise per Week: Patient declined     Minutes of Exercise per Session: 20 min   Stress: No Stress Concern Present (1/7/2025)    [employer] - Occupational Stress Questionnaire     Feeling of Stress : Not at all   Housing Stability: Unknown (1/7/2025)    Housing Stability Vital Sign     Unable to Pay for Housing in the Last Year: No     Homeless in the Last Year: No   [2]   Patient Active Problem List  Diagnosis    Personal history of colonic polyps    hypothyroidism    LVH (left ventricular hypertrophy)    HTN (hypertension)    HLD (hyperlipidemia)    Class 2 severe obesity due to excess calories with serious comorbidity and body mass index (BMI) of 36.0 to 36.9 in adult    Chronic venous insufficiency    Family history of cardiovascular disease    Chronic bilateral low back pain with left-sided sciatica    Pseudophakia    Type 2 diabetes mellitus with other specified complication, without long-term current use of insulin    Screening for colon cancer    Uterine prolapse    S/P vaginal hysterectomy    Mixed urinary incontinence due to female genital prolapse    RLS (restless legs syndrome)    Lobular carcinoma of left breast    Malignant neoplasm of central portion of left female  breast    SBO (small bowel obstruction)    Use of anastrozole    Generalized abdominal pain    Nausea and vomiting    Abnormal posture    Poor posture    Decreased ROM of left shoulder    Decreased functional mobility and endurance    Decreased strength    At risk for lymphedema    Aortic atherosclerosis    Coronary artery calcification    Pulmonary heart disease   [3]   Current Outpatient Medications on File Prior to Visit   Medication Sig Dispense Refill    acetaminophen (TYLENOL) 500 MG tablet Take 1 tablet (500 mg total) by mouth every 6 (six) hours as needed for Pain. 30 tablet 0    amLODIPine (NORVASC) 10 MG tablet Take 1 tablet (10 mg total) by mouth once daily. 90 tablet 3    aspirin (ECOTRIN) 81 MG EC tablet Take 81 mg by mouth once daily.      cloNIDine (CATAPRES) 0.1 MG tablet TAKE 1 TABLET BY MOUTH THREE TIMES DAILY 90 tablet 3    ibuprofen (ADVIL,MOTRIN) 800 MG tablet Take 1 tablet (800 mg total) by mouth 3 (three) times daily. 90 tablet 3    levothyroxine (SYNTHROID) 112 MCG tablet Take 1 tablet (112 mcg total) by mouth before breakfast. 90 tablet 3    losartan (COZAAR) 100 MG tablet Take 1 tablet (100 mg total) by mouth once daily. 90 tablet 3    lovastatin (MEVACOR) 20 MG tablet Take 2 tablets (40 mg total) by mouth every evening. 180 tablet 3    methylcellulose (ARTIFICIAL TEARS) 1 % ophthalmic solution Place 1 drop into both eyes as needed.      multivitamin (THERAGRAN) per tablet Take 1 tablet by mouth once daily.      triamcinolone (KENALOG) 0.5 % ointment APPLY  OINTMENT TOPICALLY TWICE DAILY AS NEEDED FOR  RASH (Patient not taking: Reported on 6/26/2025) 60 g 0    metFORMIN (GLUCOPHAGE) 500 MG tablet Take 500 mg by mouth. (Patient not taking: Reported on 6/13/2025)      [DISCONTINUED] conjugated estrogens (PREMARIN) vaginal cream Place 1 g vaginally twice a week. (Patient not taking: No sig reported) 30 g 3     No current facility-administered medications on file prior to visit.

## 2025-06-15 LAB — BACTERIA UR CULT: ABNORMAL

## 2025-06-18 ENCOUNTER — RESULTS FOLLOW-UP (OUTPATIENT)
Dept: PRIMARY CARE CLINIC | Facility: CLINIC | Age: 80
End: 2025-06-18

## 2025-06-18 DIAGNOSIS — N39.0 E. COLI UTI: Primary | ICD-10-CM

## 2025-06-18 DIAGNOSIS — B96.20 E. COLI UTI: Primary | ICD-10-CM

## 2025-06-18 RX ORDER — NITROFURANTOIN 25; 75 MG/1; MG/1
100 CAPSULE ORAL EVERY 12 HOURS
Qty: 10 CAPSULE | Refills: 0 | Status: SHIPPED | OUTPATIENT
Start: 2025-06-18 | End: 2025-06-23

## 2025-06-22 DIAGNOSIS — C50.112 MALIGNANT NEOPLASM OF CENTRAL PORTION OF LEFT BREAST IN FEMALE, ESTROGEN RECEPTOR POSITIVE: ICD-10-CM

## 2025-06-22 DIAGNOSIS — Z17.0 MALIGNANT NEOPLASM OF CENTRAL PORTION OF LEFT BREAST IN FEMALE, ESTROGEN RECEPTOR POSITIVE: ICD-10-CM

## 2025-06-22 DIAGNOSIS — Z79.811 LONG TERM (CURRENT) USE OF AROMATASE INHIBITORS: ICD-10-CM

## 2025-06-23 RX ORDER — ANASTROZOLE 1 MG/1
1 TABLET ORAL
Qty: 90 TABLET | Refills: 3 | Status: SHIPPED | OUTPATIENT
Start: 2025-06-23

## 2025-06-26 ENCOUNTER — OFFICE VISIT (OUTPATIENT)
Dept: PRIMARY CARE CLINIC | Facility: CLINIC | Age: 80
End: 2025-06-26
Payer: MEDICARE

## 2025-06-26 VITALS
WEIGHT: 228.81 LBS | DIASTOLIC BLOOD PRESSURE: 84 MMHG | SYSTOLIC BLOOD PRESSURE: 139 MMHG | RESPIRATION RATE: 18 BRPM | BODY MASS INDEX: 35.84 KG/M2 | OXYGEN SATURATION: 100 % | HEART RATE: 73 BPM

## 2025-06-26 DIAGNOSIS — M54.50 ACUTE BILATERAL LOW BACK PAIN WITHOUT SCIATICA: ICD-10-CM

## 2025-06-26 DIAGNOSIS — E11.69 TYPE 2 DIABETES MELLITUS WITH OTHER SPECIFIED COMPLICATION, WITHOUT LONG-TERM CURRENT USE OF INSULIN: ICD-10-CM

## 2025-06-26 DIAGNOSIS — I1A.0 RESISTANT HYPERTENSION: Primary | ICD-10-CM

## 2025-06-26 PROCEDURE — 82570 ASSAY OF URINE CREATININE: CPT

## 2025-06-26 PROCEDURE — 99999 PR PBB SHADOW E&M-EST. PATIENT-LVL V: CPT | Mod: PBBFAC,,,

## 2025-06-26 RX ORDER — HYDRALAZINE HYDROCHLORIDE 25 MG/1
50 TABLET, FILM COATED ORAL EVERY 8 HOURS
Qty: 270 TABLET | Refills: 3 | Status: SHIPPED | OUTPATIENT
Start: 2025-06-26 | End: 2026-06-26

## 2025-06-26 NOTE — PROGRESS NOTES
Subjective     Patient ID: Kee Ramirez is a 79 y.o. female.      History of Present Illness    CHIEF COMPLAINT:  Patient presents today for blood pressure follow up    HYPERTENSION:  She reports improvement in blood pressure readings. She acknowledges previously not taking blood pressure measurements correctly, noting she was not following proper technique such as sitting with feet flat on the floor, legs straight, avoiding distractions, and maintaining a calm breathing pattern during measurement. She now understands the importance of proper blood pressure measurement technique and reports feeling that her blood pressure has improved.    MUSCULOSKELETAL:  She reports ongoing lower back ache across entire lower back region. She describes symptoms as an intermittent ache, not sharp pain, occurring on and off. She currently manages symptoms with Tylenol for relief and has not attempted heat therapy as an alternative treatment method. Tylenol provides some symptomatic relief, though effectiveness is uncertain.    GENITOURINARY:  She reports a recent urinary tract infection that has been treated. She completed full course of prescribed antibiotics. She currently denies urinary symptoms such as dysuria or urgency and states symptoms have resolved.    MEDICATIONS:  She is currently taking Clonidine, aspirin, ibuprofen PRN, levothyroxine, Losartan, lovastatin, Metformin 750 mg, multivitamin and artificial tears as needed. She needs a new prescription for hydralazine, previously prescribed one tablet 3 times daily, recently increased to two tablets 3 times daily. She reports not currently using triamcinolone cream.    VACCINATIONS:  Her last COVID vaccine was received in October of last year. She is aware of current annual COVID vaccine recommendations and plans to continue with yearly vaccination around the same time period.      ROS:  General: -fever, -chills, -fatigue, -weight gain, -weight loss  Eyes: -vision changes,  -redness, -discharge  ENT: -ear pain, -nasal congestion, -sore throat  Cardiovascular: -chest pain, -palpitations, -lower extremity edema  Respiratory: -cough, -shortness of breath  Gastrointestinal: -abdominal pain, -nausea, -vomiting, -diarrhea, -constipation, -blood in stool  Genitourinary: -dysuria, -hematuria, -frequency  Musculoskeletal: -joint pain, -muscle pain, +back pain  Skin: -rash, -lesion  Neurological: -headache, -dizziness, -numbness, -tingling  Psychiatric: -anxiety, -depression, -sleep difficulty           Past Medical History:   Diagnosis Date    Bowel obstruction     Breast cancer 05/01/2022    left    Cancer     COVID-19 07/2022    Diabetes mellitus, type 2     Hyperlipidemia     Hypertension     Lobular carcinoma in situ 05/01/2022    left    Midline low back pain without sciatica 07/31/2015    Thyroid disease     Venous stasis     bilateral legs     Past Surgical History:   Procedure Laterality Date    BREAST BIOPSY Left 03/21/2022    Core bx, + ILC    CATARACT EXTRACTION      COLONOSCOPY      COLONOSCOPY N/A 12/05/2019    Procedure: COLONOSCOPY;  Surgeon: Luis Bogran-Reyes, MD;  Location: [facility];  Service: Endoscopy;  Laterality: N/A;    HYSTERECTOMY  12/01/2021    INJECTION FOR SENTINEL NODE IDENTIFICATION Left 01/04/2023    Procedure: INJECTION, FOR SENTINEL NODE IDENTIFICATION;  Surgeon: Erika Valente MD;  Location: [facility];  Service: General;  Laterality: Left;    INSERTION OF TUNNELED CENTRAL VENOUS CATHETER (CVC) WITH SUBCUTANEOUS PORT Right 05/24/2022    Procedure: [id number];  Surgeon: Darien Bear MD;  Location: [facility];  Service: General;  Laterality: Right;    MASTECTOMY Left 01/04/2023    Procedure: MASTECTOMY;  Surgeon: Erika Valente MD;  Location: [facility];  Service: General;  Laterality: Left;    MASTECTOMY  1/4/2023    MEDIPORT REMOVAL Right 01/04/2023    Procedure: REMOVAL PORT;  Surgeon: Erika Valente MD;  Location: [facility];  Service: General;  Laterality:  Right;    SENTINEL LYMPH NODE BIOPSY Left 01/04/2023    Procedure: BIOPSY, LYMPH NODE, SENTINEL;  Surgeon: Erika Valente MD;  Location: [facility];  Service: General;  Laterality: Left;    SHOULDER ARTHROSCOPY W/ ROTATOR CUFF REPAIR Right 2001    TUBAL LIGATION      VAGINAL HYSTERECTOMY N/A 01/11/2021    Procedure: HYSTERECTOMY, VAGINAL ;  Surgeon: Jessie Dacotsa MD;  [facility];  Service: OB/GYN;  Laterality: N/A;     Social History[1]  Review of patient's allergies indicates:   Allergen Reactions    Naproxen sodium Itching     Problem List[2]         Objective   Vitals:    06/26/25 0814   BP: 139/84   Pulse: 73   Resp: 18   SpO2: 100%   Weight: 103.8 kg (228 lb 13.4 oz)       Physical Exam  Constitutional:       General: She is not in acute distress.     Appearance: Normal appearance.   HENT:      Head: Normocephalic and atraumatic.      Mouth/Throat:      Mouth: Mucous membranes are moist.   Eyes:      Extraocular Movements: Extraocular movements intact.      Conjunctiva/sclera: Conjunctivae normal.      Pupils: Pupils are equal, round, and reactive to light.   Cardiovascular:      Rate and Rhythm: Normal rate and regular rhythm.      Pulses: Normal pulses.      Heart sounds: Normal heart sounds.   Pulmonary:      Effort: Pulmonary effort is normal. No respiratory distress.   Musculoskeletal:         General: Normal range of motion.      Cervical back: Normal range of motion.      Lumbar back: Normal range of motion.   Skin:     Capillary Refill: Capillary refill takes less than 2 seconds.   Neurological:      Mental Status: She is alert and oriented to person, place, and time.   Psychiatric:         Mood and Affect: Mood normal.         Behavior: Behavior normal.            Assessment and Plan     1. Resistant hypertension  -     hydrALAZINE (APRESOLINE) 25 MG tablet; Take 2 tablets (50 mg total) by mouth every 8 (eight) hours.  Dispense: 270 tablet; Refill: 3    Assessed blood pressure showing  improvement to 139/84 from previously higher readings, likely due to proper measurement technique.  - Educated patient on proper BP monitoring technique, including relaxation, feet flat, legs straight, and breathing during measurement, emphasizing the importance of consistent and accurate monitoring.  Reviewed current medication regimen, including recent changes made by Dr. Helms. Refilled Hydralazine prescription due to replenishing from increase dosage change from 25 mg to 50 mg, to be taken 3 times daily for blood pressure management.        2. Acute bilateral low back pain without sciatica  Evaluated patient's intermittent lower back ache across the lower back area, which is currently manageable with OTC analgesics.  - Advised application of heat to affected area as needed and recommended continuing Tylenol as needed for discomfort.  - Instructed patient to monitor pain and report if it worsens or does not improve.  Patient reports on her way to [facility] where she does exercises     3. Type 2 diabetes mellitus with other specified complication, without long-term current use of insulin  -     Microalbumin/creatinine urine ratio    Medications Ordered Prior to Encounter[3]         Follow up if symptoms worsen or fail to improve.    Lori A. Stephens Sandifer, FNP-C    This note was generated with the assistance of ambient listening technology. Verbal consent was obtained by the patient for the recording of patient appointment to facilitate this note. I attest to having reviewed and edited the generated note for accuracy, though some syntax or spelling errors may persist. Please contact the author of this note for any clarification.           [1]   Social History  Socioeconomic History    Marital status:     Years of education: college   Tobacco Use    Smoking status: Never     Passive exposure: Never    Smokeless tobacco: Never   Substance and Sexual Activity    Alcohol use: No     Alcohol/week: 0.0  standard drinks of alcohol    Drug use: No    Sexual activity: Not Currently     Partners: Male     Birth control/protection: None, See Surgical Hx     Social Drivers of Health     Financial Resource Strain: Low Risk  (1/7/2025)    Overall Financial Resource Strain (CARDIA)     Difficulty of Paying Living Expenses: Not very hard   Food Insecurity: No Food Insecurity (1/7/2025)    Hunger Vital Sign     Worried About Running Out of Food in the Last Year: Never true     Ran Out of Food in the Last Year: Never true   Transportation Needs: No Transportation Needs (10/7/2024)    PRAPARE - Transportation     Lack of Transportation (Medical): No     Lack of Transportation (Non-Medical): No   Physical Activity: Unknown (1/7/2025)    Exercise Vital Sign     Days of Exercise per Week: Patient declined     Minutes of Exercise per Session: 20 min   Stress: No Stress Concern Present (1/7/2025)    [employer] - Occupational Stress Questionnaire     Feeling of Stress : Not at all   Housing Stability: Unknown (1/7/2025)    Housing Stability Vital Sign     Unable to Pay for Housing in the Last Year: No     Homeless in the Last Year: No   [2]   Patient Active Problem List  Diagnosis    Personal history of colonic polyps    hypothyroidism    LVH (left ventricular hypertrophy)    HTN (hypertension)    HLD (hyperlipidemia)    Class 2 severe obesity due to excess calories with serious comorbidity and body mass index (BMI) of 36.0 to 36.9 in adult    Chronic venous insufficiency    Family history of cardiovascular disease    Chronic bilateral low back pain with left-sided sciatica    Pseudophakia    Type 2 diabetes mellitus with other specified complication, without long-term current use of insulin    Screening for colon cancer    Uterine prolapse    S/P vaginal hysterectomy    Mixed urinary incontinence due to female genital prolapse    RLS (restless legs syndrome)    Lobular carcinoma of left breast     Malignant neoplasm of central portion of left female breast    SBO (small bowel obstruction)    Use of anastrozole    Generalized abdominal pain    Nausea and vomiting    Abnormal posture    Poor posture    Decreased ROM of left shoulder    Decreased functional mobility and endurance    Decreased strength    At risk for lymphedema    Aortic atherosclerosis    Coronary artery calcification    Pulmonary heart disease   [3]   Current Outpatient Medications on File Prior to Visit   Medication Sig Dispense Refill    aspirin (ECOTRIN) 81 MG EC tablet Take 81 mg by mouth once daily.      cloNIDine (CATAPRES) 0.1 MG tablet TAKE 1 TABLET BY MOUTH THREE TIMES DAILY 90 tablet 3    ibuprofen (ADVIL,MOTRIN) 800 MG tablet Take 1 tablet (800 mg total) by mouth 3 (three) times daily. 90 tablet 3    levothyroxine (SYNTHROID) 112 MCG tablet Take 1 tablet (112 mcg total) by mouth before breakfast. 90 tablet 3    losartan (COZAAR) 100 MG tablet Take 1 tablet (100 mg total) by mouth once daily. 90 tablet 3    lovastatin (MEVACOR) 20 MG tablet Take 2 tablets (40 mg total) by mouth every evening. 180 tablet 3    metFORMIN (GLUCOPHAGE-XR) 750 MG ER 24hr tablet Take 1 tablet (750 mg total) by mouth daily with breakfast. 90 tablet 3    methylcellulose (ARTIFICIAL TEARS) 1 % ophthalmic solution Place 1 drop into both eyes as needed.      multivitamin (THERAGRAN) per tablet Take 1 tablet by mouth once daily.      acetaminophen (TYLENOL) 500 MG tablet Take 1 tablet (500 mg total) by mouth every 6 (six) hours as needed for Pain. 30 tablet 0    amLODIPine (NORVASC) 10 MG tablet Take 1 tablet (10 mg total) by mouth once daily. 90 tablet 3    anastrozole (ARIMIDEX) 1 mg Tab Take 1 tablet by mouth once daily 90 tablet 3    metFORMIN (GLUCOPHAGE) 500 MG tablet Take 500 mg by mouth. (Patient not taking: Reported on 6/13/2025)      triamcinolone (KENALOG) 0.5 % ointment APPLY  OINTMENT TOPICALLY TWICE DAILY AS NEEDED FOR  RASH (Patient not taking:  Reported on 6/26/2025) 60 g 0    [DISCONTINUED] conjugated estrogens (PREMARIN) vaginal cream Place 1 g vaginally twice a week. (Patient not taking: No sig reported) 30 g 3    [DISCONTINUED] gabapentin (NEURONTIN) 100 MG capsule Take 1 capsule (100 mg total) by mouth 3 (three) times daily. 30 capsule 0    [DISCONTINUED] hydrALAZINE (APRESOLINE) 25 MG tablet Take 1 tablet (25 mg total) by mouth every 8 (eight) hours. 270 tablet 3     No current facility-administered medications on file prior to visit.

## 2025-06-27 ENCOUNTER — PATIENT MESSAGE (OUTPATIENT)
Dept: PRIMARY CARE CLINIC | Facility: CLINIC | Age: 80
End: 2025-06-27
Payer: MEDICARE

## 2025-06-27 LAB
ALBUMIN/CREAT UR: 77.7 UG/MG
CREAT UR-MCNC: 139 MG/DL (ref 15–325)
MICROALBUMIN UR-MCNC: 108 UG/ML (ref ?–5000)

## 2025-07-01 NOTE — PROGRESS NOTES
PROGRESS NOTE    Subjective:       Patient ID: Kee Ramirez is a 79 y.o. female.  MRN: [id number]  : 1945    Chief Complaint: ILC of the left breast     History of Present Illness:   Kee Ramirez is a 79 y.o. female who presents with  ILC of the left breast.       Per Previous note: Reports yearly mammograms, normal in . In  screening mammogram showed left breast architectural distortion. Diagnostic mammogram in March showed a 21 x 9  X 2 0 mm left breast mass. US guided biopsy showed ILC, ER strongly positive, VT 15% positive, Her 2 rhina negative.   A breast MRI showed a 7.6 x 4.7 cm mass with spiculated margins.No abnormal lymph nodes were found.      No history of breast mass or biopsies. She has been seeing Dr. Ortiz and is transferring care to use due to proximity. See full oncology history below.   She has been referred for neoadjuvant chemotherapy. We met today as a follow up visit to further discuss neoadjuvant therapy.  At her visit two weeks ago, we discussed neoadjuvant chemotherapy vs neoadjuvant endocrine therapy.  She is interested in breast conservation and is not a candidate for upfront lumpectomy given tumor size and extension to the nipple areolar complex.  She met with Dr. Valente who agrees that she may not be a candidate for breast conservation even after neoadjuvant therapies.  Patient wants to try.     An oncotype was sent to determine if she was high risk and would benefit from chemotherapy.  We were notified there was not enough tissue specimen from the original biopsy for the oncotype.  After extensive discussion, we elected to start neoadjuvant anastrozole.     2022: Started anastrozole      She was admitted to the hospital from -, found to have SBO. Has NGT for decompression and improved with conservative measures.     She completed 6 months of neoadjuvant anastrozole and has had a left mastectomy  without reconstruction on 1/4/23.     Started RT on 3/20, completed at the end of April. Has dry skin and some limited ROM.     Interim history:  Resumed anastrozole 2/22/23.  Overall she is doing well. Appetite and bowel movements are good.   Sleeping okay, energy is good. Denies hot flashes and vaginal dryness.  Mild arthralgias in the right shoulder.   Denies fever and chills. Denies coughing and shortness of breath.         Previously  noted by Dr. Abraham: Oncology History:  As documented by  and updated      03.21.2022 Left Breast, Core Needle Biopsies:   - Invasive lobular carcinoma, well-differentiated .   - Bloom-Damon score (5/9):        - Tubular Formation: 3/3        - Nuclear Pleomorphism: 1/3        - Mitotic Activity: 1/3.   - Background of lobular carinoma in-situ (LCIS), nuclear grade 1.   - No perineural or lymphovascular invasion is identified.   Estrogen receptor: Positive (strong intensity, >95% of tumor cell nuclei).   Progesterone receptor: Positive (strong intensity, 15% of tumor cell nuclei).   HER2 IHC: Negative.   Ki-67: 10%.  04.04.2022 [facility] TB -- Surgery FU w/Oncotype  04.06.2022 [facility] follow up  -Breast MRI for staging, discussed surgery options; pt would like BCT, will make definitive plan after MRI  05.03.2022 Breast MRI  -Irregular 7.6 cm enhancing spiculated mass in the upper LEFT breast, consistent with the patient's known biopsy-proven invasive lobular carcinoma. The enhancement does extend to the nipple-areolar complex.  -No suspicious abnormality in the RIGHT breast.   BI-RADS Category: Overall: 6 - known biopsy-proven malignancy  05.06.2022 Follow up with [facility]  -Plan for Axillary U/S to assess LAD, PetCT  -HemOnc referral for NA therapy. Due to size of mass/location and nature of lobular cancer, was not felt that BCT was optimal unless NA therapy was done 1st   -2 week follow up to discuss further   05.13.2022 PetCT  -Mildly hypermetabolic irregular soft tissue within  the left breast in keeping with known lobular carcinoma. This mass is better appreciated on MRI of the breast.  -No definite evidence of metastatic disease.  Please note that FDG PET has has suboptimal sensitivity for certain histologies such as lobular and tubular carcinomas.  05.18.2022 Initial [facility] eval    12/1/22  MRI     Impression:  Left  Interval decreased enhancement of left breast malignancy (biopsy proven ILC).  Residual abnormal enhancement still spans 8.1 cm AP x 2.5 cm transverse and extends to the left nipple areolar complex.     Right  There is no MR evidence of malignancy.     BI-RADS Category:   Overall: 6 - Known Biopsy-Proven Malignancy    Final Pathologic Diagnosis 1.  Left axillary sentinel lymph node #1, hot and blue; count 299, excisional   biopsy: 1 lymph node, negative for metastatic carcinoma (0/1).          Confirmed by negative immunohistochemical staining with cytokeratins   AE1/AE3, cam 5.2 and wide spectrum keratin with          satisfactory positive and negative controls.   2.  Left axillary sentinel lymph node #2, hot and blue; count 652, excisional   biopsy: 1 lymph node, negative for metastatic carcinoma (0/1).          Confirmed by negative immunohistochemical staining with cytokeratins   AE1/AE3, cam 5.2 and wide spectrum keratin with          satisfactory positive and negative controls.   3.  Left breast, skin sparing mastecomy: Invasive lobular carcinoma,   measuring at least 51 mm (at least 5.1 cm) in greatest dimension (slide   measurement of largest          dimension).          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          Note:  Biopsy clip is grossly identified and biopsy site changes are   identified microscopically within area of tumor.          Margins:           - All margins are greater than 10 mm (greater than 1 cm) from   invasive tumor and LCIS.          Nipple and skin are present and uninvolved by tumor.  Note:  Tumor is   present in the soft  tissue underlying the nipple skin but          does not involve nipple epidermis.          Microcalcifications are present and associated with lobular carcinoma   in situ and invasive carcinoma.          Background breast tissue shows fibrocystic changes including apocrine   metaplasia and stromal fibrosis.          See synoptic report in comment section for further details.   4.  Right port-a-cath, removal: Gross diganosis:  Port-a-cath.         ypT3:  Tumor greater than 50 mm in greatest dimension.   Regional lymph nodes:        y(sn)pN0:  No regional lymph node metastasis identified.   Distant metastasis:     Oncology History:  Oncology History   Malignant neoplasm of central portion of left female breast   3/21/2022 Initial Diagnosis    Malignant neoplasm of central portion of left female breast     3/21/2022 Biopsy    Left Breast, Core Needle Biopsies:   - Invasive lobular carcinoma, well-differentiated .   - Bloom-Damon score (5/9):        - Tubular Formation: 3/3        - Nuclear Pleomorphism: 1/3        - Mitotic Activity: 1/3.   - Background of lobular carinoma in-situ (LCIS), nuclear grade 1.   - No perineural or lymphovascular invasion is identified.        3/21/2022 Breast Tumor Markers    Estrogen: Positive  Progesterone: Positive  HER2: Negative     5/27/2022 - 5/27/2022 Chemotherapy    This was considered, but patient did not undergo chemotherapy.   Treatment Summary   Plan Name: OP BREAST DOSE-DENSE AC-T (DOXORUBICIN CYCLOPHOSPHAMIDE Q2W FOLLOWED BY PACLITAXEL WEEKLY)  Treatment Goal: Curative  Status: Inactive  Start Date:   End Date:   Provider: Mitali Carlin MD  Chemotherapy: DOXOrubicin chemo injection 130 mg, 60 mg/m2, Intravenous, Clinic/HOD 1 time, 0 of 4 cycles  cyclophosphamide 600 mg/m2 = 1,300 mg in sodium chloride 0.9% 250 mL chemo infusion, 600 mg/m2, Intravenous, Clinic/HOD 1 time, 0 of 4 cycles  PACLitaxeL (TAXOL) 80 mg/m2 = 174 mg in sodium chloride 0.9% 250 mL chemo infusion,  80 mg/m2, Intravenous, Clinic/HOD 1 time, 0 of 12 cycles     6/7/2022 Cancer Staged    Staging form: Breast, AJCC 8th Edition  - Clinical: Stage IIA (cT3, cN0, cM0, G1, ER+, GA+, HER2-)     6/7/2022 Notable Event    Oncotype performed initially to assess for benefit of NACT to optimize for surgery but not enough tissue. Proceeded with AI x 6 months.      6/7/2022 Genetic Testing    [employer]: Negative                                           1/4/2023 Breast Surgery    Left Mastectomy with L SLNB.      1/24/2023 Tumor Conference    No definitive treatment response to neoadjuvant AI use on final surgical pathology. Oncotype will now be sent on the surgical specimen. Could include CDK46 inhibitor with her endocrine if Oncotype is low given some response to Anastrozole and large tumor size, but patient is node negative. May consider to simply change to different AI. Radiation Oncology would like to meet with patient to discuss XRT, but team does not feel strongly about her proceeding.        3/20/2023 - 4/20/2023 Radiation Therapy    Treatment Summary  Course: C1 Breast 2023  Treatment Site Energy Dose/Fx (Gy) #Fx Dose Correction (Gy) Total Dose (Gy) Start Date End Date Elapsed Days   Left chest wall scar 9E 2.5 4 / 4 0 10 4/17/2023 4/20/2023 3   Left chest wall 18X/6X 2.65 16 / 16 0 42.4 3/20/2023 4/12/2023 23            History:  Past Medical History:   Diagnosis Date    Bowel obstruction     Breast cancer 05/01/2022    left    Cancer     COVID-19 07/2022    Diabetes mellitus, type 2     Hyperlipidemia     Hypertension     Lobular carcinoma in situ 05/01/2022    left    Midline low back pain without sciatica 07/31/2015    Thyroid disease     Venous stasis     bilateral legs      Past Surgical History:   Procedure Laterality Date    BREAST BIOPSY Left 03/21/2022    Core bx, + ILC    CATARACT EXTRACTION      COLONOSCOPY      COLONOSCOPY N/A 12/05/2019    Procedure: COLONOSCOPY;  Surgeon: Luis Bogran-Reyes, MD;   Location[facility];  Service: Endoscopy;  Laterality: N/A;    HYSTERECTOMY  12/01/2021    INJECTION FOR SENTINEL NODE IDENTIFICATION Left 01/04/2023    Procedure: INJECTION, FOR SENTINEL NODE IDENTIFICATION;  Surgeon: Erika Valente MD;  Location: [facility];  Service: General;  Laterality: Left;    INSERTION OF TUNNELED CENTRAL VENOUS CATHETER (CVC) WITH SUBCUTANEOUS PORT Right 05/24/2022    Procedure: [id number];  Surgeon: Darien Bear MD;  Location: [facility];  Service: General;  Laterality: Right;    MASTECTOMY Left 01/04/2023    Procedure: MASTECTOMY;  Surgeon: Erika Valente MD;  Location: [facility];  Service: General;  Laterality: Left;    MASTECTOMY  1/4/2023    MEDIPORT REMOVAL Right 01/04/2023    Procedure: REMOVAL PORT;  Surgeon: Erika Valente MD;  Location: [facility];  Service: General;  Laterality: Right;    SENTINEL LYMPH NODE BIOPSY Left 01/04/2023    Procedure: BIOPSY, LYMPH NODE, SENTINEL;  Surgeon: Erika Valente MD;  Location: [facility];  Service: General;  Laterality: Left;    SHOULDER ARTHROSCOPY W/ ROTATOR CUFF REPAIR Right 2001    TUBAL LIGATION      VAGINAL HYSTERECTOMY N/A 01/11/2021    Procedure: HYSTERECTOMY, VAGINAL ;  Surgeon: Jessie Dacosta MD;  Location: [facility];  Service: OB/GYN;  Laterality: N/A;     Family History   Problem Relation Name Age of Onset    Diabetes Mother      Hypertension Mother      Arthritis Mother      COPD Father      Hypertension Sister      Diabetes Sister      Cancer Sister          PANCREATIC    Breast cancer Sister Bertha Borden      Social History     Tobacco Use    Smoking status: Never     Passive exposure: Never    Smokeless tobacco: Never   Substance and Sexual Activity    Alcohol use: No     Alcohol/week: 0.0 standard drinks of alcohol    Drug use: No    Sexual activity: Not Currently     Partners: Male     Birth control/protection: None, See Surgical Hx        ROS:   Review of Systems   Constitutional:  Negative for chills, fever and  "malaise/fatigue.   Respiratory:  Negative for cough and shortness of breath.    Cardiovascular:  Negative for chest pain.   Gastrointestinal:  Negative for abdominal pain and diarrhea.   Genitourinary:  Negative for frequency.   Musculoskeletal:  Negative for back pain.        Right shoulder pain    Skin:  Negative for rash.   Neurological:  Negative for dizziness, tingling, weakness and headaches.   Psychiatric/Behavioral:  The patient is not nervous/anxious.           Objective:     Vitals:    07/09/25 0751   BP: (!) 148/71   Pulse: 67   Resp: 16   Temp: 98.1 °F (36.7 °C)   TempSrc: Oral   SpO2: 100%   Weight: 101.6 kg (223 lb 15.8 oz)   Height: 5' 7" (1.702 m)   PainSc: 0-No pain         Wt Readings from Last 10 Encounters:   06/26/25 103.8 kg (228 lb 13.4 oz)   06/13/25 104.2 kg (229 lb 13.3 oz)   03/12/25 108.7 kg (239 lb 8.5 oz)   01/08/25 108 kg (238 lb)   10/27/24 108.4 kg (239 lb)   10/07/24 108.4 kg (239 lb)   08/15/24 107.3 kg (236 lb 10.6 oz)   08/10/24 104 kg (229 lb 4.5 oz)   08/04/24 104.3 kg (229 lb 15 oz)   07/21/24 104.3 kg (230 lb)       Physical Examination:   Physical Exam  Vitals and nursing note reviewed.   Constitutional:       General: She is not in acute distress.     Appearance: Normal appearance. She is well-developed.   HENT:      Head: Normocephalic.   Eyes:      Pupils: Pupils are equal, round, and reactive to light.   Cardiovascular:      Rate and Rhythm: Normal rate and regular rhythm.      Heart sounds: Normal heart sounds.   Pulmonary:      Effort: Pulmonary effort is normal.      Breath sounds: Normal breath sounds.   Chest:   Breasts:     Right: Normal.      Left: Absent.   Abdominal:      General: Bowel sounds are normal. There is no distension.      Palpations: Abdomen is soft.      Tenderness: There is no abdominal tenderness.   Musculoskeletal:      Cervical back: Normal range of motion.   Lymphadenopathy:      Cervical: No cervical adenopathy.      Upper Body:      Right " upper body: No supraclavicular or axillary adenopathy.      Left upper body: No supraclavicular or axillary adenopathy.   Skin:     General: Skin is warm and dry.      Findings: No rash.   Neurological:      Mental Status: She is alert and oriented to person, place, and time.   Psychiatric:         Behavior: Behavior normal.           Diagnostic Tests:  Significant Imaging: I have reviewed and interpreted all pertinent imaging results/findings.    Laboratory Data:  All pertinent labs have been reviewed.  Labs:   Lab Results   Component Value Date    WBC 5.43 03/25/2024    RBC 3.92 (L) 03/25/2024    HGB 11.5 (L) 03/25/2024    HCT 36.9 (L) 03/25/2024    MCV 94 03/25/2024     03/25/2024     (H) 03/12/2025     03/12/2025    K 4.2 03/12/2025    BUN 23 03/12/2025    CREATININE 0.8 03/12/2025    AST 14 08/16/2024    ALT 11 08/16/2024    BILITOT 0.4 08/16/2024       Assessment/Plan:   Malignant neoplasm of central portion of left breast in female, estrogen receptor positive  cT3N0, ER 95%, PR15%, Her 2 rhina negative, Grade 1, ki 67 10%     ypT3 yp (sn) N0   Oncotype low risk, 13     She was interested in breast conservation and received 6 months of neoadjuvant anastrozole.   Follow up MRI showed some response but persistent large area of enhancement with extension to the nipple areolar complex. Mastectomy was recommended.   Final pathology and TB recommendations were discussed. There was 5 cm of ILC, node negative, no significant treatment response, G1, Ki 67 stable at 10%.  Per guidelines, oncotype was sent, is 13, low risk, no benefit of chemotherapy.     She has completed adjuvant RT.     At this time, continue anastrozole. Mammogram negative from March, repeat in 1 year.    DEXA normal at baseline. continue calcium and vitamin D. Repeat in June 2026    Primary Hypertension   Compliant with current medications.  Continue PMD follow up.       Discussion:   No follow-ups on file.    Plan was discussed  with the patient at length, and she verbalized understanding. Kee was given an opportunity to ask questions that were answered to her satisfaction, and she was advised to call in the interval if any problems or questions arise.    Return to clinic in 6 months with KAYLYN appointment.     Patient is in agreement with the proposed treatment plan. All questions were answered to the patient's satisfaction. Patient knows to call clinic for any new or worsening symptoms and if anything is needed before the next clinic visit.          KRISS Jean Baptiste-SPENSER  Hematology & Medical Oncology   [facility] [place] [address]  ph. [phone]  Fax. [phone]    Collaborating physician, Dr. Abraham.    Approximately 10 minutes were spent face-to-face with the patient.  Approximately 20 minutes in total were spent on this encounter, which includes face-to-face time and non-face-to-face time preparing to see the patient (e.g., review of tests), obtaining and/or reviewing separately obtained history, documenting clinical information in the electronic or other health record, independently interpreting results (not separately reported) and communicating results to the patient/family/caregiver, or care coordination (not separately reported).         Med Onc Chart Routing      Follow up with physician    Follow up with KAYLYN 6 months.   Infusion scheduling note    Injection scheduling note    Labs    Imaging    Pharmacy appointment    Other referrals                Treatment Plan Information   OP ANASTROZOLE DAILY Katia Goldstein NP   Associated diagnosis: Malignant neoplasm of central portion of left female breast Stage IIA cT3, cN0, cM0, G1, ER+, DC+, HER2- noted on 5/6/2022   Line of treatment: Neoadjuvant  Treatment Goal: Curative     Upcoming Treatment Dates - OP ANASTROZOLE DAILY    6/24/2022       Antiemetics       Physician communication order       Take Home Chemotherapy       anastrozole (ARIMIDEX) 1 mg  Tab      MDM includes  :    - Acute or chronic illness or injury that poses a threat to life or bodily function  - Independent review and explanation of 3+ results from unique tests  - Discussion of management and ordering 3+ unique tests  - Extensive discussion of treatment and management  - Prescription drug management  - Drug therapy requiring intensive monitoring for toxicity

## 2025-07-09 ENCOUNTER — OFFICE VISIT (OUTPATIENT)
Dept: HEMATOLOGY/ONCOLOGY | Facility: CLINIC | Age: 80
End: 2025-07-09
Payer: MEDICARE

## 2025-07-09 VITALS
BODY MASS INDEX: 35.16 KG/M2 | OXYGEN SATURATION: 100 % | TEMPERATURE: 98 F | HEART RATE: 67 BPM | DIASTOLIC BLOOD PRESSURE: 71 MMHG | SYSTOLIC BLOOD PRESSURE: 148 MMHG | WEIGHT: 224 LBS | HEIGHT: 67 IN | RESPIRATION RATE: 16 BRPM

## 2025-07-09 DIAGNOSIS — Z79.811 USE OF ANASTROZOLE: ICD-10-CM

## 2025-07-09 DIAGNOSIS — C50.112 MALIGNANT NEOPLASM OF CENTRAL PORTION OF LEFT BREAST IN FEMALE, ESTROGEN RECEPTOR POSITIVE: Primary | ICD-10-CM

## 2025-07-09 DIAGNOSIS — E78.2 MIXED HYPERLIPIDEMIA: ICD-10-CM

## 2025-07-09 DIAGNOSIS — I10 PRIMARY HYPERTENSION: ICD-10-CM

## 2025-07-09 DIAGNOSIS — Z17.0 MALIGNANT NEOPLASM OF CENTRAL PORTION OF LEFT BREAST IN FEMALE, ESTROGEN RECEPTOR POSITIVE: Primary | ICD-10-CM

## 2025-07-09 PROCEDURE — 99999 PR PBB SHADOW E&M-EST. PATIENT-LVL V: CPT | Mod: PBBFAC,,, | Performed by: NURSE PRACTITIONER

## 2025-08-18 DIAGNOSIS — E07.9 THYROID DISEASE: ICD-10-CM

## 2025-08-20 RX ORDER — LEVOTHYROXINE SODIUM 112 UG/1
112 TABLET ORAL
Qty: 90 TABLET | Refills: 1 | Status: SHIPPED | OUTPATIENT
Start: 2025-08-20

## 2025-08-23 DIAGNOSIS — I10 PRIMARY HYPERTENSION: ICD-10-CM

## 2025-08-27 RX ORDER — AMLODIPINE BESYLATE 10 MG/1
10 TABLET ORAL
Qty: 90 TABLET | Refills: 3 | Status: SHIPPED | OUTPATIENT
Start: 2025-08-27

## (undated) DEVICE — DRAPE THREE-QTR REINF 53X77IN

## (undated) DEVICE — DRAIN CHANNEL ROUND 19FR

## (undated) DEVICE — ADHESIVE DERMABOND ADVANCED

## (undated) DEVICE — UNDERGLOVES BIOGEL PI SZ 7 LF

## (undated) DEVICE — NDL SAFETY 22G X 1.5 ECLIPSE

## (undated) DEVICE — APPLICATOR CHLORAPREP ORN 26ML

## (undated) DEVICE — DRESSING TRANS 4X10 TEGADERM

## (undated) DEVICE — DRAPE STERI INSTRUMENT 1018

## (undated) DEVICE — SOL WATER STRL IRR 1000ML

## (undated) DEVICE — TOWEL OR DISP STRL BLUE 4/PK

## (undated) DEVICE — EVACUATOR WOUND BULB 100CC

## (undated) DEVICE — GLOVE BIOGEL SKINSENSE PI 6.5

## (undated) DEVICE — Device

## (undated) DEVICE — ELECTRODE BLADE INSULATED 1 IN

## (undated) DEVICE — POSITIONER IV ARMBOARD FOAM

## (undated) DEVICE — SUT VICRYL PLUS 4-0 PS2 27

## (undated) DEVICE — SPONGE LAP 18X18 PREWASHED

## (undated) DEVICE — SKINMARKER W/RULER DEVON

## (undated) DEVICE — CONTAINER SPECIMEN OR STER 4OZ

## (undated) DEVICE — SPONGE SUPER KERLIX 6X6.75IN

## (undated) DEVICE — SYR 10CC LUER LOCK

## (undated) DEVICE — APPLIER CLIP LIAGCLIP 9.375IN

## (undated) DEVICE — ELECTRODE REM PLYHSV RETURN 9

## (undated) DEVICE — GOWN NONREINF SET-IN SLV XL

## (undated) DEVICE — SUT VICRYL 3-0 27 SH

## (undated) DEVICE — SUT 2/0 30IN SILK BLK BRAI

## (undated) DEVICE — SUT ETHILON 3-0 FS-1 30